# Patient Record
Sex: FEMALE | Race: WHITE | NOT HISPANIC OR LATINO | Employment: OTHER | URBAN - METROPOLITAN AREA
[De-identification: names, ages, dates, MRNs, and addresses within clinical notes are randomized per-mention and may not be internally consistent; named-entity substitution may affect disease eponyms.]

---

## 2017-12-13 ENCOUNTER — APPOINTMENT (OUTPATIENT)
Dept: WOMENS IMAGING | Facility: HOSPITAL | Age: 62
End: 2017-12-13

## 2017-12-13 PROCEDURE — 77063 BREAST TOMOSYNTHESIS BI: CPT | Performed by: RADIOLOGY

## 2017-12-13 PROCEDURE — G0202 SCR MAMMO BI INCL CAD: HCPCS | Performed by: RADIOLOGY

## 2017-12-13 PROCEDURE — 77067 SCR MAMMO BI INCL CAD: CPT | Performed by: RADIOLOGY

## 2017-12-13 PROCEDURE — MDREVIEWSP: Performed by: RADIOLOGY

## 2018-12-14 ENCOUNTER — APPOINTMENT (OUTPATIENT)
Dept: WOMENS IMAGING | Facility: HOSPITAL | Age: 63
End: 2018-12-14

## 2018-12-14 PROCEDURE — MDREVIEWSP: Performed by: RADIOLOGY

## 2018-12-14 PROCEDURE — 77067 SCR MAMMO BI INCL CAD: CPT | Performed by: RADIOLOGY

## 2018-12-14 PROCEDURE — 77063 BREAST TOMOSYNTHESIS BI: CPT | Performed by: RADIOLOGY

## 2019-01-02 ENCOUNTER — TELEPHONE (OUTPATIENT)
Dept: GASTROENTEROLOGY | Facility: CLINIC | Age: 64
End: 2019-01-02

## 2019-01-02 NOTE — TELEPHONE ENCOUNTER
FAST TRACK (IN MEDIA) - RECALL COLONOSCOPY - LAST COON 12/2013 - PERSONAL HX POLYPS & FAMILY HX POLYPS, DAD - [facility].

## 2019-01-03 NOTE — TELEPHONE ENCOUNTER
WANTED YOU TO KNOW.  SLEEPS ON HER RIGHT SIDE.  WAKES UP WITH ROLLING ABD PAIN THAT GOES AWAY SLOWING.

## 2019-01-10 ENCOUNTER — PREP FOR SURGERY (OUTPATIENT)
Dept: OTHER | Facility: HOSPITAL | Age: 64
End: 2019-01-10

## 2019-01-10 DIAGNOSIS — D12.6 ADENOMATOUS POLYP OF COLON, UNSPECIFIED PART OF COLON: Primary | ICD-10-CM

## 2019-01-11 PROBLEM — D12.6 ADENOMATOUS POLYP OF COLON: Status: ACTIVE | Noted: 2019-01-11

## 2019-01-11 NOTE — TELEPHONE ENCOUNTER
SCHEDULED [facility] 03/27/2019 AT PM - ARRIVE 12PM NOON.  E-MAIL INSTRUCTIONS [email or web address].

## 2019-03-26 ENCOUNTER — ANESTHESIA EVENT (OUTPATIENT)
Dept: PERIOP | Facility: HOSPITAL | Age: 64
End: 2019-03-26

## 2019-03-27 ENCOUNTER — HOSPITAL ENCOUNTER (OUTPATIENT)
Facility: HOSPITAL | Age: 64
Setting detail: HOSPITAL OUTPATIENT SURGERY
Discharge: HOME | End: 2019-03-27
Attending: INTERNAL MEDICINE | Admitting: INTERNAL MEDICINE

## 2019-03-27 ENCOUNTER — ANESTHESIA (OUTPATIENT)
Dept: PERIOP | Facility: HOSPITAL | Age: 64
End: 2019-03-27

## 2019-03-27 VITALS
RESPIRATION RATE: 16 BRPM | TEMPERATURE: 97.5 F | SYSTOLIC BLOOD PRESSURE: 165 MMHG | OXYGEN SATURATION: 98 % | HEART RATE: 82 BPM | WEIGHT: 156.2 LBS | HEIGHT: 62 IN | DIASTOLIC BLOOD PRESSURE: 88 MMHG | BODY MASS INDEX: 28.74 KG/M2

## 2019-03-27 DIAGNOSIS — D12.6 ADENOMATOUS POLYP OF COLON, UNSPECIFIED PART OF COLON: ICD-10-CM

## 2019-03-27 PROCEDURE — 25010000002 PROPOFOL 10 MG/ML EMULSION: Performed by: NURSE ANESTHETIST, CERTIFIED REGISTERED

## 2019-03-27 PROCEDURE — 45385 COLONOSCOPY W/LESION REMOVAL: CPT | Performed by: INTERNAL MEDICINE

## 2019-03-27 PROCEDURE — 45380 COLONOSCOPY AND BIOPSY: CPT | Performed by: INTERNAL MEDICINE

## 2019-03-27 PROCEDURE — 88305 TISSUE EXAM BY PATHOLOGIST: CPT | Performed by: INTERNAL MEDICINE

## 2019-03-27 RX ORDER — LIDOCAINE HYDROCHLORIDE 10 MG/ML
INJECTION, SOLUTION INFILTRATION; PERINEURAL AS NEEDED
Status: DISCONTINUED | OUTPATIENT
Start: 2019-03-27 | End: 2019-03-27 | Stop reason: SURG

## 2019-03-27 RX ORDER — SODIUM CHLORIDE 0.9 % (FLUSH) 0.9 %
3 SYRINGE (ML) INJECTION EVERY 12 HOURS SCHEDULED
Status: DISCONTINUED | OUTPATIENT
Start: 2019-03-27 | End: 2019-03-27 | Stop reason: HOSPADM

## 2019-03-27 RX ORDER — SODIUM CHLORIDE 9 MG/ML
40 INJECTION, SOLUTION INTRAVENOUS AS NEEDED
Status: DISCONTINUED | OUTPATIENT
Start: 2019-03-27 | End: 2019-03-27 | Stop reason: HOSPADM

## 2019-03-27 RX ORDER — MEPERIDINE HYDROCHLORIDE 25 MG/ML
12.5 INJECTION INTRAMUSCULAR; INTRAVENOUS; SUBCUTANEOUS
Status: CANCELLED | OUTPATIENT
Start: 2019-03-27 | End: 2019-03-28

## 2019-03-27 RX ORDER — CHLORAL HYDRATE 500 MG
1000 CAPSULE ORAL
COMMUNITY

## 2019-03-27 RX ORDER — LEVOTHYROXINE SODIUM 0.07 MG/1
75 TABLET ORAL DAILY
COMMUNITY
End: 2021-03-18

## 2019-03-27 RX ORDER — ONDANSETRON 2 MG/ML
4 INJECTION INTRAMUSCULAR; INTRAVENOUS ONCE AS NEEDED
Status: CANCELLED | OUTPATIENT
Start: 2019-03-27

## 2019-03-27 RX ORDER — SODIUM CHLORIDE, SODIUM LACTATE, POTASSIUM CHLORIDE, CALCIUM CHLORIDE 600; 310; 30; 20 MG/100ML; MG/100ML; MG/100ML; MG/100ML
100 INJECTION, SOLUTION INTRAVENOUS CONTINUOUS
Status: CANCELLED | OUTPATIENT
Start: 2019-03-27

## 2019-03-27 RX ORDER — SODIUM CHLORIDE, SODIUM LACTATE, POTASSIUM CHLORIDE, CALCIUM CHLORIDE 600; 310; 30; 20 MG/100ML; MG/100ML; MG/100ML; MG/100ML
9 INJECTION, SOLUTION INTRAVENOUS CONTINUOUS
Status: DISCONTINUED | OUTPATIENT
Start: 2019-03-27 | End: 2019-03-27 | Stop reason: HOSPADM

## 2019-03-27 RX ORDER — PHENOL 1.4 %
600 AEROSOL, SPRAY (ML) MUCOUS MEMBRANE DAILY
COMMUNITY

## 2019-03-27 RX ORDER — SODIUM CHLORIDE 0.9 % (FLUSH) 0.9 %
1-10 SYRINGE (ML) INJECTION AS NEEDED
Status: DISCONTINUED | OUTPATIENT
Start: 2019-03-27 | End: 2019-03-27 | Stop reason: HOSPADM

## 2019-03-27 RX ORDER — PROPOFOL 10 MG/ML
VIAL (ML) INTRAVENOUS AS NEEDED
Status: DISCONTINUED | OUTPATIENT
Start: 2019-03-27 | End: 2019-03-27 | Stop reason: SURG

## 2019-03-27 RX ORDER — LIDOCAINE HYDROCHLORIDE 10 MG/ML
0.5 INJECTION, SOLUTION EPIDURAL; INFILTRATION; INTRACAUDAL; PERINEURAL ONCE AS NEEDED
Status: COMPLETED | OUTPATIENT
Start: 2019-03-27 | End: 2019-03-27

## 2019-03-27 RX ADMIN — PROPOFOL 80 MG: 10 INJECTION, EMULSION INTRAVENOUS at 13:31

## 2019-03-27 RX ADMIN — LIDOCAINE HYDROCHLORIDE 0.1 ML: 10 INJECTION, SOLUTION EPIDURAL; INFILTRATION; INTRACAUDAL; PERINEURAL at 12:40

## 2019-03-27 RX ADMIN — PROPOFOL 40 MG: 10 INJECTION, EMULSION INTRAVENOUS at 13:41

## 2019-03-27 RX ADMIN — PROPOFOL 50 MG: 10 INJECTION, EMULSION INTRAVENOUS at 13:43

## 2019-03-27 RX ADMIN — LIDOCAINE HYDROCHLORIDE 30 MG: 10 INJECTION, SOLUTION INFILTRATION; PERINEURAL at 13:31

## 2019-03-27 RX ADMIN — PROPOFOL 40 MG: 10 INJECTION, EMULSION INTRAVENOUS at 13:37

## 2019-03-27 RX ADMIN — PROPOFOL 50 MG: 10 INJECTION, EMULSION INTRAVENOUS at 13:45

## 2019-03-27 RX ADMIN — PROPOFOL 40 MG: 10 INJECTION, EMULSION INTRAVENOUS at 13:34

## 2019-03-27 RX ADMIN — SODIUM CHLORIDE, POTASSIUM CHLORIDE, SODIUM LACTATE AND CALCIUM CHLORIDE 9 ML/HR: 600; 310; 30; 20 INJECTION, SOLUTION INTRAVENOUS at 12:40

## 2019-03-27 NOTE — ANESTHESIA POSTPROCEDURE EVALUATION
Patient: Yesi Benson    Procedure Summary     Date:  03/27/19 Room / Location:  [facility] ENDOSCOPY 1 /  [facility]    Anesthesia Start:  1322 Anesthesia Stop:  1357    Procedure:  COLONOSCOPY POLYPECTOMY (N/A ) Diagnosis:       Adenomatous polyp of colon, unspecified part of colon      (Adenomatous polyp of colon, unspecified part of colon [D12.6])    Surgeon:  Jaime Alaniz MD Provider:  Segun Benjamin CRNA    Anesthesia Type:  MAC ASA Status:  2          Anesthesia Type: MAC  Last vitals  BP   157/91 (03/27/19 1410)   Temp   97.5 °F (36.4 °C) (03/27/19 1400)   Pulse   83 (03/27/19 1410)   Resp   16 (03/27/19 1410)     SpO2   97 % (03/27/19 1410)     Post Anesthesia Care and Evaluation    Patient location during evaluation: bedside  Patient participation: complete - patient participated  Level of consciousness: awake  Pain score: 0  Pain management: adequate  Airway patency: patent  Anesthetic complications: No anesthetic complications  PONV Status: none  Cardiovascular status: acceptable  Respiratory status: acceptable  Hydration status: acceptable

## 2019-03-27 NOTE — ANESTHESIA PREPROCEDURE EVALUATION
Anesthesia Evaluation     Patient summary reviewed and Nursing notes reviewed   NPO Solid Status: > 8 hours  NPO Liquid Status: > 4 hours           Airway   Mallampati: II  TM distance: >3 FB  Neck ROM: full  No difficulty expected  Dental          Pulmonary - negative pulmonary ROS    breath sounds clear to auscultation  Cardiovascular - negative cardio ROS  Exercise tolerance: good (4-7 METS)    Rhythm: regular  Rate: normal        Neuro/Psych- negative ROS  GI/Hepatic/Renal/Endo    (+)   hypothyroidism,     Musculoskeletal (-) negative ROS    Abdominal    Substance History - negative use     OB/GYN          Other - negative ROS       ROS/Med Hx Other: Last of prep at 0700      Phys Exam Other: bridge                Anesthesia Plan    ASA 2     MAC     intravenous induction   Anesthetic plan, all risks, benefits, and alternatives have been provided, discussed and informed consent has been obtained with: patient.  Use of blood products discussed with patient  Consented to blood products.   Plan discussed with CRNA.

## 2019-03-29 LAB
CYTO UR: NORMAL
LAB AP CASE REPORT: NORMAL
LAB AP CLINICAL INFORMATION: NORMAL
PATH REPORT.FINAL DX SPEC: NORMAL
PATH REPORT.GROSS SPEC: NORMAL

## 2019-07-02 ENCOUNTER — APPOINTMENT (OUTPATIENT)
Dept: WOMENS IMAGING | Facility: HOSPITAL | Age: 64
End: 2019-07-02

## 2019-07-02 PROCEDURE — MDREVIEWSP: Performed by: RADIOLOGY

## 2019-07-02 PROCEDURE — 76641 ULTRASOUND BREAST COMPLETE: CPT | Performed by: RADIOLOGY

## 2019-09-25 ENCOUNTER — OFFICE VISIT (OUTPATIENT)
Dept: ORTHOPEDIC SURGERY | Facility: CLINIC | Age: 64
End: 2019-09-25

## 2019-09-25 VITALS
SYSTOLIC BLOOD PRESSURE: 130 MMHG | WEIGHT: 155 LBS | DIASTOLIC BLOOD PRESSURE: 86 MMHG | HEIGHT: 62 IN | BODY MASS INDEX: 28.52 KG/M2 | HEART RATE: 73 BPM

## 2019-09-25 DIAGNOSIS — R52 PAIN: Primary | ICD-10-CM

## 2019-09-25 DIAGNOSIS — M25.461 EFFUSION, RIGHT KNEE: ICD-10-CM

## 2019-09-25 DIAGNOSIS — M17.11 PRIMARY OSTEOARTHRITIS OF RIGHT KNEE: ICD-10-CM

## 2019-09-25 PROCEDURE — 73562 X-RAY EXAM OF KNEE 3: CPT | Performed by: NURSE PRACTITIONER

## 2019-09-25 PROCEDURE — 99203 OFFICE O/P NEW LOW 30 MIN: CPT | Performed by: NURSE PRACTITIONER

## 2019-09-25 PROCEDURE — 20610 DRAIN/INJ JOINT/BURSA W/O US: CPT | Performed by: NURSE PRACTITIONER

## 2019-09-25 RX ORDER — TRIAMCINOLONE ACETONIDE 40 MG/ML
80 INJECTION, SUSPENSION INTRA-ARTICULAR; INTRAMUSCULAR
Status: COMPLETED | OUTPATIENT
Start: 2019-09-25 | End: 2019-09-25

## 2019-09-25 RX ORDER — LIDOCAINE HYDROCHLORIDE 10 MG/ML
8 INJECTION, SOLUTION EPIDURAL; INFILTRATION; INTRACAUDAL; PERINEURAL
Status: COMPLETED | OUTPATIENT
Start: 2019-09-25 | End: 2019-09-25

## 2019-09-25 RX ADMIN — TRIAMCINOLONE ACETONIDE 80 MG: 40 INJECTION, SUSPENSION INTRA-ARTICULAR; INTRAMUSCULAR at 13:50

## 2019-09-25 RX ADMIN — LIDOCAINE HYDROCHLORIDE 8 ML: 10 INJECTION, SOLUTION EPIDURAL; INFILTRATION; INTRACAUDAL; PERINEURAL at 13:50

## 2019-09-25 NOTE — PROGRESS NOTES
Subjective:     Patient ID: Yesi Benson is a 64 y.o. female.    Chief Complaint:  Right knee pain   History of Present Illness  Yesi Benson 64-year-old female who presents to clinic with a 6-week history of pain at the right knee.  On 8/17/2019 she was walking long distances and hills when she began noticing pain.  Over the last 6 weeks pain has improved just slightly but not back to 100%.  Rates discomfort today at a 5-6 out of a 10 mainly aching throbbing in nature.  Increased pain noted with ambulating long distances, standing for extended periods of time, ascending descending steps, transitional changes such as from seated to standing attempting to walk, activities involving deep flexion.  Denies any is locking, catching or giving way.  She denies previously experiencing pain similar to what she is currently experiencing.  Denies known injury in the past.  She is tried applying ice for symptom relief again has helped minimally.  She has taken ibuprofen with mild symptom relief.  Prior to onset of pain she was able to complete exercise regimen on both treadmill and elliptical however has had to avoid the elliptical over the last several weeks since she began experience in pain.  She denies previous x-ray, MRI or CT imaging of the knee in the past.  Denies presence of numbness or tingling radiating down the right lower extremity.  Denies other concerns present at this time.    Social History     Occupational History   • Not on file   Tobacco Use   • Smoking status: Never Smoker   Substance and Sexual Activity   • Alcohol use: Not on file   • Drug use: Not on file   • Sexual activity: Not on file      Past Medical History:   Diagnosis Date   • Colon polyp    • Disease of thyroid gland      Past Surgical History:   Procedure Laterality Date   • COLONOSCOPY N/A 3/27/2019    Procedure: COLONOSCOPY POLYPECTOMY;  Surgeon: Jaime Alaniz MD;  Location: [facility];  Service: Gastroenterology   • DENTAL  "PROCEDURE Left 2011,2014   • SALPINGO OOPHORECTOMY Left        No family history on file.      Review of Systems   Constitutional: Negative for chills, diaphoresis, fever and unexpected weight change.   HENT: Negative for hearing loss, nosebleeds, sore throat and tinnitus.    Eyes: Negative for pain and visual disturbance.   Respiratory: Negative for cough, shortness of breath and wheezing.    Cardiovascular: Negative for chest pain and palpitations.   Gastrointestinal: Negative for abdominal pain, diarrhea, nausea and vomiting.   Endocrine: Negative for cold intolerance, heat intolerance and polydipsia.   Genitourinary: Negative for difficulty urinating, dysuria and hematuria.   Musculoskeletal: Positive for arthralgias and myalgias. Negative for joint swelling.   Skin: Negative for rash and wound.   Allergic/Immunologic: Negative for environmental allergies.   Neurological: Negative for dizziness, syncope and numbness.   Hematological: Does not bruise/bleed easily.   Psychiatric/Behavioral: Negative for dysphoric mood and sleep disturbance. The patient is not nervous/anxious.            Objective:  Physical Exam    Vital signs reviewed.   General: No acute distress.  Eyes: conjunctiva clear; pupils equally round and reactive  ENT: external ears and nose atraumatic; oropharynx clear  CV: no peripheral edema  Resp: normal respiratory effort  Skin: no rashes or wounds; normal turgor  Psych: mood and affect appropriate; recent and remote memory intact    Vitals:    09/25/19 1311   BP: 130/86   BP Location: Right arm   Patient Position: Sitting   Cuff Size: Adult   Pulse: 73   Weight: 70.3 kg (155 lb)   Height: 157.5 cm (62\")         09/25/19  1311   Weight: 70.3 kg (155 lb)     Body mass index is 28.35 kg/m².      Right Knee Exam     Tenderness   The patient is experiencing tenderness in the lateral joint line and medial joint line.    Range of Motion   Extension: 0   Flexion: 120     Tests   Jae:  Medial - " negative Lateral - negative  Varus: negative Valgus: negative  Lachman:  Anterior - 1+    Posterior - negative  Drawer:  Anterior - negative    Posterior - negative  Patellar apprehension: negative    Other   Erythema: absent  Sensation: normal  Pulse: present  Swelling: moderate  Effusion: effusion present    Comments:  Positive crepitus throughout arc of motion  Negative active patellar compression test                  Imaging:  Right Knee X-Ray  Indication: Pain    AP, Lateral, and [place] views    Findings:  No fracture  No bony lesion  Normal soft tissues  Patellofemoral joint narrowing with near bone-on-bone articulation  No prior studies were available for comparison.    Assessment:        1. Pain    2. Primary osteoarthritis of right knee    3. Effusion, right knee           Plan:  1.  Discussed plan of care with patient.  Wish to proceed with arthrocentesis corticosteroid injection of the right knee.  Plan to see her back in approximate 4 weeks to reevaluate.  Discussed with patient we will start her on diclofenac 50 mg 1 tablet in the morning with Tylenol.  Encouraged her to discontinue use of any over-the-counter NSAIDs including ibuprofen and Aleve may take Tylenol.  2.  Discussed with her to continue exercise regimen when the pain has decreased and she is able to continue with all previously tolerated activities.  Discussed with her there is a Baker's cyst in the posterior aspect of the knee which may rupture increase the pain.  If this does occur encouraged her to apply ice to the posterior aspect of the knee.  Encouraged for her to call with any concerns or questions she may have.  Patient verbalized understanding wants to make sugars Medicare.  Denies other concerns present at this time.    Large Joint Arthrocentesis: R knee  Date/Time: 9/25/2019 1:50 PM  Consent given by: patient  Site marked: site marked  Timeout: Immediately prior to procedure a time out was called to verify the correct patient,  procedure, equipment, support staff and site/side marked as required   Supporting Documentation  Indications: pain   Procedure Details  Location: knee - R knee  Preparation: Patient was prepped and draped in the usual sterile fashion  Needle size: 22 G  Approach: anterolateral  Medications administered: 8 mL lidocaine PF 1% 1 %; 80 mg triamcinolone acetonide 40 MG/ML  Aspirate amount: 10 mL  Aspirate: yellow  Patient tolerance: patient tolerated the procedure well with no immediate complications            Orders:  Orders Placed This Encounter   Procedures   • Large Joint Arthrocentesis: R knee   • XR Knee 3+ View With [place] Right       I ordered and reviewed the FARIDA today.     Dictated utilizing Dragon dictation

## 2019-10-23 ENCOUNTER — OFFICE VISIT (OUTPATIENT)
Dept: ORTHOPEDIC SURGERY | Facility: CLINIC | Age: 64
End: 2019-10-23

## 2019-10-23 DIAGNOSIS — M17.11 PRIMARY OSTEOARTHRITIS OF RIGHT KNEE: Primary | ICD-10-CM

## 2019-10-23 PROCEDURE — 99213 OFFICE O/P EST LOW 20 MIN: CPT | Performed by: NURSE PRACTITIONER

## 2019-10-23 NOTE — PROGRESS NOTES
Subjective:     Patient ID: Yesi Benson is a 64 y.o. female.    Chief Complaint:  Follow-up primary osteoarthritis right knee  History of Present Illness  Yesi Benson presents back to clinic for follow-up right knee.  Arthrocentesis corticosteroid injection followed by starting her on diclofenac approximately 4 weeks ago states the 1-1/2-week ago felt a sudden pop in the posterior aspect of the knee and ever since that time pain has been significantly better.  She has been able to fully extend and flex the knee without discomfort.  States the pain is approximately 85 to 90% better and she has returned to treadmill activities however has been very cautious for fear of pain at the knee.  Maximal tenderness continues to be present the anterior aspect but also on occasion the medial and lateral joint line.  Denies that the knee is locking, catching or giving out on her.  Rates discomfort today to 4 out of a 10 aching in nature, time the pain is wax and wane.  Denies locking, catching or giving way.  Denies presence of numbness or tingling right lower extremity.  Denies other concerns present at this time.    Social History     Occupational History   • Not on file   Tobacco Use   • Smoking status: Never Smoker   Substance and Sexual Activity   • Alcohol use: Not on file   • Drug use: Not on file   • Sexual activity: Not on file      Past Medical History:   Diagnosis Date   • Colon polyp    • Disease of thyroid gland      Past Surgical History:   Procedure Laterality Date   • COLONOSCOPY N/A 3/27/2019    Procedure: COLONOSCOPY POLYPECTOMY;  Surgeon: Jaime Alaniz MD;  Location: [facility];  Service: Gastroenterology   • DENTAL PROCEDURE Left 2011,2014   • SALPINGO OOPHORECTOMY Left        No family history on file.      Review of Systems   Constitutional: Negative for chills, diaphoresis, fever and unexpected weight change.   HENT: Negative for hearing loss, nosebleeds, sore throat and tinnitus.    Eyes:  Negative for pain and visual disturbance.   Respiratory: Negative for cough, shortness of breath and wheezing.    Cardiovascular: Negative for chest pain and palpitations.   Gastrointestinal: Negative for abdominal pain, diarrhea, nausea and vomiting.   Endocrine: Negative for cold intolerance, heat intolerance and polydipsia.   Genitourinary: Negative for difficulty urinating, dysuria and hematuria.   Musculoskeletal: Positive for arthralgias. Negative for joint swelling and myalgias.   Skin: Negative for rash and wound.   Allergic/Immunologic: Negative for environmental allergies.   Neurological: Negative for dizziness, syncope and numbness.   Hematological: Does not bruise/bleed easily.   Psychiatric/Behavioral: Negative for dysphoric mood and sleep disturbance. The patient is not nervous/anxious.    All other systems reviewed and are negative.          Objective:  Physical Exam    Vital signs reviewed.   General: No acute distress.  Eyes: conjunctiva clear; pupils equally round and reactive  ENT: external ears and nose atraumatic; oropharynx clear  CV: no peripheral edema  Resp: normal respiratory effort  Skin: no rashes or wounds; normal turgor  Psych: mood and affect appropriate; recent and remote memory intact    There were no vitals filed for this visit.  There were no vitals filed for this visit.  There is no height or weight on file to calculate BMI.      Right Knee Exam     Tenderness   The patient is experiencing tenderness in the patella.    Range of Motion   Extension: 0   Flexion: 130     Tests   Jae:  Medial - negative Lateral - negative  Varus: negative Valgus: negative  Lachman:  Anterior - 1+    Posterior - negative  Drawer:  Anterior - negative      Patellar apprehension: negative    Other   Erythema: absent  Sensation: normal  Pulse: present  Swelling: mild  Effusion: no effusion present    Comments:  Positive crepitus throughout arc of motion  Negative active patellar compression test              Assessment:        1. Primary osteoarthritis of right knee           Plan:  1.  Discussed plan of care with patient.  Wishes to proceed with Visco supplementation authorization.  We will plan to see her back in clinic after authorization approved to start Visco supplementation injections right knee.  Patient encouraged to proceed with weightbearing activities including walking on treadmill as tolerated.  Patient verbalized understanding of all information agrees plan of care.  Denies other concerns present at this time.  Orders:  Orders Placed This Encounter   Procedures   • Visco Treatment       Dictated utilizing Dragon dictation

## 2019-11-04 ENCOUNTER — CLINICAL SUPPORT (OUTPATIENT)
Dept: ORTHOPEDIC SURGERY | Facility: CLINIC | Age: 64
End: 2019-11-04

## 2019-11-04 DIAGNOSIS — M17.11 PRIMARY OSTEOARTHRITIS OF RIGHT KNEE: Primary | ICD-10-CM

## 2019-11-04 PROCEDURE — 20610 DRAIN/INJ JOINT/BURSA W/O US: CPT | Performed by: NURSE PRACTITIONER

## 2019-11-04 NOTE — PROGRESS NOTES
Large Joint Arthrocentesis: R knee  Date/Time: 11/4/2019 2:21 PM  Consent given by: patient  Site marked: site marked  Timeout: Immediately prior to procedure a time out was called to verify the correct patient, procedure, equipment, support staff and site/side marked as required   Supporting Documentation  Indications: pain   Procedure Details  Location: knee - R knee  Preparation: Patient was prepped and draped in the usual sterile fashion  Needle size: 22 G  Approach: superior  Medications administered: 30 mg Hyaluronan 30 MG/2ML  Patient tolerance: patient tolerated the procedure well with no immediate complications        Patient presents to clinic today for right knee viscosupplement injections.  This is the first injection of the series.  I explained details of injections as well as risks, benefits and alternatives with the patient today, had all questions answered, wished to proceed with injections.  I will see patient back in 1 week for repeat injection.  Patient was instructed to watch for signs or symptoms of infection including redness, swelling, warmth to the touch, or significant increased pain and to contact our office immediately if any of these issues were noted.

## 2019-11-11 ENCOUNTER — CLINICAL SUPPORT (OUTPATIENT)
Dept: ORTHOPEDIC SURGERY | Facility: CLINIC | Age: 64
End: 2019-11-11

## 2019-11-11 VITALS — HEIGHT: 62 IN | WEIGHT: 155 LBS | BODY MASS INDEX: 28.52 KG/M2

## 2019-11-11 DIAGNOSIS — M17.11 PRIMARY OSTEOARTHRITIS OF RIGHT KNEE: Primary | ICD-10-CM

## 2019-11-11 PROCEDURE — 20610 DRAIN/INJ JOINT/BURSA W/O US: CPT | Performed by: NURSE PRACTITIONER

## 2019-11-11 NOTE — PROGRESS NOTES
Procedure   Large Joint Arthrocentesis: R knee  Date/Time: 11/11/2019 1:31 PM  Consent given by: patient  Site marked: site marked  Timeout: Immediately prior to procedure a time out was called to verify the correct patient, procedure, equipment, support staff and site/side marked as required   Supporting Documentation  Indications: pain   Procedure Details  Location: knee - R knee  Preparation: Patient was prepped and draped in the usual sterile fashion  Needle size: 22 G  Approach: superior (LATERAL)  Medications administered: 30 mg Hyaluronan 30 MG/2ML  Patient tolerance: patient tolerated the procedure well with no immediate complications      ORTHOVISC PROVIDED VIA [facility].    Patient presents to clinic today for right knee viscosupplement injections.  This is the second injection of the series.  I explained details of injections as well as risks, benefits and alternatives with the patient today, had all questions answered, wished to proceed with injections.  I will see patient back in 1 week for repeat injection.  Patient was instructed to watch for signs or symptoms of infection including redness, swelling, warmth to the touch, or significant increased pain and to contact our office immediately if any of these issues were noted.

## 2019-11-18 ENCOUNTER — CLINICAL SUPPORT (OUTPATIENT)
Dept: ORTHOPEDIC SURGERY | Facility: CLINIC | Age: 64
End: 2019-11-18

## 2019-11-18 DIAGNOSIS — M17.11 PRIMARY OSTEOARTHRITIS OF RIGHT KNEE: ICD-10-CM

## 2019-11-18 DIAGNOSIS — R52 PAIN: Primary | ICD-10-CM

## 2019-11-18 PROCEDURE — 20610 DRAIN/INJ JOINT/BURSA W/O US: CPT | Performed by: NURSE PRACTITIONER

## 2019-11-18 NOTE — PROGRESS NOTES
Large Joint Arthrocentesis: R knee  Date/Time: 11/18/2019 8:17 AM  Consent given by: patient  Site marked: site marked  Timeout: Immediately prior to procedure a time out was called to verify the correct patient, procedure, equipment, support staff and site/side marked as required   Supporting Documentation  Indications: pain   Procedure Details  Location: knee - R knee  Preparation: Patient was prepped and draped in the usual sterile fashion  Needle size: 22 G  Approach: anterolateral  Medications administered: 30 mg Hyaluronan 30 MG/2ML  Patient tolerance: patient tolerated the procedure well with no immediate complications        Patient presents to clinic today for right knee viscosupplement injections.  This is the 3rd injection of the series.  I explained details of injections as well as risks, benefits and alternatives with the patient today, had all questions answered, wished to proceed with injections.  I will see patient back in 6 weeks for re-evaluation. Patient was instructed to watch for signs or symptoms of infection including redness, swelling, warmth to the touch, or significant increased pain and to contact our office immediately if any of these issues were noted.

## 2019-12-16 ENCOUNTER — APPOINTMENT (OUTPATIENT)
Dept: WOMENS IMAGING | Facility: HOSPITAL | Age: 64
End: 2019-12-16

## 2019-12-16 PROCEDURE — 77067 SCR MAMMO BI INCL CAD: CPT | Performed by: RADIOLOGY

## 2019-12-16 PROCEDURE — MDREVIEWSP: Performed by: RADIOLOGY

## 2019-12-16 PROCEDURE — 77063 BREAST TOMOSYNTHESIS BI: CPT | Performed by: RADIOLOGY

## 2020-01-22 ENCOUNTER — OFFICE VISIT (OUTPATIENT)
Dept: ORTHOPEDIC SURGERY | Facility: CLINIC | Age: 65
End: 2020-01-22

## 2020-01-22 DIAGNOSIS — M17.11 PRIMARY OSTEOARTHRITIS OF RIGHT KNEE: Primary | ICD-10-CM

## 2020-01-22 DIAGNOSIS — M25.461 EFFUSION, RIGHT KNEE: ICD-10-CM

## 2020-01-22 PROCEDURE — 99213 OFFICE O/P EST LOW 20 MIN: CPT | Performed by: NURSE PRACTITIONER

## 2020-01-22 RX ORDER — METHYLPREDNISOLONE 4 MG/1
TABLET ORAL
Qty: 21 TABLET | Refills: 0 | Status: SHIPPED | OUTPATIENT
Start: 2020-01-22 | End: 2020-02-21

## 2020-01-22 NOTE — PROGRESS NOTES
Subjective:     Patient ID: Yesi Benson is a 64 y.o. female.    Chief Complaint:  Follow-up primary osteoarthritis right knee  History of Present Illness  Yesi Benson returns to clinic for follow-up right knee.  She received Visco supplementation injections last visit on 11/18/2019 with approximately 90 to 95% symptom relief.  She is experiencing pain at the posterior joint line which does wax and wane.  She notices discomfort when she is seated in a car and the car seat is pushing up against the posterior joint line and notices stiffness in the a.m. however when she kicks the knee back and forth she notices the stiffness is slowly decreasing and eases.  After she is seated for any length of time notices that she is also experiencing stiffness in the knee but pain is very tolerable.  She has resumed all previously tolerated activities including riding a bike, ambulating on treadmill.  She has had orthotics in past has been unknown amount of time since she last replaced those.  She is very pleased with the relief that she is achieved thus far has received greater relief with the Visco supplementation injections than that of the steroid.  Denies that the knee is locking, catching or giving away.  She is continued diclofenac but only taking once daily.  Denies other concerns present this time.       Social History     Occupational History   • Not on file   Tobacco Use   • Smoking status: Never Smoker   • Smokeless tobacco: Never Used   Substance and Sexual Activity   • Alcohol use: No     Frequency: Never   • Drug use: No   • Sexual activity: Defer      Past Medical History:   Diagnosis Date   • Colon polyp    • Disease of thyroid gland      Past Surgical History:   Procedure Laterality Date   • COLONOSCOPY N/A 3/27/2019    Procedure: COLONOSCOPY POLYPECTOMY;  Surgeon: Jaime Alaniz MD;  Location: [facility];  Service: Gastroenterology   • DENTAL PROCEDURE Left 2011,2014   • SALPINGO OOPHORECTOMY Left         No family history on file.      Review of Systems   Constitutional: Negative for chills, diaphoresis, fever and unexpected weight change.   HENT: Negative for hearing loss, nosebleeds, sore throat and tinnitus.    Eyes: Negative for pain and visual disturbance.   Respiratory: Negative for cough, shortness of breath and wheezing.    Cardiovascular: Negative for chest pain and palpitations.   Gastrointestinal: Negative for abdominal pain, diarrhea, nausea and vomiting.   Endocrine: Negative for cold intolerance, heat intolerance and polydipsia.   Genitourinary: Negative for difficulty urinating, dysuria and hematuria.   Musculoskeletal: Positive for arthralgias. Negative for joint swelling and myalgias.   Skin: Negative for rash and wound.   Allergic/Immunologic: Negative for environmental allergies.   Neurological: Negative for dizziness, syncope and numbness.   Hematological: Does not bruise/bleed easily.   Psychiatric/Behavioral: Negative for dysphoric mood and sleep disturbance. The patient is not nervous/anxious.    All other systems reviewed and are negative.          Objective:  Physical Exam    General: No acute distress.  Eyes: conjunctiva clear; pupils equally round and reactive  ENT: external ears and nose atraumatic; oropharynx clear  CV: no peripheral edema  Resp: normal respiratory effort  Skin: no rashes or wounds; normal turgor  Psych: mood and affect appropriate; recent and remote memory intact    There were no vitals filed for this visit.  There were no vitals filed for this visit.  There is no height or weight on file to calculate BMI.      Right Knee Exam     Range of Motion   Extension: 0   Flexion: 130     Tests   Jae:  Medial - negative Lateral - negative  Varus: negative Valgus: negative  Lachman:  Anterior - 1+    Posterior - negative  Drawer:  Anterior - negative      Patellar apprehension: negative    Other   Erythema: absent  Sensation: normal  Pulse: present  Swelling:  moderate  Effusion: no effusion present    Comments:  Maximal tenderness posterior joint line             Assessment:        1. Primary osteoarthritis of right knee    2. Effusion, right knee           Plan:  1. Discussed plan of care with patient.  We will start Medrol Dosepak for the next 6 days encouraged to avoid concurrent use of any anti-inflammatory medications until after completion.  Was completed the steroid pack will proceed with resume the diclofenac.  If she is not improving will proceed with MRI of the right knee.  Discussed again we can repeat Visco supplementation injections as frequently as every 6 months in 1 day.  She verbalized understanding of all information agrees with plan of care.  Denies other concerns present this time.  Orders:  No orders of the defined types were placed in this encounter.      Dictated utilizing Dragon dictation

## 2020-02-21 ENCOUNTER — OFFICE VISIT (OUTPATIENT)
Dept: ORTHOPEDIC SURGERY | Facility: CLINIC | Age: 65
End: 2020-02-21

## 2020-02-21 VITALS — HEIGHT: 62 IN | WEIGHT: 157 LBS | BODY MASS INDEX: 28.89 KG/M2

## 2020-02-21 DIAGNOSIS — M17.11 PRIMARY OSTEOARTHRITIS OF RIGHT KNEE: Primary | ICD-10-CM

## 2020-02-21 PROCEDURE — 99212 OFFICE O/P EST SF 10 MIN: CPT | Performed by: NURSE PRACTITIONER

## 2020-02-21 PROCEDURE — 20610 DRAIN/INJ JOINT/BURSA W/O US: CPT | Performed by: NURSE PRACTITIONER

## 2020-02-21 RX ADMIN — LIDOCAINE HYDROCHLORIDE 4 ML: 10 INJECTION, SOLUTION EPIDURAL; INFILTRATION; INTRACAUDAL; PERINEURAL at 09:51

## 2020-02-21 RX ADMIN — TRIAMCINOLONE ACETONIDE 80 MG: 40 INJECTION, SUSPENSION INTRA-ARTICULAR; INTRAMUSCULAR at 09:51

## 2020-02-21 NOTE — PROGRESS NOTES
Subjective:     Patient ID: Yesi Benson is a 64 y.o. female.    Chief Complaint:  Follow-up primary osteoarthritis right knee  History of Present Illness  Yesi Benson returns to clinic for follow-up right knee.  Has continued with her exercise regimen riding recumbent bike twice daily completing straight leg raises and other resistance exercises and ambulating on treadmill twice a week.  She is able to tolerate the biking does report with treadmill activity stiffness after she is completed exercises and has continued to swell the right knee.  Last visit she was started on Medrol Dosepak which did seem to help but swelling and pain has returned.  Maximal tenderness present the posterior joint line but also swelling noted at the medial compartment.  Completed Visco series 11/18/2019 again was very pleased with the pain relief however swelling continues.  Denies any recent MRI or CT of the knee.  Increased pain with knees flexed specifically when seated in car and he thinks press against the posterior joint line.  Notices significant stiffness the knees in the a.m. does kick the back and forth to get the stiffness to decrease if it does ease if she is getting up in the morning.  Pain is not rating to the lateral aspect of the hip nor to the groin.  Denies all other concerns present this time.    Social History     Occupational History   • Not on file   Tobacco Use   • Smoking status: Never Smoker   • Smokeless tobacco: Never Used   Substance and Sexual Activity   • Alcohol use: No     Frequency: Never   • Drug use: No   • Sexual activity: Defer      Past Medical History:   Diagnosis Date   • Colon polyp    • Disease of thyroid gland      Past Surgical History:   Procedure Laterality Date   • COLONOSCOPY N/A 3/27/2019    Procedure: COLONOSCOPY POLYPECTOMY;  Surgeon: Jaime Alaniz MD;  Location: [facility];  Service: Gastroenterology   • DENTAL PROCEDURE Left 2011,2014   • SALPINGO OOPHORECTOMY Left   "      No family history on file.      Review of Systems   Constitutional: Negative for chills, diaphoresis, fever and unexpected weight change.   HENT: Negative for hearing loss, nosebleeds, sore throat and tinnitus.    Eyes: Negative for pain and visual disturbance.   Respiratory: Negative for cough, shortness of breath and wheezing.    Cardiovascular: Negative for chest pain and palpitations.   Gastrointestinal: Negative for abdominal pain, diarrhea, nausea and vomiting.   Endocrine: Negative for cold intolerance, heat intolerance and polydipsia.   Genitourinary: Negative for difficulty urinating, dysuria and hematuria.   Musculoskeletal: Positive for arthralgias and myalgias. Negative for joint swelling.   Skin: Negative for rash and wound.   Allergic/Immunologic: Negative for environmental allergies.   Neurological: Negative for dizziness, syncope and numbness.   Hematological: Does not bruise/bleed easily.   Psychiatric/Behavioral: Negative for dysphoric mood and sleep disturbance. The patient is not nervous/anxious.            Objective:  Physical Exam    General: No acute distress.  Eyes: conjunctiva clear; pupils equally round and reactive  ENT: external ears and nose atraumatic; oropharynx clear  CV: no peripheral edema  Resp: normal respiratory effort  Skin: no rashes or wounds; normal turgor  Psych: mood and affect appropriate; recent and remote memory intact    Vitals:    02/21/20 0922   Weight: 71.2 kg (157 lb)   Height: 157.5 cm (62\")         02/21/20  0922   Weight: 71.2 kg (157 lb)     Body mass index is 28.72 kg/m².      Right Knee Exam     Tenderness   The patient is experiencing tenderness in the medial joint line.    Range of Motion   Extension: 0   Flexion: 120     Tests   Jae:  Medial - negative Lateral - negative  Varus: negative Valgus: negative  Lachman:  Anterior - 1+    Posterior - negative  Drawer:  Anterior - negative    Posterior - negative  Patellar apprehension: negative    Other "   Erythema: absent  Sensation: normal  Pulse: present  Swelling: moderate  Effusion: no effusion present    Comments:  Maximal tenderness posterior joint line           Assessment:        1. Primary osteoarthritis of right knee           Plan:  1.  Discussed plan of care with patient.  Wish to pursue corticosteroid injection right knee.  Long discussion with patient regarding activity.  I do recommend she decrease walking on treadmill at this time see if the swelling will decrease.  May continue with exercise bike twice daily as well as leg lift exercises she was previously provided in clinic.  We will plan to see her back in clinic if not improving.  She verbalized understanding of all information agrees with plan of care.  Denies other concerns present this time.  Large Joint Arthrocentesis: R knee  Date/Time: 2/21/2020 9:51 AM  Consent given by: patient  Site marked: site marked  Timeout: Immediately prior to procedure a time out was called to verify the correct patient, procedure, equipment, support staff and site/side marked as required   Supporting Documentation  Indications: pain   Procedure Details  Location: knee - R knee  Preparation: Patient was prepped and draped in the usual sterile fashion  Needle size: 22 G  Approach: superior  Medications administered: 4 mL lidocaine PF 1% 1 %; 80 mg triamcinolone acetonide 40 MG/ML  Patient tolerance: patient tolerated the procedure well with no immediate complications          Orders:  Orders Placed This Encounter   Procedures   • Large Joint Arthrocentesis: R knee       Medications:  No orders of the defined types were placed in this encounter.      Followup:  No follow-ups on file.    Yesi was seen today for follow-up and pain.    Diagnoses and all orders for this visit:    Primary osteoarthritis of right knee    Other orders  -     Large Joint Arthrocentesis: R knee      Dictated utilizing Dragon dictation

## 2020-02-22 RX ORDER — LIDOCAINE HYDROCHLORIDE 10 MG/ML
4 INJECTION, SOLUTION EPIDURAL; INFILTRATION; INTRACAUDAL; PERINEURAL
Status: COMPLETED | OUTPATIENT
Start: 2020-02-21 | End: 2020-02-21

## 2020-02-22 RX ORDER — TRIAMCINOLONE ACETONIDE 40 MG/ML
80 INJECTION, SUSPENSION INTRA-ARTICULAR; INTRAMUSCULAR
Status: COMPLETED | OUTPATIENT
Start: 2020-02-21 | End: 2020-02-21

## 2020-04-20 ENCOUNTER — TELEMEDICINE (OUTPATIENT)
Dept: ORTHOPEDIC SURGERY | Facility: CLINIC | Age: 65
End: 2020-04-20

## 2020-04-20 DIAGNOSIS — M17.11 PRIMARY OSTEOARTHRITIS OF RIGHT KNEE: Primary | ICD-10-CM

## 2020-04-20 PROBLEM — E03.9 HYPOTHYROIDISM: Status: ACTIVE | Noted: 2017-11-01

## 2020-04-20 NOTE — PROGRESS NOTES
You have chosen to receive care through a telehealth visit.  Do you consent to use a video/audio connection for your medical care today? Yes    Subjective:     Patient ID: Yesi Benson is a 65 y.o. female.    Chief Complaint:  Follow-up primary osteoarthritis right knee, via video visit  History of Present Illness  Yesi Benson call completed today with video visit with patient regarding her right knee.  Rates discomfort day-to-day to 4-5 out of a 10 constant nagging ongoing pain reports pain present for 3 to 4 days a week is not constant every day.  She feels good in the morning she was able to ride her bike twice daily is also completing leg lifts once daily as previously discussed and has noted significant improvement since she was seen in September 2019 however not completely gone.  Denies at the knee is locking, catching or giving way.  Is not experiencing pain radiating to her groin or to the lateral aspect of the hip.  She does experience tingling pins-and-needles sensation radiating down the right lower extremity and into the foot on occasion is not constant.  She is noticing swelling down into her foot as well.  Maximal tenderness present the anterior aspect of the knee as well as the posterior joint line of the knee joint worse later on throughout the day.  She has not had any difficulty ambulating is not had any difficulty when she is on her bike.  She is continued anti-inflammatory medication tolerating well.  She has now completed Visco supplementation injections which did help as well as corticosteroid injections which have also helped to decrease the pain again just not 100% relief.  He has not yet ready to undergo any total joint replacement surgery.  Denies other concerns present this time.       Social History     Occupational History   • Not on file   Tobacco Use   • Smoking status: Never Smoker   • Smokeless tobacco: Never Used   Substance and Sexual Activity   • Alcohol use: No     Frequency:  Never   • Drug use: No   • Sexual activity: Defer      Past Medical History:   Diagnosis Date   • Colon polyp    • Disease of thyroid gland      Past Surgical History:   Procedure Laterality Date   • COLONOSCOPY N/A 3/27/2019    Procedure: COLONOSCOPY POLYPECTOMY;  Surgeon: Jaime Alaniz MD;  Location: [facility];  Service: Gastroenterology   • DENTAL PROCEDURE Left 2011,2014   • SALPINGO OOPHORECTOMY Left        History reviewed. No pertinent family history.      Review of Systems   Constitutional: Negative for chills, diaphoresis and unexpected weight change.   HENT: Negative for hearing loss, nosebleeds, sore throat and tinnitus.    Eyes: Negative for pain and visual disturbance.   Respiratory: Negative for cough, shortness of breath and wheezing.    Cardiovascular: Negative for chest pain and palpitations.   Gastrointestinal: Negative for abdominal pain, diarrhea, nausea and vomiting.   Endocrine: Negative for cold intolerance, heat intolerance and polydipsia.   Genitourinary: Negative for difficulty urinating, dyspareunia and hematuria.   Musculoskeletal: Positive for arthralgias and myalgias.   Skin: Negative for rash and wound.   Allergic/Immunologic: Negative for environmental allergies.   Neurological: Negative for dizziness, syncope and numbness.   Hematological: Does not bruise/bleed easily.   Psychiatric/Behavioral: Negative for dysphoric mood and sleep disturbance. The patient is not nervous/anxious.            Objective:  Physical Exam    General: No acute distress.  Eyes: conjunctiva clear; pupils equally round and reactive  ENT: external ears and nose atraumatic; oropharynx clear  CV: no peripheral edema  Resp: normal respiratory effort  Skin: no rashes or wounds; normal turgor  Psych: mood and affect appropriate; recent and remote memory intact    There were no vitals filed for this visit.  There were no vitals filed for this visit.  There is no height or weight on file to calculate  BMI.      Ortho Exam     Right knee exam  Extension 2 degrees, flexion 125 degrees  Maximal tenderness posterior joint line, anterior aspect over patella  For patient positive sensation light touch all distributions right lower extremity  Negative logroll exam  Negative erythema  Mild swelling    Assessment:        1. Primary osteoarthritis of right knee           Plan:  1.  Discussed plan of care with patient.  Do wish for patient to continue oral anti-inflammatories may use ice alternating with heat will start topical diclofenac up to 4 times a day at the right knee.  I do wish for her to continue home strengthening exercises as she is doing very good job completing these.  We will proceed with MRI due to continued swelling, failing corticosteroids, Visco supplement, anti-inflammatory, home exercise regimen.  She verbalized understanding of all information agrees with plan of care.  Visco supplementation injections can be completed again after May 18 and corticosteroid injection after May 21 also discussed with patient this will not be completed until after pandemic has subsided.  She verbalized understanding agrees with all information.  Denies other concerns present this time.  Orders:  No orders of the defined types were placed in this encounter.      Medications:  New Medications Ordered This Visit   Medications   • diclofenac (VOLTAREN) 1 % gel gel     Sig: Apply 4 g topically to the appropriate area as directed 4 (Four) Times a Day.     Dispense:  100 g     Refill:  3       Followup:  No follow-ups on file.    Yesi was seen today for knee pain.    Diagnoses and all orders for this visit:    Primary osteoarthritis of right knee    Other orders  -     diclofenac (VOLTAREN) 1 % gel gel; Apply 4 g topically to the appropriate area as directed 4 (Four) Times a Day.    Dictated utilizing Dragon dictation

## 2020-05-01 ENCOUNTER — HOSPITAL ENCOUNTER (OUTPATIENT)
Dept: MRI IMAGING | Facility: HOSPITAL | Age: 65
End: 2020-05-01

## 2020-05-07 ENCOUNTER — HOSPITAL ENCOUNTER (OUTPATIENT)
Dept: MRI IMAGING | Facility: HOSPITAL | Age: 65
Discharge: HOME | End: 2020-05-07
Admitting: NURSE PRACTITIONER

## 2020-05-07 DIAGNOSIS — M17.11 PRIMARY OSTEOARTHRITIS OF RIGHT KNEE: ICD-10-CM

## 2020-05-07 PROCEDURE — 73721 MRI JNT OF LWR EXTRE W/O DYE: CPT

## 2020-05-19 ENCOUNTER — OFFICE VISIT (OUTPATIENT)
Dept: ORTHOPEDIC SURGERY | Facility: CLINIC | Age: 65
End: 2020-05-19

## 2020-05-19 VITALS
HEIGHT: 62 IN | HEART RATE: 76 BPM | BODY MASS INDEX: 28.52 KG/M2 | WEIGHT: 155 LBS | SYSTOLIC BLOOD PRESSURE: 171 MMHG | DIASTOLIC BLOOD PRESSURE: 84 MMHG

## 2020-05-19 DIAGNOSIS — M17.11 PRIMARY OSTEOARTHRITIS OF RIGHT KNEE: Primary | ICD-10-CM

## 2020-05-19 DIAGNOSIS — S83.231A COMPLEX TEAR OF MEDIAL MENISCUS OF RIGHT KNEE AS CURRENT INJURY, INITIAL ENCOUNTER: ICD-10-CM

## 2020-05-19 PROBLEM — S83.271A COMPLEX TEAR OF LATERAL MENISCUS OF RIGHT KNEE AS CURRENT INJURY: Status: ACTIVE | Noted: 2020-05-19

## 2020-05-19 PROCEDURE — 99214 OFFICE O/P EST MOD 30 MIN: CPT | Performed by: ORTHOPAEDIC SURGERY

## 2020-05-19 PROCEDURE — 20610 DRAIN/INJ JOINT/BURSA W/O US: CPT | Performed by: ORTHOPAEDIC SURGERY

## 2020-05-19 RX ORDER — TRIAMCINOLONE ACETONIDE 40 MG/ML
80 INJECTION, SUSPENSION INTRA-ARTICULAR; INTRAMUSCULAR
Status: COMPLETED | OUTPATIENT
Start: 2020-05-19 | End: 2020-05-19

## 2020-05-19 RX ORDER — LIDOCAINE HYDROCHLORIDE 10 MG/ML
8 INJECTION, SOLUTION EPIDURAL; INFILTRATION; INTRACAUDAL; PERINEURAL
Status: COMPLETED | OUTPATIENT
Start: 2020-05-19 | End: 2020-05-19

## 2020-05-19 RX ADMIN — TRIAMCINOLONE ACETONIDE 80 MG: 40 INJECTION, SUSPENSION INTRA-ARTICULAR; INTRAMUSCULAR at 13:42

## 2020-05-19 RX ADMIN — LIDOCAINE HYDROCHLORIDE 8 ML: 10 INJECTION, SOLUTION EPIDURAL; INFILTRATION; INTRACAUDAL; PERINEURAL at 13:42

## 2020-05-19 NOTE — PROGRESS NOTES
Subjective:     Patient ID: Yesi Benson is a 65 y.o. female.    Chief Complaint: right knee pain, new patient to provider    History of Present Illness  Yesi Benson returns to clinic today for evaluation of her right knee. She has been experiencing significant pain in her right knee after a long walk at the [employer] [employer] 9 months ago. Today, she rates the pain as 7/10, describes it as aching in nature.    Localizes pain to the medial aspect of her knee. Has noted improvement with prior cortisone injections and gel injections that lasted approximately 2 months. Symptoms are exacerbated with prolonged weightbearing. She states she is able to ride the stationary bike without issue. She notes occasional radiation of her pain down her leg and into her right foot. She also experiences intermittent numbness and tingling in her right lower extremity.     Social History     Occupational History   • Not on file   Tobacco Use   • Smoking status: Never Smoker   • Smokeless tobacco: Never Used   Substance and Sexual Activity   • Alcohol use: No     Frequency: Never   • Drug use: No   • Sexual activity: Defer      Past Medical History:   Diagnosis Date   • Colon polyp    • Disease of thyroid gland      Past Surgical History:   Procedure Laterality Date   • COLONOSCOPY N/A 3/27/2019    Procedure: COLONOSCOPY POLYPECTOMY;  Surgeon: Jaime Alaniz MD;  Location: [facility];  Service: Gastroenterology   • DENTAL PROCEDURE Left 2011,2014   • SALPINGO OOPHORECTOMY Left        History reviewed. No pertinent family history.      Review of Systems   Constitutional: Negative for chills, diaphoresis, fever and unexpected weight change.   HENT: Negative for hearing loss, nosebleeds, sore throat and tinnitus.    Eyes: Negative for pain and visual disturbance.   Respiratory: Negative for cough, shortness of breath and wheezing.    Cardiovascular: Negative for chest pain and palpitations.   Gastrointestinal: Negative for  "abdominal pain, diarrhea, nausea and vomiting.   Endocrine: Negative for cold intolerance, heat intolerance and polydipsia.   Genitourinary: Negative for difficulty urinating, dysuria and hematuria.   Musculoskeletal: Positive for arthralgias and myalgias. Negative for joint swelling.   Skin: Negative for rash and wound.   Allergic/Immunologic: Negative for environmental allergies.   Neurological: Negative for dizziness, syncope and numbness.   Hematological: Does not bruise/bleed easily.   Psychiatric/Behavioral: Negative for dysphoric mood and sleep disturbance. The patient is not nervous/anxious.            Objective:  Vitals:    05/19/20 1307   BP: 171/84   Pulse: 76   Weight: 70.3 kg (155 lb)   Height: 157.5 cm (62\")         05/19/20  1307   Weight: 70.3 kg (155 lb)     Body mass index is 28.35 kg/m².  Physical Exam    Vital signs reviewed.   General: No acute distress, alert and oriented  Eyes: conjunctiva clear; pupils equally round and reactive  ENT: external ears and nose atraumatic; oropharynx clear  CV: no peripheral edema  Resp: normal respiratory effort  Skin: no rashes or wounds; normal turgor  Psych: mood and affect appropriate; recent and remote memory intact        Ortho Exam     Right Knee-    ROM 0-145 degrees  4+/5 on flexion  4+/5 on extension  Maximal tenderness medial joint line positive  Jae exam    Effusion- moderate   Grade 1A Lachman  Anterior drawer- negative  Posterior drawer- negative   No opening on varus and valgus stress at 0 and 30  Active patellar compression test- positive     Log roll-  negative  Stinchfield-  negative  Positive sensation light tough all distributions symmetric to contralateral side  Brisk cap refill all digits  2+ dorsalis pedis pulse    Imaging:  Review of outside x-rays of right knee from September 2019 indicate mild medial compartment joint space narrowing with no evidence of greg bone-on-bone arthritic change.    Mri Knee Right Without " Contrast    Result Date: 5/7/2020  Impression: Complex tear posterior horn lateral meniscus with a large radial component as well as a vertical component. Large cystic (1.9 cm) structure posterior to the posterior horn most compatible with a para meniscal cyst. Mild chondromalacia medial and patellofemoral compartments. Signer Name: SHAHNAZ Ohara MD  Signed: 5/7/2020 10:42 AM  Workstation Name: [id number]  [facility]      Review of outside MRI right knee including review of image as well as radiology report indicates complex tear posterior horn medial meniscus with significant vertical and radial type tear is appreciated, complex noted primarily in the medial patellofemoral compartments.  No evidence of cruciate or collateral ligament injury.    Assessment:        1. Primary osteoarthritis of right knee    2. Complex tear of medial meniscus of right knee as current injury, initial encounter           Plan:  Large Joint Arthrocentesis  Date/Time: 5/19/2020 1:42 PM  Consent given by: patient  Site marked: site marked  Timeout: Immediately prior to procedure a time out was called to verify the correct patient, procedure, equipment, support staff and site/side marked as required   Supporting Documentation  Indications: pain   Procedure Details  Location: knee - Knee joint: BILATERAL KNEE.  Preparation: Patient was prepped and draped in the usual sterile fashion  Needle size: 22 G  Approach: superior (LATERAL)  Medications administered: 8 mL lidocaine PF 1% 1 %; 80 mg triamcinolone acetonide 40 MG/ML  Patient tolerance: patient tolerated the procedure well with no immediate complications                1. Discussed treatment options at length with patient at today's visit.   2. Patient would like to proceed with cortisone injection today to the right knee. Recommended limited use of affected extremity for the next 24 hours to only essential activites other than work on general active and passive  motion. Recommended supplementing with ice and soft tissue massage. Discussed with patient that they should see results in 5-7 days, if no improvement in 5-6 weeks I have asked them to call the office to review other options. Patient should call office immediately if they notice redness, warmth, fevers, chills, or residual numbness or tingling for greater than 6 hours after injection.   3. We will get approval for one-shot gel injection to the right knee given underlying degenerative change.       Yesi Benson was in agreement with plan and had all questions answered.     Orders:  Orders Placed This Encounter   Procedures   • Large Joint Arthrocentesis   • Visco Treatment       Medications:  No orders of the defined types were placed in this encounter.      Followup:  Return for viscosupplement injections.    Yesi was seen today for pain.    Diagnoses and all orders for this visit:    Primary osteoarthritis of right knee  -     Visco Treatment; Future    Complex tear of medial meniscus of right knee as current injury, initial encounter    Other orders  -     Large Joint Arthrocentesis        By signing my name here, I Rosemary Carroll, attest that all documentation on 05/19/20 at 16:36 has been prepared under the direction and in the presence of Dr. Elmer Fuentes.    I, Dr. Elmer Fuentes, personally performed the services described in this documentation, as scribed by Rosemary Carroll, in my presence, and it is both accurate and complete.    Dictated utilizing Dragon dictation   Answers for HPI/ROS submitted by the patient on 5/13/2020   What is the primary reason for your visit?: Other  Please describe your symptoms.: Right knee pain  Have you had these symptoms before?: Yes  How long have you been having these symptoms?: 1-4 weeks ago  Please list any medications you are currently taking for this condition.: Diclofenac  Please describe any probable cause for these symptoms. : Meniscus tear

## 2020-06-05 ENCOUNTER — CLINICAL SUPPORT (OUTPATIENT)
Dept: ORTHOPEDIC SURGERY | Facility: CLINIC | Age: 65
End: 2020-06-05

## 2020-06-05 VITALS — HEIGHT: 62 IN | WEIGHT: 155 LBS | BODY MASS INDEX: 28.52 KG/M2

## 2020-06-05 DIAGNOSIS — M17.11 PRIMARY OSTEOARTHRITIS OF RIGHT KNEE: Primary | ICD-10-CM

## 2020-06-05 PROCEDURE — 20610 DRAIN/INJ JOINT/BURSA W/O US: CPT | Performed by: ORTHOPAEDIC SURGERY

## 2020-06-05 NOTE — PROGRESS NOTES
Procedure   Large Joint Arthrocentesis: R knee  Date/Time: 6/5/2020 8:51 AM  Consent given by: patient  Site marked: site marked  Timeout: Immediately prior to procedure a time out was called to verify the correct patient, procedure, equipment, support staff and site/side marked as required   Supporting Documentation  Indications: pain   Procedure Details  Location: knee - R knee  Preparation: Patient was prepped and draped in the usual sterile fashion  Needle size: 22 G  Approach: superior (LATERAL)  Medications administered: 88 mg Hyaluronan 88 MG/4ML  Patient tolerance: patient tolerated the procedure well with no immediate complications      MONOVISC INJECTION PROVIDED VIA [facility].         Patient presents to clinic today for right knee viscosupplement one shot injection. I explained details of injections as well as risks, benefits and alternatives with the patient today, had all questions answered, wished to proceed with injection.  I will see patient back in 6 weeks for follow up on injection. Patient was instructed to watch for signs or symptoms of infection including redness, swelling, warmth to the touch, or significant increased pain and to contact our office immediately if any of these issues were noted.

## 2020-07-30 ENCOUNTER — OFFICE VISIT (OUTPATIENT)
Dept: ORTHOPEDIC SURGERY | Facility: CLINIC | Age: 65
End: 2020-07-30

## 2020-07-30 VITALS — BODY MASS INDEX: 28.52 KG/M2 | WEIGHT: 155 LBS | HEIGHT: 62 IN

## 2020-07-30 DIAGNOSIS — S83.231A COMPLEX TEAR OF MEDIAL MENISCUS OF RIGHT KNEE AS CURRENT INJURY, INITIAL ENCOUNTER: ICD-10-CM

## 2020-07-30 DIAGNOSIS — M17.11 PRIMARY OSTEOARTHRITIS OF RIGHT KNEE: Primary | ICD-10-CM

## 2020-07-30 PROCEDURE — 99213 OFFICE O/P EST LOW 20 MIN: CPT | Performed by: NURSE PRACTITIONER

## 2020-07-30 RX ORDER — PREDNISONE 1 MG/1
TABLET ORAL
Qty: 21 TABLET | Refills: 0 | Status: ON HOLD | OUTPATIENT
Start: 2020-07-30 | End: 2020-10-14

## 2020-07-30 NOTE — PROGRESS NOTES
Subjective:     Patient ID: Yesi Benson is a 65 y.o. female.    Chief Complaint:  Follow-up mechanical knee pain, DJD right knee  History of Present Illness  Yesi Benson returns to clinic for follow-up right knee.  Received Visco supplementation injection 6 weeks ago and states she basically feels the same.  The knee continues to be painful but not constant pain.Rates discomfort day-to-day to 4-5 out of a 10 constant nagging ongoing pain reports pain present for 3 to 4 days a week is not constant every day.  She feels good in the morning she was able to ride her bike twice daily is also completing leg lifts once daily as previously discussed and has noted significant improvement since she was seen in September 2019 however not completely gone.  Denies at the knee is locking, catching or giving way.  She is experiencing popping in the front which is somewhat painful.She does experience tingling pins-and-needles sensation radiating down the right lower extremity and into the foot on occasion is not constant.  She is noticing swelling down into her foot as well.  Maximal tenderness present the anterior aspect of the knee as well as the posterior joint line of the knee joint worse later on throughout the day.  She is currently having difficulty ambulating secondary to the swelling that she is experiencing.  Increased pain noted also when the back of the knee, posterior joint line sits against the seat in the car does cause her worsening pain.  She is unable to tolerate oral anti-inflammatories and was recently discontinued secondary to decreased kidney functioning.  She was instructed to use (gel some very sparingly.  Denies presence of pain rating to the groin or to the lateral aspect of the hip.  Denies other concerns present time.     Social History     Occupational History   • Not on file   Tobacco Use   • Smoking status: Never Smoker   • Smokeless tobacco: Never Used   Substance and Sexual Activity   • Alcohol  "use: No     Frequency: Never   • Drug use: No   • Sexual activity: Defer      Past Medical History:   Diagnosis Date   • Colon polyp    • Disease of thyroid gland      Past Surgical History:   Procedure Laterality Date   • COLONOSCOPY N/A 3/27/2019    Procedure: COLONOSCOPY POLYPECTOMY;  Surgeon: Jaime Alaniz MD;  Location: [facility];  Service: Gastroenterology   • DENTAL PROCEDURE Left 2011,2014   • SALPINGO OOPHORECTOMY Left        No family history on file.      Review of Systems   Constitutional: Negative for chills, diaphoresis, fever and unexpected weight change.   HENT: Negative for hearing loss, nosebleeds, sore throat and tinnitus.    Eyes: Negative for pain and visual disturbance.   Respiratory: Negative for cough, shortness of breath and wheezing.    Cardiovascular: Negative for chest pain and palpitations.   Gastrointestinal: Negative for abdominal pain, diarrhea, nausea and vomiting.   Endocrine: Negative for cold intolerance, heat intolerance and polydipsia.   Genitourinary: Negative for difficulty urinating, dysuria and hematuria.   Musculoskeletal: Positive for arthralgias and joint swelling. Negative for myalgias.   Skin: Negative for rash and wound.   Allergic/Immunologic: Negative for environmental allergies.   Neurological: Negative for dizziness, syncope and numbness.   Hematological: Does not bruise/bleed easily.   Psychiatric/Behavioral: Negative for dysphoric mood and sleep disturbance. The patient is not nervous/anxious.            Objective:  Physical Exam    General: No acute distress.  Eyes: conjunctiva clear; pupils equally round and reactive  ENT: external ears and nose atraumatic; oropharynx clear  CV: no peripheral edema  Resp: normal respiratory effort  Skin: no rashes or wounds; normal turgor  Psych: mood and affect appropriate; recent and remote memory intact   Vitals:    07/30/20 1442   Weight: 70.3 kg (155 lb)   Height: 157.5 cm (62\")         07/30/20  1442 "   Weight: 70.3 kg (155 lb)     Body mass index is 28.35 kg/m².      Right Knee Exam     Tenderness   The patient is experiencing tenderness in the medial joint line.    Range of Motion   Extension: 0   Flexion: 130     Tests   Jae:  Medial - positive Lateral - negative  Varus: negative Valgus: negative  Lachman:  Anterior - 1+    Posterior - negative  Drawer:  Anterior - negative    Posterior - negative  Patellar apprehension: negative    Other   Erythema: absent  Sensation: normal  Pulse: present  Swelling: moderate  Effusion: effusion present    Comments:  Positive crepitus throughout arc of motion  Negative logroll exam  Negative stinchfield exam   Positive tenderness posterior joint line            Assessment:        1. Primary osteoarthritis of right knee    2. Complex tear of medial meniscus of right knee as current injury, initial encounter           Plan:  1.  Discussed plan of care with patient.  Will start prednisone taper dose for swelling.  Discussed with patient will talk with Dr. Fuentes figure out a plan to contact patient with further information.  She verbalized understanding of all information agrees with plan of care.  Denies other concerns that she has at this time.  Orders:  No orders of the defined types were placed in this encounter.      Medications:  New Medications Ordered This Visit   Medications   • predniSONE (DELTASONE) 5 MG tablet     Si tabs day 1, 5 tabs day 2, 4 tabs day 3, 3 tabs day 4, 2 tabs day 5, 1 tab day 6     Dispense:  21 tablet     Refill:  0       Followup:  No follow-ups on file.    Yesi was seen today for follow-up.    Diagnoses and all orders for this visit:    Primary osteoarthritis of right knee    Complex tear of medial meniscus of right knee as current injury, initial encounter    Other orders  -     predniSONE (DELTASONE) 5 MG tablet; 6 tabs day 1, 5 tabs day 2, 4 tabs day 3, 3 tabs day 4, 2 tabs day 5, 1 tab day 6           Dictated utilizing Hayley  dictation

## 2020-08-25 NOTE — PROGRESS NOTES
Subjective:     Patient ID: Yesi Benson is a 65 y.o. female.    Chief Complaint: Follow-up mechanical knee pain, DJD right knee    History of Present Illness  Yesi Benson returns to clinic today for evaluation of her right knee. The patient had prior Visco supplement injection to the right knee on 6/5/2020. She notes approximately 50% improvement of the pain with the injection, lasting about 2 months. The pain has worsened in the past 4 weeks. She also notes sensitivity to touch over the right knee. She rates the pain as 8-9/10, describes it as aching and pressure in nature. Localizes pain to the medial joint line. Has noted improvement with rest. Symptoms are exacerbated with activities and prolonged standing. Denies radiation of pain, denies associated numbness or tingling.    Social History     Occupational History   • Not on file   Tobacco Use   • Smoking status: Never Smoker   • Smokeless tobacco: Never Used   Substance and Sexual Activity   • Alcohol use: No     Frequency: Never   • Drug use: No   • Sexual activity: Defer      Past Medical History:   Diagnosis Date   • Colon polyp    • Disease of thyroid gland      Past Surgical History:   Procedure Laterality Date   • COLONOSCOPY N/A 3/27/2019    Procedure: COLONOSCOPY POLYPECTOMY;  Surgeon: Jaime lAaniz MD;  Location: [facility];  Service: Gastroenterology   • DENTAL PROCEDURE Left 2011,2014   • SALPINGO OOPHORECTOMY Left        No family history on file.      Review of Systems   Constitutional: Negative for chills, diaphoresis, fever and unexpected weight change.   HENT: Negative for hearing loss, nosebleeds, sore throat and tinnitus.    Eyes: Negative for pain and visual disturbance.   Respiratory: Negative for cough, shortness of breath and wheezing.    Cardiovascular: Negative for chest pain and palpitations.   Gastrointestinal: Negative for abdominal pain, diarrhea, nausea and vomiting.   Endocrine: Negative for cold intolerance, heat  "intolerance and polydipsia.   Genitourinary: Negative for difficulty urinating, dysuria and hematuria.   Musculoskeletal: Positive for arthralgias and myalgias. Negative for joint swelling.   Skin: Negative for rash and wound.   Allergic/Immunologic: Negative for environmental allergies.   Neurological: Negative for dizziness, syncope and numbness.   Hematological: Does not bruise/bleed easily.   Psychiatric/Behavioral: Negative for dysphoric mood and sleep disturbance. The patient is not nervous/anxious.            Objective:  Vitals:    08/27/20 0908   Weight: 70.3 kg (155 lb)   Height: 157.5 cm (62\")         08/27/20 0908   Weight: 70.3 kg (155 lb)     Body mass index is 28.35 kg/m².    Physical Exam    Vital signs reviewed.   General: No acute distress, alert and oriented  Eyes: conjunctiva clear; pupils equally round and reactive  ENT: external ears and nose atraumatic; oropharynx clear  CV: no peripheral edema  Resp: normal respiratory effort  Skin: no rashes or wounds; normal turgor  Psych: mood and affect appropriate; recent and remote memory intact          Ortho Exam       Right Knee-    ROM 0-125 degrees  4/5 on flexion  4/5 on extension  Maximal tenderness medial joint line    Effusion- moderate   No opening on varus and valgus stress at 0 and 30  Active patellar compression test- positive     Some hypersensitivity to touch over the right knee  Brisk cap refill all digits  2+ dorsalis pedis pulse      Imaging:  Review of the prior x-rays indicates moderate medial compartment joint space narrowing of prior x-rays from September 2019.  Review of prior MRI from May 2020 indicates large degenerative tear posterior horn medial meniscus with moderate chondromalacia of the medial femoral condyle and patellofemoral joint    Assessment:        1. Primary osteoarthritis of right knee    2. Complex tear of medial meniscus of right knee as current injury, initial encounter    3. Chronic pain of right knee       "     Plan:  Large Joint Arthrocentesis: R knee  Date/Time: 8/27/2020 9:45 AM  Consent given by: patient  Site marked: site marked  Timeout: Immediately prior to procedure a time out was called to verify the correct patient, procedure, equipment, support staff and site/side marked as required   Supporting Documentation  Indications: pain   Procedure Details  Location: knee - R knee  Preparation: Patient was prepped and draped in the usual sterile fashion  Needle size: 22 G  Approach: superior  Medications administered: 8 mL lidocaine PF 1% 1 %; 80 mg triamcinolone acetonide 40 MG/ML  Patient tolerance: patient tolerated the procedure well with no immediate complications                1. Discussed treatment options at length with patient at today's visit, including total knee arthoplasty, arthroscopic meniscal repair, cortisone injection, or genicular nerve block. At this time, patient would like to proceed with a cortisone injection today, and plan for a TKA in about 6 weeks.  2. Patient would like to proceed with cortisone injection today to the right knee. Recommended limited use of affected extremity for the next 24 hours to only essential activites other than work on general active and passive motion. Recommended supplementing with ice and soft tissue massage. Discussed with patient that they should see results in 5-7 days, if no improvement in 5-6 weeks I have asked them to call the office to review other options. Patient should call office immediately if they notice redness, warmth, fevers, chills, or residual numbness or tingling for greater than 6 hours after injection.   3. I reviewed anatomy and a model of a total knee arthroplasty, as well as typical postoperative recovery of 6-12 months before [facility] recovery, and possible need for rehabilitation stay after hospitalization. We also discussed risks, benefits, and alternatives of procedure with risks including but not limited to neurovascular damage,  bleeding, infection, malalignment, chronic pian, failure of implants, osteolysis, loosening of implants, loss of motion, weakness, stiffness, instability, DVT, pulmonary embolus, death, stroke, complex regional pain syndrome, myocardial infarction, and need for additional procedures. Patient understood all these and had all questions answered before consenting to the procedure. No guarantees were given in regards to results from the surgery. We will have patient medically optimized by their primary care physician and plan on proceeding with surgery at next available date.   4. Patient denies past medical history of blood clots, cardiac issues, or diabetes mellitus.       Yesi Benson was in agreement with plan and had all questions answered.     Orders:  Orders Placed This Encounter   Procedures   • Large Joint Arthrocentesis: R knee   • MRSA Screen Culture (Outpatient) - Swab, Nares   • Basic metabolic panel   • Protime-INR   • APTT   • Urinalysis With Culture If Indicated -   • Consult Primary Care Physician for Medical Clearance   • Follow anesthesia standing orders.   • Provide instructions to patient regarding NPO status   • Care Order / Instruction for all Female Patients   • Provide NPO Instructions to Patient   • ECG 12 Lead   • CBC and Differential       Medications:  No orders of the defined types were placed in this encounter.      Followup:  No follow-ups on file.    Yesi was seen today for follow-up.    Diagnoses and all orders for this visit:    Primary osteoarthritis of right knee  -     Case Request; Standing  -     Consult Primary Care Physician for Medical Clearance  -     CBC and Differential; Future  -     Basic metabolic panel; Future  -     Protime-INR; Future  -     APTT; Future  -     MRSA Screen Culture (Outpatient) - Swab, Nares; Future  -     Urinalysis With Culture If Indicated -; Future  -     ECG 12 Lead; Future    Complex tear of medial meniscus of right knee as current injury,  initial encounter  -     Case Request; Standing  -     Consult Primary Care Physician for Medical Clearance  -     CBC and Differential; Future  -     Basic metabolic panel; Future  -     Protime-INR; Future  -     APTT; Future  -     MRSA Screen Culture (Outpatient) - Swab, Nares; Future  -     Urinalysis With Culture If Indicated -; Future  -     ECG 12 Lead; Future    Chronic pain of right knee  -     Large Joint Arthrocentesis: R knee    Other orders  -     Follow anesthesia standing orders.  -     Provide instructions to patient regarding NPO status  -     Care Order / Instruction for all Female Patients  -     Provide NPO Instructions to Patient           By signing my name here, I Camille Mckinney attest that all documentation on 08/27/20 at 10:10 has been prepared under the direction and in the presence of Dr. Elmer Fuentes MD.    I, Dr. Elmer Fuentes, personally performed the services described in this documentation, as scribed by Camille Mckinney, in my presence, and it is both accurate and complete.        Dictated utilizing Dragon dictation

## 2020-08-27 ENCOUNTER — OFFICE VISIT (OUTPATIENT)
Dept: ORTHOPEDIC SURGERY | Facility: CLINIC | Age: 65
End: 2020-08-27

## 2020-08-27 VITALS — WEIGHT: 155 LBS | HEIGHT: 62 IN | BODY MASS INDEX: 28.52 KG/M2

## 2020-08-27 DIAGNOSIS — M17.11 PRIMARY OSTEOARTHRITIS OF RIGHT KNEE: Primary | ICD-10-CM

## 2020-08-27 DIAGNOSIS — S83.231A COMPLEX TEAR OF MEDIAL MENISCUS OF RIGHT KNEE AS CURRENT INJURY, INITIAL ENCOUNTER: ICD-10-CM

## 2020-08-27 DIAGNOSIS — M25.561 CHRONIC PAIN OF RIGHT KNEE: ICD-10-CM

## 2020-08-27 DIAGNOSIS — G89.29 CHRONIC PAIN OF RIGHT KNEE: ICD-10-CM

## 2020-08-27 PROCEDURE — 20610 DRAIN/INJ JOINT/BURSA W/O US: CPT | Performed by: ORTHOPAEDIC SURGERY

## 2020-08-27 PROCEDURE — 99214 OFFICE O/P EST MOD 30 MIN: CPT | Performed by: ORTHOPAEDIC SURGERY

## 2020-08-27 RX ORDER — TRIAMCINOLONE ACETONIDE 40 MG/ML
80 INJECTION, SUSPENSION INTRA-ARTICULAR; INTRAMUSCULAR
Status: COMPLETED | OUTPATIENT
Start: 2020-08-27 | End: 2020-08-27

## 2020-08-27 RX ORDER — LIDOCAINE HYDROCHLORIDE 10 MG/ML
8 INJECTION, SOLUTION EPIDURAL; INFILTRATION; INTRACAUDAL; PERINEURAL
Status: COMPLETED | OUTPATIENT
Start: 2020-08-27 | End: 2020-08-27

## 2020-08-27 RX ORDER — MELOXICAM 7.5 MG/1
15 TABLET ORAL ONCE
Status: CANCELLED | OUTPATIENT
Start: 2020-08-27 | End: 2020-08-27

## 2020-08-27 RX ORDER — PREGABALIN 150 MG/1
150 CAPSULE ORAL ONCE
Status: CANCELLED | OUTPATIENT
Start: 2020-08-27 | End: 2020-08-27

## 2020-08-27 RX ORDER — ACETAMINOPHEN 325 MG/1
1000 TABLET ORAL ONCE
Status: CANCELLED | OUTPATIENT
Start: 2020-08-27 | End: 2020-08-27

## 2020-08-27 RX ADMIN — LIDOCAINE HYDROCHLORIDE 8 ML: 10 INJECTION, SOLUTION EPIDURAL; INFILTRATION; INTRACAUDAL; PERINEURAL at 09:45

## 2020-08-27 RX ADMIN — TRIAMCINOLONE ACETONIDE 80 MG: 40 INJECTION, SUSPENSION INTRA-ARTICULAR; INTRAMUSCULAR at 09:45

## 2020-08-31 ENCOUNTER — TRANSCRIBE ORDERS (OUTPATIENT)
Dept: ADMINISTRATIVE | Facility: HOSPITAL | Age: 65
End: 2020-08-31

## 2020-08-31 DIAGNOSIS — Z01.818 PREOP TESTING: Primary | ICD-10-CM

## 2020-10-07 ENCOUNTER — APPOINTMENT (OUTPATIENT)
Dept: PREADMISSION TESTING | Facility: HOSPITAL | Age: 65
End: 2020-10-07

## 2020-10-07 VITALS
BODY MASS INDEX: 28.34 KG/M2 | OXYGEN SATURATION: 95 % | HEIGHT: 62 IN | SYSTOLIC BLOOD PRESSURE: 152 MMHG | WEIGHT: 154 LBS | HEART RATE: 102 BPM | DIASTOLIC BLOOD PRESSURE: 78 MMHG | RESPIRATION RATE: 18 BRPM

## 2020-10-07 DIAGNOSIS — Z01.818 PREOP TESTING: Primary | ICD-10-CM

## 2020-10-07 DIAGNOSIS — S83.231A COMPLEX TEAR OF MEDIAL MENISCUS OF RIGHT KNEE AS CURRENT INJURY, INITIAL ENCOUNTER: ICD-10-CM

## 2020-10-07 DIAGNOSIS — M17.11 PRIMARY OSTEOARTHRITIS OF RIGHT KNEE: ICD-10-CM

## 2020-10-07 LAB
ABO GROUP BLD: NORMAL
ABO GROUP BLD: NORMAL
ANION GAP SERPL CALCULATED.3IONS-SCNC: 9 MMOL/L (ref 5–15)
APTT PPP: 26.9 SECONDS (ref 24.3–38.1)
BASOPHILS # BLD AUTO: 0.04 10*3/MM3 (ref 0–0.2)
BASOPHILS NFR BLD AUTO: 0.7 % (ref 0–1.5)
BILIRUB UR QL STRIP: NEGATIVE
BLD GP AB SCN SERPL QL: NEGATIVE
BUN SERPL-MCNC: 16 MG/DL (ref 8–23)
BUN/CREAT SERPL: 15.1 (ref 7–25)
CALCIUM SPEC-SCNC: 10.1 MG/DL (ref 8.6–10.5)
CHLORIDE SERPL-SCNC: 104 MMOL/L (ref 98–107)
CLARITY UR: CLEAR
CO2 SERPL-SCNC: 26 MMOL/L (ref 22–29)
COLOR UR: YELLOW
CREAT SERPL-MCNC: 1.06 MG/DL (ref 0.57–1)
DEPRECATED RDW RBC AUTO: 42.6 FL (ref 37–54)
EOSINOPHIL # BLD AUTO: 0.05 10*3/MM3 (ref 0–0.4)
EOSINOPHIL NFR BLD AUTO: 0.9 % (ref 0.3–6.2)
ERYTHROCYTE [DISTWIDTH] IN BLOOD BY AUTOMATED COUNT: 13.2 % (ref 12.3–15.4)
GFR SERPL CREATININE-BSD FRML MDRD: 52 ML/MIN/1.73
GLUCOSE SERPL-MCNC: 123 MG/DL (ref 65–99)
GLUCOSE UR STRIP-MCNC: ABNORMAL MG/DL
HCT VFR BLD AUTO: 48.9 % (ref 34–46.6)
HGB BLD-MCNC: 15.7 G/DL (ref 12–15.9)
HGB UR QL STRIP.AUTO: NEGATIVE
IMM GRANULOCYTES # BLD AUTO: 0.01 10*3/MM3 (ref 0–0.05)
IMM GRANULOCYTES NFR BLD AUTO: 0.2 % (ref 0–0.5)
INR PPP: 0.98 (ref 0.9–1.1)
KETONES UR QL STRIP: NEGATIVE
LEUKOCYTE ESTERASE UR QL STRIP.AUTO: NEGATIVE
LYMPHOCYTES # BLD AUTO: 1.77 10*3/MM3 (ref 0.7–3.1)
LYMPHOCYTES NFR BLD AUTO: 30.8 % (ref 19.6–45.3)
MCH RBC QN AUTO: 28.8 PG (ref 26.6–33)
MCHC RBC AUTO-ENTMCNC: 32.1 G/DL (ref 31.5–35.7)
MCV RBC AUTO: 89.7 FL (ref 79–97)
MONOCYTES # BLD AUTO: 0.38 10*3/MM3 (ref 0.1–0.9)
MONOCYTES NFR BLD AUTO: 6.6 % (ref 5–12)
NEUTROPHILS NFR BLD AUTO: 3.49 10*3/MM3 (ref 1.7–7)
NEUTROPHILS NFR BLD AUTO: 60.8 % (ref 42.7–76)
NITRITE UR QL STRIP: NEGATIVE
NRBC BLD AUTO-RTO: 0 /100 WBC (ref 0–0.2)
PH UR STRIP.AUTO: 5.5 [PH] (ref 4.5–8)
PLATELET # BLD AUTO: 258 10*3/MM3 (ref 140–450)
PMV BLD AUTO: 10.6 FL (ref 6–12)
POTASSIUM SERPL-SCNC: 3.7 MMOL/L (ref 3.5–5.2)
PROT UR QL STRIP: NEGATIVE
PROTHROMBIN TIME: 12.7 SECONDS (ref 12.1–15)
RBC # BLD AUTO: 5.45 10*6/MM3 (ref 3.77–5.28)
RH BLD: POSITIVE
RH BLD: POSITIVE
SODIUM SERPL-SCNC: 139 MMOL/L (ref 136–145)
SP GR UR STRIP: 1.01 (ref 1–1.03)
T&S EXPIRATION DATE: NORMAL
UROBILINOGEN UR QL STRIP: ABNORMAL
WBC # BLD AUTO: 5.74 10*3/MM3 (ref 3.4–10.8)

## 2020-10-07 PROCEDURE — 86901 BLOOD TYPING SEROLOGIC RH(D): CPT | Performed by: ORTHOPAEDIC SURGERY

## 2020-10-07 PROCEDURE — 85730 THROMBOPLASTIN TIME PARTIAL: CPT | Performed by: ORTHOPAEDIC SURGERY

## 2020-10-07 PROCEDURE — 80048 BASIC METABOLIC PNL TOTAL CA: CPT | Performed by: ORTHOPAEDIC SURGERY

## 2020-10-07 PROCEDURE — 93005 ELECTROCARDIOGRAM TRACING: CPT

## 2020-10-07 PROCEDURE — 86901 BLOOD TYPING SEROLOGIC RH(D): CPT

## 2020-10-07 PROCEDURE — 36415 COLL VENOUS BLD VENIPUNCTURE: CPT

## 2020-10-07 PROCEDURE — 81003 URINALYSIS AUTO W/O SCOPE: CPT | Performed by: ORTHOPAEDIC SURGERY

## 2020-10-07 PROCEDURE — 85025 COMPLETE CBC W/AUTO DIFF WBC: CPT | Performed by: ORTHOPAEDIC SURGERY

## 2020-10-07 PROCEDURE — 86900 BLOOD TYPING SEROLOGIC ABO: CPT | Performed by: ORTHOPAEDIC SURGERY

## 2020-10-07 PROCEDURE — 85610 PROTHROMBIN TIME: CPT | Performed by: ORTHOPAEDIC SURGERY

## 2020-10-07 PROCEDURE — 87081 CULTURE SCREEN ONLY: CPT | Performed by: ORTHOPAEDIC SURGERY

## 2020-10-07 PROCEDURE — 93010 ELECTROCARDIOGRAM REPORT: CPT | Performed by: INTERNAL MEDICINE

## 2020-10-07 PROCEDURE — 86900 BLOOD TYPING SEROLOGIC ABO: CPT

## 2020-10-07 PROCEDURE — 86850 RBC ANTIBODY SCREEN: CPT | Performed by: ORTHOPAEDIC SURGERY

## 2020-10-07 NOTE — DISCHARGE INSTRUCTIONS
PRE-ADMISSION TESTING INSTRUCTIONS FOR TOTAL JOINT PATIENTS    Take these medications the morning of surgery with a small sip of water: Levothyroxine    Stop Fish Oil, Calcium  No aspirin, advil, aleve, ibuprofen, naproxen, diet pills, decongestants, or vitamin/herbal supplements for a week prior to your surgery.    Do not take any insulin or diabetes medications the morning of surgery.      Start your Bactroban ointment on _____10/9______.  You will need to apply the ointment with a clean Q-tip 3 times a day in both sides of your nose for 5 days and the morning of surgery.    General Instructions:    • Do not eat solid food after midnight the night before surgery.  No gum, mints, or hard candy after midnight the night before surgery.  • You may drink clear liquids the day of surgery up until 2 hours before your arrival time.  • Clear liquids are liquids you can see through. Nothing RED in color.    Plain water    Sports drinks  Sodas     Gelatin (Jell-O)  Fruit juices without pulp such as white grape juice and apple juice  Popsicles that contain no fruit or yogurt  Tea or coffee (no cream or milk added)    • It is beneficial for you to have a clear drink that contains carbohydrates just before you leave your house and before your fasting time begins.  We suggest a 20 ounce bottle of Gatorade or Powerade for non-diabetic patients or a 20 ounce bottle of G2 or Powerade Zero for diabetic patients.     • Patients who avoid smoking, chewing tobacco and alcohol for 4 weeks prior to surgery have a reduced risk of post-operative complications.  If at all possible, quit smoking as many days before surgery as you can.    • Do not smoke, use chewing tobacco or drink alcohol after midnight the day of surgery.    • Bring your C-PAP/ BI-PAP machine if you use one.  • Wear clean comfortable clothes and socks.  • Do not wear contact lenses, lotion, deodorant, or make-up.  Bring a case for your glasses if applicable.   • You may  brush your teeth the morning of surgery.  • You may wear dentures/partials, do not put adhesive/glue on them.  • Bring crutches or walker if applicable.  • Leave all other jewelry and valuables at home.  • NOTIFY YOUR SURGEON IF YOU BECOME ILL, HAVE A FEVER, PRODUCTIVE COUGH, OR CANNOT BE HERE THE DAY OF SURGERY.      Preventing a Surgical Site Infection:    • Shower the night before and on the morning of surgery using the chlorhexidine soap you were given.  Use a clean washcloth with the soap.  Place clean sheets on your bed after showering the night before surgery. Do not use the CHG soap on your hair, face, or private areas. Wash your body gently for five (5) minutes. Do not scrub your skin.  Dry with a clean towel and dress in clean clothing.    • Do not shave the surgical area for 10 days-2 weeks prior to surgery  because the razor can irritate skin and make it easier to develop an infection.  • Make sure you, your family, and all healthcare providers clean their hands with soap and water or an alcohol based hand  before caring for you or your wound.      Day of surgery:    Your surgeon’s office will advise you of your arrival time for the day of surgery.    Upon arrival, a Pre-op nurse and Anesthesia provider will review your health history, obtain vital signs, and answer questions you may have.  The only belongings needed at this time will be your home medications and if applicable your C-PAP/BI-PAP machine.  If you are staying overnight your family can leave the rest of your belongings in the car and bring them to your room later.  A Pre-op nurse will start an IV and you may receive medication in preparation for surgery, including something to help you relax.  Your family will be able to see you in the Pre-op area.  While you are in surgery your family should notify the waiting room  if they leave the waiting room area and provide a contact phone number.    If you have any questions, you  can call the Pre-Admission Department at [phone] or your surgeon's office.    Please be aware that surgery does come with discomfort.  We want to make every effort to control your discomfort so please discuss any uncontrolled symptoms with your nurse.   Your doctor will most likely have prescribed pain medications.     You may have bruising or discomfort from the tourniquet used in surgery.     Please leave all luggage in the car the morning of surgery.  You will be transported to your hospital room following the recovery period.  Your family can get your luggage at that time.      You may receive a survey regarding the care you received. Your feedback is very important and will be used to collect the necessary data to help us to continue to provide excellent care.

## 2020-10-07 NOTE — PAT
Pt here for PAT visit.  Pre-op tests completed, chg soap given, and instructions reviewed.  Instructed clears until 2 hrs prior to arrival time, voiced understanding. Joint Camp completed, Covid swab 10/12, Medical clearance with Dr Gorman, Pain pump reviewed, No reaction to metals or fake jewelry per pt

## 2020-10-08 ENCOUNTER — OFFICE VISIT (OUTPATIENT)
Dept: ORTHOPEDIC SURGERY | Facility: CLINIC | Age: 65
End: 2020-10-08

## 2020-10-08 ENCOUNTER — PREP FOR SURGERY (OUTPATIENT)
Dept: OTHER | Facility: HOSPITAL | Age: 65
End: 2020-10-08

## 2020-10-08 VITALS
BODY MASS INDEX: 28.34 KG/M2 | DIASTOLIC BLOOD PRESSURE: 85 MMHG | SYSTOLIC BLOOD PRESSURE: 169 MMHG | HEIGHT: 62 IN | HEART RATE: 88 BPM | WEIGHT: 154 LBS

## 2020-10-08 DIAGNOSIS — M17.11 PRIMARY OSTEOARTHRITIS OF RIGHT KNEE: ICD-10-CM

## 2020-10-08 DIAGNOSIS — R52 PAIN: Primary | ICD-10-CM

## 2020-10-08 LAB — MRSA SPEC QL CULT: NORMAL

## 2020-10-08 PROCEDURE — 73562 X-RAY EXAM OF KNEE 3: CPT | Performed by: ORTHOPAEDIC SURGERY

## 2020-10-08 PROCEDURE — 99024 POSTOP FOLLOW-UP VISIT: CPT | Performed by: ORTHOPAEDIC SURGERY

## 2020-10-08 ASSESSMENT — KOOS JR
KOOS JR SCORE: 50.012
KOOS JR SCORE: 15

## 2020-10-08 NOTE — PROGRESS NOTES
"Subjective:     Patient ID: Yesi Benson is a 65 y.o. female.    Chief Complaint:    History of Present Illness  Yesi Benson {presents/returns:21071} to clinic today for evaluation of ***     Social History     Occupational History   • Not on file   Tobacco Use   • Smoking status: Never Smoker   • Smokeless tobacco: Never Used   Substance and Sexual Activity   • Alcohol use: No     Frequency: Never   • Drug use: No   • Sexual activity: Defer      Past Medical History:   Diagnosis Date   • Colon polyp    • Disease of thyroid gland      Past Surgical History:   Procedure Laterality Date   • COLONOSCOPY N/A 3/27/2019    Procedure: COLONOSCOPY POLYPECTOMY;  Surgeon: Jaime Alaniz MD;  Location: [facility];  Service: Gastroenterology   • DENTAL PROCEDURE Left 2011,2014   • SALPINGO OOPHORECTOMY Left     as an infant        Family History   Problem Relation Age of Onset   • Cancer Father          Review of Systems   Musculoskeletal: Positive for arthralgias and gait problem.           Objective:  Vitals:    10/08/20 0904   BP: 169/85   Pulse: 88   Weight: 69.9 kg (154 lb)   Height: 157.5 cm (62\")         10/08/20  0904   Weight: 69.9 kg (154 lb)     Body mass index is 28.17 kg/m².  ***      Ortho Exam     ***  Imaging:  ***  Assessment:      No diagnosis found.       Plan:          1. Discussed treatment options at length with patient at today's visit. ***      Yesi Benson *** was in agreement with plan and had all questions answered.     Orders:  No orders of the defined types were placed in this encounter.      Medications:  No orders of the defined types were placed in this encounter.      Followup:  No follow-ups on file.    There are no diagnoses linked to this encounter.      Dictated utilizing Dragon dictation   "

## 2020-10-08 NOTE — H&P (VIEW-ONLY)
History & Physical       Patient: Yesi Benson    YOB: 1955    Medical Record Number: [id number]    Surgeon:  Dr. Elmer Fuentes    Chief Complaints:   Chief Complaint   Patient presents with   • Right Knee - Pain       Subjective:  This problem is not new to this examiner.     History of Present Illness: 65 y.o. female presents with   Chief Complaint   Patient presents with   • Right Knee - Pain   . Onset of symptoms was years ago and has been progressively worsening despite more conservative treatment measures.  Symptoms are associated with ability to move, exercise, and perform activities of daily living.  Symptoms are aggravated by weight bearing and ROM necessary for activities of daily living.   Symptoms improve with rest, ice and elevation only minimally.      Allergies: No Known Allergies    Medications:   Home Medications:  Current Outpatient Medications on File Prior to Visit   Medication Sig   • calcium carbonate (OS-ARMANDO) 600 MG tablet Take 600 mg by mouth Daily.   • diclofenac (VOLTAREN) 1 % gel gel Apply 4 g topically to the appropriate area as directed 4 (Four) Times a Day.   • levothyroxine (SYNTHROID) 75 MCG tablet Take 75 mcg by mouth Daily.   • Omega-3 Fatty Acids (FISH OIL) 1000 MG capsule capsule Take 1,000 mg by mouth Daily With Breakfast.   • predniSONE (DELTASONE) 5 MG tablet 6 tabs day 1, 5 tabs day 2, 4 tabs day 3, 3 tabs day 4, 2 tabs day 5, 1 tab day 6     No current facility-administered medications on file prior to visit.      Current Medications:  Scheduled Meds:  Continuous Infusions:No current facility-administered medications for this visit.     PRN Meds:.    I have reviewed the patient's medical history in detail and updated the computerized patient record.  Review and summarization of old records include:    Past Medical History:   Diagnosis Date   • Colon polyp    • Disease of thyroid gland         Past Surgical History:   Procedure Laterality Date   • COLONOSCOPY  N/A 3/27/2019    Procedure: COLONOSCOPY POLYPECTOMY;  Surgeon: Jaime Alaniz MD;  Location: [facility] OR;  Service: Gastroenterology   • DENTAL PROCEDURE Left 2011,2014   • SALPINGO OOPHORECTOMY Left     as an infant         Social History     Occupational History   • Not on file   Tobacco Use   • Smoking status: Never Smoker   • Smokeless tobacco: Never Used   Substance and Sexual Activity   • Alcohol use: No     Frequency: Never   • Drug use: No   • Sexual activity: Defer      Social History     Social History Narrative   • Not on file        Family History   Problem Relation Age of Onset   • Cancer Father        ROS: 14 point review of systems was performed and was negative except for documented findings in HPI and today's encounter.     Allergies: No Known Allergies  Constitutional:  Denies fever, shaking or chills   Eyes:  Denies change in visual acuity   HENT:  Denies nasal congestion or sore throat   Respiratory:  Denies cough or shortness of breath   Cardiovascular:  Denies chest pain or severe LE edema   GI:  Denies abdominal pain, nausea, vomiting, bloody stools or diarrhea   Musculoskeletal:  Denies numbness tingling or loss of motor function except as outlined above in history of present illness.  : Denies painful urination or hematuria  Integument:  Denies rash, lesion or ulceration   Neurologic:  Denies headache or focal weakness  Endocrine:  Denies lymphadenopathy  Psych:  Denies confusion or change in mental status   Hem:  Denies active bleeding    Physical Exam: 65 y.o. female  Body mass index is 28.17 kg/m².  Vitals:    10/08/20 0904   BP: 169/85   Pulse: 88     Vital signs reviewed.   General Appearance:    Alert, cooperative, in no acute distress                  Eyes: conjunctiva clear  ENT: external ears and nose atraumatic  CV: no peripheral edema  Resp: normal respiratory effort  Skin: no rashes or wounds; normal turgor  Psych: mood and affect appropriate  Lymph: no nodes  appreciated  Neuro: gross sensation intact  Vascular:  Palpable peripheral pulse in noted extremity  Musculoskeletal Extremities: Right Knee-     ROM 0-125 degrees  4/5 on flexion  4/5 on extension  Maximal tenderness medial joint line     Effusion- moderate   No opening on varus and valgus stress at 0 and 30  Active patellar compression test- positive      Some hypersensitivity to touch over the right knee  Brisk cap refill all digits  2+ dorsalis pedis pulse    Debilities/Disabilities Identified: None      Diagnostic Tests:  Appointment on 10/07/2020   Component Date Value Ref Range Status   • ABO Type 10/07/2020 A   Final   • RH type 10/07/2020 Positive   Final   • Antibody Screen 10/07/2020 Negative   Final   • T&S Expiration Date 10/07/2020 10/7/2020 11:59:00 PM   Final   • ABO Type 10/07/2020 A   Final   • RH type 10/07/2020 Positive   Final   Appointment on 10/07/2020   Component Date Value Ref Range Status   • Color, UA 10/07/2020 Yellow  Yellow, Straw Final   • Appearance, UA 10/07/2020 Clear  Clear Final   • pH, UA 10/07/2020 5.5  4.5 - 8.0 Final   • Specific Gravity, UA 10/07/2020 1.015  1.003 - 1.030 Final   • Glucose, UA 10/07/2020 500 mg/dL (2+)* Negative Final   • Ketones, UA 10/07/2020 Negative  Negative Final   • Bilirubin, UA 10/07/2020 Negative  Negative Final   • Blood, UA 10/07/2020 Negative  Negative Final   • Protein, UA 10/07/2020 Negative  Negative Final   • Leuk Esterase, UA 10/07/2020 Negative  Negative Final   • Nitrite, UA 10/07/2020 Negative  Negative Final   • Urobilinogen, UA 10/07/2020 0.2 E.U./dL  0.2 - 1.0 E.U./dL Final   • PTT 10/07/2020 26.9  24.3 - 38.1 seconds Final   • Protime 10/07/2020 12.7  12.1 - 15.0 Seconds Final   • INR 10/07/2020 0.98  0.90 - 1.10 Final   • Glucose 10/07/2020 123* 65 - 99 mg/dL Final   • BUN 10/07/2020 16  8 - 23 mg/dL Final   • Creatinine 10/07/2020 1.06* 0.57 - 1.00 mg/dL Final   • Sodium 10/07/2020 139  136 - 145 mmol/L Final   • Potassium  10/07/2020 3.7  3.5 - 5.2 mmol/L Final   • Chloride 10/07/2020 104  98 - 107 mmol/L Final   • CO2 10/07/2020 26.0  22.0 - 29.0 mmol/L Final   • Calcium 10/07/2020 10.1  8.6 - 10.5 mg/dL Final   • eGFR Non African Amer 10/07/2020 52* >60 mL/min/1.73 Final   • BUN/Creatinine Ratio 10/07/2020 15.1  7.0 - 25.0 Final   • Anion Gap 10/07/2020 9.0  5.0 - 15.0 mmol/L Final   • WBC 10/07/2020 5.74  3.40 - 10.80 10*3/mm3 Final   • RBC 10/07/2020 5.45* 3.77 - 5.28 10*6/mm3 Final   • Hemoglobin 10/07/2020 15.7  12.0 - 15.9 g/dL Final   • Hematocrit 10/07/2020 48.9* 34.0 - 46.6 % Final   • MCV 10/07/2020 89.7  79.0 - 97.0 fL Final   • MCH 10/07/2020 28.8  26.6 - 33.0 pg Final   • MCHC 10/07/2020 32.1  31.5 - 35.7 g/dL Final   • RDW 10/07/2020 13.2  12.3 - 15.4 % Final   • RDW-SD 10/07/2020 42.6  37.0 - 54.0 fl Final   • MPV 10/07/2020 10.6  6.0 - 12.0 fL Final   • Platelets 10/07/2020 258  140 - 450 10*3/mm3 Final   • Neutrophil % 10/07/2020 60.8  42.7 - 76.0 % Final   • Lymphocyte % 10/07/2020 30.8  19.6 - 45.3 % Final   • Monocyte % 10/07/2020 6.6  5.0 - 12.0 % Final   • Eosinophil % 10/07/2020 0.9  0.3 - 6.2 % Final   • Basophil % 10/07/2020 0.7  0.0 - 1.5 % Final   • Immature Grans % 10/07/2020 0.2  0.0 - 0.5 % Final   • Neutrophils, Absolute 10/07/2020 3.49  1.70 - 7.00 10*3/mm3 Final   • Lymphocytes, Absolute 10/07/2020 1.77  0.70 - 3.10 10*3/mm3 Final   • Monocytes, Absolute 10/07/2020 0.38  0.10 - 0.90 10*3/mm3 Final   • Eosinophils, Absolute 10/07/2020 0.05  0.00 - 0.40 10*3/mm3 Final   • Basophils, Absolute 10/07/2020 0.04  0.00 - 0.20 10*3/mm3 Final   • Immature Grans, Absolute 10/07/2020 0.01  0.00 - 0.05 10*3/mm3 Final   • nRBC 10/07/2020 0.0  0.0 - 0.2 /100 WBC Final     No results found.    Imaging was done today and discussed at length with the patient:    Indication: pain related symptoms,  Views: 3V AP, LAT & 40 degree PA right knee(s)   Findings: advanced arthritis  Comparison views: viewed last xray done in  the office.     Assessment:  Patient Active Problem List   Diagnosis   • Adenomatous polyp of colon   • Effusion, right knee   • Primary osteoarthritis of right knee   • Hyperlipidemia   • Hypothyroidism   • Complex tear of lateral meniscus of right knee as current injury   • Complex tear of medial meniscus of right knee as current injury       Plan:  I reviewed anatomy of a total joint arthroplasty in laymen's terms, as well as typical postoperative recovery and possibly 6-12 months for maximal recovery, and possible need for rehabilitation stay after hospitalization. We also discussed risks, benefits, alternatives, and limitations of procedure with risks including but not limited to neurovascular damage, bleeding, infection, malalignment, chronic pian, failure of implants, osteolysis, loosening of implants, loss of motion, weakness, stiffness, instability, DVT, pulmonary embolus, death, stroke, complex regional pain syndrome, myocardial infarction, and need for additional procedures. No guarantees were given regarding results of surgery.      Yesi Benson was given the opportunity to ask and have all questions answered today.  The patient voiced understanding of the risks, benefits, and alternative forms of treatment that were discussed and the patient consents to proceed with surgery.     Patient's blood clot history is negative.    Plan for DVT prophylaxis is ASA    Patient's MRSA infection history is negative.    Patient's skin infection history is negative.    Discharge Plan: POD 2-3 to home    Date: 10/8/2020    Dictated utilizing Dragon dictation

## 2020-10-08 NOTE — H&P
History & Physical       Patient: Yesi Benson    YOB: 1955    Medical Record Number: [id number]    Surgeon:  Dr. Elmer Fuentes    Chief Complaints:   Chief Complaint   Patient presents with   • Right Knee - Pain       Subjective:  This problem is not new to this examiner.     History of Present Illness: 65 y.o. female presents with   Chief Complaint   Patient presents with   • Right Knee - Pain   . Onset of symptoms was years ago and has been progressively worsening despite more conservative treatment measures.  Symptoms are associated with ability to move, exercise, and perform activities of daily living.  Symptoms are aggravated by weight bearing and ROM necessary for activities of daily living.   Symptoms improve with rest, ice and elevation only minimally.      Allergies: No Known Allergies    Medications:   Home Medications:  Current Outpatient Medications on File Prior to Visit   Medication Sig   • calcium carbonate (OS-ARMANDO) 600 MG tablet Take 600 mg by mouth Daily.   • diclofenac (VOLTAREN) 1 % gel gel Apply 4 g topically to the appropriate area as directed 4 (Four) Times a Day.   • levothyroxine (SYNTHROID) 75 MCG tablet Take 75 mcg by mouth Daily.   • Omega-3 Fatty Acids (FISH OIL) 1000 MG capsule capsule Take 1,000 mg by mouth Daily With Breakfast.   • predniSONE (DELTASONE) 5 MG tablet 6 tabs day 1, 5 tabs day 2, 4 tabs day 3, 3 tabs day 4, 2 tabs day 5, 1 tab day 6     No current facility-administered medications on file prior to visit.      Current Medications:  Scheduled Meds:  Continuous Infusions:No current facility-administered medications for this visit.     PRN Meds:.    I have reviewed the patient's medical history in detail and updated the computerized patient record.  Review and summarization of old records include:    Past Medical History:   Diagnosis Date   • Colon polyp    • Disease of thyroid gland         Past Surgical History:   Procedure Laterality Date   • COLONOSCOPY  N/A 3/27/2019    Procedure: COLONOSCOPY POLYPECTOMY;  Surgeon: Jaime Alaniz MD;  Location: [facility] OR;  Service: Gastroenterology   • DENTAL PROCEDURE Left 2011,2014   • SALPINGO OOPHORECTOMY Left     as an infant         Social History     Occupational History   • Not on file   Tobacco Use   • Smoking status: Never Smoker   • Smokeless tobacco: Never Used   Substance and Sexual Activity   • Alcohol use: No     Frequency: Never   • Drug use: No   • Sexual activity: Defer      Social History     Social History Narrative   • Not on file        Family History   Problem Relation Age of Onset   • Cancer Father        ROS: 14 point review of systems was performed and was negative except for documented findings in HPI and today's encounter.     Allergies: No Known Allergies  Constitutional:  Denies fever, shaking or chills   Eyes:  Denies change in visual acuity   HENT:  Denies nasal congestion or sore throat   Respiratory:  Denies cough or shortness of breath   Cardiovascular:  Denies chest pain or severe LE edema   GI:  Denies abdominal pain, nausea, vomiting, bloody stools or diarrhea   Musculoskeletal:  Denies numbness tingling or loss of motor function except as outlined above in history of present illness.  : Denies painful urination or hematuria  Integument:  Denies rash, lesion or ulceration   Neurologic:  Denies headache or focal weakness  Endocrine:  Denies lymphadenopathy  Psych:  Denies confusion or change in mental status   Hem:  Denies active bleeding    Physical Exam: 65 y.o. female  Body mass index is 28.17 kg/m².  Vitals:    10/08/20 0904   BP: 169/85   Pulse: 88     Vital signs reviewed.   General Appearance:    Alert, cooperative, in no acute distress                  Eyes: conjunctiva clear  ENT: external ears and nose atraumatic  CV: no peripheral edema  Resp: normal respiratory effort  Skin: no rashes or wounds; normal turgor  Psych: mood and affect appropriate  Lymph: no nodes  appreciated  Neuro: gross sensation intact  Vascular:  Palpable peripheral pulse in noted extremity  Musculoskeletal Extremities: Right Knee-     ROM 0-125 degrees  4/5 on flexion  4/5 on extension  Maximal tenderness medial joint line     Effusion- moderate   No opening on varus and valgus stress at 0 and 30  Active patellar compression test- positive      Some hypersensitivity to touch over the right knee  Brisk cap refill all digits  2+ dorsalis pedis pulse    Debilities/Disabilities Identified: None      Diagnostic Tests:  Appointment on 10/07/2020   Component Date Value Ref Range Status   • ABO Type 10/07/2020 A   Final   • RH type 10/07/2020 Positive   Final   • Antibody Screen 10/07/2020 Negative   Final   • T&S Expiration Date 10/07/2020 10/7/2020 11:59:00 PM   Final   • ABO Type 10/07/2020 A   Final   • RH type 10/07/2020 Positive   Final   Appointment on 10/07/2020   Component Date Value Ref Range Status   • Color, UA 10/07/2020 Yellow  Yellow, Straw Final   • Appearance, UA 10/07/2020 Clear  Clear Final   • pH, UA 10/07/2020 5.5  4.5 - 8.0 Final   • Specific Gravity, UA 10/07/2020 1.015  1.003 - 1.030 Final   • Glucose, UA 10/07/2020 500 mg/dL (2+)* Negative Final   • Ketones, UA 10/07/2020 Negative  Negative Final   • Bilirubin, UA 10/07/2020 Negative  Negative Final   • Blood, UA 10/07/2020 Negative  Negative Final   • Protein, UA 10/07/2020 Negative  Negative Final   • Leuk Esterase, UA 10/07/2020 Negative  Negative Final   • Nitrite, UA 10/07/2020 Negative  Negative Final   • Urobilinogen, UA 10/07/2020 0.2 E.U./dL  0.2 - 1.0 E.U./dL Final   • PTT 10/07/2020 26.9  24.3 - 38.1 seconds Final   • Protime 10/07/2020 12.7  12.1 - 15.0 Seconds Final   • INR 10/07/2020 0.98  0.90 - 1.10 Final   • Glucose 10/07/2020 123* 65 - 99 mg/dL Final   • BUN 10/07/2020 16  8 - 23 mg/dL Final   • Creatinine 10/07/2020 1.06* 0.57 - 1.00 mg/dL Final   • Sodium 10/07/2020 139  136 - 145 mmol/L Final   • Potassium  10/07/2020 3.7  3.5 - 5.2 mmol/L Final   • Chloride 10/07/2020 104  98 - 107 mmol/L Final   • CO2 10/07/2020 26.0  22.0 - 29.0 mmol/L Final   • Calcium 10/07/2020 10.1  8.6 - 10.5 mg/dL Final   • eGFR Non African Amer 10/07/2020 52* >60 mL/min/1.73 Final   • BUN/Creatinine Ratio 10/07/2020 15.1  7.0 - 25.0 Final   • Anion Gap 10/07/2020 9.0  5.0 - 15.0 mmol/L Final   • WBC 10/07/2020 5.74  3.40 - 10.80 10*3/mm3 Final   • RBC 10/07/2020 5.45* 3.77 - 5.28 10*6/mm3 Final   • Hemoglobin 10/07/2020 15.7  12.0 - 15.9 g/dL Final   • Hematocrit 10/07/2020 48.9* 34.0 - 46.6 % Final   • MCV 10/07/2020 89.7  79.0 - 97.0 fL Final   • MCH 10/07/2020 28.8  26.6 - 33.0 pg Final   • MCHC 10/07/2020 32.1  31.5 - 35.7 g/dL Final   • RDW 10/07/2020 13.2  12.3 - 15.4 % Final   • RDW-SD 10/07/2020 42.6  37.0 - 54.0 fl Final   • MPV 10/07/2020 10.6  6.0 - 12.0 fL Final   • Platelets 10/07/2020 258  140 - 450 10*3/mm3 Final   • Neutrophil % 10/07/2020 60.8  42.7 - 76.0 % Final   • Lymphocyte % 10/07/2020 30.8  19.6 - 45.3 % Final   • Monocyte % 10/07/2020 6.6  5.0 - 12.0 % Final   • Eosinophil % 10/07/2020 0.9  0.3 - 6.2 % Final   • Basophil % 10/07/2020 0.7  0.0 - 1.5 % Final   • Immature Grans % 10/07/2020 0.2  0.0 - 0.5 % Final   • Neutrophils, Absolute 10/07/2020 3.49  1.70 - 7.00 10*3/mm3 Final   • Lymphocytes, Absolute 10/07/2020 1.77  0.70 - 3.10 10*3/mm3 Final   • Monocytes, Absolute 10/07/2020 0.38  0.10 - 0.90 10*3/mm3 Final   • Eosinophils, Absolute 10/07/2020 0.05  0.00 - 0.40 10*3/mm3 Final   • Basophils, Absolute 10/07/2020 0.04  0.00 - 0.20 10*3/mm3 Final   • Immature Grans, Absolute 10/07/2020 0.01  0.00 - 0.05 10*3/mm3 Final   • nRBC 10/07/2020 0.0  0.0 - 0.2 /100 WBC Final     No results found.    Imaging was done today and discussed at length with the patient:    Indication: pain related symptoms,  Views: 3V AP, LAT & 40 degree PA right knee(s)   Findings: advanced arthritis  Comparison views: viewed last xray done in  the office.     Assessment:  Patient Active Problem List   Diagnosis   • Adenomatous polyp of colon   • Effusion, right knee   • Primary osteoarthritis of right knee   • Hyperlipidemia   • Hypothyroidism   • Complex tear of lateral meniscus of right knee as current injury   • Complex tear of medial meniscus of right knee as current injury       Plan:  I reviewed anatomy of a total joint arthroplasty in laymen's terms, as well as typical postoperative recovery and possibly 6-12 months for maximal recovery, and possible need for rehabilitation stay after hospitalization. We also discussed risks, benefits, alternatives, and limitations of procedure with risks including but not limited to neurovascular damage, bleeding, infection, malalignment, chronic pian, failure of implants, osteolysis, loosening of implants, loss of motion, weakness, stiffness, instability, DVT, pulmonary embolus, death, stroke, complex regional pain syndrome, myocardial infarction, and need for additional procedures. No guarantees were given regarding results of surgery.      Yesi Benson was given the opportunity to ask and have all questions answered today.  The patient voiced understanding of the risks, benefits, and alternative forms of treatment that were discussed and the patient consents to proceed with surgery.     Patient's blood clot history is negative.    Plan for DVT prophylaxis is ASA    Patient's MRSA infection history is negative.    Patient's skin infection history is negative.    Discharge Plan: POD 2-3 to home    Date: 10/8/2020    Dictated utilizing Dragon dictation

## 2020-10-12 ENCOUNTER — LAB (OUTPATIENT)
Dept: LAB | Facility: HOSPITAL | Age: 65
End: 2020-10-12

## 2020-10-12 DIAGNOSIS — Z01.818 PREOP TESTING: ICD-10-CM

## 2020-10-12 PROCEDURE — U0004 COV-19 TEST NON-CDC HGH THRU: HCPCS

## 2020-10-12 PROCEDURE — C9803 HOPD COVID-19 SPEC COLLECT: HCPCS

## 2020-10-13 ENCOUNTER — ANESTHESIA EVENT (OUTPATIENT)
Dept: PERIOP | Facility: HOSPITAL | Age: 65
End: 2020-10-13

## 2020-10-13 LAB — SARS-COV-2 RNA RESP QL NAA+PROBE: NOT DETECTED

## 2020-10-14 ENCOUNTER — APPOINTMENT (OUTPATIENT)
Dept: GENERAL RADIOLOGY | Facility: HOSPITAL | Age: 65
End: 2020-10-14

## 2020-10-14 ENCOUNTER — ANESTHESIA (OUTPATIENT)
Dept: PERIOP | Facility: HOSPITAL | Age: 65
End: 2020-10-14

## 2020-10-14 ENCOUNTER — HOSPITAL ENCOUNTER (OUTPATIENT)
Facility: HOSPITAL | Age: 65
Setting detail: OBSERVATION
Discharge: HOME | End: 2020-10-15
Attending: ORTHOPAEDIC SURGERY | Admitting: ORTHOPAEDIC SURGERY

## 2020-10-14 DIAGNOSIS — Z96.651 STATUS POST TOTAL RIGHT KNEE REPLACEMENT: Primary | ICD-10-CM

## 2020-10-14 DIAGNOSIS — S83.231A COMPLEX TEAR OF MEDIAL MENISCUS OF RIGHT KNEE AS CURRENT INJURY, INITIAL ENCOUNTER: ICD-10-CM

## 2020-10-14 DIAGNOSIS — M17.11 PRIMARY OSTEOARTHRITIS OF RIGHT KNEE: ICD-10-CM

## 2020-10-14 PROCEDURE — G0378 HOSPITAL OBSERVATION PER HR: HCPCS

## 2020-10-14 PROCEDURE — C1713 ANCHOR/SCREW BN/BN,TIS/BN: HCPCS | Performed by: ORTHOPAEDIC SURGERY

## 2020-10-14 PROCEDURE — 0396T: CPT | Performed by: ORTHOPAEDIC SURGERY

## 2020-10-14 PROCEDURE — 25010000002 MIDAZOLAM PER 1MG: Performed by: NURSE ANESTHETIST, CERTIFIED REGISTERED

## 2020-10-14 PROCEDURE — 25010000002 DEXAMETHASONE PER 1 MG: Performed by: NURSE ANESTHETIST, CERTIFIED REGISTERED

## 2020-10-14 PROCEDURE — 94799 UNLISTED PULMONARY SVC/PX: CPT

## 2020-10-14 PROCEDURE — 97165 OT EVAL LOW COMPLEX 30 MIN: CPT

## 2020-10-14 PROCEDURE — C1776 JOINT DEVICE (IMPLANTABLE): HCPCS | Performed by: ORTHOPAEDIC SURGERY

## 2020-10-14 PROCEDURE — 76942 ECHO GUIDE FOR BIOPSY: CPT | Performed by: ORTHOPAEDIC SURGERY

## 2020-10-14 PROCEDURE — 73560 X-RAY EXAM OF KNEE 1 OR 2: CPT

## 2020-10-14 PROCEDURE — 25010000002 ONDANSETRON PER 1 MG: Performed by: NURSE ANESTHETIST, CERTIFIED REGISTERED

## 2020-10-14 PROCEDURE — L1830 KO IMMOB CANVAS LONG PRE OTS: HCPCS | Performed by: ORTHOPAEDIC SURGERY

## 2020-10-14 PROCEDURE — 97161 PT EVAL LOW COMPLEX 20 MIN: CPT

## 2020-10-14 PROCEDURE — 25010000002 ROPIVACAINE PER 1 MG: Performed by: NURSE ANESTHETIST, CERTIFIED REGISTERED

## 2020-10-14 PROCEDURE — 25010000003 CEFAZOLIN SODIUM-DEXTROSE 2-3 GM-%(50ML) RECONSTITUTED SOLUTION: Performed by: ORTHOPAEDIC SURGERY

## 2020-10-14 PROCEDURE — 25010000002 PROPOFOL 10 MG/ML EMULSION: Performed by: NURSE ANESTHETIST, CERTIFIED REGISTERED

## 2020-10-14 PROCEDURE — 27447 TOTAL KNEE ARTHROPLASTY: CPT | Performed by: ORTHOPAEDIC SURGERY

## 2020-10-14 PROCEDURE — 25010000002 KETOROLAC TROMETHAMINE PER 15 MG: Performed by: ORTHOPAEDIC SURGERY

## 2020-10-14 DEVICE — COMP FEM PERSONA CR CMT COCR STD SZ4 RT: Type: IMPLANTABLE DEVICE | Site: KNEE | Status: FUNCTIONAL

## 2020-10-14 DEVICE — CAP TOTL KN CMT PREMIUM: Type: IMPLANTABLE DEVICE | Site: KNEE | Status: FUNCTIONAL

## 2020-10-14 DEVICE — DEV CONTRL TISS STRATAFIX SPIRAL MNCRYL UD 3/0 PLS 45CM: Type: IMPLANTABLE DEVICE | Site: KNEE | Status: FUNCTIONAL

## 2020-10-14 DEVICE — STEM TIB/KN PERSONA CMT 5D SZC RT: Type: IMPLANTABLE DEVICE | Site: KNEE | Status: FUNCTIONAL

## 2020-10-14 DEVICE — PAT KN PERSONA VE CRS/LNK CMT 8.5X32MM: Type: IMPLANTABLE DEVICE | Site: KNEE | Status: FUNCTIONAL

## 2020-10-14 DEVICE — DEV WND/CLS CONTRL TISS STRATAFIX SYMM PDS PLS CTX 60CM VIL: Type: IMPLANTABLE DEVICE | Site: KNEE | Status: FUNCTIONAL

## 2020-10-14 DEVICE — CAP BEAR KN VE UPCHRG: Type: IMPLANTABLE DEVICE | Site: KNEE | Status: FUNCTIONAL

## 2020-10-14 DEVICE — CAP PAT KN VE/TM UPCHRG: Type: IMPLANTABLE DEVICE | Site: KNEE | Status: FUNCTIONAL

## 2020-10-14 DEVICE — CMT BONE R 1X40: Type: IMPLANTABLE DEVICE | Site: KNEE | Status: FUNCTIONAL

## 2020-10-14 DEVICE — ART/SRF KN PERSONA/VE MC CD 1TO2 10MM RT: Type: IMPLANTABLE DEVICE | Site: KNEE | Status: FUNCTIONAL

## 2020-10-14 RX ORDER — SODIUM CHLORIDE, SODIUM LACTATE, POTASSIUM CHLORIDE, CALCIUM CHLORIDE 600; 310; 30; 20 MG/100ML; MG/100ML; MG/100ML; MG/100ML
9 INJECTION, SOLUTION INTRAVENOUS CONTINUOUS
Status: DISCONTINUED | OUTPATIENT
Start: 2020-10-14 | End: 2020-10-15 | Stop reason: HOSPADM

## 2020-10-14 RX ORDER — SODIUM CHLORIDE 0.9 % (FLUSH) 0.9 %
10 SYRINGE (ML) INJECTION AS NEEDED
Status: DISCONTINUED | OUTPATIENT
Start: 2020-10-14 | End: 2020-10-14 | Stop reason: HOSPADM

## 2020-10-14 RX ORDER — CEFAZOLIN SODIUM 2 G/50ML
2 SOLUTION INTRAVENOUS EVERY 8 HOURS
Status: DISCONTINUED | OUTPATIENT
Start: 2020-10-14 | End: 2020-10-14

## 2020-10-14 RX ORDER — OXYCODONE HYDROCHLORIDE 5 MG/1
5 TABLET ORAL EVERY 4 HOURS PRN
Status: DISCONTINUED | OUTPATIENT
Start: 2020-10-14 | End: 2020-10-15 | Stop reason: HOSPADM

## 2020-10-14 RX ORDER — OXYCODONE HYDROCHLORIDE 5 MG/1
10 TABLET ORAL EVERY 4 HOURS PRN
Status: DISCONTINUED | OUTPATIENT
Start: 2020-10-14 | End: 2020-10-15 | Stop reason: HOSPADM

## 2020-10-14 RX ORDER — ONDANSETRON 2 MG/ML
4 INJECTION INTRAMUSCULAR; INTRAVENOUS ONCE AS NEEDED
Status: COMPLETED | OUTPATIENT
Start: 2020-10-14 | End: 2020-10-14

## 2020-10-14 RX ORDER — ONDANSETRON 2 MG/ML
4 INJECTION INTRAMUSCULAR; INTRAVENOUS ONCE AS NEEDED
Status: DISCONTINUED | OUTPATIENT
Start: 2020-10-14 | End: 2020-10-14 | Stop reason: HOSPADM

## 2020-10-14 RX ORDER — ONDANSETRON 2 MG/ML
4 INJECTION INTRAMUSCULAR; INTRAVENOUS EVERY 6 HOURS PRN
Status: DISCONTINUED | OUTPATIENT
Start: 2020-10-14 | End: 2020-10-15 | Stop reason: HOSPADM

## 2020-10-14 RX ORDER — BUPIVACAINE HYDROCHLORIDE 5 MG/ML
INJECTION, SOLUTION PERINEURAL
Status: COMPLETED | OUTPATIENT
Start: 2020-10-14 | End: 2020-10-14

## 2020-10-14 RX ORDER — LEVOTHYROXINE SODIUM 0.07 MG/1
75 TABLET ORAL DAILY
Status: DISCONTINUED | OUTPATIENT
Start: 2020-10-14 | End: 2020-10-15 | Stop reason: HOSPADM

## 2020-10-14 RX ORDER — SODIUM CHLORIDE 9 MG/ML
40 INJECTION, SOLUTION INTRAVENOUS AS NEEDED
Status: DISCONTINUED | OUTPATIENT
Start: 2020-10-14 | End: 2020-10-14 | Stop reason: HOSPADM

## 2020-10-14 RX ORDER — ROPIVACAINE HYDROCHLORIDE 2 MG/ML
INJECTION, SOLUTION EPIDURAL; INFILTRATION; PERINEURAL
Status: COMPLETED | OUTPATIENT
Start: 2020-10-14 | End: 2020-10-14

## 2020-10-14 RX ORDER — CEFAZOLIN SODIUM 2 G/50ML
2 SOLUTION INTRAVENOUS EVERY 8 HOURS
Status: COMPLETED | OUTPATIENT
Start: 2020-10-14 | End: 2020-10-15

## 2020-10-14 RX ORDER — MAGNESIUM HYDROXIDE 1200 MG/15ML
LIQUID ORAL AS NEEDED
Status: DISCONTINUED | OUTPATIENT
Start: 2020-10-14 | End: 2020-10-14 | Stop reason: HOSPADM

## 2020-10-14 RX ORDER — PROPOFOL 10 MG/ML
VIAL (ML) INTRAVENOUS AS NEEDED
Status: DISCONTINUED | OUTPATIENT
Start: 2020-10-14 | End: 2020-10-14 | Stop reason: SURG

## 2020-10-14 RX ORDER — HYDROMORPHONE HYDROCHLORIDE 1 MG/ML
0.25 INJECTION, SOLUTION INTRAMUSCULAR; INTRAVENOUS; SUBCUTANEOUS
Status: DISCONTINUED | OUTPATIENT
Start: 2020-10-14 | End: 2020-10-14 | Stop reason: HOSPADM

## 2020-10-14 RX ORDER — LIDOCAINE HYDROCHLORIDE 10 MG/ML
0.5 INJECTION, SOLUTION EPIDURAL; INFILTRATION; INTRACAUDAL; PERINEURAL ONCE AS NEEDED
Status: DISCONTINUED | OUTPATIENT
Start: 2020-10-14 | End: 2020-10-14 | Stop reason: HOSPADM

## 2020-10-14 RX ORDER — MELOXICAM 7.5 MG/1
15 TABLET ORAL DAILY
Status: DISCONTINUED | OUTPATIENT
Start: 2020-10-15 | End: 2020-10-15 | Stop reason: HOSPADM

## 2020-10-14 RX ORDER — LIDOCAINE HYDROCHLORIDE 20 MG/ML
INJECTION, SOLUTION INFILTRATION; PERINEURAL AS NEEDED
Status: DISCONTINUED | OUTPATIENT
Start: 2020-10-14 | End: 2020-10-14 | Stop reason: SURG

## 2020-10-14 RX ORDER — MEPERIDINE HYDROCHLORIDE 25 MG/ML
12.5 INJECTION INTRAMUSCULAR; INTRAVENOUS; SUBCUTANEOUS
Status: DISCONTINUED | OUTPATIENT
Start: 2020-10-14 | End: 2020-10-14 | Stop reason: HOSPADM

## 2020-10-14 RX ORDER — ROPIVACAINE HYDROCHLORIDE 2 MG/ML
INJECTION, SOLUTION EPIDURAL; INFILTRATION; PERINEURAL
Status: DISCONTINUED | OUTPATIENT
Start: 2020-10-14 | End: 2020-10-14 | Stop reason: SURG

## 2020-10-14 RX ORDER — PREGABALIN 75 MG/1
150 CAPSULE ORAL ONCE
Status: COMPLETED | OUTPATIENT
Start: 2020-10-14 | End: 2020-10-14

## 2020-10-14 RX ORDER — SODIUM CHLORIDE 9 MG/ML
INJECTION, SOLUTION INTRAVENOUS AS NEEDED
Status: DISCONTINUED | OUTPATIENT
Start: 2020-10-14 | End: 2020-10-14 | Stop reason: HOSPADM

## 2020-10-14 RX ORDER — ONDANSETRON 4 MG/1
4 TABLET, FILM COATED ORAL EVERY 6 HOURS PRN
Status: DISCONTINUED | OUTPATIENT
Start: 2020-10-14 | End: 2020-10-15 | Stop reason: HOSPADM

## 2020-10-14 RX ORDER — CEFAZOLIN SODIUM 2 G/50ML
2 SOLUTION INTRAVENOUS ONCE
Status: COMPLETED | OUTPATIENT
Start: 2020-10-14 | End: 2020-10-14

## 2020-10-14 RX ORDER — ACETAMINOPHEN 500 MG
1000 TABLET ORAL ONCE
Status: COMPLETED | OUTPATIENT
Start: 2020-10-14 | End: 2020-10-14

## 2020-10-14 RX ORDER — FAMOTIDINE 10 MG/ML
20 INJECTION, SOLUTION INTRAVENOUS
Status: COMPLETED | OUTPATIENT
Start: 2020-10-14 | End: 2020-10-14

## 2020-10-14 RX ORDER — PREGABALIN 75 MG/1
75 CAPSULE ORAL EVERY 12 HOURS SCHEDULED
Status: DISCONTINUED | OUTPATIENT
Start: 2020-10-14 | End: 2020-10-15 | Stop reason: HOSPADM

## 2020-10-14 RX ORDER — SODIUM CHLORIDE 0.9 % (FLUSH) 0.9 %
10 SYRINGE (ML) INJECTION EVERY 12 HOURS SCHEDULED
Status: DISCONTINUED | OUTPATIENT
Start: 2020-10-14 | End: 2020-10-14 | Stop reason: HOSPADM

## 2020-10-14 RX ORDER — HYDROMORPHONE HYDROCHLORIDE 1 MG/ML
0.5 INJECTION, SOLUTION INTRAMUSCULAR; INTRAVENOUS; SUBCUTANEOUS
Status: DISCONTINUED | OUTPATIENT
Start: 2020-10-14 | End: 2020-10-14 | Stop reason: HOSPADM

## 2020-10-14 RX ORDER — MORPHINE SULFATE 10 MG/ML
6 INJECTION INTRAMUSCULAR; INTRAVENOUS; SUBCUTANEOUS
Status: DISCONTINUED | OUTPATIENT
Start: 2020-10-14 | End: 2020-10-15 | Stop reason: HOSPADM

## 2020-10-14 RX ORDER — DEXAMETHASONE SODIUM PHOSPHATE 4 MG/ML
8 INJECTION, SOLUTION INTRA-ARTICULAR; INTRALESIONAL; INTRAMUSCULAR; INTRAVENOUS; SOFT TISSUE ONCE AS NEEDED
Status: COMPLETED | OUTPATIENT
Start: 2020-10-14 | End: 2020-10-14

## 2020-10-14 RX ORDER — SODIUM CHLORIDE, SODIUM LACTATE, POTASSIUM CHLORIDE, CALCIUM CHLORIDE 600; 310; 30; 20 MG/100ML; MG/100ML; MG/100ML; MG/100ML
100 INJECTION, SOLUTION INTRAVENOUS CONTINUOUS
Status: DISCONTINUED | OUTPATIENT
Start: 2020-10-14 | End: 2020-10-15 | Stop reason: HOSPADM

## 2020-10-14 RX ORDER — KETOROLAC TROMETHAMINE 30 MG/ML
15 INJECTION, SOLUTION INTRAMUSCULAR; INTRAVENOUS EVERY 6 HOURS PRN
Status: DISCONTINUED | OUTPATIENT
Start: 2020-10-14 | End: 2020-10-15 | Stop reason: HOSPADM

## 2020-10-14 RX ORDER — MIDAZOLAM HYDROCHLORIDE 2 MG/2ML
1 INJECTION, SOLUTION INTRAMUSCULAR; INTRAVENOUS
Status: COMPLETED | OUTPATIENT
Start: 2020-10-14 | End: 2020-10-14

## 2020-10-14 RX ORDER — NALOXONE HCL 0.4 MG/ML
0.4 VIAL (ML) INJECTION
Status: DISCONTINUED | OUTPATIENT
Start: 2020-10-14 | End: 2020-10-15 | Stop reason: HOSPADM

## 2020-10-14 RX ORDER — MELOXICAM 7.5 MG/1
15 TABLET ORAL ONCE
Status: COMPLETED | OUTPATIENT
Start: 2020-10-14 | End: 2020-10-14

## 2020-10-14 RX ORDER — ACETAMINOPHEN 325 MG/1
950 TABLET ORAL 3 TIMES DAILY
Status: DISCONTINUED | OUTPATIENT
Start: 2020-10-14 | End: 2020-10-15 | Stop reason: HOSPADM

## 2020-10-14 RX ADMIN — CEFAZOLIN SODIUM 2 G: 2 SOLUTION INTRAVENOUS at 16:55

## 2020-10-14 RX ADMIN — SODIUM CHLORIDE, POTASSIUM CHLORIDE, SODIUM LACTATE AND CALCIUM CHLORIDE 9 ML/HR: 600; 310; 30; 20 INJECTION, SOLUTION INTRAVENOUS at 07:11

## 2020-10-14 RX ADMIN — SODIUM CHLORIDE 1000 MG: 9 INJECTION, SOLUTION INTRAVENOUS at 08:26

## 2020-10-14 RX ADMIN — ROPIVACAINE HYDROCHLORIDE 15 ML: 2 INJECTION, SOLUTION EPIDURAL; INFILTRATION at 08:09

## 2020-10-14 RX ADMIN — LIDOCAINE HYDROCHLORIDE 50 MG: 20 INJECTION, SOLUTION INFILTRATION; PERINEURAL at 08:20

## 2020-10-14 RX ADMIN — ACETAMINOPHEN 975 MG: 325 TABLET, FILM COATED ORAL at 16:55

## 2020-10-14 RX ADMIN — MIDAZOLAM HYDROCHLORIDE 1 MG: 1 INJECTION, SOLUTION INTRAMUSCULAR; INTRAVENOUS at 07:54

## 2020-10-14 RX ADMIN — ACETAMINOPHEN 975 MG: 325 TABLET, FILM COATED ORAL at 20:29

## 2020-10-14 RX ADMIN — ROPIVACAINE HYDROCHLORIDE 20 ML: 2 INJECTION, SOLUTION EPIDURAL; INFILTRATION at 11:12

## 2020-10-14 RX ADMIN — DEXAMETHASONE SODIUM PHOSPHATE 8 MG: 4 INJECTION, SOLUTION INTRAMUSCULAR; INTRAVENOUS at 07:47

## 2020-10-14 RX ADMIN — KETOROLAC TROMETHAMINE 15 MG: 30 INJECTION, SOLUTION INTRAMUSCULAR; INTRAVENOUS at 17:35

## 2020-10-14 RX ADMIN — BUPIVACAINE HYDROCHLORIDE 8 ML/HR: 5 INJECTION, SOLUTION EPIDURAL; INTRACAUDAL; PERINEURAL at 11:14

## 2020-10-14 RX ADMIN — ACETAMINOPHEN 1000 MG: 500 TABLET, FILM COATED ORAL at 06:57

## 2020-10-14 RX ADMIN — ONDANSETRON 4 MG: 2 INJECTION, SOLUTION INTRAMUSCULAR; INTRAVENOUS at 07:47

## 2020-10-14 RX ADMIN — PROPOFOL 50 MCG/KG/MIN: 10 INJECTION, EMULSION INTRAVENOUS at 08:20

## 2020-10-14 RX ADMIN — MELOXICAM 15 MG: 7.5 TABLET ORAL at 06:57

## 2020-10-14 RX ADMIN — SODIUM CHLORIDE 1000 MG: 9 INJECTION, SOLUTION INTRAVENOUS at 10:30

## 2020-10-14 RX ADMIN — FAMOTIDINE 20 MG: 10 INJECTION INTRAVENOUS at 07:47

## 2020-10-14 RX ADMIN — PROPOFOL 50 MG: 10 INJECTION, EMULSION INTRAVENOUS at 08:20

## 2020-10-14 RX ADMIN — OXYCODONE HYDROCHLORIDE 5 MG: 5 TABLET ORAL at 14:27

## 2020-10-14 RX ADMIN — CEFAZOLIN SODIUM 2 G: 2 SOLUTION INTRAVENOUS at 08:23

## 2020-10-14 RX ADMIN — PREGABALIN 150 MG: 75 CAPSULE ORAL at 06:57

## 2020-10-14 RX ADMIN — PREGABALIN 75 MG: 75 CAPSULE ORAL at 14:26

## 2020-10-14 RX ADMIN — SODIUM CHLORIDE, POTASSIUM CHLORIDE, SODIUM LACTATE AND CALCIUM CHLORIDE 100 ML/HR: 600; 310; 30; 20 INJECTION, SOLUTION INTRAVENOUS at 14:28

## 2020-10-14 RX ADMIN — MIDAZOLAM HYDROCHLORIDE 1 MG: 1 INJECTION, SOLUTION INTRAMUSCULAR; INTRAVENOUS at 07:50

## 2020-10-14 RX ADMIN — PREGABALIN 75 MG: 75 CAPSULE ORAL at 20:29

## 2020-10-14 RX ADMIN — BUPIVACAINE HYDROCHLORIDE 2 ML: 5 INJECTION, SOLUTION PERINEURAL at 07:59

## 2020-10-14 NOTE — PLAN OF CARE
Problem: Adult Inpatient Plan of Care  Goal: Plan of Care Review  Recent Flowsheet Documentation  Taken 10/14/2020 1326 by Vincent Méndez, PT  Plan of Care Reviewed With: patient  Outcome Summary: PT: Patient performs supine to sit transfer with min A, sit to/from stand transfers with CGA and gait x 95 feet with CGA and use of FWW. Patient requires min verbal cues for safety with use of device. Patient would benefit from phyiscal therapy while in the hospital to address deficits in functional mobility and LE strength/ROM. Recommend outpatient PT at discharge. Patient agreeable to recommendations.

## 2020-10-14 NOTE — INTERVAL H&P NOTE
"H&P reviewed. The patient was examined and there are no changes to the H&P.     Vitals:    10/14/20 0625   BP: 146/81   BP Location: Right arm   Pulse: 75   Resp: 21   Temp: 97.9 °F (36.6 °C)   TempSrc: Oral   SpO2: 100%   Weight: 69.3 kg (152 lb 12.8 oz)   Height: 157.5 cm (62.01\")       "

## 2020-10-14 NOTE — ANESTHESIA PREPROCEDURE EVALUATION
Anesthesia Evaluation     Patient summary reviewed and Nursing notes reviewed   no history of anesthetic complications:  NPO Solid Status: > 8 hours  NPO Liquid Status: > 8 hours           Airway   Mallampati: II  TM distance: >3 FB  Neck ROM: full  No difficulty expected  Dental - normal exam     Pulmonary - negative pulmonary ROS and normal exam   Cardiovascular - normal exam    ECG reviewed  Rhythm: regular  Rate: normal        Neuro/Psych- negative ROS  GI/Hepatic/Renal/Endo    (+)   thyroid problem thyroid cancer    Musculoskeletal     Abdominal  - normal exam   Substance History - negative use     OB/GYN          Other   arthritis,                      Anesthesia Plan    ASA 2     spinal and regional     intravenous induction     Anesthetic plan, all risks, benefits, and alternatives have been provided, discussed and informed consent has been obtained with: patient.  Use of blood products discussed with patient  Consented to blood products.   Plan discussed with CRNA.

## 2020-10-14 NOTE — THERAPY EVALUATION
Patient Name: Yesi Benson  : 1955    MRN: [id number]                              Today's Date: 10/14/2020       Admit Date: 10/14/2020    Visit Dx:     ICD-10-CM ICD-9-CM   1. Primary osteoarthritis of right knee  M17.11 715.16   2. Complex tear of medial meniscus of right knee as current injury, initial encounter  S83.231A 836.0     Patient Active Problem List   Diagnosis   • Adenomatous polyp of colon   • Effusion, right knee   • Primary osteoarthritis of right knee   • Hyperlipidemia   • Hypothyroidism   • Complex tear of lateral meniscus of right knee as current injury   • Complex tear of medial meniscus of right knee as current injury     Past Medical History:   Diagnosis Date   • Colon polyp    • Disease of thyroid gland      Past Surgical History:   Procedure Laterality Date   • COLONOSCOPY N/A 3/27/2019    Procedure: COLONOSCOPY POLYPECTOMY;  Surgeon: Jaime Alaniz MD;  Location: [facility];  Service: Gastroenterology   • DENTAL PROCEDURE Left ,   • SALPINGO OOPHORECTOMY Left     as an infant      General Information     Row Name 10/14/20 1413          OT Time and Intention    Document Type  evaluation  -EN     Mode of Treatment  occupational therapy  -EN     Row Name 10/14/20 1413          General Information    Prior Level of Function  independent:;gait;ADL's  -EN     Existing Precautions/Restrictions  fall;brace worn when out of bed  -EN     Barriers to Rehab  none identified  -EN     Row Name 10/14/20 1413          Living Environment    Lives With  spouse  -EN     Row Name 10/14/20 1413          Home Main Entrance    Number of Stairs, Main Entrance  one  -EN     Stair Railings, Main Entrance  none  -EN     Row Name 10/14/20 1413          Stairs Within Home, Primary    Stairs, Within Home, Primary  lives on first level  -EN     Row Name 10/14/20 1413          Cognition    Orientation Status (Cognition)  oriented x 3  -EN       User Key  (r) = Recorded By, (t) = Taken By, (c)  = Cosigned By    Initials Name Provider Type    Liz Maldonado OTR Occupational Therapist        Mobility/ADL's     Row Name 10/14/20 1414          Bed Mobility    Bed Mobility  supine-sit  -EN     Supine-Sit [place] (Bed Mobility)  minimum assist (75% patient effort)  -EN     Assistive Device (Bed Mobility)  bed rails;head of bed elevated  -EN     Row Name 10/14/20 1414          Transfers    Transfers  sit-stand transfer  -EN     Sit-Stand [place] (Transfers)  contact guard  -EN     Row Name 10/14/20 1414          Sit-Stand Transfer    Assistive Device (Sit-Stand Transfers)  walker, front-wheeled  -EN     Row Name 10/14/20 1414          Functional Mobility    Functional Mobility- Ind. Level  contact guard assist  -EN     Functional Mobility- Device  rolling walker  -EN     Functional Mobility-Distance (Feet)  95  -EN     Row Name 10/14/20 1414          Activities of Daily Living    BADL Assessment/Intervention  lower body dressing  -EN     Row Name 10/14/20 1414          Lower Body Dressing Assessment/Training    Comment (Lower Body Dressing)  patient has sock aid and reacher that was given to her, is unsure how to use it. Anticipate min assist for LB ADLs at this time.  -EN       User Key  (r) = Recorded By, (t) = Taken By, (c) = Cosigned By    Initials Name Provider Type    Liz Maldonado OTR Occupational Therapist        Obj/Interventions    No documentation.       Goals/Plan     Row Name 10/14/20 1419          Dressing Goal 1 (OT)    Activity/Device (Dressing Goal 1, OT)  lower body dressing  -EN     [place]/Cues Needed (Dressing Goal 1, OT)  modified independence;other (see comments) LH AE if needed  -EN     Row Name 10/14/20 1419          Therapy Assessment/Plan (OT)    Planned Therapy Interventions (OT)  BADL retraining;adaptive equipment training  -EN       User Key  (r) = Recorded By, (t) = Taken By, (c) = Cosigned By    Initials Name Provider Type    CUCO Blanton  TRAVIS Orona Occupational Therapist        Clinical Impression     Row Name 10/14/20 1416          Plan of Care Review    Plan of Care Reviewed With  patient;spouse  -EN     Outcome Summary  OT evaluation completed. patient performed supine to sit with min assist. Patient stood from EOB with CGA and performed functional mobility with CGA X 95 ft with rolling walker. Anticipate min assist for LB ADLs. OT to follow while in hosptial. Patient plans to return home with spouse and go [facility]. [facility] and [facility].  -EN     Row Name 10/14/20 1416          Therapy Assessment/Plan (OT)    Rehab Potential (OT)  good, to achieve stated therapy goals  -EN     Criteria for Skilled Therapeutic Interventions Met (OT)  yes;skilled treatment is necessary  -EN     Therapy Frequency (OT)  other (see comments) 1-2 visits to ed. on  AE  -EN     Predicted Duration of Therapy Intervention (OT)  one to two days  -EN     Row Name 10/14/20 1416          Therapy Plan Review/Discharge Plan (OT)    Anticipated Discharge Disposition (OT)  home with outpatient therapy services  -EN     Row Name 10/14/20 1416          Positioning and Restraints    Pre-Treatment Position  in bed  -EN     Post Treatment Position  chair  -EN     In Chair  notified nsg;encouraged to call for assist;reclined;call light within reach  -EN       User Key  (r) = Recorded By, (t) = Taken By, (c) = Cosigned By    Initials Name Provider Type    Liz Maldonado OTR Occupational Therapist        Outcome Measures     Row Name 10/14/20 1420          How much help from another is currently needed...    Putting on and taking off regular lower body clothing?  3  -EN     Bathing (including washing, rinsing, and drying)  3  -EN     Toileting (which includes using toilet bed pan or urinal)  3  -EN     Putting on and taking off regular upper body clothing  4  -EN     Taking care of personal grooming (such as brushing teeth)  4  -EN     Eating meals  4  -EN      AM-PAC 6 Clicks Score (OT)  21  -EN     Row Name 10/14/20 1420          Functional Assessment    Outcome Measure Options  AM-PAC 6 Clicks Daily Activity (OT)  -EN       User Key  (r) = Recorded By, (t) = Taken By, (c) = Cosigned By    Initials Name Provider Type    EN Liz Blanton OTR Occupational Therapist        Occupational Therapy Education                 Title: PT OT SLP Therapies (In Progress)     Topic: Occupational Therapy (Done)     Point: ADL training (Done)     Description:   Instruct learner(s) on proper safety adaptation and remediation techniques during self care or transfers.   Instruct in proper use of assistive devices.              Learning Progress Summary           Patient Acceptance, E, VU by EN at 10/14/2020 1420   Significant Other Acceptance, E, VU by EN at 10/14/2020 1420                               User Key     Initials Effective Dates Name Provider Type Discipline    EN 07/05/20 -  Liz Blanton OTR Occupational Therapist OT              OT Recommendation and Plan  Planned Therapy Interventions (OT): BADL retraining, adaptive equipment training  Therapy Frequency (OT): other (see comments)(1-2 visits to ed. on LH AE)  Plan of Care Review  Plan of Care Reviewed With: patient, spouse  Outcome Summary: OT evaluation completed. patient performed supine to sit with min assist. Patient stood from EOB with CGA and performed functional mobility with CGA X 95 ft with rolling walker. Anticipate min assist for LB ADLs. OT to follow while in hosptial. Patient plans to return home with spouse and go [facility]. [facility] and [facility].     Time Calculation:   Time Calculation- OT     Row Name 10/14/20 1422             Time Calculation- OT    OT Start Time  1326  -EN      OT Stop Time  1349  -EN      OT Time Calculation (min)  23 min  -EN        User Key  (r) = Recorded By, (t) = Taken By, (c) = Cosigned By    Initials Name Provider Type    Liz Maldonado OTR  Occupational Therapist        Therapy Charges for Today     Code Description Service Date Service Provider Modifiers Qty    [id number] HC OT EVAL LOW COMPLEXITY 2 10/14/2020 Liz Blanton, OTR GO 1               TRAVIS Siegel  10/14/2020

## 2020-10-14 NOTE — PLAN OF CARE
Discharge Planning Assessment  ROD Mann     Patient Name: Yesi Benson  MRN: [id number]  Today's Date: 10/14/2020    Admit Date: 10/14/2020    Discharge Needs Assessment       Row Name 10/14/20 1258       Living Environment    Lives With  spouse    Name(s) of Who Lives With Patient  Ryan Benson,     Current Living Arrangements  home/apartment/condo Two story house with bed room and bath on the first floor and one step to gain entry.    Duration at Residence  since 1978    Potentially Unsafe Housing Conditions  -- none    Primary Care Provided by  self    Provides Primary Care For  no one    Caregiving Concerns  None voiced    Family Caregiver if Needed  spouse;child(mckay), adult    Family Caregiver Names   Ryan and daughter Lesia    Quality of Family Relationships  helpful;involved;supportive    Able to Return to Prior Arrangements  yes    Living Arrangement Comments  pt states she lives with her  in a two story house with one step to gain entry and bedroom and bath on the first floor       Resource/Environmental Concerns    Resource/Environmental Concerns  none    Transportation Concerns  -- none       Transition Planning    Patient/Family Anticipates Transition to  home with family    Patient/Family Anticipated Services at Transition  outpatient care    Transportation Anticipated  family or friend will provide pt's  will be able to provide ride home at discharge       Discharge Needs Assessment    Readmission Within the Last 30 Days  no previous admission in last 30 days    Current Outpatient/Agency/Support Group  -- none    Equipment Currently Used at Home  walker, rolling;commode;shower chair    Concerns to be Addressed  discharge planning    Concerns Comments  none voiced    Anticipated Changes Related to Illness  none    Equipment Needed After Discharge  none    Outpatient/Agency/Support Group Needs  outpatient therapy    Discharge Facility/Level of Care Needs  -- na     Provided Post Acute Provider List?  Refused    Refused Provider List Comment  Offered community resources but patient declines the need for them at this time.    Patient's Choice of Community Agency(s)  none    Current Discharge Risk  -- none    Discharge Coordination/Progress  Patient states she plans in returning home at discharge with her  and daughter to help as needed and OP PT/OT through [facility].          Discharge Plan       Row Name 10/14/20 7191       Plan    Plan  Home with OP PT/OT through [facility]    Patient/Family in Agreement with Plan  --  Ryan is at bedside and is also agreeable to this discharge plan.    Plan Comments  Into room and introduced self and role of CM. Discussed discharge disposition with patient and her  with permission. Patient confirms that the info on her face sheet is correct and that she see's Dr. Mani Gorman as PCP. She states that she [facility] in [place] and has no problem picking up her medications or paying for them. She also states that she does have a living will but it is not on file here and CM encouraged patient to bring a copy in so that it can be scanned into her records and she verbalized understanding. Patient states she lives with her  in a two story house with her bedroom and bath on the first floor and only one step to gain entry. She states that prior to her surgery she had no problem maneuvering the steps or within the home. She also states that she is independent with her ADL's and still drives but her  will be alb to provide ride home at discharge. Patient states she has a rolling walker, BASIC and a shower chair at home and does not anticipate needing any other equipment at discharge. She states that she has not used a home care agency for herself in the past and would like to do OP PT/SRIDHAR here at [facility]. Offered community resources but patient declines the need for them at this time.  Patient states she plans on returning home at discharge with her  and adult daughter to help as needed. Patient and  had no other questions or concerns regarding discharge plans at this time. CM called OP Rehab and scheduled an appointment for patient for Friday, 10/16/2020 @ 8:30. Name and number place on white board in room. CM will continue to follow for needs.          Continued Care and Services - Admitted Since 10/14/2020    Coordination has not been started for this encounter.         Demographic Summary       Row Name 10/14/20 1648       General Information    Admission Type  observation    Arrived From  home    Referral Source  admission list    Reason for Consult  discharge planning    Preferred Language  English     Used During This Interaction  no       Contact Information    Permission Granted to Share Info With            Functional Status    No documentation.       Psychosocial    No documentation.       Abuse/Neglect    No documentation.       Legal    No documentation.       Substance Abuse    No documentation.       Patient Forms    No documentation.           Kimberlyn Barrera RN

## 2020-10-14 NOTE — PLAN OF CARE
Problem: Adult Inpatient Plan of Care  Goal: Plan of Care Review  Recent Flowsheet Documentation  Taken 10/14/2020 1416 by Liz Blanton OTR  Plan of Care Reviewed With:   patient   spouse  Outcome Summary: OT evaluation completed. patient performed supine to sit with min assist. Patient stood from EOB with CGA and performed functional mobility with CGA X 95 ft with rolling walker. Anticipate min assist for LB ADLs. OT to follow while in hosptial. Patient plans to return home with spouse and go to out-pt. P[facility]T and [facility].

## 2020-10-14 NOTE — THERAPY TREATMENT NOTE
Acute Care - Physical Therapy Initial Evaluation   Jenny Chávez     Patient Name: Yesi Benson  : 1955  MRN: [id number]  Today's Date: 10/14/2020   Onset of Illness/Injury or Date of Surgery: 10/14/20       PT Assessment (last 12 hours)      PT Evaluation and Treatment     Row Name 10/14/20 1326          Physical Therapy Time and Intention    Subjective Information  no complaints  -BP     Document Type  evaluation  -BP     Mode of Treatment  physical therapy  -BP     Patient Effort  good  -BP     Symptoms Noted During/After Treatment  none  -BP     Row Name 10/14/20 1326          General Information    Patient Profile Reviewed  yes  -BP     Onset of Illness/Injury or Date of Surgery  10/14/20  -BP     Referring Physician  Dr. Fuentes   -BP     Patient Observations  alert;cooperative;agree to therapy  -BP     Patient/Family/Caregiver Comments/Observations  Patient supine in bed with HOB elevated. Patient agreeable to PT evaluation.   -BP     Prior Level of Function  independent:;gait;transfer;all household mobility;community mobility;driving;home management;bed mobility  -BP     Equipment Currently Used at Home  none owns: FWW, BSC, shower chair  -BP     Pertinent History of Current Functional Problem  Patient admitted s/p R TKA due to worsening knee pain. Patient reports independence with all mobility and ADL's prior to surgery.   -BP     Existing Precautions/Restrictions  fall;brace worn when out of bed  -BP     Risks Reviewed  patient:;spouse/S.O.:;LOB;increased discomfort  -BP     Benefits Reviewed  patient:;spouse/S.O.:;improve function;increase independence;increase strength  -BP     Barriers to Rehab  none identified  -BP     Row Name 10/14/20 1326          Previous Level of Function/Home Environm    Household Ambulation, Premorbid Functional Level  independent  -BP     Community Ambulation, Premorbid Functional Level  independent  -BP     Row Name 10/14/20 1326          Living Environment    Current  Living Arrangements  home/apartment/condo  -     Home Accessibility  stairs to enter home  -[facility] Name 10/14/20 1326          Home Main Entrance    Number of Stairs, Main Entrance  one  -     Stairs Comment, Main Entrance  one curb stair to enter   -[facility] Name 10/14/20 1326          Home Use of Assistive/Adaptive Equipment    Equipment Currently Used at Home  none  -BP     Row Name 10/14/20 1326          Sensory Assessment (Somatosensory)    Sensory Assessment (Somatosensory)  -- reports decreased sensation R LE due to surgical block   -[facility] Name 10/14/20 1326          Pain    Additional Documentation  Pain Scale: Numbers Pre/Post-Treatment (Group)  -BP     Row Name 10/14/20 1326          Pain Scale: Numbers Pre/Post-Treatment    Pretreatment Pain Rating  0/10 - no pain  -     Posttreatment Pain Rating  0/10 - no pain  -BP     Row Name 10/14/20 1326          Pain Scale: Word Pre/Post-Treatment    Pain Intervention(s)  Cold applied;Repositioned  -BP     Row Name 10/14/20 1326          Range of Motion Comprehensive    Comment, General Range of Motion  L LE AROM WFL. R ankle AROM WFL. R knee AROM not assessed   -BP     Row Name 10/14/20 1326          Strength Comprehensive (MMT)    Comment, General Manual Muscle Testing (MMT) Assessment  L LE gross MMT 5/5. R LE strength not assessed   -BP     Row Name 10/14/20 1326          Bed Mobility    Bed Mobility  supine-sit  -     Supine-Sit [place] (Bed Mobility)  minimum assist (75% patient effort)  -     Bed Mobility, Safety Issues  decreased use of legs for bridging/pushing  -     Assistive Device (Bed Mobility)  bed rails;head of bed elevated  -     Comment (Bed Mobility)  assist for R LE out of bed   -BP     Row Name 10/14/20 1326          Transfers    Transfers  sit-stand transfer;stand-sit transfer  -     Comment (Transfers)  Verbal cues for hand placement required   -     Sit-Stand [place] (Transfers)  contact guard;verbal  cues  -BP     Stand-Sit [place] (Transfers)  contact guard;verbal cues  -BP     Row Name 10/14/20 1326          Sit-Stand Transfer    Assistive Device (Sit-Stand Transfers)  walker, front-wheeled  -BP     Row Name 10/14/20 1326          Stand-Sit Transfer    Assistive Device (Stand-Sit Transfers)  walker, front-wheeled  -BP     Row Name 10/14/20 1326          Gait/Stairs (Locomotion)    [place] Level (Gait)  contact guard;verbal cues  -BP     Assistive Device (Gait)  walker, front-wheeled  -BP     Distance in Feet (Gait)  95  -BP     Pattern (Gait)  swing-through  -BP     Deviations/Abnormal Patterns (Gait)  gait speed decreased  -BP     Bilateral Gait Deviations  forward flexed posture  -BP     Comment (Gait/Stairs)  No loss of balance noted. Verbal cues for safety with use of device provided with directional changes.   -BP     Row Name 10/14/20 1326          Safety Issues, Functional Mobility    Safety Issues Affecting Function (Mobility)  positioning of assistive device  -BP     Row Name             Wound 10/14/20 0934 Right anterior knee Incision    Wound - Properties Group Placement Date: 10/14/20  -TG Placement Time: 0934  -TG Present on Hospital Admission: N  -TG Side: Right  -TG Orientation: anterior  -TG Location: knee  -TG Primary Wound Type: Incision  -TG Additional Comments: prineo, telfa, ABD, webril, ace wraps, cold wrap, immobilizer  -TG    Retired Wound - Properties Group Date first assessed: 10/14/20  -TG Time first assessed: 0934  -TG Present on Hospital Admission: N  -TG Side: Right  -TG Location: knee  -TG Primary Wound Type: Incision  -TG Additional Comments: prineo, telfa, ABD, webril, ace wraps, cold wrap, immobilizer  -TG    Row Name 10/14/20 1326          Plan of Care Review    Plan of Care Reviewed With  patient  -BP     Outcome Summary  PT: Patient performs supine to sit transfer with min A, sit to/from stand transfers with CGA and gait x 95 feet with CGA and use of FWW. Patient  requires min verbal cues for safety with use of device. Patient would benefit from phyiscal therapy while in the hospital to address deficits in functional mobility and LE strength/ROM. Recommend outpatient PT at discharge. Patient agreeable to recommendations.   -BP     Row Name 10/14/20 1326          Physical Therapy Goals    Bed Mobility Goal Selection (PT)  bed mobility, PT goal 1  -BP     Transfer Goal Selection (PT)  transfer, PT goal 1  -BP     Gait Training Goal Selection (PT)  gait training, PT goal 1  -BP     Stairs Goal Selection (PT)  stairs, PT goal 1  -BP     Row Name 10/14/20 1326          Bed Mobility Goal 1 (PT)    Activity/Assistive Device (Bed Mobility Goal 1, PT)  bed mobility activities, all  -BP     [place] Level/Cues Needed (Bed Mobility Goal 1, PT)  supervision required  -BP     Time Frame (Bed Mobility Goal 1, PT)  2 days  -BP     Progress/Outcomes (Bed Mobility Goal 1, PT)  goal ongoing  -BP     Row Name 10/14/20 1326          Transfer Goal 1 (PT)    Activity/Assistive Device (Transfer Goal 1, PT)  sit-to-stand/stand-to-sit;walker, rolling  -BP     [place] Level/Cues Needed (Transfer Goal 1, PT)  supervision required  -BP     Time Frame (Transfer Goal 1, PT)  2 days  -BP     Progress/Outcome (Transfer Goal 1, PT)  goal ongoing  -     Row Name 10/14/20 1326          Gait Training Goal 1 (PT)    Activity/Assistive Device (Gait Training Goal 1, PT)  gait (walking locomotion);walker, rolling  -BP     [place] Level (Gait Training Goal 1, PT)  supervision required  -BP     Distance (Gait Training Goal 1, PT)  150  -BP     Time Frame (Gait Training Goal 1, PT)  2 days  -BP     Progress/Outcome (Gait Training Goal 1, PT)  goal ongoing  -BP     Row Name 10/14/20 1326          Stairs Goal 1 (PT)    Activity/Assistive Device (Stairs Goal 1, PT)  ascending stairs;descending stairs;walker, rolling  -BP     [place] Level/Cues Needed (Stairs Goal 1, PT)  contact guard assist  -BP      Number of Stairs (Stairs Goal 1, PT)  1  -BP     Time Frame (Stairs Goal 1, PT)  2 days  -BP     Progress/Outcome (Stairs Goal 1, PT)  goal ongoing  -BP     Row Name 10/14/20 1326          Positioning and Restraints    Pre-Treatment Position  in bed  -BP     Post Treatment Position  chair  -BP     In Chair  notified nsg;reclined;call light within reach;encouraged to call for assist;with family/caregiver  -BP     Row Name 10/14/20 1326          Therapy Assessment/Plan (PT)    Criteria for Skilled Interventions Met (PT)  yes;skilled treatment is necessary  -BP     Predicted Duration of Therapy Intervention (PT)  2 days   -BP     Problem List (PT)  problems related to;mobility;range of motion (ROM);strength;pain  -BP     Activity Limitations Related to Problem List (PT)  unable to ambulate safely;unable to transfer safely  -BP     Row Name 10/14/20 1326          PT Evaluation Complexity    History, PT Evaluation Complexity  no personal factors and/or comorbidities  -BP     Examination of Body Systems (PT Eval Complexity)  1-2 elements  -BP     Clinical Presentation (PT Evaluation Complexity)  stable  -BP     Clinical Decision Making (PT Evaluation Complexity)  low complexity  -BP     Overall Complexity (PT Evaluation Complexity)  low complexity  -BP     Row Name 10/14/20 1326          Therapy Plan Review/Discharge Plan (PT)    Therapy Plan Review (PT)  evaluation/treatment results reviewed;care plan/treatment goals reviewed;risks/benefits reviewed;current/potential barriers reviewed;patient;spouse/significant other;participants voiced agreement with care plan  -BP       User Key  (r) = Recorded By, (t) = Taken By, (c) = Cosigned By    Initials Name Provider Type    BP Vincent Méndez, PT Physical Therapist    TG Adamaris Harris, RN Registered Nurse        Physical Therapy Education                 Title: PT OT SLP Therapies (In Progress)     Topic: Physical Therapy (In Progress)     Point: Mobility training (Done)      Learning Progress Summary           Patient Acceptance, E,TB, VU by BP at 10/14/2020 1420                   Point: Home exercise program (Not Started)     Learner Progress:  Not documented in this visit.                      User Key     Initials Effective Dates Name Provider Type Discipline    BP 04/03/18 -  Vincent Méndez, PT Physical Therapist PT              PT Recommendation and Plan  Anticipated Discharge Disposition (PT): home with outpatient therapy services  Planned Therapy Interventions (PT): gait training, bed mobility training, patient/family education, ROM (range of motion), stair training, strengthening, transfer training  Therapy Frequency (PT): 2 times/day  Plan of Care Reviewed With: patient  Outcome Summary: PT: Patient performs supine to sit transfer with min A, sit to/from stand transfers with CGA and gait x 95 feet with CGA and use of FWW. Patient requires min verbal cues for safety with use of device. Patient would benefit from phyiscal therapy while in the hospital to address deficits in functional mobility and LE strength/ROM. Recommend outpatient PT at discharge. Patient agreeable to recommendations.   Outcome Measures     Row Name 10/14/20 1326             How much help from another person do you currently need...    Turning from your back to your side while in flat bed without using bedrails?  3  -BP      Moving from lying on back to sitting on the side of a flat bed without bedrails?  3  -BP      Moving to and from a bed to a chair (including a wheelchair)?  3  -BP      Standing up from a chair using your arms (e.g., wheelchair, bedside chair)?  3  -BP      Climbing 3-5 steps with a railing?  3  -BP      To walk in hospital room?  3  -BP      AM-PAC 6 Clicks Score (PT)  18  -BP         Functional Assessment    Outcome Measure Options  AM-PAC 6 Clicks Basic Mobility (PT)  -BP        User Key  (r) = Recorded By, (t) = Taken By, (c) = Cosigned By    Initials Name Provider Type    BP  Vincent Méndez, PT Physical Therapist           Time Calculation:   PT Charges     Row Name 10/14/20 1421             Time Calculation    Start Time  1326  -BP      Stop Time  1349  -BP      Time Calculation (min)  23 min  -BP      PT Received On  10/14/20  -BP      PT - Next Appointment  10/15/20  -BP        User Key  (r) = Recorded By, (t) = Taken By, (c) = Cosigned By    Initials Name Provider Type    BP Vincent Méndez, PT Physical Therapist        Therapy Charges for Today     Code Description Service Date Service Provider Modifiers Qty    [id number] HC PT EVAL LOW COMPLEXITY 2 10/14/2020 Vincent Méndez, PT GP 1          PT G-Codes  Outcome Measure Options: AM-PAC 6 Clicks Daily Activity (OT)  AM-PAC 6 Clicks Score (PT): 18  AM-PAC 6 Clicks Score (OT): 21    Vincent Méndez, CHAY  10/14/2020

## 2020-10-14 NOTE — ANESTHESIA PROCEDURE NOTES
Peripheral Block      Patient location during procedure: pre-op  Reason for block: post-op pain management and MD/surgeon's request  Performed by  CRNA: Shaggy Lora CRNA  Preanesthetic Checklist  Completed: patient identified, site marked, surgical consent, pre-op evaluation, timeout performed, IV checked, risks and benefits discussed and monitors and equipment checked  Prep:  Sterile barriers:cap, gloves, gown, mask and sterile barriers  Prep: ChloraPrep  Patient monitoring: blood pressure monitoring, continuous pulse oximetry and EKG  Procedure  Sedation:yes  Performed under: spinal  Guidance:ultrasound guided  Images:still images obtained, printed/placed on chart    Laterality:right  Block Type:adductor canal block  Injection Technique:single-shot  Needle Type:echogenic  Needle Gauge:21 G  Resistance on Injection: none    Medications Used: ropivacaine (NAROPIN) 0.2% injection, 20 mL  Med admintered at 10/14/2020 11:12 AM      Post Assessment  Injection Assessment: negative aspiration for heme, no paresthesia on injection and incremental injection  Patient Tolerance:comfortable throughout block  Complications:no

## 2020-10-14 NOTE — ANESTHESIA PROCEDURE NOTES
Spinal Block      Patient location during procedure: OR  Indication:at surgeon's request and post-op pain management  Performed By  CRNA: Shaggy Lora CRNA  Preanesthetic Checklist  Completed: patient identified, surgical consent, pre-op evaluation, timeout performed, IV checked, risks and benefits discussed and monitors and equipment checked  Spinal Block Prep:  Patient Position:sitting  Sterile Tech:cap, gown, mask, gloves and sterile barriers  Prep:Betadine  Patient Monitoring:blood pressure monitoring, continuous pulse oximetry and EKG  Spinal Block Procedure  Approach:midline  Guidance:landmark technique and palpation technique  Location:L3-L4  Needle Type:Sprotte  Needle Gauge:25 G  Placement of Spinal needle event:cerebrospinal fluid aspirated  Paresthesia: no  Fluid Appearance:clear  Medications: bupivacaine (MARCAINE) injection 0.5%, 2 mL  Med Administered at 10/14/2020 7:59 AM   Post Assessment  Patient Tolerance:patient tolerated the procedure well with no apparent complications  Complications no

## 2020-10-14 NOTE — ANESTHESIA POSTPROCEDURE EVALUATION
Patient: Yesi Benson    Procedure Summary     Date: 10/14/20 Room / Location:  [facility] OR 3 /  [facility]    Anesthesia Start: 0816 Anesthesia Stop: 1054    Procedure: TOTAL KNEE ARTHROPLASTY AND ALL ASSOCIATED PROCEDURES (Right Knee) Diagnosis:       Primary osteoarthritis of right knee      Complex tear of medial meniscus of right knee as current injury, initial encounter      (Primary osteoarthritis of right knee [M17.11])      (Complex tear of medial meniscus of right knee as current injury, initial encounter [S83.231A])    Surgeon: Elmer Fuentes MD Provider: Segun Benjamin CRNA    Anesthesia Type: spinal, regional ASA Status: 2          Anesthesia Type: spinal, regional    Vitals  Vitals Value Taken Time   /76 10/14/20 1145   Temp 97.6 °F (36.4 °C) 10/14/20 1054   Pulse 65 10/14/20 1150   Resp 14 10/14/20 1135   SpO2 98 % 10/14/20 1150           Post Anesthesia Care and Evaluation    Patient location during evaluation: bedside  Patient participation: complete - patient participated  Level of consciousness: awake and alert  Pain score: 0  Pain management: adequate  Airway patency: patent  Anesthetic complications: No anesthetic complications    Cardiovascular status: acceptable  Respiratory status: acceptable  Hydration status: acceptable

## 2020-10-14 NOTE — OP NOTE
Date of Surgery: 10/14/2020    Preoperative diagnosis: right knee osteoarthritis    Postoperative diagnosis: right knee osteoarthritis    Procedure:  1.right total knee arthroplasty  2. Intraoperative use of kinetic knee balance sensor for implant stability during total knee arthroplasty    Surgeon: Alfredo Vo.: Dre Mary    spinal with regional block    Estimated blood loss: <500ml    Fluids: per anesthesia    Specimens: None    Complications: None    Drains: None    Tourniquet time: Tourniquet Times:     Inflated: 10/14/2020  8:50 AM     Deflated: 10/14/2020 10:45 AM    Implants used: Tahira Persona total knee arthroplasty system with a size 32 patella, size C tibia, size 4 cruciate retaining femoral component , 10 mm highly cross-linked medial congruent polyethylene insert,  cement    Examination under anesthesia: right knee passive range of motion 2-125°, varus alignment, no open wounds lacerations or abrasions over knee, stable to varus and valgus stress at 2 and 30°, 2+ dorsalis pedis pulse.    Indications for procedure: Patient is a pleasant 65 y.o. female who has had significant pain to right knee over the last several years, has failed conservative treatment with intra-articular injections, bracing, home exercise program, activity modification, anti-inflammatory medications. Has had persistent pain limiting activities of daily living and even has had pain at rest. We discussed treatment options and patient wished to proceed with above-mentioned surgery. Was explained details of procedure as well as risks benefits and alternatives as documented on history and physical and had all questions answered. No guarantees were given in regards to results of the surgery.    Details of procedure: Patient was seen, evaluated, and cleared for surgery by anesthesia. Had been medically optimized by primary care physician. Patient was met in the preoperative hold area, operative site was marked, consent was  reviewed, history and physical was updated, and preoperative labs were reviewed. Regional block and spinal were placed per anesthesia and patient was taken to the operating room and placed in supine position on a regular OR table. Examination under anesthesia was carried out this time. Nonsterile tourniquet was then applied to right lower extremity. Patient was secured to the table with a waist strap and all bony prominences were well-padded. right lower extremity was then sterilely prepped and draped in standard fashion.  Formal timeout was completed including confirmation of history and physical, operative consent, operative site, patient identification number, and preoperative antibiotics administration. right leg was then exsanguinated and tourniquet inflated to 250 mmHg. Procedure was then begun with longitudinal incision over the anterior aspect of the right knee through skin and subcutaneous tissue with 15 blade. Minimal subcutaneous flaps were developed at this point in time, and standard medial parapatellar arthrotomy was then created at this point. Portion of suprapatellar and infrapatellar fat pad were resected this point in time. Medial tibial peel was carried out in order to allow for further exposure the knee. Medial and lateral meniscus were resected this time and ACL was transected.  Patella was then everted and measured with a caliper. Patellar reamer was then used to create initial retropatellar cut followed by completion of cut with a oscillating saw in standard fashion and use of rongeur. Patella was sized as a 32 and patella guard was placed at this time.  Attention was then turned to the distal femur with the knee being brought into a flexed position.  Reference pin for I-Assist navigational system was placed in the distal femur in-line with Whitesides line in retrograde fashion.  Navigational distal femoral cutting block was positioned at this time as well and calibrated with multiple planes  of knee and hip motion carried out to set reference positions for navigation device.  Navigational device was then adjusted to 0 degrees valgus cut on distal femur and 3 degrees flexion cut on distal femur.  Distal femoral cutting block was pinned into position at this site, navigational device removed. Depth of the resection was checked with a sickle and noted to be appropriate. Additional pin was placed for security of the cutting block and oscillating saw was then used to create a distal femoral cut in standard fashion while collateral ligaments were protected with Z retractors. Bone was removed and measured at this time.  Cut validation was completed with navigational device at this point time as well confirming appropriate cut consistent with set parameters as noted above. Pins and cutting block were removed as well. Contents of the notch were then resected including the ACL, PCL, and posterior horns of medial and lateral meniscus. Posterior retractor was then placed and tibia was subluxated anteriorly.   Attention was then turned to the proximal tibia. Additional release of lateral tissues was completed at this time to allow for appropriate visualization of the proximal tibia articular surface. I-Assist navigational device was then pinned in position over the tibial tubercle with 3 pins at this time, external guide was placed in line with the tibial crest and center of the ankle joint and clamped into position over the ankle while proximal guide pins were impacted into the top of the tibia.  Navigational pods were then calibrated with range of motion of the hip and once noted to be appropriate, external guide from tibia was removed.  Adjustments were made to the navigational device to place the cut in neutral varus and 3 degrees posterior slope.  Tibial cutting block resection depth was set with a sickle, block was pinned into position at this time, and alignment assessed with the drop kareem noted to be in line  with tibial crest, medial third of tibial tubercle, and center of the ankle joint.  Oscillating saw was then used to complete the proximal tibial cut while collateral ligaments were protected with Z retractors. Bone fragments were removed and measured.  Tibial cut was then validated with validation device from navigational system and confirmed to be within reasonable position of above-mentioned set parameters for the proximal tibia cut. Knee was brought into full extension with extension gap being checked with spacer block at this time and noted be acceptable with knee brought into full extension, stable medial and lateral collateral stability at full extension.  Navigational guide was then removed at this point time.   Attention was then returned to the distal femur with a femoral sizing guide being placed, transverse epicondylar axis and Whitesides line being assessed and used to determine appropriate external rotation of 5 degrees.  Femoral sizing guide was secured to the distal femur with 2 screws, sizing caliper was adjusted to the anterior femur and femur was sized at a 4.  2 drill holes were made through the sizing guide which was then removed including 2 screws. Size 4 femoral cutting block was then impacted onto the distal femoral cut surface and pinned into position. Sickle was used to check the anterior cut and there was no evidence of anticipated notching. Collateral ligaments were protected with Z retractors while anterior, posterior, and chamfer cuts were created in standard fashion with oscillating saw. Femoral cutting block was removed at this time as well as bone fragments. Posterior capsule was stripped at this time. Size 4 femoral trial was placed. Size C tibial baseplate trial with 10 mm polyethylene trial was inserted. Knee was then ranged from 0-125°, good varus valgus stability at full extension and 30° as well as throughout mid flexion with good AP translation at 90° of flexion  noted.  Patella was everted at this point in time, 32 mm patella reaming guide was placed and lug holes were drilled for patella at this point. Patella trial was placed and patella was noted to track in midline with no evidence of subluxation. Knee was ranged from full extension to full flexion and tibial rotation was noted with midline of tibial trial being marked with Bovie electrocautery at the proximal tibia. Femoral and patellar and tibial trials were removed at this point in time and tibia was subluxated anteriorly with posterior retractor. Tibial trial baseplate was placed once again, position confirmed with drop kareem as well as with previous marking for tibial rotation and assessing for bony coverage of implant. Once position of tibial baseplate was noted to be appropriate, 2 pins were placed at this time, and reamer and punch were then used through the tibial baseplate.  All trial components were once again removed at this time and pulsatile lavage was used to thoroughly clean all bony cut surfaces as well as subcutaneous tissue. Final implants were opened on the back table this time. Cement was prepared on the back table and was applied to the cut surfaces of the distal femur, proximal tibia, and patella as well as to the backside of the implants. Tibial component was placed first followed by distal femur followed by patella. Extraneous cement was removed with freer at this time. Once all implants were in position knee was brought into full extension with axial compression to allow for cement to cure fully.  Once cement was completely hardened, trial polyethylene insert was assessed, range of motion of knee from 0-125° was achieved with good varus and valgus stability throughout range of motion and good AP translation at 90° of flexion. Thus a 10 mm polyethylene insert was opened on the back table, inserted and locked in appropriately into the tibial baseplate. Knee was thoroughly irrigated with a second  evaluation for any additional bone fragments or cement particles. Knee was then thoroughly irrigated with Betadine solution followed by pulsatile lavage.   Attention was then turned to closure of the wound with running self locking #2 suture ×1, 2-0 Vicryls in inverted fashion for deep subcutaneous closure, 2-0 running self locking strata-fix suture, and Prineo glue and mesh for skin. Wound was dressed with Telfa, 4 x 4 gauze, web roll, ABD pad, Ace wrap, and patient was placed in knee immobilizer and given ice pack. At the end of procedure all lap, needle and sponge counts were correct ×2. Patient had brisk cap refill all digits right lower extremity, compartments are soft and easily compressible at the end of the procedure.    Disposition: Patient was taken to recovery room in stable condition. Will be admitted for pain control and initiation of therapy, weight-bear as tolerated right lower extremity, DVT prophylaxis will be started on postoperative day #1 with aspirin. Results of the procedure were discussed immediately postoperatively with patient's family and they had all questions answered at that time.

## 2020-10-14 NOTE — ANESTHESIA PROCEDURE NOTES
Peripheral Block      Patient location during procedure: pre-op  Reason for block: post-op pain management and MD/surgeon's request  Performed by  CRNA: Shaggy Lora CRNA  Preanesthetic Checklist  Completed: patient identified, site marked, surgical consent, pre-op evaluation, timeout performed, IV checked, risks and benefits discussed and monitors and equipment checked  Prep:  Sterile barriers:cap, gloves, gown, mask and sterile barriers  Prep: ChloraPrep  Patient monitoring: blood pressure monitoring, continuous pulse oximetry and EKG  Procedure  Sedation:yes    Guidance:ultrasound guided  Images:still images obtained    Laterality:right  Block Type:iPack  Injection Technique:single-shot  Needle Type:echogenic  Needle Gauge:21 G  Resistance on Injection: none    Medications Used: ropivacaine (NAROPIN) 0.2% injection, 15 mL  Med admintered at 10/14/2020 8:09 AM      Post Assessment  Injection Assessment: negative aspiration for heme, no paresthesia on injection and incremental injection  Patient Tolerance:comfortable throughout block  Complications:no

## 2020-10-15 VITALS
HEIGHT: 62 IN | OXYGEN SATURATION: 97 % | HEART RATE: 85 BPM | WEIGHT: 152 LBS | DIASTOLIC BLOOD PRESSURE: 57 MMHG | TEMPERATURE: 98.3 F | SYSTOLIC BLOOD PRESSURE: 111 MMHG | RESPIRATION RATE: 17 BRPM | BODY MASS INDEX: 27.97 KG/M2

## 2020-10-15 LAB
ANION GAP SERPL CALCULATED.3IONS-SCNC: 9.6 MMOL/L (ref 5–15)
BASOPHILS # BLD AUTO: 0.01 10*3/MM3 (ref 0–0.2)
BASOPHILS NFR BLD AUTO: 0.1 % (ref 0–1.5)
BUN SERPL-MCNC: 16 MG/DL (ref 8–23)
BUN/CREAT SERPL: 17.4 (ref 7–25)
CALCIUM SPEC-SCNC: 9.4 MG/DL (ref 8.6–10.5)
CHLORIDE SERPL-SCNC: 105 MMOL/L (ref 98–107)
CO2 SERPL-SCNC: 24.4 MMOL/L (ref 22–29)
CREAT SERPL-MCNC: 0.92 MG/DL (ref 0.57–1)
DEPRECATED RDW RBC AUTO: 43.5 FL (ref 37–54)
EOSINOPHIL # BLD AUTO: 0 10*3/MM3 (ref 0–0.4)
EOSINOPHIL NFR BLD AUTO: 0 % (ref 0.3–6.2)
ERYTHROCYTE [DISTWIDTH] IN BLOOD BY AUTOMATED COUNT: 13.3 % (ref 12.3–15.4)
GFR SERPL CREATININE-BSD FRML MDRD: 61 ML/MIN/1.73
GLUCOSE SERPL-MCNC: 123 MG/DL (ref 65–99)
HCT VFR BLD AUTO: 41.8 % (ref 34–46.6)
HGB BLD-MCNC: 13.5 G/DL (ref 12–15.9)
IMM GRANULOCYTES # BLD AUTO: 0.04 10*3/MM3 (ref 0–0.05)
IMM GRANULOCYTES NFR BLD AUTO: 0.3 % (ref 0–0.5)
LYMPHOCYTES # BLD AUTO: 0.77 10*3/MM3 (ref 0.7–3.1)
LYMPHOCYTES NFR BLD AUTO: 6.2 % (ref 19.6–45.3)
MCH RBC QN AUTO: 29.2 PG (ref 26.6–33)
MCHC RBC AUTO-ENTMCNC: 32.3 G/DL (ref 31.5–35.7)
MCV RBC AUTO: 90.5 FL (ref 79–97)
MONOCYTES # BLD AUTO: 1.01 10*3/MM3 (ref 0.1–0.9)
MONOCYTES NFR BLD AUTO: 8.1 % (ref 5–12)
NEUTROPHILS NFR BLD AUTO: 10.68 10*3/MM3 (ref 1.7–7)
NEUTROPHILS NFR BLD AUTO: 85.3 % (ref 42.7–76)
NRBC BLD AUTO-RTO: 0 /100 WBC (ref 0–0.2)
PLATELET # BLD AUTO: 243 10*3/MM3 (ref 140–450)
PMV BLD AUTO: 11.7 FL (ref 6–12)
POTASSIUM SERPL-SCNC: 4.1 MMOL/L (ref 3.5–5.2)
RBC # BLD AUTO: 4.62 10*6/MM3 (ref 3.77–5.28)
SODIUM SERPL-SCNC: 139 MMOL/L (ref 136–145)
WBC # BLD AUTO: 12.51 10*3/MM3 (ref 3.4–10.8)

## 2020-10-15 PROCEDURE — 80048 BASIC METABOLIC PNL TOTAL CA: CPT | Performed by: ORTHOPAEDIC SURGERY

## 2020-10-15 PROCEDURE — 25010000003 CEFAZOLIN SODIUM-DEXTROSE 2-3 GM-%(50ML) RECONSTITUTED SOLUTION: Performed by: ORTHOPAEDIC SURGERY

## 2020-10-15 PROCEDURE — 97535 SELF CARE MNGMENT TRAINING: CPT

## 2020-10-15 PROCEDURE — G0378 HOSPITAL OBSERVATION PER HR: HCPCS

## 2020-10-15 PROCEDURE — 97116 GAIT TRAINING THERAPY: CPT

## 2020-10-15 PROCEDURE — 25010000002 KETOROLAC TROMETHAMINE PER 15 MG: Performed by: ORTHOPAEDIC SURGERY

## 2020-10-15 PROCEDURE — 85025 COMPLETE CBC W/AUTO DIFF WBC: CPT | Performed by: ORTHOPAEDIC SURGERY

## 2020-10-15 PROCEDURE — 97110 THERAPEUTIC EXERCISES: CPT

## 2020-10-15 RX ORDER — MELOXICAM 15 MG/1
15 TABLET ORAL DAILY
Qty: 30 TABLET | Refills: 0 | Status: SHIPPED | OUTPATIENT
Start: 2020-10-16 | End: 2020-12-03

## 2020-10-15 RX ORDER — PREGABALIN 75 MG/1
75 CAPSULE ORAL 2 TIMES DAILY
Qty: 28 CAPSULE | Refills: 0 | Status: SHIPPED | OUTPATIENT
Start: 2020-10-15 | End: 2020-11-13 | Stop reason: ALTCHOICE

## 2020-10-15 RX ORDER — FOLIC ACID 0.8 MG
500 TABLET ORAL
Qty: 30 EACH | Refills: 0 | Status: SHIPPED | OUTPATIENT
Start: 2020-10-15 | End: 2020-12-03

## 2020-10-15 RX ORDER — ONDANSETRON 4 MG/1
4 TABLET, FILM COATED ORAL EVERY 6 HOURS PRN
Qty: 20 TABLET | Refills: 0 | Status: SHIPPED | OUTPATIENT
Start: 2020-10-15 | End: 2020-11-13 | Stop reason: ALTCHOICE

## 2020-10-15 RX ORDER — DOCUSATE SODIUM 250 MG
250 CAPSULE ORAL DAILY
Qty: 60 CAPSULE | Refills: 0 | Status: SHIPPED | OUTPATIENT
Start: 2020-10-15 | End: 2020-12-18

## 2020-10-15 RX ORDER — PREDNISONE 1 MG/1
5 TABLET ORAL DAILY
Qty: 21 TABLET | Refills: 0 | Status: SHIPPED | OUTPATIENT
Start: 2020-10-15 | End: 2020-11-13 | Stop reason: ALTCHOICE

## 2020-10-15 RX ORDER — OXYCODONE HYDROCHLORIDE AND ACETAMINOPHEN 5; 325 MG/1; MG/1
TABLET ORAL
Qty: 36 TABLET | Refills: 0 | Status: SHIPPED | OUTPATIENT
Start: 2020-10-15 | End: 2020-12-03

## 2020-10-15 RX ADMIN — MELOXICAM 15 MG: 7.5 TABLET ORAL at 09:46

## 2020-10-15 RX ADMIN — OXYCODONE HYDROCHLORIDE 5 MG: 5 TABLET ORAL at 07:30

## 2020-10-15 RX ADMIN — LEVOTHYROXINE SODIUM 75 MCG: 75 TABLET ORAL at 09:46

## 2020-10-15 RX ADMIN — CEFAZOLIN SODIUM 2 G: 2 SOLUTION INTRAVENOUS at 00:21

## 2020-10-15 RX ADMIN — ASPIRIN 325 MG: 325 TABLET, COATED ORAL at 09:46

## 2020-10-15 RX ADMIN — OXYCODONE HYDROCHLORIDE 5 MG: 5 TABLET ORAL at 14:48

## 2020-10-15 RX ADMIN — ACETAMINOPHEN 975 MG: 325 TABLET, FILM COATED ORAL at 09:46

## 2020-10-15 RX ADMIN — KETOROLAC TROMETHAMINE 15 MG: 30 INJECTION, SOLUTION INTRAMUSCULAR; INTRAVENOUS at 12:38

## 2020-10-15 RX ADMIN — PREGABALIN 75 MG: 75 CAPSULE ORAL at 09:46

## 2020-10-15 NOTE — THERAPY DISCHARGE NOTE
Acute Care - Physical Therapy Treatment Note/Discharge   Jenny Chávez     Patient Name: Yesi Benson  : 1955  MRN: [id number]  Today's Date: 10/15/2020   Onset of Illness/Injury or Date of Surgery: 10/14/20     Referring Physician: Dr. Fuentes       Admit Date: 10/14/2020    Visit Dx:    ICD-10-CM ICD-9-CM   1. Status post total right knee replacement  Z96.651 V43.65   2. Primary osteoarthritis of right knee  M17.11 715.16   3. Complex tear of medial meniscus of right knee as current injury, initial encounter  S83.231A 836.0     Patient Active Problem List   Diagnosis   • Adenomatous polyp of colon   • Effusion, right knee   • Primary osteoarthritis of right knee   • Hyperlipidemia   • Hypothyroidism   • Complex tear of lateral meniscus of right knee as current injury   • Complex tear of medial meniscus of right knee as current injury     Past Medical History:   Diagnosis Date   • Colon polyp    • Disease of thyroid gland      Past Surgical History:   Procedure Laterality Date   • COLONOSCOPY N/A 3/27/2019    Procedure: COLONOSCOPY POLYPECTOMY;  Surgeon: Jaime Alaniz MD;  Location: [facility];  Service: Gastroenterology   • DENTAL PROCEDURE Left ,   • SALPINGO OOPHORECTOMY Left     as an infant           PT Assessment (last 12 hours)      PT Evaluation and Treatment     Row Name 10/15/20 1330 10/15/20 0853       Physical Therapy Time and Intention    Subjective Information  no complaints  -MH  no complaints  -MH    Document Type  therapy note (daily note)  -MH  therapy note (daily note)  -MH    Mode of Treatment  --  physical therapy  -MH    Patient Effort  good  -MH  good  -MH    Row Name 10/15/20 1330 10/15/20 0853       General Information    Patient Observations  alert;cooperative;agree to therapy  -MH  --    Patient/Family/Caregiver Comments/Observations  Pt sitting EOB with pt's  present in the room  -MH  --    General Observations of Patient  --  pt resting in bed with SCD  "and ice pack in place  -    Row Name 10/15/20 1330          Pain Scale: Numbers Pre/Post-Treatment    Pain Location - Side  Right  -     Pain Location  knee  -     Pre/Posttreatment Pain Comment  Pt denies pain but states she does feel \"some pressure\" during gait and \"Burning\" during ex's  -     Row Name 10/15/20 0853          Pain Scale: Word Pre/Post-Treatment    Pre/Posttreatment Pain Comment  Pt states she is not having any \"sharp or shooting pain\" but describes as an ache  -     Pain Intervention(s)  Cold applied;Repositioned;Ambulation/increased activity  -     Row Name 10/15/20 1330 10/15/20 0853       Bed Mobility    Supine-Sit [place]ier (Bed Mobility)  --  standby assist;supervision;contact guard  -    Assistive Device (Bed Mobility)  --  bed rails;head of bed elevated  -    Comment (Bed Mobility)  Sit to supine transfer with HOB flat and no bed rail with CGA/Min (A) for guiding (R) LE  -  --    Row Name 10/15/20 1330 10/15/20 0853       Transfers    Sit-Stand [place] (Transfers)  verbal cues;standby assist;contact guard  -  standby assist;verbal cues;contact guard  -    Stand-Sit [place] (Transfers)  verbal cues;standby assist;contact guard  -  standby assist;contact guard;verbal cues  -    Row Name 10/15/20 1330 10/15/20 0853       Sit-Stand Transfer    Assistive Device (Sit-Stand Transfers)  walker, front-wheeled  -  walker, front-wheeled  -    Row Name 10/15/20 1330 10/15/20 0853       Stand-Sit Transfer    Assistive Device (Stand-Sit Transfers)  walker, front-wheeled  -  walker, front-wheeled  -    Row Name 10/15/20 1330 10/15/20 0853       Gait/Stairs (Locomotion)    C[place]ier Level (Gait)  standby assist;contact guard  -  verbal cues;contact guard  -    Assistive Device (Gait)  walker, front-wheeled  -  walker, naomi  -    Distance in Feet (Gait)  200+  -MH  300 ft total  -    Pattern (Gait)  step-through  -  step-through  -    " Deviations/Abnormal Patterns (Gait)  --  gait speed decreased  -    Right Sided Gait Deviations  --  heel strike decreased  -    [place] Level (Stairs)  verbal cues;contact guard  -  --    Assistive Device (Stairs)  walker, front-wheeled  -  --    Handrail Location (Stairs)  both sides  -  --    Number of Steps (Stairs)  curb style step then 3 steps  -  --    Ascending Technique (Stairs)  step-to-step  -  --    Descending Technique (Stairs)  step-to-step  -  --    Row Name 10/15/20 1330 10/15/20 0853       Knee (Therapeutic Exercise)    Knee AROM (Therapeutic Exercise)  right;10 repetitions AP, QS, SLR, SAQ, hip abd/add, heel slide  -  right;SAQ (short arc quad);SLR (straight leg raise);heel slides;supine;10 repetitions hip abd/add, AP, QS x 10 reps each  -    Row Name             Wound 10/14/20 0934 Right anterior knee Incision    Wound - Properties Group Placement Date: 10/14/20  -TG Placement Time: 0934  -TG Present on Hospital Admission: N  -TG Side: Right  -TG Orientation: anterior  -TG Location: knee  -TG Primary Wound Type: Incision  -TG Additional Comments: prineo, telfa, ABD, webril, ace wraps, cold wrap, immobilizer  -TG    Retired Wound - Properties Group Date first assessed: 10/14/20  -TG Time first assessed: 0934  -TG Present on Hospital Admission: N  -TG Side: Right  -TG Location: knee  -TG Primary Wound Type: Incision  -TG Additional Comments: prineo, telfa, ABD, webril, ace wraps, cold wrap, immobilizer  -TG    Row Name 10/15/20 1330 10/15/20 0853       Plan of Care Review    Outcome Summary  PT:  pt performs bed mobility with assist only to guide the (R)LE; participates in TKA HEP x 10 reps each; pt able to manage curb step with verbal cues, FWW and CGA - 3 additional steps with (B) handrails with vc's and CGA; pt ambulated additional 200+ft with FWW, CGA - demonstrates improved gait pattern including increased heel strike and step length; reviewed HEP with pt  -  PT:  Pt  performed 10 reps of TKA ex's including (I) SLR; pt performs supine to sit with CGA to guide (R) LE; sit to/from stand transfers from bed, recliner and BSC with SBA/CGA ; ambulated 300ft total with CGA and FWW.  -    Row Name 10/15/20 1330 10/15/20 0853       Positioning and Restraints    Pre-Treatment Position  other (comment) sitting EOB  -MH  in bed  -MH    Post Treatment Position  bed  -MH  chair  -MH    In Bed  supine;call light within reach;encouraged to call for assist;with family/caregiver;side rails up x2 CP reapplied  -  --    In Chair  --  reclined;call light within reach;encouraged to call for assist CP reapplied  -      User Key  (r) = Recorded By, (t) = Taken By, (c) = Cosigned By    Initials Name Provider Type     Braden Mckeon PTA Physical Therapy Assistant    TG Adamaris Harris RN Registered Nurse          Physical Therapy Education                 Title: PT OT SLP Therapies (Resolved)     Topic: Physical Therapy (Resolved)     Point: Mobility training (Resolved)     Learning Progress Summary           Patient Acceptance, E,TB,D, VU,DU,NR by  at 10/15/2020 1116    Acceptance, E,TB, VU by  at 10/14/2020 1420                   Point: Home exercise program (Resolved)     Learning Progress Summary           Patient Acceptance, E,TB,D, VU,DU,NR by  at 10/15/2020 1116                               User Key     Initials Effective Dates Name Provider Type Discipline     08/11/15 -  Braden Mckeon PTA Physical Therapy Assistant PT     04/03/18 -  Vincent Méndez PT Physical Therapist PT                PT Recommendation and Plan     Outcome Summary: PT:  pt performs bed mobility with assist only to guide the (R)LE; participates in TKA HEP x 10 reps each; pt able to manage curb step with verbal cues, FWW and CGA - 3 additional steps with (B) handrails with vc's and CGA; pt ambulated additional 200+ft with FWW, CGA - demonstrates improved gait pattern including increased heel strike  and step length; reviewed HEP with pt    Outcome Measures     Row Name 10/15/20 1300 10/15/20 1020 10/15/20 0853       How much help from another person do you currently need...    Turning from your back to your side while in flat bed without using bedrails?  4  -MH  --  4  -MH    Moving from lying on back to sitting on the side of a flat bed without bedrails?  3  -MH  --  3  -MH    Moving to and from a bed to a chair (including a wheelchair)?  3  -MH  --  3  -MH    Standing up from a chair using your arms (e.g., wheelchair, bedside chair)?  3  -MH  --  3  -MH    Climbing 3-5 steps with a railing?  3  -MH  --  3  -MH    To walk in hospital room?  3  -MH  --  3  -MH    AM-PAC 6 Clicks Score (PT)  19  -MH  --  19  -MH       How much help from another is currently needed...    Putting on and taking off regular lower body clothing?  --  4  -JJ  --    Bathing (including washing, rinsing, and drying)  --  4  -JJ  --    Toileting (which includes using toilet bed pan or urinal)  --  4  -JJ  --    Putting on and taking off regular upper body clothing  --  4  -JJ  --    Taking care of personal grooming (such as brushing teeth)  --  4  -JJ  --    Eating meals  --  4  -JJ  --    AM-PAC 6 Clicks Score (OT)  --  24  -JJ  --       PADD    Diagnosis  1  -JW  --  --    Gender  1  -JW  --  --    Age Group  1  -JW  --  --    Gait Distance  1  -JW  --  --    Assist Level  1  -JW  --  --    Home Support  3  -JW  --  --    PADD Score  8  -JW  --  --    Patient Preference  home with outpatient rehab  -  --  --    Prediction by PADD Score  directly home (with home health or out-patient rehab)  -  --  --       Functional Assessment    Outcome Measure Options  PADD  -JW  --  AM-PAC 6 Clicks Basic Mobility (PT)  -    Row Name 10/14/20 1326             How much help from another person do you currently need...    Turning from your back to your side while in flat bed without using bedrails?  3  -BP      Moving from lying on back to  sitting on the side of a flat bed without bedrails?  3  -BP      Moving to and from a bed to a chair (including a wheelchair)?  3  -BP      Standing up from a chair using your arms (e.g., wheelchair, bedside chair)?  3  -BP      Climbing 3-5 steps with a railing?  3  -BP      To walk in hospital room?  3  -BP      AM-PAC 6 Clicks Score (PT)  18  -BP         Functional Assessment    Outcome Measure Options  AM-PAC 6 Clicks Basic Mobility (PT)  -BP        User Key  (r) = Recorded By, (t) = Taken By, (c) = Cosigned By    Initials Name Provider Type    Braden Smith PTA Physical Therapy Assistant    Jesika Sanchez, OTR Occupational Therapist    Vincent Hernandez, PT Physical Therapist    Hayley Small, PT Physical Therapist           Time Calculation:   PT Charges     Row Name 10/15/20 1437 10/15/20 1119          Time Calculation    Start Time  1330  -  0853  -     Stop Time  1401  -  0937  -     Time Calculation (min)  31 min  -  44 min  -       User Key  (r) = Recorded By, (t) = Taken By, (c) = Cosigned By    Initials Name Provider Type     Braden Mckeon PTA Physical Therapy Assistant        Therapy Charges for Today     Code Description Service Date Service Provider Modifiers Qty    72524914832 HC GAIT TRAINING EA 15 MIN 10/15/2020 Braden Mckeon PTA GP 1    71373563509 HC PT THER PROC EA 15 MIN 10/15/2020 Braden Mckeon PTA GP 2    46059486869 HC PT THER PROC EA 15 MIN 10/15/2020 Braden Mckeon PTA GP 1    43239642968 HC GAIT TRAINING EA 15 MIN 10/15/2020 Braden Mckeon PTA GP 1          PT G-Codes  Outcome Measure Options: PADD  AM-PAC 6 Clicks Score (PT): 19  AM-PAC 6 Clicks Score (OT): 24    PT Discharge Summary  Reason for Discharge: Discharge from facility  Outcomes Achieved: Patient able to partially acheive established goals  Discharge Destination: Home with outpatient services    Braden Mckeon PTA  10/15/2020

## 2020-10-15 NOTE — DISCHARGE SUMMARY
Orthopedic Discharge Summary      Patient: Yesi Benson      YOB: 1955    Medical Record Number: [id number]    Attending Physician: Elmer Fuentes MD  Consulting Physician(s):   Date of Admission: 10/14/2020  6:17 AM  Date of Discharge: 10/15/2020      Patient Active Problem List   Diagnosis   • Adenomatous polyp of colon   • Effusion, right knee   • Primary osteoarthritis of right knee   • Hyperlipidemia   • Hypothyroidism   • Complex tear of lateral meniscus of right knee as current injury   • Complex tear of medial meniscus of right knee as current injury     Status Post: [place] TOTAL KNEE ARTHROPLASTY [36287] (TOTAL KNEE ARTHROPLASTY AND ALL ASSOCIATED PROCEDURES), RIGHT       No Known Allergies    Current Medications:     Discharge Medications      New Medications      Instructions Start Date   aspirin 325 MG EC tablet   One tablet BID x 2 weeks then one tablet once daily x 2 weeks then discontinue for DVT prophylaxis      docusate sodium 250 MG capsule  Commonly known as: COLACE   250 mg, Oral, Daily      elastomeric 8 mL/hr reservoir 1 each device 1 Device, sodium chloride 0.9 % solution 300 mL with bupivacaine (PF) 0.5 % solution 100 mL   8 mL/hr (8 mL/hr), Intradermal, Continuous      Magnesium 500 MG capsule   500 mg, Oral, Every Night at Bedtime      Melatonin 5 MG capsule   5 mg, Oral, Every Night at Bedtime      meloxicam 15 MG tablet  Commonly known as: MOBIC   15 mg, Oral, Daily   Start Date: October 16, 2020     ondansetron 4 MG tablet  Commonly known as: ZOFRAN   4 mg, Oral, Every 6 Hours PRN      oxyCODONE-acetaminophen 5-325 MG per tablet  Commonly known as: PERCOCET   1-2 tablets every 4-6 hours, prn moderate-severe pain      predniSONE 5 MG tablet  Commonly known as: DELTASONE   5 mg, Oral, Daily      pregabalin 75 MG capsule  Commonly known as: LYRICA   75 mg, Oral, 2 Times Daily         Continue These Medications      Instructions Start Date   calcium carbonate 600 MG  tablet  Commonly known as: OS-ARMANDO   600 mg, Oral, Daily      diclofenac 1 % gel gel  Commonly known as: VOLTAREN   4 g, Topical, 4 Times Daily      fish oil 1000 MG capsule capsule   1,000 mg, Oral, Daily With Breakfast      Synthroid 75 MCG tablet  Generic drug: levothyroxine   75 mcg, Oral, Daily                 Past Medical History:   Diagnosis Date   • Colon polyp    • Disease of thyroid gland      Past Surgical History:   Procedure Laterality Date   • COLONOSCOPY N/A 3/27/2019    Procedure: COLONOSCOPY POLYPECTOMY;  Surgeon: Jaime Alaniz MD;  Location: [facility];  Service: Gastroenterology   • DENTAL PROCEDURE Left 2011,2014   • SALPINGO OOPHORECTOMY Left     as an infant      Social History     Occupational History   • Not on file   Tobacco Use   • Smoking status: Never Smoker   • Smokeless tobacco: Never Used   Substance and Sexual Activity   • Alcohol use: No     Frequency: Never   • Drug use: No   • Sexual activity: Defer      Social History     Social History Narrative   • Not on file     Family History   Problem Relation Age of Onset   • Cancer Father          Physical Exam: 65 y.o. female  General Appearance:    Alert, cooperative, in no acute distress                      Vitals:    10/14/20 1405 10/14/20 1505 10/14/20 1915 10/15/20 0612   BP: 150/76 140/74 136/64 111/57   BP Location: Left arm Right arm Right arm Left arm   Patient Position: Sitting Sitting Lying Lying   Pulse: 72 74 89 85   Resp: 20 20 21 17   Temp: 98 °F (36.7 °C) 98 °F (36.7 °C) 98 °F (36.7 °C) 98.3 °F (36.8 °C)   TempSrc: Oral Oral Oral Oral   SpO2: 97% 99% 96% 97%   Weight:       Height:            Head:    Normocephalic, without obvious abnormality, atraumatic   Eyes:            Lids and lashes normal, conjunctivae and sclerae normal, no   icterus, no pallor, corneas clear, PERRLA   Ears:    Ears appear intact with no abnormalities noted   Throat:   No oral lesions, no thrush, oral mucosa moist   Neck:   No  adenopathy, supple, trachea midline, no thyromegaly, no    carotid bruit, no JVD   Back:     No kyphosis present, no scoliosis present, no skin lesions,       erythema or scars, no tenderness to percussion or                   palpation,   range of motion normal   Lungs:     Clear to auscultation,respirations regular, even and                   unlabored    Heart:    Regular rhythm and normal rate, normal S1 and S2, no            murmur, no gallop, no rub, no click   Chest Wall:    No abnormalities observed   Abdomen:     Normal bowel sounds, no masses, no organomegaly, soft        non-tender, non-distended, no guarding, no rebound                 tenderness   Rectal:     Deferred   Extremities:   Incision intact without signs or symptoms of infection.               Neurovascular status remains intact to operative extremity.      Moves all extremities well, no edema, no cyanosis, no              redness   Pulses:   Pulses palpable and equal bilaterally   Skin:   No bleeding, bruising or rash   Lymph nodes:   No palpable adenopathy   Neurologic:   Cranial nerves 2 - 12 grossly intact, sensation intact, DTR        present and equal bilaterally           Hospital Course:  65 y.o. female admitted to [facility] to services of Elmer Fuentes MD with Primary osteoarthritis of right knee [M17.11]  Complex tear of medial meniscus of right knee as current injury, initial encounter [S83.231A]  Primary osteoarthritis of right knee [M17.11] on 10/14/2020 and underwent [place] TOTAL KNEE ARTHROPLASTY [54974] (TOTAL KNEE ARTHROPLASTY AND ALL ASSOCIATED PROCEDURES)  Per Elmer Fuentes MD. Antibiotic and VTE prophylaxis were per [facility]IP protocols. Post-operatively the patient transferred to the post-operative floor where the patient underwent mobilization therapy that included active as well as passive ROM exercises. Opioids were titrated to achieve appropriate pain management to allow for participation in mobilization  exercises. Vital signs are now stable. The incision is intact without signs or symptoms of infection. Operative extremity neurovascular status remains intact.   Appropriate education re: incision care, activity levels, medications, and follow up visits was completed and all questions were answered. The patient is now deemed stable for discharge to Home.      DIAGNOSTIC TESTS:     Admission on 10/14/2020   Component Date Value Ref Range Status   • Glucose 10/15/2020 123* 65 - 99 mg/dL Final   • BUN 10/15/2020 16  8 - 23 mg/dL Final   • Creatinine 10/15/2020 0.92  0.57 - 1.00 mg/dL Final   • Sodium 10/15/2020 139  136 - 145 mmol/L Final   • Potassium 10/15/2020 4.1  3.5 - 5.2 mmol/L Final   • Chloride 10/15/2020 105  98 - 107 mmol/L Final   • CO2 10/15/2020 24.4  22.0 - 29.0 mmol/L Final   • Calcium 10/15/2020 9.4  8.6 - 10.5 mg/dL Final   • eGFR Non African Amer 10/15/2020 61  >60 mL/min/1.73 Final   • BUN/Creatinine Ratio 10/15/2020 17.4  7.0 - 25.0 Final   • Anion Gap 10/15/2020 9.6  5.0 - 15.0 mmol/L Final   • WBC 10/15/2020 12.51* 3.40 - 10.80 10*3/mm3 Final   • RBC 10/15/2020 4.62  3.77 - 5.28 10*6/mm3 Final   • Hemoglobin 10/15/2020 13.5  12.0 - 15.9 g/dL Final   • Hematocrit 10/15/2020 41.8  34.0 - 46.6 % Final   • MCV 10/15/2020 90.5  79.0 - 97.0 fL Final   • MCH 10/15/2020 29.2  26.6 - 33.0 pg Final   • MCHC 10/15/2020 32.3  31.5 - 35.7 g/dL Final   • RDW 10/15/2020 13.3  12.3 - 15.4 % Final   • RDW-SD 10/15/2020 43.5  37.0 - 54.0 fl Final   • MPV 10/15/2020 11.7  6.0 - 12.0 fL Final   • Platelets 10/15/2020 243  140 - 450 10*3/mm3 Final   • Neutrophil % 10/15/2020 85.3* 42.7 - 76.0 % Final   • Lymphocyte % 10/15/2020 6.2* 19.6 - 45.3 % Final   • Monocyte % 10/15/2020 8.1  5.0 - 12.0 % Final   • Eosinophil % 10/15/2020 0.0* 0.3 - 6.2 % Final   • Basophil % 10/15/2020 0.1  0.0 - 1.5 % Final   • Immature Grans % 10/15/2020 0.3  0.0 - 0.5 % Final   • Neutrophils, Absolute 10/15/2020 10.68* 1.70 - 7.00 10*3/mm3  Final   • Lymphocytes, Absolute 10/15/2020 0.77  0.70 - 3.10 10*3/mm3 Final   • Monocytes, Absolute 10/15/2020 1.01* 0.10 - 0.90 10*3/mm3 Final   • Eosinophils, Absolute 10/15/2020 0.00  0.00 - 0.40 10*3/mm3 Final   • Basophils, Absolute 10/15/2020 0.01  0.00 - 0.20 10*3/mm3 Final   • Immature Grans, Absolute 10/15/2020 0.04  0.00 - 0.05 10*3/mm3 Final   • nRBC 10/15/2020 0.0  0.0 - 0.2 /100 WBC Final       No results found.    Discharge and Follow up Instructions:   Outpatient physical therapy appointment scheduled 10/16/2020  Aspirin DVT prophylaxis  Follow-up in office in 2 weeks  Follow-up primary care within 1 to 2 weeks postop  Weightbearing as tolerated      Date: 10/15/2020    UNRULY Hernandez

## 2020-10-15 NOTE — PROGRESS NOTES
POD# 1 s/p right TKA    Subjective:Patient states well controlled, has been participating with PT/OT ambulating in hallway tolerated well. Denies fevers chills or sweats overnight, denies any residual numbness or tingling to operative extremity.  No calf pain or swelling.    Objective:  Vitals:    10/14/20 1405 10/14/20 1505 10/14/20 1915 10/15/20 0612   BP: 150/76 140/74 136/64 111/57   BP Location: Left arm Right arm Right arm Left arm   Patient Position: Sitting Sitting Lying Lying   Pulse: 72 74 89 85   Resp: 20 20 21 17   Temp: 98 °F (36.7 °C) 98 °F (36.7 °C) 98 °F (36.7 °C) 98.3 °F (36.8 °C)   TempSrc: Oral Oral Oral Oral   SpO2: 97% 99% 96% 97%   Weight:       Height:           Results from last 7 days   Lab Units 10/15/20  0346   WBC 10*3/mm3 12.51*   HEMOGLOBIN g/dL 13.5   HEMATOCRIT % 41.8   PLATELETS 10*3/mm3 243       Results from last 7 days   Lab Units 10/15/20  0346   SODIUM mmol/L 139   POTASSIUM mmol/L 4.1   CHLORIDE mmol/L 105   CO2 mmol/L 24.4   BUN mg/dL 16   CREATININE mg/dL 0.92   GLUCOSE mg/dL 123*   CALCIUM mg/dL 9.4       Exam:    Right knee- incision clean, dry, intact   Flex and extend toes and ankle   No calf pain, negative Homans sign   Positive sensation light touch right foot   Brisk cap refill all digits, palpable dorsalis pedis pulse    Impression: s/p right TKA    Plan:  1. PT/OT- weight-bear as tolerated, ambulation, range of motion  2. Pain control-  oxycodone, Tylenol, Lyrica  3. DVT prophylaxis- aspirin twice a day  4. Wound care- dermabond remain intact until follow-up in office  5. Disposition- home with outpatient PT once medically stable and cleared by PT, home today after physical therapy this afternoon.

## 2020-10-15 NOTE — DISCHARGE INSTRUCTIONS
Total Knee Replacement Discharge Instructions:    I. ACTIVITIES:  1. Exercises:  ? Complete exercise program as taught by the hospital physical therapist 2 times per day  ? Exercise program will be advanced by the physical therapist  ? During the day be up ambulating every 2 hours (while awake) for short distances  ? Complete the ankle pump exercises at least 10 times per hour (while awake)  ? Elevate legs most of the day the first week post operatively and thereafter elevate legs when in bed and for at least 30 minutes during the day. Caution must be taken to avoid pillow placement under the bend of the knee as this can lead to flexion contractures of the knee.  ? Use cold packs 20-30 minutes approximately 5 times per day. This should be done before and after completing your exercises and at any time you are experiencing pain/ stiffness in your operative extremity.  ? Apply 6 inch ace wraps to operative extremity from ankle to groin. Please keep in place at all times except when bathing.      2. Activities of Daily Living:  ? No tub baths, hot tubs, or swimming pools for 4 weeks  ? May shower on post-operative day #3- Do not scrub or rub around the incision or mesh. No soap or alcohol to incision, just let water run over wound.         II. RESTRICTIONS  ? Do not cross legs or kneel  ? Your surgeon will discuss with you when you will be able to drive again.  ? Weight bearing as tolerated  ? First week stay inside on even terrain. May go up and down stairs one stair at a time utilizing the hand rail  ? After one week, you may venture outside.     III. PRECAUTIONS:  ? Everyone that comes near you should wash their hands  ? No elective dental, genital-urinary, or colon procedures or surgical procedures for 12 weeks after surgery unless absolutely necessary.  ?  If dental work or surgical procedure is deemed absolutely necessary, you will need to contact your surgeon as you will need to take antibiotics 1 hour prior to  any dental work (including teeth cleanings).  ? Please discuss with your surgeon prophylactic antibiotics as the length of time this intervention will be necessary for you varies with each patient’s health history and situation.  ? Avoid sick people. If you must be around someone who is ill, they should wear a mask.  ? Avoid visits to the Emergency Room or Urgent Care unless you are having a life              threatening event.     IV. INCISION CARE:  ? Wash your hands prior to dressing changes  ? Incision and knee should be covered with an ACE wrap daily for compression. No creams or ointments to the incision  ? Do not touch or pick at the incision  ? Check incision every day and notify surgeon immediately if any of the following signs or symptoms are noted:  o Increase in redness  o Increase in swelling around the incision and of the entire extremity  o Increase in pain  o Drainage oozing from the incision  o Pulling apart of the edges of the incision  o Increase in overall body temperature (greater than 100.5 degrees)  ? You will be given further instructions on removal of the glue and mesh over your incision at your first follow up visit at the office.     V. MEDICATIONS:   1. Anticoagulants: You will be discharged on an anticoagulant, typically either Aspirin or Eliquis. This is a prophylactic medication that helps prevent blood clots during your post-operative period. The type and length of dosage varies based on your individual needs, procedure performed, and surgeon’s preference.  ? While taking the anticoagulant, you should avoid taking any additional aspirin, ibuprofen (Advil or Motrin), Aleve (Naprosyn) or other non-steroidal anti-inflammatory medications.   ? Notify surgeon immediately if any greg bleeding is noted in the urine, stool, emesis, or from the nose or the incision. Blood in the stool will often appear as black rather than red. Blood in urine may appear as pink. Blood in emesis may appear as  brown/black like coffee grounds.  ? You will need to apply pressure for longer periods of time to any cuts or abrasions to stop bleeding  ? Avoid alcohol while taking anticoagulants    2. Stool Softeners: You will be at greater risk of constipation after surgery due to being less mobile and the pain medications.   ? Take stool softeners as instructed by your surgeon while on pain medications. Over the counter Colace 100 mg 1-2 capsules twice daily.   ? If stools become too loose or too frequent, please decreases the dosage or stop the stool softener.  ? If constipation occurs despite use of stool softeners, you are to continue the stool softeners and add a laxative (Milk of Magnesia 1 ounce daily as needed)  ? Drink plenty of fluids, and eat fruits and vegetables during your recovery time    3. Pain Medications utilized after surgery are narcotics and the law requires that the following information be given to all patients that are prescribed narcotics:  ? CLASSIFICATION: Pain medications are called Opioids and are narcotics  ? LEGALITIES: It is illegal to share narcotics with others and to drive within 24 hours of taking narcotics  ? POTENTIAL SIDE EFFECTS: Potential side effects of opioids include: nausea, vomiting, itching, dizziness, drowsiness, dry mouth, constipation, and difficulty urinating.  ? POTENTIAL ADVERSE EFFECTS:   o Opioid tolerance can develop with use of pain medications and this simply means that it requires more and more of the medication to control pain; however, this is seen more in patients that use opioids for longer periods of time.  o Opioid dependence can develop with use of Opioids and this simply means that to stop the medication can cause withdrawal symptoms; however, this is seen with patients that use Opioids for longer periods of time.  o Opioid addiction can develop with use of Opioids and the incidence of this is very unlikely in patients who take the medications as ordered and  stop the medications as instructed.  o Opioid overdose can be dangerous, but is unlikely when the medication is taken as ordered and stopped when ordered. It is important not to mix opioids with alcohol or with and type of sedative such as Benadryl as this can lead to over sedation and respiratory difficulty.  ? DOSAGE:   o Pain medications will need to be taken consistently for the first week to decrease pain and promote adequate pain relief and participation in physical therapy.  o After the initial surgical pain begins to resolve, you may begin to decrease the pain medication. By the end of 6 weeks, you should be off of pain medications.  o Refills will not be given by the office during evening hours, on weekends, or after 12 weeks post-op.  o To seek refills on pain medications during the initial 6 week post-operative period, you must call the office 48 hours in advance to request the refill. The office will then notify you when to  the prescription. DO NOT wait until you are out of the medication to request a refill.    VI. FOLLOW-UP VISITS:  ? You will need to follow up in the office with Hetal Melendrez Nurse Practitioner in 2 weeks. Please call  [phone]  to schedule this appointment.  ? You will need to follow up with your primary care physician within 4 weeks.  ? If you have any concerns or suspected complications prior to your follow up visit, please call your surgeons office. Do not wait until your appointment time if you suspect complications. These will need to be addressed in the office promptly.

## 2020-10-15 NOTE — PLAN OF CARE
Problem: Adult Inpatient Plan of Care  Goal: Plan of Care Review  Recent Flowsheet Documentation  Taken 10/15/2020 1330 by Braden Mckeon, PTA  Outcome Summary:   PT:  pt performs bed mobility with assist only to guide the (R)LE   participates in TKA HEP x 10 reps each   pt able to manage curb step with verbal cues, FWW and CGA - 3 additional steps with (B) handrails with vc's and CGA   pt ambulated additional 200+ft with FWW, CGA - demonstrates improved gait pattern including increased heel strike and step length   reviewed HEP with pt  T

## 2020-10-15 NOTE — PROGRESS NOTES
Patient: Yesi Benson  Procedure(s):  TOTAL KNEE ARTHROPLASTY AND ALL ASSOCIATED PROCEDURES  Anesthesia type: spinal, regional    Patient location: [facility] Surgical Floor  Vitals:    10/14/20 1405 10/14/20 1505 10/14/20 1915 10/15/20 0612   BP: 150/76 140/74 136/64 111/57   BP Location: Left arm Right arm Right arm Left arm   Patient Position: Sitting Sitting Lying Lying   Pulse: 72 74 89 85   Resp: 20 20 21 17   Temp: 98 °F (36.7 °C) 98 °F (36.7 °C) 98 °F (36.7 °C) 98.3 °F (36.8 °C)   TempSrc: Oral Oral Oral Oral   SpO2: 97% 99% 96% 97%   Weight:       Height:         Level of consciousness: awake, alert and oriented    Post-anesthesia pain: adequate analgesia  Airway patency: patent  Respiratory: unassisted  Cardiovascular: stable and blood pressure at baseline  Hydration: euvolemic    Anesthetic complications: no

## 2020-10-15 NOTE — PLAN OF CARE
Goal Outcome Evaluation:  Plan of Care Reviewed With: patient  Progress: improving  Outcome Summary: VSS, good pain control, ambulated in light, tolerating diet, dressing c/d/i

## 2020-10-15 NOTE — PLAN OF CARE
Problem: Adult Inpatient Plan of Care  Goal: Plan of Care Review  Recent Flowsheet Documentation  Taken 10/15/2020 1020 by Jesika Hallman OTR  Plan of Care Reviewed With: patient  Outcome Summary: OT: pt educated on use of long handled ae for lb adls. pt states she has spouse's equipment at home to use. pt able to utilize sockaid to samuel slipper socks with verbal cues after demonstration. pt and spouse state no further OT concerns at this time. pt plan is to return home today with outpatient physical therapy tomorrow. no further OT recommendations at this time

## 2020-10-15 NOTE — THERAPY TREATMENT NOTE
"Acute Care - Physical Therapy Treatment Note   Jenny Chávez     Patient Name: Yesi Benson  : 1955  MRN: [id number]  Today's Date: 10/15/2020   Onset of Illness/Injury or Date of Surgery: 10/14/20       PT Assessment (last 12 hours)      PT Evaluation and Treatment     Row Name 10/15/20 0853          Physical Therapy Time and Intention    Subjective Information  no complaints  -     Document Type  therapy note (daily note)  -     Mode of Treatment  physical therapy  -     Patient Effort  good  -     Row Name 10/15/20 0853          General Information    General Observations of Patient  pt resting in bed with SCD and ice pack in place  -     Row Name 10/15/20 0853          Pain Scale: Word Pre/Post-Treatment    Pre/Posttreatment Pain Comment  Pt states she is not having any \"sharp or shooting pain\" but describes as an ache  -     Pain Intervention(s)  Cold applied;Repositioned;Ambulation/increased activity  -     Row Name 10/15/20 0853          Bed Mobility    Supine-Sit [place] (Bed Mobility)  standby assist;supervision;contact guard  -     Assistive Device (Bed Mobility)  bed rails;head of bed elevated  -     Row Name 10/15/20 0853          Transfers    Sit-Stand [place] (Transfers)  standby assist;verbal cues;contact guard  -     Stand-Sit [place] (Transfers)  standby assist;contact guard;verbal cues  -     Row Name 10/15/20 0853          Sit-Stand Transfer    Assistive Device (Sit-Stand Transfers)  walker, front-wheeled  -     Row Name 10/15/20 0853          Stand-Sit Transfer    Assistive Device (Stand-Sit Transfers)  walker, front-wheeled  -     Row Name 10/15/20 0853          Gait/Stairs (Locomotion)    [place] Level (Gait)  verbal cues;contact guard  -     Assistive Device (Gait)  walker, naomi  -     Distance in Feet (Gait)  300 ft total  -     Pattern (Gait)  step-through  -     Deviations/Abnormal Patterns (Gait)  gait speed decreased  -     " Right Sided Gait Deviations  heel strike decreased  -     Row Name 10/15/20 0853          Knee (Therapeutic Exercise)    Knee AROM (Therapeutic Exercise)  right;SAQ (short arc quad);SLR (straight leg raise);heel slides;supine;10 repetitions hip abd/add, AP, QS x 10 reps each  -                            Row Name 10/15/20 0853          Plan of Care Review    Outcome Summary  PT:  Pt performed 10 reps of TKA ex's including (I) SLR; pt performs supine to sit with CGA to guide (R) LE; sit to/from stand transfers from bed, recliner and BSC with SBA/CGA ; ambulated 300ft total with CGA and FWW.  -     Row Name 10/15/20 0853          Positioning and Restraints    Pre-Treatment Position  in bed  -     Post Treatment Position  chair  -     In Chair  reclined;call light within reach;encouraged to call for assist CP reapplied  -       User Key  (r) = Recorded By, (t) = Taken By, (c) = Cosigned By    Initials Name Provider Type     Braden Mckeon PTA Physical Therapy Assistant              Physical Therapy Education                 Title: PT OT SLP Therapies (Done)     Topic: Physical Therapy (Done)     Point: Mobility training (Done)     Learning Progress Summary           Patient Acceptance, E,TB,D, VU,DU,NR by  at 10/15/2020 1116    Acceptance, E,TB, VU by  at 10/14/2020 1420                   Point: Home exercise program (Done)     Learning Progress Summary           Patient Acceptance, E,TB,D, VU,DU,NR by  at 10/15/2020 1116                               User Key     Initials Effective Dates Name Provider Type Discipline     08/11/15 -  Braden Mckeon PTA Physical Therapy Assistant PT     04/03/18 -  Vincent Méndez PT Physical Therapist PT              PT Recommendation and Plan     Outcome Summary: PT:  Pt performed 10 reps of TKA ex's including (I) SLR; pt performs supine to sit with CGA to guide (R) LE; sit to/from stand transfers from bed, recliner and BSC with SBA/CGA ; ambulated 300ft  total with CGA and FWW.  Outcome Measures     Row Name 10/15/20 0853 10/14/20 1326          How much help from another person do you currently need...    Turning from your back to your side while in flat bed without using bedrails?  4  -MH  3  -BP     Moving from lying on back to sitting on the side of a flat bed without bedrails?  3  -MH  3  -BP     Moving to and from a bed to a chair (including a wheelchair)?  3  -MH  3  -BP     Standing up from a chair using your arms (e.g., wheelchair, bedside chair)?  3  -MH  3  -BP     Climbing 3-5 steps with a railing?  3  -MH  3  -BP     To walk in hospital room?  3  -MH  3  -BP     AM-PAC 6 Clicks Score (PT)  19  -MH  18  -BP        Functional Assessment    Outcome Measure Options  AM-PAC 6 Clicks Basic Mobility (PT)  -MH  AM-PAC 6 Clicks Basic Mobility (PT)  -BP       User Key  (r) = Recorded By, (t) = Taken By, (c) = Cosigned By    Initials Name Provider Type     Braden Mckeon PTA Physical Therapy Assistant    BP Vincent Méndez, PT Physical Therapist           Time Calculation:   PT Charges     Row Name 10/15/20 1119             Time Calculation    Start Time  0853  -      Stop Time  0937  -      Time Calculation (min)  44 min  -        User Key  (r) = Recorded By, (t) = Taken By, (c) = Cosigned By    Initials Name Provider Type     Braden Mckeon PTA Physical Therapy Assistant        Therapy Charges for Today     Code Description Service Date Service Provider Modifiers Qty    39883404604 HC GAIT TRAINING EA 15 MIN 10/15/2020 Braden Mckeon PTA GP 1    12791838650 HC PT THER PROC EA 15 MIN 10/15/2020 Braden Mckeon PTA GP 2          PT G-Codes  Outcome Measure Options: AM-PAC 6 Clicks Basic Mobility (PT)  AM-PAC 6 Clicks Score (PT): 19  AM-PAC 6 Clicks Score (OT): 21    Braden Mckeon PTA  10/15/2020

## 2020-10-15 NOTE — THERAPY DISCHARGE NOTE
Acute Care - Occupational Therapy Treatment Note/Discharge   [place]     Patient Name: Yesi Benson  : 1955  MRN: [id number]  Today's Date: 10/15/2020  Onset of Illness/Injury or Date of Surgery: 10/14/20     Referring Physician: Dr. Fuentes       Admit Date: 10/14/2020       ICD-10-CM ICD-9-CM   1. Status post total right knee replacement  Z96.651 V43.65   2. Primary osteoarthritis of right knee  M17.11 715.16   3. Complex tear of medial meniscus of right knee as current injury, initial encounter  S83.231A 836.0     Patient Active Problem List   Diagnosis   • Adenomatous polyp of colon   • Effusion, right knee   • Primary osteoarthritis of right knee   • Hyperlipidemia   • Hypothyroidism   • Complex tear of lateral meniscus of right knee as current injury   • Complex tear of medial meniscus of right knee as current injury     Past Medical History:   Diagnosis Date   • Colon polyp    • Disease of thyroid gland      Past Surgical History:   Procedure Laterality Date   • COLONOSCOPY N/A 3/27/2019    Procedure: COLONOSCOPY POLYPECTOMY;  Surgeon: Jaime Alaniz MD;  Location: [facility];  Service: Gastroenterology   • DENTAL PROCEDURE Left ,   • SALPINGO OOPHORECTOMY Left     as an infant           OT ASSESSMENT FLOWSHEET (last 12 hours)      OT Evaluation and Treatment     Row Name 10/15/20 1020                   OT Time and Intention    Subjective Information  no complaints  -JJ        Document Type  therapy note (daily note)  -JJ        Mode of Treatment  occupational therapy  -JJ        Patient Effort  good  -JJ        Symptoms Noted During/After Treatment  none  -JJ           Pain Scale: Numbers Pre/Post-Treatment    Pretreatment Pain Rating  -- no co pain  -JJ           Bed Mobility    Comment (Bed Mobility)  deferred up in chair  -JJ           Lower Body Dressing Assessment/Training    Comment (Lower Body Dressing)  pt donned slipper sock utilizing sockaid with verbal cues after  demonstration  -JJ           Wound 10/14/20 0934 Right anterior knee Incision    Wound - Properties Group Placement Date: 10/14/20  -TG Placement Time: 0934 -TG Present on Hospital Admission: N  -TG Side: Right  -TG Orientation: anterior  -TG Location: knee  -TG Primary Wound Type: Incision  -TG Additional Comments: prineo, telfa, ABD, webril, ace wraps, cold wrap, immobilizer  -TG    Retired Wound - Properties Group Date first assessed: 10/14/20  -TG Time first assessed: 0934  -TG Present on Hospital Admission: N  -TG Side: Right  -TG Location: knee  -TG Primary Wound Type: Incision  -TG Additional Comments: prineo, telfa, ABD, webril, ace wraps, cold wrap, immobilizer  -TG       Plan of Care Review    Plan of Care Reviewed With  patient  -JJ        Outcome Summary  OT: pt educated on use of long handled ae for lb adls. pt states she has spouse's equipment at home to use. pt able to utilize sockaid to samuel slipper socks with verbal cues after demonstration. pt and spouse state no further OT concerns at this time. pt plan is to return home today with outpatient physical therapy tomorrow. no further OT recommendations at this time  -JJ           Dressing Goal 1 (OT)    Activity/Device (Dressing Goal 1, OT)  lower body dressing  -JJ        [place]/Cues Needed (Dressing Goal 1, OT)  modified independence;other (see comments) LH AE if needed  -JJ        Progress/Outcome (Dressing Goal 1, OT)  goal met  -JJ           Positioning and Restraints    Pre-Treatment Position  sitting in chair/recliner  -JJ        Post Treatment Position  chair  -JJ        In Chair  reclined;encouraged to call for assist;call light within reach;with family/caregiver  -JJ           Progress Summary (OT)    Progress Toward Functional Goals (OT)  progress toward functional goals as expected  -JJ        Daily Progress Summary (OT)  no further OT concerns at this time.   -JJ           Therapy Plan Review/Discharge Plan (OT)    Anticipated  Discharge Disposition (OT)  home with outpatient therapy services  -MODESTO          User Key  (r) = Recorded By, (t) = Taken By, (c) = Cosigned By    Initials Name Effective Dates    Jesika Sanchez, OTABBY 07/05/20 -     Adamaris Cooper RN 04/10/20 -           Occupational Therapy Education                 Title: PT OT SLP Therapies (Done)     Topic: Occupational Therapy (Resolved)     Point: ADL training (Resolved)     Description:   Instruct learner(s) on proper safety adaptation and remediation techniques during self care or transfers.   Instruct in proper use of assistive devices.              Learning Progress Summary           Patient Acceptance, E,TB,D, VU,DU by MODESTO at 10/15/2020 1251    Comment: pt educated on long handled ae and adls.    Acceptance, E, VU by EN at 10/14/2020 1420   Significant Other Acceptance, E, VU by EN at 10/14/2020 1420                               User Key     Initials Effective Dates Name Provider Type Discipline    EN 07/05/20 -  Liz Blanton OTR Occupational Therapist OT    MODESTO 07/05/20 -  Jesika Hallman, OTR Occupational Therapist OT                OT Recommendation and Plan     Progress Toward Functional Goals (OT): progress toward functional goals as expected  Plan of Care Review  Plan of Care Reviewed With: patient  Outcome Summary: OT: pt educated on use of long handled ae for lb adls. pt states she has spouse's equipment at home to use. pt able to utilize sockaid to samuel slipper socks with verbal cues after demonstration. pt and spouse state no further OT concerns at this time. pt plan is to return home today with outpatient physical therapy tomorrow. no further OT recommendations at this time  Plan of Care Reviewed With: patient  Outcome Summary: OT: pt educated on use of long handled ae for lb adls. pt states she has spouse's equipment at home to use. pt able to utilize sockaid to samuel slipper socks with verbal cues after demonstration. pt and spouse  state no further OT concerns at this time. pt plan is to return home today with outpatient physical therapy tomorrow. no further OT recommendations at this time     Rehab Goal Summary     Row Name 10/15/20 1020             Dressing Goal 1 (OT)    Activity/Device (Dressing Goal 1, OT)  lower body dressing  -JJ      [place]/Cues Needed (Dressing Goal 1, OT)  modified independence;other (see comments) LH AE if needed  -JJ      Progress/Outcome (Dressing Goal 1, OT)  goal met  -JJ        User Key  (r) = Recorded By, (t) = Taken By, (c) = Cosigned By    Initials Name Provider Type Discipline    Jesika Sanchez, OTR Occupational Therapist OT          Outcome Measures     Row Name 10/15/20 1020 10/15/20 0853 10/14/20 1326       How much help from another person do you currently need...    Turning from your back to your side while in flat bed without using bedrails?  --  4  -  3  -BP    Moving from lying on back to sitting on the side of a flat bed without bedrails?  --  3  -  3  -BP    Moving to and from a bed to a chair (including a wheelchair)?  --  3  -  3  -BP    Standing up from a chair using your arms (e.g., wheelchair, bedside chair)?  --  3  -  3  -BP    Climbing 3-5 steps with a railing?  --  3  -MH  3  -BP    To walk in hospital room?  --  3  -MH  3  -BP    AM-PAC 6 Clicks Score (PT)  --  19  -MH  18  -BP       How much help from another is currently needed...    Putting on and taking off regular lower body clothing?  4  -JJ  --  --    Bathing (including washing, rinsing, and drying)  4  -JJ  --  --    Toileting (which includes using toilet bed pan or urinal)  4  -JJ  --  --    Putting on and taking off regular upper body clothing  4  -JJ  --  --    Taking care of personal grooming (such as brushing teeth)  4  -JJ  --  --    Eating meals  4  -JJ  --  --    AM-PAC 6 Clicks Score (OT)  24  -JJ  --  --       Functional Assessment    Outcome Measure Options  --  AM-PAC 6 Clicks Basic Mobility  (PT)  -JOSSIE  AM-PAC 6 Clicks Basic Mobility (PT)  -BP      User Key  (r) = Recorded By, (t) = Taken By, (c) = Cosigned By    Initials Name Provider Type    Braden Smith, PTA Physical Therapy Assistant    Jesika Sanchez, OTR Occupational Therapist    BP Vincent Méndez, PT Physical Therapist          Time Calculation:   Time Calculation- OT     Row Name 10/15/20 1252             Time Calculation- OT    OT Start Time  1020  -      OT Stop Time  1033  -      OT Time Calculation (min)  13 min  -        User Key  (r) = Recorded By, (t) = Taken By, (c) = Cosigned By    Initials Name Provider Type    Jesika Sanchez, OTR Occupational Therapist          Therapy Charges for Today     Code Description Service Date Service Provider Modifiers Qty    [id number]  OT SELF CARE/MGMT/TRAIN EA 15 MIN 10/15/2020 Jesika Hallman OTR GO 1               OT Discharge Summary  Anticipated Discharge Disposition (OT): home with outpatient therapy services  Reason for Discharge: All goals achieved  Discharge Destination: Home with outpatient services    TRAVIS Bagley  10/15/2020

## 2020-10-15 NOTE — PLAN OF CARE
Problem: Adult Inpatient Plan of Care  Goal: Plan of Care Review  Recent Flowsheet Documentation  Taken 10/15/2020 0853 by Braden Mckeon, SARAH  Outcome Summary:   PT:  Pt performed 10 reps of TKA ex's including (I) SLR   pt performs supine to sit with CGA to guide (R) LE   sit to/from stand transfers from bed, recliner and BSC with SBA/CGA   ambulated 300ft total with CGA and FWW.

## 2020-10-15 NOTE — NURSING NOTE
Continued Stay Note  [facility]     Patient Name: Yesi Benson  MRN: [id number]  Today's Date: 10/15/2020    Admit Date: 10/14/2020    Discharge Plan     Row Name 10/15/20 1052       Plan    Plan  Home    Plan Comments  Followed up with patient at bedside to review discharge needs. Patient is currently sitting up in the chair with no complaints and  is at bedside. Updated patient that she had an appointment at [facility] for tomorrow at 8:30AM and to arrive at 8:00AM and she verbalized understanding. Patient states she plans on returning home at discharge with her  to help as needed. Patient had no other questions or concerns regarding discharge needs at this time. CM will continue to follow for needs.        Discharge Codes    No documentation.       Expected Discharge Date and Time     Expected Discharge Date Expected Discharge Time    Oct 15, 2020             Kimberlyn Barrera RN

## 2020-10-16 ENCOUNTER — READMISSION MANAGEMENT (OUTPATIENT)
Dept: CALL CENTER | Facility: HOSPITAL | Age: 65
End: 2020-10-16

## 2020-10-16 ENCOUNTER — HOSPITAL ENCOUNTER (OUTPATIENT)
Dept: PHYSICAL THERAPY | Facility: HOSPITAL | Age: 65
Setting detail: THERAPIES SERIES
Discharge: HOME | End: 2020-10-16

## 2020-10-16 DIAGNOSIS — Z96.651 STATUS POST TOTAL RIGHT KNEE REPLACEMENT: Primary | ICD-10-CM

## 2020-10-16 PROCEDURE — 97161 PT EVAL LOW COMPLEX 20 MIN: CPT | Performed by: PHYSICAL THERAPIST

## 2020-10-16 NOTE — OUTREACH NOTE
Prep Survey      Responses   [facility] facility patient discharged from?  [place]   Is LACE score < 7 ?  Yes   Eligibility  Readm Mgmt   Discharge diagnosis  [place] TOTAL KNEE ARTHROPLASTY 22114 (TOTAL KNEE ARTHROPLASTY AND ALL ASSOCIATED PROCEDURES), RIGHT    Does the patient have one of the following disease processes/diagnoses(primary or secondary)?  Total Joint Replacement   Does the patient have Home health ordered?  Yes   What is the Home health agency?   [facility]   Is there a DME ordered?  No   Prep survey completed?  Yes          Charo Lieberman RN

## 2020-10-16 NOTE — THERAPY EVALUATION
Outpatient Physical Therapy Ortho Initial Evaluation   [place]     Patient Name: Yesi Benson  : 1955  MRN: [id number]  Today's Date: 10/16/2020      Visit Date: 10/16/2020    Patient Active Problem List   Diagnosis   • Adenomatous polyp of colon   • Effusion, right knee   • Primary osteoarthritis of right knee   • Hyperlipidemia   • Hypothyroidism   • Complex tear of lateral meniscus of right knee as current injury   • Complex tear of medial meniscus of right knee as current injury        Past Medical History:   Diagnosis Date   • Colon polyp    • Disease of thyroid gland         Past Surgical History:   Procedure Laterality Date   • COLONOSCOPY N/A 3/27/2019    Procedure: COLONOSCOPY POLYPECTOMY;  Surgeon: Jaime Alaniz MD;  Location: [facility] OR;  Service: Gastroenterology   • DENTAL PROCEDURE Left    • SALPINGO OOPHORECTOMY Left     as an infant        Visit Dx:     ICD-10-CM ICD-9-CM   1. Status post total right knee replacement  Z96.651 V43.65         Patient History     Row Name 10/16/20 0820 10/15/20 0736          History    Chief Complaint  Difficulty Walking;Difficulty with daily activities;Pain;Swelling;Tightness  -  Joint stiffness;Joint swelling;Pain  (Pended)   (Patient-Rptd)   -patient     Type of Pain  Knee pain right  -  Knee pain  (Pended)   (Patient-Rptd)   -patient     Date Current Problem(s) Began  10/14/20  -GC  08/15/19  (Pended)   (Patient-Rptd)   -patient     Brief Description of Current Complaint  Pt reports a long history of right knee pain that did not respond to conservative treatment. She underwent a right TKA on 10/14. She was hosptialized overnight before being discharged home. She is now referred for therapy.  -  Had total knee replacement on  after having knee problems since   (Pended)   (Patient-Rptd)   -patient     Patient/Caregiver Goals  Relieve pain;Return to prior level of function;Improve mobility;Improve  strength  -GC  Relieve pain;Return to prior level of function;Return to work;Improve mobility;Improve strength;Decrease swelling;Heal wound  (Pended)   (Patient-Rptd)   -patient     Patient's Rating of General Health  Good  -GC  --     Hand Dominance  right-handed  -GC  right-handed  (Pended)   (Patient-Rptd)   -patient     Occupation/sports/leisure activities  pt is retired and enjoys working outside  -  Retired/working outside & inside my home  (Pended)   (Patient-Rptd)   -patient     Patient seeing anyone else for problem(s)?  --  No  (Pended)   (Patient-Rptd)   -patient     What clinical tests have you had for this problem?  X-ray;MRI  -GC  X-ray;MRI  (Pended)   (Patient-Rptd)   -patient     Results of Clinical Tests  OA right knee  -GC  --     Are you or can you be pregnant  --  No  (Pended)   (Patient-Rptd)   -patient        Pain     Pain Location  Knee right   -GC  --     Pain at Present  5  -GC  --     Pain at Best  5  -GC  --     Pain at Worst  5  -GC  --     Pain Frequency  Constant/continuous  -GC  --     Pain Description  Aching;Discomfort  -GC  --     What Performance Factors Make the Current Problem(s) WORSE?  Pt c/o pain with being on her feet and when she tries to bend or straighten her knee  -GC  --     What Performance Factors Make the Current Problem(s) BETTER?  Pt feels best if she gets off her feet and rests with her knee slightly bent  -  --     Difficulties with ADL's?  Pt has some difficulty with don/doff shoes and socks, getting in/out of car  -GC  --        Fall Risk Assessment    Any falls in the past year:  No  -GC  No  (Pended)   (Patient-Rptd)   -patient        Services    Prior Rehab/Home Health Experiences  --  No  (Pended)   (Patient-Rptd)   -patient     Are you currently receiving Home Health services  --  No  (Pended)   (Patient-Rptd)   -patient     Do you plan to receive Home Health services in the near future  --  No  (Pended)   (Patient-Rptd)   -patient        Daily  Activities    Primary Language  English  -GC  English  (Pended)   (Patient-Rptd)   -patient     Are you able to read  Yes  -GC  Yes  (Pended)   (Patient-Rptd)   -patient     Are you able to write  Yes  -GC  Yes  (Pended)   (Patient-Rptd)   -patient     How does patient learn best?  Reading  -GC  Listening;Reading;Demonstration;Pictures/Video  (Pended)   (Patient-Rptd)   -patient     Teaching needs identified  Home Exercise Program;Management of Condition  -GC  --     Patient is concerned about/has problems with  Climbing Stairs;Flexibility;Performing home management (household chores, shopping, care of dependents);Performing job responsibilities/community activities (work, school,;Performing sports, recreation, and play activities;Standing;Walking  -GC  --     Does patient have problems with the following?  None  -GC  --     Barriers to learning  None  -GC  --     Functional Status  bathing;dressing;grooming;mobility issues preventing performance of daily activities  -GC  --     Pt Participated in POC and Goals  Yes  -GC  --        Safety    Are you being hurt, hit, or frightened by anyone at home or in your life?  No  -GC  No  (Pended)   (Patient-Rptd)   -patient     Are you being neglected by a caregiver  No  -GC  No  (Pended)   (Patient-Rptd)   -patient     Have you had any of the following issues with  --  N/A  (Pended)   (Patient-Rptd)   -patient       User Key  (r) = Recorded By, (t) = Taken By, (c) = Cosigned By    Initials Name Provider Type    GC Ryan Kong, PT Physical Therapist    patient Yesi Benson --          PT Ortho     Row Name 10/16/20 0820       Posture/Observations    Posture/Observations Comments  Pt initially seen with ACE wrap on right knee and pain pump in thigh. The pain pump was removed and the pt was noted to have considerable bleeding from where it was inserted. A gauze compression wrap was applied the bleeding stopped before her treatment session ended. Pt has moderate edema right  knee. The surgical site is healing nicely.  -GC       Knee Palpation    Medial Joint Line  Right:;Tender  -GC    Lateral Joint Line  Right:;Tender  -GC       Patellar Accessory Motions    Superior glide  Right:;WNL  -GC    Inferior glide  Right:;WNL  -GC    Medial glide  Right:;WNL  -GC    Lateral glide  Right:;WNL  -GC       Knee Special Tests    Suzanne’s sign (DVT)  Right:;Negative  -GC       Right Lower Ext    Rt Knee Extension/Flexion AROM  0-19-75 degrees  -GC       MMT Right Lower Ext    Rt Hip Flexion MMT, Gross Movement  (3/5) fair  -GC    Rt Hip Extension MMT, Gross Movement  (3/5) fair  -GC    Rt Hip ABduction MMT, Gross Movement  (3+/5) fair plus  -GC    Rt Hip ADduction MMT, Gross Movement  (3/5) fair  -GC    Rt Knee Extension MMT, Gross Movement  (3/5) fair  -GC    Rt Knee Flexion MMT, Gross Movement  (3+/5) fair plus  -GC    Rt Ankle Plantarflexion MMT, Gross Movement  (4/5) good  -GC    Rt Ankle Dorsiflexion MMT, Gross Movement  (4+/5) good plus  -GC       Sensation    Light Touch  No apparent deficits  -GC       Lower Extremity Flexibility    Hamstrings  Right:;Moderately limited  -GC    Quadriceps  Right:;Moderately limited  -GC    ITB  Right:;Mildly limited  -GC    Gastrocnemius  Right:;Mildly limited  -GC       Transfers    Comment (Transfers)  Pt requires assistance with her right LE when bladimir gsit to/from supine  -GC       Gait/Stairs (Locomotion)    Comment (Gait/Stairs)  Pt ambualtes with front wheeled walker and has moderate antalgic gait right LE with decreased step and stride length and maintaining a knee flexed posture throughout the gait cycle.  -GC      User Key  (r) = Recorded By, (t) = Taken By, (c) = Cosigned By    Initials Name Provider Type    GC Ryan Kong PT Physical Therapist                      Therapy Education  Given: HEP, Symptoms/condition management, Pain management  Program: New  How Provided: Verbal, Demonstration, Written  Provided to: Patient  Level of  Understanding: Teach back education performed, Verbalized, Demonstrated     PT OP Goals     Row Name 10/16/20 0820          PT Short Term Goals    STG Date to Achieve  11/06/20  -     STG 1  Decrease right knee pain to 2-3/10 with activity.  -GC     STG 2  Increase right knee AROM to 0-5-110 degrees with testing.  -GC     STG 3  Increase right LE stength to at least 4/5 all planes with testing.  -GC     STG 4  Pt will be independent with her HEP issued by this therapist.  -GC        Long Term Goals    LTG Date to Achieve  11/27/20  -GC     LTG 1  Decrease right knee pain to 0-1/10 with activity.  -GC     LTG 2  Increase right knee AROM to 0-1250 degrees with testing.  -GC     LTG 3  Increase right LE stength to at least 4+/5 all planes with testing.  -GC     LTG 4  Pt will ambulate normally on levels and stairs without assistive device.  -     LTG 5  Pt will be independent with all ADLs and have a LEFS score > 65.  -        Time Calculation    PT Goal Re-Cert Due Date  11/13/20  -       User Key  (r) = Recorded By, (t) = Taken By, (c) = Cosigned By    Initials Name Provider Type     Ryan Kong, PT Physical Therapist          PT Assessment/Plan     Row Name 10/16/20 0820          PT Assessment    Functional Limitations  Impaired gait;Limitation in home management;Limitations in community activities;Limitations in functional capacity and performance;Performance in leisure activities;Performance in self-care ADL  -     Impairments  Edema;Gait;Impaired flexibility;Range of motion;Pain;Muscle strength  -     Assessment Comments  Pt presents 2 days s/p right TKA. She has knee pain rated up to 7/10 with activities that involve her being on her feet or when she tries to bedn or straighten it. She has moderate edema right knee, decreased left knee ROM, decreased left LE strength, decreased ambulatory status, and decreased function secodnary to the above.  -     Rehab Potential  Good  -      Patient/caregiver participated in establishment of treatment plan and goals  Yes  -GC     Patient would benefit from skilled therapy intervention  Yes  -GC        PT Plan    PT Frequency  2x/week;3x/week  -GC     Predicted Duration of Therapy Intervention (PT)  6 weeks  -GC     Planned CPT's?  PT EVAL LOW COMPLEXITY: 40410;PT THER PROC EA 15 MIN: 51685;PT MANUAL THERAPY EA 15 MIN: 03660;PT HOT OR COLD PACK TREAT MCARE;PT ELECTRICAL STIM UNATTEND:   -GC     PT Plan Comments  Pt is to continue her HEP daily  -GC       User Key  (r) = Recorded By, (t) = Taken By, (c) = Cosigned By    Initials Name Provider Type    GC Ryan Kong, PT Physical Therapist            OP Exercises     Row Name 10/16/20 0820             Exercise 1    Exercise Name 1  Heel Slides  -GC      Cueing 1  Verbal;Tactile  -GC      Time 1  8 min  -GC         Exercise 2    Exercise Name 2  Wall Slides  -GC      Cueing 2  Verbal;Tactile  -GC      Time 2  8 min  -GC         Exercise 3    Exercise Name 3  Passive knee FLEX stretch with pt in supine  -GC      Cueing 3  Verbal;Tactile  -GC      Reps 3  10  -GC      Time 3  10 secs  -GC         Exercise 4    Exercise Name 4  Hamstring/gastroc stretch  -GC      Cueing 4  Verbal;Tactile  -GC      Reps 4  15  -GC      Time 4  10 secs  -GC         Exercise 5    Exercise Name 5  Supine knee EXT stretch on roll  -GC      Cueing 5  Verbal;Tactile  -GC      Time 5  5 min  -GC         Exercise 6    Exercise Name 6  Passive knee EXT stretch  -GC      Cueing 6  Verbal;Tactile  -GC      Reps 6  10  -GC      Time 6  10 secs  -GC         Exercise 7    Exercise Name 7  QS  -GC      Cueing 7  Verbal;Tactile  -GC      Reps 7  20  -GC      Time 7  5 secs  -GC        User Key  (r) = Recorded By, (t) = Taken By, (c) = Cosigned By    Initials Name Provider Type    GC Ryan Kong, PT Physical Therapist                        Outcome Measure Options: Lower Extremity Functional Scale (LEFS)  Lower Extremity Functional  Index  Any of your usual work, housework or school activities: Quite a bit of difficulty  Your usual hobbies, recreational or sporting activities: Quite a bit of difficulty  Getting into or out of the bath: Quite a bit of difficulty  Walking between rooms: Moderate difficulty  Putting on your shoes or socks: Moderate difficulty  Squatting: Extreme difficulty or unable to perform activity  Lifting an object, like a bag of groceries from the floor: Quite a bit of difficulty  Performing light activities around your home: Moderate difficulty  Performing heavy activities around your home: Extreme difficulty or unable to perform activity  Getting into or out of a car: Moderate difficulty  Walking 2 blocks: Moderate difficulty  Walking a mile: Extreme difficulty or unable to perform activity  Going up or down 10 stairs (about 1 flight of stairs): Quite a bit of difficulty  Standing for 1 hour: Extreme difficulty or unable to perform activity  Sitting for 1 hour: Quite a bit of difficulty  Running on even ground: Extreme difficulty or unable to perform activity  Running on uneven ground: Extreme difficulty or unable to perform activity  Making sharp turns while running fast: Extreme difficulty or unable to perform activity  Hopping: Extreme difficulty or unable to perform activity  Rolling over in bed: Quite a bit of difficulty  Total: 17      Time Calculation:     Start Time: 0820  Stop Time: 0937  Time Calculation (min): 77 min     Therapy Charges for Today     Code Description Service Date Service Provider Modifiers Qty    28707540051 HC PT EVAL LOW COMPLEXITY 3 10/16/2020 Ryan Kong, PT GP 1          PT G-Nick  Outcome Measure Options: Lower Extremity Functional Scale (LEFS)  Total: 17         Ryan Kong PT  10/16/2020

## 2020-10-21 ENCOUNTER — HOSPITAL ENCOUNTER (OUTPATIENT)
Dept: PHYSICAL THERAPY | Facility: HOSPITAL | Age: 65
Setting detail: THERAPIES SERIES
Discharge: HOME | End: 2020-10-21

## 2020-10-21 DIAGNOSIS — Z96.651 STATUS POST TOTAL RIGHT KNEE REPLACEMENT: Primary | ICD-10-CM

## 2020-10-21 PROCEDURE — 97140 MANUAL THERAPY 1/> REGIONS: CPT

## 2020-10-21 PROCEDURE — 97110 THERAPEUTIC EXERCISES: CPT

## 2020-10-21 NOTE — THERAPY TREATMENT NOTE
Outpatient Physical Therapy Ortho Treatment Note   [facility]     Patient Name: Yesi Benson  : 1955  MRN: [id number]  Today's Date: 10/21/2020      Visit Date: 10/21/2020    Visit Dx:    ICD-10-CM ICD-9-CM   1. Status post total right knee replacement  Z96.651 V43.65       Patient Active Problem List   Diagnosis   • Adenomatous polyp of colon   • Effusion, right knee   • Primary osteoarthritis of right knee   • Hyperlipidemia   • Hypothyroidism   • Complex tear of lateral meniscus of right knee as current injury   • Complex tear of medial meniscus of right knee as current injury        Past Medical History:   Diagnosis Date   • Colon polyp    • Disease of thyroid gland         Past Surgical History:   Procedure Laterality Date   • COLONOSCOPY N/A 3/27/2019    Procedure: COLONOSCOPY POLYPECTOMY;  Surgeon: Jaime Alaniz MD;  Location: [facility];  Service: Gastroenterology   • DENTAL PROCEDURE Left    • SALPINGO OOPHORECTOMY Left     as an infant    • TOTAL KNEE ARTHROPLASTY Right 10/14/2020    Procedure: TOTAL KNEE ARTHROPLASTY AND ALL ASSOCIATED PROCEDURES;  Surgeon: Elmer Fuentes MD;  Location: [facility];  Service: Orthopedics;  Laterality: Right;                       PT Assessment/Plan     Row Name 10/21/20 1100          PT Assessment    Assessment Comments  Pt with slightly limited flexion AROM secondary to increased edema/ecchymosis. Incorporated strengthening exercises;trial of KT applied for edema  -KM        PT Plan    PT Plan Comments  Assess pts response to KT tape. Progress as tolerated.   -KM       User Key  (r) = Recorded By, (t) = Taken By, (c) = Cosigned By    Initials Name Provider Type     Gladis Garzon PTA Physical Therapy Assistant          Modalities     Row Name 10/21/20 1100             Ice    Ice Applied  Yes  -KM      Location  (R) knee- ant/post  -KM      Rx Minutes  10 mins  -KM      Ice S/P Rx  Yes  -KM         Other Treatment Provided    Taping  / Bracing  KT- 2 fan strips for edema  -KM        User Key  (r) = Recorded By, (t) = Taken By, (c) = Cosigned By    Initials Name Provider Type    Gladis Callahan PTA Physical Therapy Assistant        OP Exercises     Row Name 10/21/20 1100             Subjective Comments    Subjective Comments  Pt states her knee is doing well and has been compliant with HEP 2x/day.   -KM         Exercise 1    Exercise Name 1  Heel Slides  -KM      Cueing 1  Verbal;Tactile  -KM      Time 1  8 min  -KM         Exercise 2    Exercise Name 2  Wall Slides  -KM      Cueing 2  Verbal;Tactile  -KM      Time 2  8 min  -KM         Exercise 3    Exercise Name 3  Passive knee FLEX stretch with pt in supine  -KM      Cueing 3  Verbal;Tactile  -KM      Reps 3  10  -KM      Time 3  10 secs  -KM         Exercise 4    Exercise Name 4  Hamstring/gastroc stretch  -KM      Cueing 4  Verbal;Tactile  -KM      Reps 4  15  -KM      Time 4  10 secs  -KM         Exercise 5    Exercise Name 5  Supine knee EXT stretch on roll  -KM      Cueing 5  Verbal;Tactile  -KM      Time 5  5 min  -KM         Exercise 6    Exercise Name 6  Passive knee EXT stretch  -KM      Cueing 6  Verbal;Tactile  -KM      Reps 6  10  -KM      Time 6  10 secs  -KM         Exercise 7    Exercise Name 7  QS  -KM      Cueing 7  Verbal;Tactile  -KM      Reps 7  20  -KM      Time 7  5 secs  -KM         Exercise 8    Exercise Name 8  SLR  -KM      Reps 8  10  -KM         Exercise 9    Exercise Name 9  SAQ   -KM      Reps 9  10  -KM         Exercise 10    Exercise Name 10  PF vs TB  -KM      Reps 10  20  -KM      Time 10  Silver  -KM        User Key  (r) = Recorded By, (t) = Taken By, (c) = Cosigned By    Initials Name Provider Type    Gladis Callahan PTA Physical Therapy Assistant                                          Time Calculation:   Start Time: 1100  Stop Time: 1230  Time Calculation (min): 90 min  Therapy Charges for Today     Code Description Service Date Service Provider  Modifiers Qty    77983121829 HC PT MANUAL THERAPY EA 15 MIN 10/21/2020 Gladis Garzon, PTA GP 1    81789996084 HC PT THER PROC EA 15 MIN 10/21/2020 Gladis Garzon, PTA GP 1    77564991497 HC PT THER PROC EA 15 MIN 10/21/2020 Gladis Garzon, PTA GP 1                    Gladis Garzon, SARAH  10/21/2020

## 2020-10-23 ENCOUNTER — HOSPITAL ENCOUNTER (OUTPATIENT)
Dept: PHYSICAL THERAPY | Facility: HOSPITAL | Age: 65
Setting detail: THERAPIES SERIES
Discharge: HOME | End: 2020-10-23

## 2020-10-23 DIAGNOSIS — Z96.651 STATUS POST TOTAL RIGHT KNEE REPLACEMENT: Primary | ICD-10-CM

## 2020-10-23 PROCEDURE — 97110 THERAPEUTIC EXERCISES: CPT

## 2020-10-23 PROCEDURE — 97140 MANUAL THERAPY 1/> REGIONS: CPT

## 2020-10-23 NOTE — THERAPY TREATMENT NOTE
Outpatient Physical Therapy Ortho Treatment Note   [facility]     Patient Name: Yesi Benson  : 1955  MRN: [id number]  Today's Date: 10/23/2020      Visit Date: 10/23/2020    Visit Dx:    ICD-10-CM ICD-9-CM   1. Status post total right knee replacement  Z96.651 V43.65       Patient Active Problem List   Diagnosis   • Adenomatous polyp of colon   • Effusion, right knee   • Primary osteoarthritis of right knee   • Hyperlipidemia   • Hypothyroidism   • Complex tear of lateral meniscus of right knee as current injury   • Complex tear of medial meniscus of right knee as current injury        Past Medical History:   Diagnosis Date   • Colon polyp    • Disease of thyroid gland         Past Surgical History:   Procedure Laterality Date   • COLONOSCOPY N/A 3/27/2019    Procedure: COLONOSCOPY POLYPECTOMY;  Surgeon: Jaime Alaniz MD;  Location: [facility];  Service: Gastroenterology   • DENTAL PROCEDURE Left    • SALPINGO OOPHORECTOMY Left     as an infant    • TOTAL KNEE ARTHROPLASTY Right 10/14/2020    Procedure: TOTAL KNEE ARTHROPLASTY AND ALL ASSOCIATED PROCEDURES;  Surgeon: Elmer Fuentes MD;  Location: [facility];  Service: Orthopedics;  Laterality: Right;       PT Ortho     Row Name 10/23/20 1100       Subjective Comments    Subjective Comments  pt reports she has not been taking pain meds for a few days, only tylenol; hasn't noticed a significant difference with tape in place  -       Subjective Pain    Subjective Pain Comment  reports minimal to no pain at rest but with increased pain with initial change of position/initiation of gait   -      User Key  (r) = Recorded By, (t) = Taken By, (c) = Cosigned By    Initials Name Provider Type     Braden Mckeon, SARAH Physical Therapy Assistant                      PT Assessment/Plan     Row Name 10/23/20 1310          PT Assessment    Assessment Comments  pt continues with edema and bruising throughout (R) LE, limited flexion ROM;  good tolerance to progression of ex's  -        PT Plan    PT Plan Comments  Cont per POC, progressing as tolerated  -       User Key  (r) = Recorded By, (t) = Taken By, (c) = Cosigned By    Initials Name Provider Type     Braden Mckeon PTA Physical Therapy Assistant          Modalities     Row Name 10/23/20 1100             Ice    Location  (R) knee- ant/post  -      Rx Minutes  10 mins  -      Ice S/P Rx  Yes  -         Other Treatment Provided    Taping / Bracing  KT- 2 fan strips for edema still intact, pt to leave on up to Monday then remove; additional fan strip applied posterior thigh to area of extensive bruising.  -        User Key  (r) = Recorded By, (t) = Taken By, (c) = Cosigned By    Initials Name Provider Type     Braden Mckeon PTA Physical Therapy Assistant        OP Exercises     Row Name 10/23/20 1100             Subjective Comments    Subjective Comments  pt reports she has not been taking pain meds for a few days, only tylenol; hasn't noticed a significant difference with tape in place  -         Subjective Pain    Subjective Pain Comment  reports minimal to no pain at rest but with increased pain with initial change of position/initiation of gait   -         Exercise 1    Exercise Name 1  Heel Slides  -      Cueing 1  Verbal;Tactile  -      Time 1  8 min  -         Exercise 2    Exercise Name 2  Wall Slides  -      Cueing 2  Verbal;Tactile  -      Time 2  8 min  -         Exercise 3    Exercise Name 3  Passive knee FLEX stretch with pt in supine  -      Cueing 3  Verbal;Tactile  -      Reps 3  10  -      Time 3  10 secs  -         Exercise 4    Exercise Name 4  Hamstring/gastroc stretch  -      Cueing 4  Verbal;Tactile  -      Reps 4  --  -      Time 4  10 sec hold x 3 min  -         Exercise 5    Exercise Name 5  Supine knee EXT stretch on roll  -      Cueing 5  Verbal;Tactile  -      Time 5  5 min  -         Exercise 6    Exercise Name  "6  Passive knee EXT stretch  -MH      Cueing 6  Verbal;Tactile  -MH      Reps 6  10  -MH      Time 6  10 secs  -MH         Exercise 7    Exercise Name 7  QS  -MH      Cueing 7  Verbal;Tactile  -MH      Reps 7  20  -MH      Time 7  5 secs  -MH         Exercise 8    Exercise Name 8  SLR  -MH      Cueing 8  Verbal;Tactile  -MH      Reps 8  10  -MH      Additional Comments  assist to initiate first 2-3  reps then able to complete (I)  -MH         Exercise 9    Exercise Name 9  sidelying ABD  -MH      Cueing 9  Verbal;Tactile  -MH      Reps 9  10  -MH         Exercise 10    Exercise Name 10  SAQ  -MH      Cueing 10  Verbal;Tactile  -MH      Reps 10  20  -MH      Time 10  --  -MH         Exercise 11    Exercise Name 11  LAQ  -MH      Cueing 11  Verbal;Tactile;Demo  -MH      Reps 11  10  -MH         Exercise 12    Exercise Name 12  Calf raises  -MH      Cueing 12  Verbal;Demo  -MH      Reps 12  10  -MH         Exercise 13    Exercise Name 13  4\" forward step up  -MH      Cueing 13  Verbal;Demo  -MH      Reps 13  10 each  -MH        User Key  (r) = Recorded By, (t) = Taken By, (c) = Cosigned By    Initials Name Provider Type     Braden Mckeon PTA Physical Therapy Assistant                           Therapy Education  Given: HEP, Symptoms/condition management  Program: New, Reinforced  How Provided: Verbal, Demonstration  Provided to: Patient  Level of Understanding: Teach back education performed, Verbalized, Demonstrated              Time Calculation:   Start Time: 1103  Stop Time: 1222  Time Calculation (min): 79 min  Therapy Charges for Today     Code Description Service Date Service Provider Modifiers Qty    73411868342 HC PT MANUAL THERAPY EA 15 MIN 10/23/2020 Braden Mckeon PTA GP 1    71185654646 HC PT THER PROC EA 15 MIN 10/23/2020 Braden Mckeon PTA GP 2                    Braden Mckeon PTA  10/23/2020     "

## 2020-10-28 ENCOUNTER — HOSPITAL ENCOUNTER (OUTPATIENT)
Dept: PHYSICAL THERAPY | Facility: HOSPITAL | Age: 65
Setting detail: THERAPIES SERIES
Discharge: HOME | End: 2020-10-28

## 2020-10-28 DIAGNOSIS — Z96.651 STATUS POST TOTAL RIGHT KNEE REPLACEMENT: Primary | ICD-10-CM

## 2020-10-28 PROCEDURE — 97110 THERAPEUTIC EXERCISES: CPT

## 2020-10-28 PROCEDURE — 97140 MANUAL THERAPY 1/> REGIONS: CPT

## 2020-10-28 NOTE — THERAPY TREATMENT NOTE
"    Outpatient Physical Therapy Ortho Treatment Note   Jenny Chávez     Patient Name: Yesi Benson  : 1955  MRN: [id number]  Today's Date: 10/28/2020      Visit Date: 10/28/2020    Visit Dx:    ICD-10-CM ICD-9-CM   1. Status post total right knee replacement  Z96.651 V43.65       Patient Active Problem List   Diagnosis   • Adenomatous polyp of colon   • Effusion, right knee   • Primary osteoarthritis of right knee   • Hyperlipidemia   • Hypothyroidism   • Complex tear of lateral meniscus of right knee as current injury   • Complex tear of medial meniscus of right knee as current injury        Past Medical History:   Diagnosis Date   • Colon polyp    • Disease of thyroid gland         Past Surgical History:   Procedure Laterality Date   • COLONOSCOPY N/A 3/27/2019    Procedure: COLONOSCOPY POLYPECTOMY;  Surgeon: Jaime Alaniz MD;  Location: [facility];  Service: Gastroenterology   • DENTAL PROCEDURE Left ,   • SALPINGO OOPHORECTOMY Left     as an infant    • TOTAL KNEE ARTHROPLASTY Right 10/14/2020    Procedure: TOTAL KNEE ARTHROPLASTY AND ALL ASSOCIATED PROCEDURES;  Surgeon: Elmer Fuentes MD;  Location: [facility];  Service: Orthopedics;  Laterality: Right;       PT Ortho     Row Name 10/28/20 1000       Subjective Comments    Subjective Comments  pt states she had a \"really bad day Saturday\" with pt needing to take pain meds (hadn't taken any for 3-4 days prior to) and had increased swelling.  Pt by the next day she was better - no obvious reason for flare of symptoms  -       Posture/Observations    Posture/Observations Comments  Pt ambulates into clinic with improved gait pattern - increased flexion of the knee and improved heel strike  -      User Key  (r) = Recorded By, (t) = Taken By, (c) = Cosigned By    Initials Name Provider Type     Braden Mckeon, PTA Physical Therapy Assistant                      PT Assessment/Plan     Row Name 10/28/20 1219          PT Assessment " "   Assessment Comments  Bruising still present throughout (R) LE but improving; pt continues with mod+ edema (L) thigh to foot which is limiting factor in progressing pt's flexion ROM; pt able to progress reps of ex's without increased pain  -        PT Plan    PT Plan Comments  Cont per POC, progressing as tolerated  -       User Key  (r) = Recorded By, (t) = Taken By, (c) = Cosigned By    Initials Name Provider Type    Braden Smith, PTA Physical Therapy Assistant            OP Exercises     Row Name 10/28/20 1000             Subjective Comments    Subjective Comments  pt states she had a \"really bad day Saturday\" with pt needing to take pain meds (hadn't taken any for 3-4 days prior to) and had increased swelling.  Pt by the next day she was better - no obvious reason for flare of symptoms  -         Exercise 1    Exercise Name 1  Heel Slides  -      Cueing 1  Verbal;Tactile  -      Time 1  8 min  -MH         Exercise 2    Exercise Name 2  Wall Slides  -      Cueing 2  Verbal;Tactile  -      Time 2  8 min  -      Additional Comments  assist needed to place foot on wall  -         Exercise 3    Exercise Name 3  PROM: flex - seated  -      Cueing 3  Verbal;Tactile  -      Reps 3  10  -MH      Time 3  10 secs  -MH         Exercise 4    Exercise Name 4  Hamstring/gastroc stretch  -      Cueing 4  Verbal;Tactile  -      Time 4  10 sec hold x 3 min  -MH         Exercise 5    Exercise Name 5  Supine knee EXT stretch on roll  -      Cueing 5  Verbal;Tactile  -      Time 5  5 min  -MH         Exercise 6    Exercise Name 6  PROM: Ext - supine  -      Cueing 6  Verbal;Tactile  -      Reps 6  10  -MH      Time 6  10 secs  -MH         Exercise 7    Exercise Name 7  QS  -MH      Cueing 7  Verbal;Tactile  -      Reps 7  20  -MH      Time 7  5 secs  -MH         Exercise 8    Exercise Name 8  SLR  -      Cueing 8  Verbal;Tactile  -MH      Sets 8  2  -MH      Reps 8  10  -MH      " "Additional Comments  assist for 1st rep then able to complete (I)  -MH         Exercise 9    Exercise Name 9  sidelying ABD  -MH      Cueing 9  Verbal;Tactile  -MH      Sets 9  2  -MH      Reps 9  10  -MH         Exercise 10    Exercise Name 10  SAQ  -MH      Cueing 10  Verbal;Tactile  -MH      Sets 10  2  -MH      Reps 10  10  -MH         Exercise 11    Exercise Name 11  LAQ  -MH      Cueing 11  Verbal;Tactile;Demo  -MH      Reps 11  20  -MH         Exercise 12    Exercise Name 12  Calf raises  -MH      Cueing 12  Verbal;Demo  -MH      Reps 12  20  -MH         Exercise 13    Exercise Name 13  4\" forward step up  -MH      Cueing 13  Verbal;Demo  -MH      Reps 13  10 each  -MH        User Key  (r) = Recorded By, (t) = Taken By, (c) = Cosigned By    Initials Name Provider Type    Braden Smith PTA Physical Therapy Assistant                           Therapy Education  Given: HEP, Symptoms/condition management  Program: Reinforced  How Provided: Verbal  Provided to: Patient  Level of Understanding: Verbalized, Demonstrated, Teach back education performed              Time Calculation:   Start Time: 1100  Stop Time: 1229  Time Calculation (min): 89 min  Therapy Charges for Today     Code Description Service Date Service Provider Modifiers Qty    85216546326 HC PT MANUAL THERAPY EA 15 MIN 10/28/2020 Braden Mckeon PTA GP 1    27814319250 HC PT THER PROC EA 15 MIN 10/28/2020 Braden Mckeon PTA GP 3                    Braden Mckeon PTA  10/28/2020     "

## 2020-10-30 ENCOUNTER — HOSPITAL ENCOUNTER (OUTPATIENT)
Dept: PHYSICAL THERAPY | Facility: HOSPITAL | Age: 65
Setting detail: THERAPIES SERIES
Discharge: HOME | End: 2020-10-30

## 2020-10-30 ENCOUNTER — OFFICE VISIT (OUTPATIENT)
Dept: ORTHOPEDIC SURGERY | Facility: CLINIC | Age: 65
End: 2020-10-30

## 2020-10-30 VITALS — WEIGHT: 152 LBS | BODY MASS INDEX: 27.97 KG/M2 | HEIGHT: 62 IN

## 2020-10-30 DIAGNOSIS — Z96.651 STATUS POST TOTAL RIGHT KNEE REPLACEMENT: Primary | ICD-10-CM

## 2020-10-30 PROBLEM — S83.271A COMPLEX TEAR OF LATERAL MENISCUS OF RIGHT KNEE AS CURRENT INJURY: Status: RESOLVED | Noted: 2020-05-19 | Resolved: 2020-10-30

## 2020-10-30 PROBLEM — M25.461 EFFUSION, RIGHT KNEE: Status: RESOLVED | Noted: 2019-09-25 | Resolved: 2020-10-30

## 2020-10-30 PROBLEM — M17.11 PRIMARY OSTEOARTHRITIS OF RIGHT KNEE: Status: RESOLVED | Noted: 2019-09-25 | Resolved: 2020-10-30

## 2020-10-30 PROBLEM — S83.231A COMPLEX TEAR OF MEDIAL MENISCUS OF RIGHT KNEE AS CURRENT INJURY: Status: RESOLVED | Noted: 2020-05-19 | Resolved: 2020-10-30

## 2020-10-30 PROCEDURE — 97140 MANUAL THERAPY 1/> REGIONS: CPT

## 2020-10-30 PROCEDURE — 97110 THERAPEUTIC EXERCISES: CPT

## 2020-10-30 PROCEDURE — 99024 POSTOP FOLLOW-UP VISIT: CPT | Performed by: NURSE PRACTITIONER

## 2020-10-30 NOTE — PROGRESS NOTES
CC: s/p right total knee arthroplasty, DOS 10/14/2020    Interval History: Yesi Benson returns for 2 week postoperative visit.  She is doing well. Pain is controlled with pain medication and is  improving. She denies any wound problem, fevers, or chills. Patient is continuing to work with outpatient PT. Ambulating with walker. Continuing DVT prophylaxis with use of Aspirin.     Physical Examination: Right knee was examined   Incision clean and dry   ROM 2-75,  4/5 strength   Stable to varus and valgus stress   Flex/extend ankle and toes   Positive sensation right foot   No calf pain, negative Homans sign bilaterally    Assessment/Plan:  Yesi Benson is recovering from surgery as expected.  We will continue outpatient therapy for range of motion, strengthening, and gait normalization.  Discussed to decrease home exercise regimen to once daily to further help reduce the swelling continue the anti-inflammatory medications until completed.  We will have her follow-up with Dr. Fuentes in 2 weeks with repeat x-ray images of the right knee.  Discussed to continue with application of ice, Ace for swelling.  Patient had all question answered today. Will continue DVT prophylaxis for an additional 2 weeks. Discussed with patient to avoid immersing incision for 4 weeks total after surgery.  May remove Dermabond dressing in 2 weeks.    Medications:  No orders of the defined types were placed in this encounter.      UNRULY Hernandez

## 2020-10-30 NOTE — THERAPY TREATMENT NOTE
Outpatient Physical Therapy Ortho Treatment Note   Jenny Chávez     Patient Name: Yesi Benson  : 1955  MRN: [id number]  Today's Date: 10/30/2020      Visit Date: 10/30/2020    Visit Dx:    ICD-10-CM ICD-9-CM   1. Status post total right knee replacement  Z96.651 V43.65       Patient Active Problem List   Diagnosis   • Adenomatous polyp of colon   • Hyperlipidemia   • Hypothyroidism   • Status post total right knee replacement, DOS 10/14/2020        Past Medical History:   Diagnosis Date   • Colon polyp    • Disease of thyroid gland         Past Surgical History:   Procedure Laterality Date   • COLONOSCOPY N/A 3/27/2019    Procedure: COLONOSCOPY POLYPECTOMY;  Surgeon: Jaime Alaniz MD;  Location: [facility];  Service: Gastroenterology   • DENTAL PROCEDURE Left    • SALPINGO OOPHORECTOMY Left     as an infant    • TOTAL KNEE ARTHROPLASTY Right 10/14/2020    Procedure: TOTAL KNEE ARTHROPLASTY AND ALL ASSOCIATED PROCEDURES;  Surgeon: Elmer Fuentes MD;  Location: [facility];  Service: Orthopedics;  Laterality: Right;       PT Ortho     Row Name 10/30/20 1050       Right Lower Ext    Rt Knee Extension/Flexion AROM  0-2-95 post stretch  -KM      User Key  (r) = Recorded By, (t) = Taken By, (c) = Cosigned By    Initials Name Provider Type    Gladis Callahan PTA Physical Therapy Assistant                      PT Assessment/Plan     Row Name 10/30/20 1055          PT Assessment    Assessment Comments  Pt with improved edema and ecchymosis compared to previous visit. Good progression with passive stretching and pt ambulates well with SPC. KT reapplied for edema                                                 -KM        PT Plan    PT Plan Comments  Continue to progress as tolerated.   -KM       User Key  (r) = Recorded By, (t) = Taken By, (c) = Cosigned By    Initials Name Provider Type    Gladis Callahan PTA Physical Therapy Assistant          Modalities     Row Name 10/30/20 1598              Ice    Ice Applied  Yes  -KM      Location  (R) knee- ant/post  -KM      Rx Minutes  10 mins  -KM      Ice S/P Rx  Yes  -KM         Other Treatment Provided    Taping / Bracing  KT- 2 fan strips for edema  -KM        User Key  (r) = Recorded By, (t) = Taken By, (c) = Cosigned By    Initials Name Provider Type    Gladis Callahan PTA Physical Therapy Assistant        OP Exercises     Row Name 10/30/20 1050             Subjective Comments    Subjective Comments  Pt states her f/u appointment went well; states she is to decreased HEP to 1x/day due to increased edema.   -KM         Exercise 1    Exercise Name 1  Heel Slides  -KM      Cueing 1  Verbal;Tactile  -KM      Time 1  8 min  -KM         Exercise 2    Exercise Name 2  Wall Slides  -KM      Cueing 2  Verbal;Tactile  -KM      Time 2  8 min  -KM      Additional Comments  Bike (seat 1) x 5 min  -KM         Exercise 3    Exercise Name 3  PROM: flex - seated  -KM      Cueing 3  Verbal;Tactile  -KM      Reps 3  10  -KM      Time 3  10 secs  -KM         Exercise 4    Exercise Name 4  Hamstring/gastroc stretch  -KM      Cueing 4  Verbal;Tactile  -KM      Time 4  10 sec hold x 3 min  -KM         Exercise 5    Exercise Name 5  Supine knee EXT stretch on roll  -KM      Cueing 5  Verbal;Tactile  -KM      Time 5  5 min  -KM         Exercise 6    Exercise Name 6  PROM: Ext - supine  -KM      Cueing 6  Verbal;Tactile  -KM      Reps 6  10  -KM      Time 6  10 secs  -KM         Exercise 7    Exercise Name 7  QS  -KM      Cueing 7  Verbal;Tactile  -KM      Reps 7  20  -KM      Time 7  5 secs  -KM         Exercise 8    Exercise Name 8  SLR  -KM      Cueing 8  Verbal;Tactile  -KM      Sets 8  2  -KM      Reps 8  10  -KM         Exercise 9    Exercise Name 9  sidelying ABD  -KM      Cueing 9  Verbal;Tactile  -KM      Sets 9  2  -KM      Reps 9  10  -KM         Exercise 10    Exercise Name 10  SAQ  -KM      Cueing 10  Verbal;Tactile  -KM      Sets 10  2  -KM      Reps 10  10  " -KM         Exercise 11    Exercise Name 11  LAQ  -KM      Cueing 11  Verbal;Tactile;Demo  -KM      Reps 11  20  -KM         Exercise 12    Exercise Name 12  Calf raises  -KM      Cueing 12  Verbal;Demo  -KM      Reps 12  20  -KM         Exercise 13    Exercise Name 13  6\" forward step up  -KM      Cueing 13  Verbal;Demo  -KM      Reps 13  10 each  -KM         Exercise 14    Exercise Name 14  TKE  -KM      Cueing 14  Verbal;Demo  -KM      Reps 14  20  -KM      Time 14  Silver  -KM        User Key  (r) = Recorded By, (t) = Taken By, (c) = Cosigned By    Initials Name Provider Type    Gladis Callahan PTA Physical Therapy Assistant                                          Time Calculation:   Start Time: 1050  Stop Time: 1200  Time Calculation (min): 70 min  Therapy Charges for Today     Code Description Service Date Service Provider Modifiers Qty    85438784493 HC PT MANUAL THERAPY EA 15 MIN 10/30/2020 Gladis Garzon PTA GP 1    [id number] HC PT THER PROC EA 15 MIN 10/30/2020 Gladis Garzon PTA GP 2                    Gladis Garzon PTA  10/30/2020     "

## 2020-11-02 ENCOUNTER — TELEPHONE (OUTPATIENT)
Dept: ORTHOPEDIC SURGERY | Facility: CLINIC | Age: 65
End: 2020-11-02

## 2020-11-02 NOTE — TELEPHONE ENCOUNTER
She does not need to continue the prednisone after she is completed. If the pain is still present, she can take the Lyrica. She was improving so I was not going to refill the Lyrica but we always have the option. Thanks

## 2020-11-02 NOTE — TELEPHONE ENCOUNTER
Yesi would like to know if she is to continue taking the Lyrica and prednisone. She said you both talked about it but she didn't remember.  If so she needs a refill sent in. Please advice

## 2020-11-03 NOTE — TELEPHONE ENCOUNTER
Patient  Informed    She will finish up both and if she feels like she needs the Lyrica she will call.

## 2020-11-04 ENCOUNTER — HOSPITAL ENCOUNTER (OUTPATIENT)
Dept: PHYSICAL THERAPY | Facility: HOSPITAL | Age: 65
Setting detail: THERAPIES SERIES
Discharge: HOME | End: 2020-11-04

## 2020-11-04 PROCEDURE — 97140 MANUAL THERAPY 1/> REGIONS: CPT

## 2020-11-04 PROCEDURE — 97110 THERAPEUTIC EXERCISES: CPT

## 2020-11-04 NOTE — THERAPY TREATMENT NOTE
Outpatient Physical Therapy Ortho Treatment Note   Jenny Chávez     Patient Name: Yesi Benson  : 1955  MRN: [id number]  Today's Date: 2020      Visit Date: 2020    Visit Dx:  No diagnosis found.    Patient Active Problem List   Diagnosis   • Adenomatous polyp of colon   • Hyperlipidemia   • Hypothyroidism   • Status post total right knee replacement, DOS 10/14/2020        Past Medical History:   Diagnosis Date   • Colon polyp    • Disease of thyroid gland         Past Surgical History:   Procedure Laterality Date   • COLONOSCOPY N/A 3/27/2019    Procedure: COLONOSCOPY POLYPECTOMY;  Surgeon: Jaime Alaniz MD;  Location: [facility];  Service: Gastroenterology   • DENTAL PROCEDURE Left    • SALPINGO OOPHORECTOMY Left     as an infant    • TOTAL KNEE ARTHROPLASTY Right 10/14/2020    Procedure: TOTAL KNEE ARTHROPLASTY AND ALL ASSOCIATED PROCEDURES;  Surgeon: Elmer Fuentes MD;  Location: [facility];  Service: Orthopedics;  Laterality: Right;                       PT Assessment/Plan     Row Name 20 1055          PT Assessment    Assessment Comments  Pt continues to present with edema, however has improved since previous visit. Improved quad strength noted and pt able to complete independent SLR  -KM        PT Plan    PT Plan Comments  Continue per POC  -KM       User Key  (r) = Recorded By, (t) = Taken By, (c) = Cosigned By    Initials Name Provider Type    Gladis Callahan PTA Physical Therapy Assistant          Modalities     Row Name 20 105             Ice    Ice Applied  Yes  -KM      Location  (R) knee- ant/post  -KM      Rx Minutes  10 mins  -KM      Ice S/P Rx  Yes  -KM         Other Treatment Provided    Taping / Bracing  KT- 2 fan strips for edema  -KM        User Key  (r) = Recorded By, (t) = Taken By, (c) = Cosigned By    Initials Name Provider Type    Gladis Callahan PTA Physical Therapy Assistant        OP Exercises     Row Name 20 1057  "            Subjective Comments    Subjective Comments  Pt states she has noticed improved symptoms since decreasing HEP to 1x/day.   -KM         Exercise 1    Exercise Name 1  Heel Slides  -KM      Cueing 1  --  -KM      Time 1  8 min  -KM         Exercise 2    Exercise Name 2  Wall Slides  -KM      Cueing 2  --  -KM      Time 2  85 min  -KM      Additional Comments  --  -KM         Exercise 3    Exercise Name 3  Bike: Seat 1  -KM      Cueing 3  --  -KM      Reps 3  --  -KM      Time 3  5 min  -KM         Exercise 4    Exercise Name 4  PROM: Flexion  -KM      Cueing 4  --  -KM      Reps 4  10x10  -KM      Time 4  --  -KM         Exercise 5    Exercise Name 5  Hamstring Stretch  -KM      Cueing 5  --  -KM      Reps 5  10x  -KM      Time 5  10 sec hold  -KM         Exercise 6    Exercise Name 6  Heel Prop  -KM      Cueing 6  --  -KM      Reps 6  --  -KM      Time 6  5 min  -KM         Exercise 7    Exercise Name 7  PROM: Extension  -KM      Cueing 7  --  -KM      Reps 7  10x10  -KM      Time 7  --  -KM         Exercise 8    Exercise Name 8  QS  -KM      Cueing 8  --  -KM      Sets 8  --  -KM      Reps 8  20  -KM      Time 8  5 sec  -KM         Exercise 9    Exercise Name 9  3-Way Hip  -KM      Cueing 9  --  -KM      Sets 9  --  -KM      Reps 9  25 each  -KM         Exercise 10    Exercise Name 10  LAQ  -KM      Cueing 10  --  -KM      Sets 10  --  -KM      Reps 10  25  -KM         Exercise 11    Exercise Name 11  TKE  -KM      Cueing 11  --  -KM      Reps 11  25  -KM         Exercise 12    Exercise Name 12  Heel Raises  -KM      Cueing 12  --  -KM      Reps 12  25  -KM         Exercise 13    Exercise Name 13  6\" Fwd Step Ups  -KM      Cueing 13  --  -KM      Reps 13  20 each   -KM         Exercise 14    Exercise Name 14  Mini Squats  -KM      Cueing 14  --  -KM      Reps 14  20  -KM      Time 14  --  -KM         Exercise 15    Exercise Name 15  --  -KM        User Key  (r) = Recorded By, (t) = Taken By, (c) = " Cosigned By    Initials Name Provider Type    Gladis Callahan PTA Physical Therapy Assistant                                          Time Calculation:   Start Time: 1055  Stop Time: 1215  Time Calculation (min): 80 min  Therapy Charges for Today     Code Description Service Date Service Provider Modifiers Qty    01450890553 HC PT MANUAL THERAPY EA 15 MIN 11/4/2020 Gladis Garzon PTA GP 1    03579902648 HC PT THER PROC EA 15 MIN 11/4/2020 Gladis Garzon PTA GP 2                    Gladis Garzon PTA  11/4/2020

## 2020-11-06 ENCOUNTER — HOSPITAL ENCOUNTER (OUTPATIENT)
Dept: PHYSICAL THERAPY | Facility: HOSPITAL | Age: 65
Setting detail: THERAPIES SERIES
Discharge: HOME | End: 2020-11-06

## 2020-11-06 DIAGNOSIS — Z96.651 STATUS POST TOTAL RIGHT KNEE REPLACEMENT: Primary | ICD-10-CM

## 2020-11-06 PROCEDURE — 97140 MANUAL THERAPY 1/> REGIONS: CPT

## 2020-11-06 PROCEDURE — 97110 THERAPEUTIC EXERCISES: CPT

## 2020-11-06 NOTE — THERAPY TREATMENT NOTE
"    Outpatient Physical Therapy Ortho Treatment Note   [place]     Patient Name: Yesi Benson  : 1955  MRN: [id number]  Today's Date: 2020      Visit Date: 2020    Visit Dx:    ICD-10-CM ICD-9-CM   1. Status post total right knee replacement  Z96.651 V43.65       Patient Active Problem List   Diagnosis   • Adenomatous polyp of colon   • Hyperlipidemia   • Hypothyroidism   • Status post total right knee replacement, DOS 10/14/2020        Past Medical History:   Diagnosis Date   • Colon polyp    • Disease of thyroid gland         Past Surgical History:   Procedure Laterality Date   • COLONOSCOPY N/A 3/27/2019    Procedure: COLONOSCOPY POLYPECTOMY;  Surgeon: Jaime Alaniz MD;  Location: [facility];  Service: Gastroenterology   • DENTAL PROCEDURE Left    • SALPINGO OOPHORECTOMY Left     as an infant    • TOTAL KNEE ARTHROPLASTY Right 10/14/2020    Procedure: TOTAL KNEE ARTHROPLASTY AND ALL ASSOCIATED PROCEDURES;  Surgeon: Elmer Fuentes MD;  Location: [facility];  Service: Orthopedics;  Laterality: Right;       PT Ortho     Row Name 20 1100       Subjective Comments    Subjective Comments  Pt reports her swelling is much better but that her knee still feels sore and bruised in the back  -       Subjective Pain    Subjective Pain Comment  \"I really don't have any pain\" during the day but that she does continue to have pain with ex's/stretching  -      User Key  (r) = Recorded By, (t) = Taken By, (c) = Cosigned By    Initials Name Provider Type     Braden Mckeon, PTA Physical Therapy Assistant                      PT Assessment/Plan     Row Name 20 1215          PT Assessment    Assessment Comments  pt continues to make progress with decreased edema and bruising, improved ROM and improving gait; tolerated progression of reps well and without increased symptoms; kinesiotape held this session due to decreased edema and to allow the skin a break  -        " "PT Plan    PT Plan Comments  Cont per POC, progressing as tolerated; check response to not applying tape  -MH       User Key  (r) = Recorded By, (t) = Taken By, (c) = Cosigned By    Initials Name Provider Type    Braden Smith PTA Physical Therapy Assistant          Modalities     Row Name 11/06/20 1100             Ice    Location  (R) knee- ant/post  -MH      Rx Minutes  10 mins  -MH      Ice S/P Rx  Yes  -MH        User Key  (r) = Recorded By, (t) = Taken By, (c) = Cosigned By    Initials Name Provider Type    JOSSIE Braden Mckeon PTA Physical Therapy Assistant        OP Exercises     Row Name 11/06/20 1100             Subjective Comments    Subjective Comments  Pt reports her swellin is much better but that her knee still feels sore and bruised in the back  -         Subjective Pain    Subjective Pain Comment  \"I really don't have any pain\" during the day but that she does continue to have pain with ex's/stretching  -MH         Exercise 1    Exercise Name 1  Heel Slides  -MH      Time 1  8 min  -MH         Exercise 2    Exercise Name 2  Wall Slides  -MH      Time 2  8 min  -MH         Exercise 3    Exercise Name 3  Bike: Seat 1  -MH      Time 3  5 min  -MH         Exercise 4    Exercise Name 4  PROM: Flexion  -MH      Reps 4  10x10  -MH         Exercise 5    Exercise Name 5  Hamstring Stretch  -MH      Reps 5  10x  -MH      Time 5  10 sec hold  -MH         Exercise 6    Exercise Name 6  Heel Prop  -MH      Time 6  5 min  -MH         Exercise 7    Exercise Name 7  PROM: Extension  -MH      Reps 7  10x10  -MH         Exercise 8    Exercise Name 8  QS  -MH      Reps 8  20  -MH      Time 8  5 sec  -MH         Exercise 9    Exercise Name 9  3-Way Hip  -MH      Reps 9  25 each  -MH         Exercise 10    Exercise Name 10  SAQ  -MH      Cueing 10  Verbal;Tactile  -MH      Reps 10  30  -MH         Exercise 11    Exercise Name 11  LAQ  -MH      Cueing 11  Verbal  -MH      Reps 11  30  -MH         Exercise 12    " "Exercise Name 12  Heel Raises  -      Reps 12  30  -         Exercise 13    Exercise Name 13  6\" Fwd Step Ups  -      Reps 13  15 each  -         Exercise 14    Exercise Name 14  Mini Squats  -      Reps 14  not completed due to oversight  -        User Key  (r) = Recorded By, (t) = Taken By, (c) = Cosigned By    Initials Name Provider Type     Braden Mckeon PTA Physical Therapy Assistant                           Therapy Education  Given: HEP, Symptoms/condition management  Program: Reinforced, Progressed  How Provided: Verbal, Demonstration  Provided to: Patient  Level of Understanding: Teach back education performed, Verbalized, Demonstrated              Time Calculation:   Start Time: 1100  Stop Time: 1230  Time Calculation (min): 90 min  Therapy Charges for Today     Code Description Service Date Service Provider Modifiers Qty    12584968726 HC PT MANUAL THERAPY EA 15 MIN 11/6/2020 Braden Mckeon PTA GP 1    22075332586 HC PT THER PROC EA 15 MIN 11/6/2020 Braden Mckeon PTA GP 3                    Braden Mckeon PTA  11/6/2020     "

## 2020-11-11 ENCOUNTER — HOSPITAL ENCOUNTER (OUTPATIENT)
Dept: PHYSICAL THERAPY | Facility: HOSPITAL | Age: 65
Setting detail: THERAPIES SERIES
Discharge: HOME | End: 2020-11-11

## 2020-11-11 DIAGNOSIS — Z96.651 STATUS POST TOTAL RIGHT KNEE REPLACEMENT: Primary | ICD-10-CM

## 2020-11-11 PROCEDURE — 97140 MANUAL THERAPY 1/> REGIONS: CPT

## 2020-11-11 PROCEDURE — 97110 THERAPEUTIC EXERCISES: CPT

## 2020-11-11 NOTE — THERAPY TREATMENT NOTE
Outpatient Physical Therapy Ortho Treatment Note   [place]     Patient Name: Yesi Benson  : 1955  MRN: [id number]  Today's Date: 2020      Visit Date: 2020    Visit Dx:    ICD-10-CM ICD-9-CM   1. Status post total right knee replacement  Z96.651 V43.65       Patient Active Problem List   Diagnosis   • Adenomatous polyp of colon   • Hyperlipidemia   • Hypothyroidism   • Status post total right knee replacement, DOS 10/14/2020        Past Medical History:   Diagnosis Date   • Colon polyp    • Disease of thyroid gland         Past Surgical History:   Procedure Laterality Date   • COLONOSCOPY N/A 3/27/2019    Procedure: COLONOSCOPY POLYPECTOMY;  Surgeon: Jaime Alaniz MD;  Location: [facility], [place];  Service: Gastroenterology   • DENTAL PROCEDURE Left    • SALPINGO OOPHORECTOMY Left     as an infant    • TOTAL KNEE ARTHROPLASTY Right 10/14/2020    Procedure: TOTAL KNEE ARTHROPLASTY AND ALL ASSOCIATED PROCEDURES;  Surgeon: Elemr Fuentes MD;  Location: [facility], [place];  Service: Orthopedics;  Laterality: Right;       PT Ortho     Row Name 20 1100       Subjective Comments    Subjective Comments  pt states she has been walking around the house with the cane but still uses walker in public - asking when she can transition to cane soley; pt states she still gets a lot of swelling medially and behind the knee  -       Right Lower Ext    Rt Knee Extension/Flexion AROM  3 - 95 degrees (supine)  -    Rt Knee Extension/Flexion PROM  0 - 100 degrees (supine)  -      User Key  (r) = Recorded By, (t) = Taken By, (c) = Cosigned By    Initials Name Provider Type     Braden Mckeon, PTA Physical Therapy Assistant                      PT Assessment/Plan     Row Name 20 0002          PT Assessment    Assessment Comments  pt is tolerating progression of ex's well but although she demonstrates increased PROM this session, her AROM has been slow to progress - feel this is  likely due to continued edema  -MH        PT Plan    PT Plan Comments  Pt to MD prior to next session - will continue as advised  -MH       User Key  (r) = Recorded By, (t) = Taken By, (c) = Cosigned By    Initials Name Provider Type    [place] Smithodilia Corcoran PTA Physical Therapy Assistant          Modalities     Row Name 11/11/20 1100             Ice    Location  (R) knee- ant/post  -MH      Rx Minutes  10 mins  -MH      Ice S/P Rx  Yes  -MH        User Key  (r) = Recorded By, (t) = Taken By, (c) = Cosigned By    Initials Name Provider Type    [place] Smithodilia Corcoran PTA Physical Therapy Assistant        OP Exercises     Row Name 11/11/20 1100             Subjective Comments    Subjective Comments  pt states she has been walking around the house with the cane but still uses walker in public - asking when she can transition to cane soley; pt states she still gets a lot of swelling medially and behind the knee  -MH         Exercise 1    Exercise Name 1  Heel Slides  -MH      Time 1  8 min  -MH         Exercise 2    Exercise Name 2  Wall Slides  -MH      Time 2  8 min  -MH         Exercise 3    Exercise Name 3  Bike: Seat 1  -MH      Time 3  5 min  -MH         Exercise 4    Exercise Name 4  PROM: Flexion  -MH      Reps 4  10x10  -MH         Exercise 5    Exercise Name 5  Hamstring Stretch  -MH      Reps 5  10x  -MH      Time 5  10 sec hold  -MH         Exercise 6    Exercise Name 6  Heel Prop  -MH      Time 6  5 min  -MH         Exercise 7    Exercise Name 7  PROM: Extension  -MH      Reps 7  10x10  -MH         Exercise 8    Exercise Name 8  QS  -MH      Reps 8  30  -MH      Time 8  5 sec  -MH         Exercise 9    Exercise Name 9  3-Way Hip  -MH      Reps 9  25 each  -MH         Exercise 10    Exercise Name 10  SAQ  -MH      Cueing 10  Verbal;Tactile  -MH      Reps 10  40  -MH         Exercise 11    Exercise Name 11  LAQ  -MH      Cueing 11  Verbal  -MH      Reps 11  40  -MH         Exercise 12    Exercise Name 12  Heel  "Raises  -      Reps 12  30  -MH         Exercise 13    Exercise Name 13  6\" Fwd Step Ups  -      Reps 13  15 each  -MH         Exercise 14    Exercise Name 14  Mini Squats  -      Cueing 14  Verbal  -      Reps 14  10  -MH         Exercise 15    Exercise Name 15  TKE vs theraband  -MH      Cueing 15  Verbal  -MH      Reps 15  40  -MH      Time 15  silver  -        User Key  (r) = Recorded By, (t) = Taken By, (c) = Cosigned By    Initials Name Provider Type     Braden Mckeon PTA Physical Therapy Assistant                           Therapy Education  Education Details: discussed use of cane at home until she feels comfortable and then start with using cane outside of the home when on even ground, transitioning to cane only when she feels safe  Given: HEP, Symptoms/condition management  Program: Reinforced  How Provided: Verbal  Provided to: Patient  Level of Understanding: Teach back education performed, Verbalized, Demonstrated              Time Calculation:   Start Time: 1100  Stop Time: 1230  Time Calculation (min): 90 min  Therapy Charges for Today     Code Description Service Date Service Provider Modifiers Qty    55958499622 HC PT MANUAL THERAPY EA 15 MIN 11/11/2020 Braden Mckeon PTA GP 1    20212340775 HC PT THER PROC EA 15 MIN 11/11/2020 Braden Mckeon PTA GP 3                    Braden Mckeon PTA  11/11/2020     "

## 2020-11-12 NOTE — PROGRESS NOTES
CC: s/p right total knee arthroplasty, DOS 10/14/2020  Interval History: Yesi Benson returns for 4 week postoperative visit. She is doing well. Pain is controlled with pain medication and is improving. She denies any wound problem, fevers, or chills. Patient is continuing to work with outpatient PT. Ambulating with walker today, but uses a cane in her home.     Physical Examination: Right knee was examined   Incision clean, dry, and intact   Moderate soft tissue swelling   Mild effusion   ROM 2-95   Stable to varus and valgus stress   Flex/extend ankle and toes   Positive sensation right foot   No calf pain, negative Homans sign bilaterally    Radiographic review: Radiographs of the right knee 3 views, AP, lateral and patella axial views, compared to immediate postop films, indication s/p TKA,  shows that the implant is in good position. There is no evidence of implant loosening or osteolysis, no dislocation or periprosthetic fractures. Overall alignment acceptable at this time.     Assessment/Plan:  1. Yesi Benson is recovering from surgery as expected.  2. Prescribed prednisone taper today to relieve residual stiffness.  3. May transition to cane for ambulation as tolerated.  4. She may shower at this time.  5. We will continue physical therapy for range of motion, strengthening, and gait normalization. Continue at-home exercise program.  6. She is to follow up in clinic in 2 weeks with no xrays. Patient had all question answered today. Discussed need for prophylactic antibiotics with dental/endoscopy procedures.     Medications:  New Medications Ordered This Visit   Medications   • predniSONE (DELTASONE) 10 MG tablet     Si mg po daily x 3 days, then 40 mg po daily x 3 days, then 20 mg po daily x 3 days, then 10 mg po daily x 3 days     Dispense:  39 tablet     Refill:  0         By signing my name here, I Camille Mckinney attest that all documentation on 20 at 10:13 EST has been prepared under the  direction and in the presence of Dr. Elmer Fuentes MD.    I, Dr. Elmer Fuentes, personally performed the services described in this documentation, as scribed by Camille Mckinney, in my presence, and it is both accurate and complete.

## 2020-11-13 ENCOUNTER — OFFICE VISIT (OUTPATIENT)
Dept: ORTHOPEDIC SURGERY | Facility: CLINIC | Age: 65
End: 2020-11-13

## 2020-11-13 ENCOUNTER — HOSPITAL ENCOUNTER (OUTPATIENT)
Dept: PHYSICAL THERAPY | Facility: HOSPITAL | Age: 65
Setting detail: THERAPIES SERIES
Discharge: HOME | End: 2020-11-13

## 2020-11-13 VITALS — HEIGHT: 62 IN | WEIGHT: 152 LBS | BODY MASS INDEX: 27.97 KG/M2

## 2020-11-13 DIAGNOSIS — Z96.651 STATUS POST TOTAL RIGHT KNEE REPLACEMENT: Primary | ICD-10-CM

## 2020-11-13 PROCEDURE — 73562 X-RAY EXAM OF KNEE 3: CPT | Performed by: ORTHOPAEDIC SURGERY

## 2020-11-13 PROCEDURE — 97140 MANUAL THERAPY 1/> REGIONS: CPT

## 2020-11-13 PROCEDURE — 97110 THERAPEUTIC EXERCISES: CPT

## 2020-11-13 PROCEDURE — 99024 POSTOP FOLLOW-UP VISIT: CPT | Performed by: ORTHOPAEDIC SURGERY

## 2020-11-13 RX ORDER — PREDNISONE 10 MG/1
TABLET ORAL
Qty: 39 TABLET | Refills: 0 | Status: SHIPPED | OUTPATIENT
Start: 2020-11-13 | End: 2020-12-03

## 2020-11-13 NOTE — THERAPY TREATMENT NOTE
"    Outpatient Physical Therapy Ortho Treatment Note   Jenny Chávez     Patient Name: Yesi Benson  : 1955  MRN: [id number]  Today's Date: 2020      Visit Date: 2020    Visit Dx:    ICD-10-CM ICD-9-CM   1. Status post total right knee replacement  Z96.651 V43.65       Patient Active Problem List   Diagnosis   • Adenomatous polyp of colon   • Hyperlipidemia   • Hypothyroidism   • Status post total right knee replacement, DOS 10/14/2020        Past Medical History:   Diagnosis Date   • Colon polyp    • Disease of thyroid gland         Past Surgical History:   Procedure Laterality Date   • COLONOSCOPY N/A 3/27/2019    Procedure: COLONOSCOPY POLYPECTOMY;  Surgeon: Jaime Alaniz MD;  Location: [facility];  Service: Gastroenterology   • DENTAL PROCEDURE Left    • SALPINGO OOPHORECTOMY Left     as an infant    • TOTAL KNEE ARTHROPLASTY Right 10/14/2020    Procedure: TOTAL KNEE ARTHROPLASTY AND ALL ASSOCIATED PROCEDURES;  Surgeon: Elmer Fuentes MD;  Location: [facility];  Service: Orthopedics;  Laterality: Right;       PT Ortho     Row Name 20 1000       Subjective Comments    Subjective Comments  pt reports MD pleased with current progress but is going to give prescription for steroid to help with edema  -       Subjective Pain    Subjective Pain Comment  No specific pain rating given but complains of knee feeling \"really tight\"  -      User Key  (r) = Recorded By, (t) = Taken By, (c) = Cosigned By    Initials Name Provider Type     Braden Mckeon, PTA Physical Therapy Assistant                      PT Assessment/Plan     Row Name 20 1337          PT Assessment    Assessment Comments  continues with tightness (R) thigh and knee due to edema but pt making gains with increasing ex reps and improved gait  -        PT Plan    PT Plan Comments  Contt per POC, progressing as tolerated  -       User Key  (r) = Recorded By, (t) = Taken By, (c) = Cosigned By    " "Initials Name Provider Type    Braden SmithSARAH Physical Therapy Assistant          Modalities     Row Name 11/13/20 1000             Ice    Location  ant/post (R) knee - pt supine  -MH      Rx Minutes  10 mins  -MH      Ice S/P Rx  Yes  -MH        User Key  (r) = Recorded By, (t) = Taken By, (c) = Cosigned By    Initials Name Provider Type    Braden SmithSARAH Physical Therapy Assistant        OP Exercises     Row Name 11/13/20 1000             Subjective Comments    Subjective Comments  pt reports MD pleased with current progress but is going to give prescription for steroid to help with edema  -         Subjective Pain    Subjective Pain Comment  No specific pain rating given but complains of knee feeling \"really tight\"  -         Exercise 1    Exercise Name 1  Heel Slides  -      Time 1  6 min  -MH         Exercise 2    Exercise Name 2  Wall Slides  -MH      Time 2  6 min  -MH         Exercise 3    Exercise Name 3  Bike: Seat 1  -MH      Time 3  5 min  -MH         Exercise 4    Exercise Name 4  PROM: Flexion  -MH      Reps 4  10x10  -MH         Exercise 5    Exercise Name 5  Hamstring Stretch  -MH      Reps 5  10x  -MH      Time 5  10 sec hold  -MH         Exercise 6    Exercise Name 6  Heel Prop  -MH      Time 6  5 min  -MH         Exercise 7    Exercise Name 7  PROM: Extension  -MH      Reps 7  10x10  -MH         Exercise 8    Exercise Name 8  QS  -MH      Reps 8  30  -MH      Time 8  5 sec  -MH         Exercise 9    Exercise Name 9  3-Way Hip  -MH      Reps 9  30 each  -MH         Exercise 10    Exercise Name 10  SAQ  -MH      Cueing 10  Verbal;Tactile  -MH      Reps 10  40  -MH         Exercise 11    Exercise Name 11  LAQ  -MH      Cueing 11  Verbal  -MH      Reps 11  40  -MH         Exercise 12    Exercise Name 12  Heel Raises  -MH      Reps 12  30  -MH         Exercise 13    Exercise Name 13  6\" Fwd Step Ups  -MH      Reps 13  30 each  -MH         Exercise 14    Exercise Name 14  Mini " Squats  -      Cueing 14  Verbal  -MH      Reps 14  15  -MH         Exercise 15    Exercise Name 15  TKE vs theraband  -MH      Cueing 15  Verbal  -MH      Reps 15  40  -MH      Time 15  silver  -        User Key  (r) = Recorded By, (t) = Taken By, (c) = Cosigned By    Initials Name Provider Type     Braden Mckeon PTA Physical Therapy Assistant                           Therapy Education  Education Details: discussed trial of alt heat/cold to assist with edema  Given: HEP, Symptoms/condition management  Program: Reinforced  How Provided: Verbal  Provided to: Patient  Level of Understanding: Teach back education performed, Verbalized, Demonstrated              Time Calculation:   Start Time: 1025  Stop Time: 1200  Time Calculation (min): 95 min  Therapy Charges for Today     Code Description Service Date Service Provider Modifiers Qty    94814414420 HC PT MANUAL THERAPY EA 15 MIN 11/13/2020 Braden Mckeon PTA GP 1    50766416095 HC PT THER PROC EA 15 MIN 11/13/2020 Braden Mckeon PTA GP 2                    Braden Mckeon PTA  11/13/2020

## 2020-11-18 ENCOUNTER — HOSPITAL ENCOUNTER (OUTPATIENT)
Dept: PHYSICAL THERAPY | Facility: HOSPITAL | Age: 65
Setting detail: THERAPIES SERIES
Discharge: HOME | End: 2020-11-18

## 2020-11-18 DIAGNOSIS — Z96.651 STATUS POST TOTAL RIGHT KNEE REPLACEMENT: Primary | ICD-10-CM

## 2020-11-18 PROCEDURE — 97110 THERAPEUTIC EXERCISES: CPT | Performed by: PHYSICAL THERAPIST

## 2020-11-18 NOTE — THERAPY RE-EVALUATION
Outpatient Physical Therapy Ortho Re-Evaluation   [place]     Patient Name: Yesi Benson  : 1955  MRN: [id number]  Today's Date: 2020      Visit Date: 2020    Patient Active Problem List   Diagnosis   • Adenomatous polyp of colon   • Hyperlipidemia   • Hypothyroidism   • Status post total right knee replacement, DOS 10/14/2020        Past Medical History:   Diagnosis Date   • Colon polyp    • Disease of thyroid gland         Past Surgical History:   Procedure Laterality Date   • COLONOSCOPY N/A 3/27/2019    Procedure: COLONOSCOPY POLYPECTOMY;  Surgeon: Jaime Alaniz MD;  Location: [facility];  Service: Gastroenterology   • DENTAL PROCEDURE Left    • SALPINGO OOPHORECTOMY Left     as an infant    • TOTAL KNEE ARTHROPLASTY Right 10/14/2020    Procedure: TOTAL KNEE ARTHROPLASTY AND ALL ASSOCIATED PROCEDURES;  Surgeon: Elmer Fuentes MD;  Location: [facility];  Service: Orthopedics;  Laterality: Right;       Visit Dx:     ICD-10-CM ICD-9-CM   1. Status post total right knee replacement  Z96.651 V43.65             PT Ortho     Row Name 20 1100       Patellar Accessory Motions    Superior glide  Right:;WNL  -GC    Inferior glide  Right:;WNL  -GC    Medial glide  Right:;WNL  -GC    Lateral glide  Right:;WNL  -GC       Right Lower Ext    Rt Knee Extension/Flexion AROM  0-104 degrees  -GC    Rt Knee Extension/Flexion PROM  0-110 degrees  -GC       MMT Right Lower Ext    Rt Hip Flexion MMT, Gross Movement  (4/5) good  -GC    Rt Hip Extension MMT, Gross Movement  (4+/5) good plus  -GC    Rt Hip ABduction MMT, Gross Movement  (4+/5) good plus  -GC    Rt Hip ADduction MMT, Gross Movement  (4/5) good  -GC    Rt Knee Extension MMT, Gross Movement  (4/5) good  -GC    Rt Knee Flexion MMT, Gross Movement  (4+/5) good plus  -GC    Rt Ankle Plantarflexion MMT, Gross Movement  (5/5) normal  -GC    Rt Ankle Dorsiflexion MMT, Gross Movement  (5/5) normal  -GC       Lower Extremity  Flexibility    Hamstrings  Right:;Mildly limited  -GC    Quadriceps  Right:;Moderately limited  -GC       Gait/Stairs (Locomotion)    Comment (Gait/Stairs)  Pt ambulates with mild antaglic gait right LE using a quad cane. She has decreased stride length on the right and decreased knee range during gait cycle  -      User Key  (r) = Recorded By, (t) = Taken By, (c) = Cosigned By    Initials Name Provider Type     Ryan Kong PT Physical Therapist                            PT OP Goals     Row Name 11/18/20 1100          PT Short Term Goals    STG Date to Achieve  11/06/20  -     STG 1  Decrease right knee pain to 2-3/10 with activity.  -     STG 1 Progress  Met  -     STG 2  Increase right knee AROM to 0-5-110 degrees with testing.  -GC     STG 2 Progress  Partially Met;Ongoing;Progressing  -     STG 3  Increase right LE stength to at least 4/5 all planes with testing.  -     STG 3 Progress  Met  -     STG 4  Pt will be independent with her HEP issued by this therapist.  -     STG 4 Progress  Met  -        Long Term Goals    LTG Date to Achieve  11/27/20  -     LTG 1  Decrease right knee pain to 0-1/10 with activity.  -     LTG 1 Progress  Met  -     LTG 2  Increase right knee AROM to 0-125 degrees with testing.  -     LTG 2 Progress  Partially Met;Ongoing;Progressing  -     LTG 3  Increase right LE stength to at least 4+/5 all planes with testing.  -     LTG 3 Progress  Partially Met;Ongoing;Progressing  -     LTG 4  Pt will ambulate normally on levels and stairs without assistive device.  -     LTG 4 Progress  Ongoing;Progressing  -     LTG 5  Pt will be independent with all ADLs and have a LEFS score > 65.  -     LTG 5 Progress  Ongoing;Progressing  -       User Key  (r) = Recorded By, (t) = Taken By, (c) = Cosigned By    Initials Name Provider Type    Ryan Catalan PT Physical Therapist          PT Assessment/Plan     Row Name 11/18/20 1100          PT Assessment  "   Assessment Comments  Pt is doing well with increased knee ROM, increased LE strength, ecreased c/o pain, and improving function.  -GC        PT Plan    PT Plan Comments  Pt is to continue her HEP daily.  -GC       User Key  (r) = Recorded By, (t) = Taken By, (c) = Cosigned By    Initials Name Provider Type    GC Ryan Kong, PT Physical Therapist            OP Exercises     Row Name 11/18/20 1100             Subjective Comments    Subjective Comments  Pt states the swelling is getting better and her knee is feeling pretty good.  -GC         Subjective Pain    Able to rate subjective pain?  yes  -GC      Pre-Treatment Pain Level  1  -GC         Exercise 1    Exercise Name 1  Heel Slides  -GC      Time 1  8 min  -GC         Exercise 2    Exercise Name 2  Wall Slides  -GC      Time 2  8 min  -GC         Exercise 3    Exercise Name 3  Bike: Seat 1  -GC      Time 3  5 min  -GC         Exercise 4    Exercise Name 4  PROM: Flexion  -GC      Reps 4  10x10  -GC         Exercise 5    Exercise Name 5  Hamstring Stretch  -GC      Reps 5  15  -GC      Time 5  10 sec hold  -GC         Exercise 6    Exercise Name 6  Heel Prop  -GC      Time 6  5 min  -GC         Exercise 7    Exercise Name 7  PROM: Extension  -GC      Reps 7  10x10  -GC         Exercise 8    Exercise Name 8  QS  -GC      Reps 8  30  -GC      Time 8  5 sec  -GC         Exercise 9    Exercise Name 9  3-Way Hip  -GC      Reps 9  25 each  -GC      Time 9  1#  -GC         Exercise 10    Exercise Name 10  SAQ  -GC      Cueing 10  Verbal;Tactile  -GC      Reps 10  25  -GC      Time 10  1#  -GC         Exercise 11    Exercise Name 11  LAQ  -GC      Cueing 11  Verbal  -GC      Reps 11  25  -GC      Time 11  1#  -GC         Exercise 12    Exercise Name 12  Heel Raises  -GC      Reps 12  30  -GC         Exercise 13    Exercise Name 13  6\" Fwd Step Ups  -GC      Reps 13  30 each  -GC         Exercise 14    Exercise Name 14  Mini Squats  -GC      Cueing 14  Verbal  -GC   "    Reps 14  20  -GC         Exercise 15    Exercise Name 15  TKE vs theraband  -GC      Cueing 15  Verbal  -GC      Reps 15  25  -GC      Time 15  gold  -GC        User Key  (r) = Recorded By, (t) = Taken By, (c) = Cosigned By    Initials Name Provider Type    GC Ryan Kong, PT Physical Therapist                                  Time Calculation:     Start Time: 1100  Stop Time: 1228  Time Calculation (min): 88 min     Therapy Charges for Today     Code Description Service Date Service Provider Modifiers Qty    52801593906  PT THER PROC EA 15 MIN 11/18/2020 Ryan Kong, PT GP 2                    Ryan Kong, PT  11/18/2020

## 2020-11-20 ENCOUNTER — HOSPITAL ENCOUNTER (OUTPATIENT)
Dept: PHYSICAL THERAPY | Facility: HOSPITAL | Age: 65
Setting detail: THERAPIES SERIES
Discharge: HOME | End: 2020-11-20

## 2020-11-20 DIAGNOSIS — Z96.651 STATUS POST TOTAL RIGHT KNEE REPLACEMENT: Primary | ICD-10-CM

## 2020-11-20 PROCEDURE — 97140 MANUAL THERAPY 1/> REGIONS: CPT

## 2020-11-20 PROCEDURE — 97110 THERAPEUTIC EXERCISES: CPT

## 2020-11-20 NOTE — THERAPY TREATMENT NOTE
Outpatient Physical Therapy Ortho Treatment Note   Jenny Chávez     Patient Name: Yesi Benson  : 1955  MRN: [id number]  Today's Date: 2020      Visit Date: 2020    Visit Dx:    ICD-10-CM ICD-9-CM   1. Status post total right knee replacement  Z96.651 V43.65       Patient Active Problem List   Diagnosis   • Adenomatous polyp of colon   • Hyperlipidemia   • Hypothyroidism   • Status post total right knee replacement, DOS 10/14/2020        Past Medical History:   Diagnosis Date   • Colon polyp    • Disease of thyroid gland         Past Surgical History:   Procedure Laterality Date   • COLONOSCOPY N/A 3/27/2019    Procedure: COLONOSCOPY POLYPECTOMY;  Surgeon: Jaime Alaniz MD;  Location: [facility];  Service: Gastroenterology   • DENTAL PROCEDURE Left    • SALPINGO OOPHORECTOMY Left     as an infant    • TOTAL KNEE ARTHROPLASTY Right 10/14/2020    Procedure: TOTAL KNEE ARTHROPLASTY AND ALL ASSOCIATED PROCEDURES;  Surgeon: Elmer Fuentes MD;  Location: [facility];  Service: Orthopedics;  Laterality: Right;       PT Ortho     Row Name 20 1100       Subjective Comments    Subjective Comments  pt states her knee felt swollen in the back when she woke this morning but it feels better now  -      User Key  (r) = Recorded By, (t) = Taken By, (c) = Cosigned By    Initials Name Provider Type    Braden Smith PTA Physical Therapy Assistant                      PT Assessment/Plan     Row Name 20 1444          PT Assessment    Assessment Comments  pt with increased tolerance to flexion PROM stretching today; able to progress reps of ex's per flow sheet without increased symptoms; pt ambulating increased distances with cane vs walker  - decreased speed but improving gait pattern  -       User Key  (r) = Recorded By, (t) = Taken By, (c) = Cosigned By    Initials Name Provider Type    Braden Smith PTA Physical Therapy Assistant          Modalities     Row  "Name 11/20/20 1100             Ice    Location  ant/post (R) knee - pt supine  -MH      Rx Minutes  10 mins  -MH      Ice S/P Rx  Yes  -MH        User Key  (r) = Recorded By, (t) = Taken By, (c) = Cosigned By    Initials Name Provider Type     Linda Braden Corcoran, PTA Physical Therapy Assistant        OP Exercises     Row Name 11/20/20 1100             Subjective Comments    Subjective Comments  pt states her knee felt swollen in the back when she woke this morning but it feels better now  -MH         Exercise 1    Exercise Name 1  Heel Slides  -MH      Time 1  8 min  -MH         Exercise 2    Exercise Name 2  Wall Slides  -MH      Time 2  8 min  -MH         Exercise 3    Exercise Name 3  Airdyne: Seat 1  -MH      Time 3  5 min  -MH         Exercise 4    Exercise Name 4  PROM: Flexion  -MH      Reps 4  10x10  -MH         Exercise 5    Exercise Name 5  Hamstring Stretch  -MH      Reps 5  15  -MH      Time 5  10 sec hold x 3min  -MH         Exercise 6    Exercise Name 6  Heel Prop  -MH      Time 6  5 min  -MH         Exercise 7    Exercise Name 7  PROM: Extension  -MH      Reps 7  10x10  -MH         Exercise 8    Exercise Name 8  QS  -MH      Reps 8  D/C to HEP  -MH         Exercise 9    Exercise Name 9  3-Way Hip  -MH      Reps 9  30 each  -MH      Time 9  1#  -MH         Exercise 10    Exercise Name 10  SAQ  -MH      Cueing 10  Verbal;Tactile  -MH      Reps 10  30  -MH      Time 10  1#  -MH         Exercise 11    Exercise Name 11  LAQ  -MH      Cueing 11  Verbal  -MH      Reps 11  30  -MH      Time 11  1#  -MH         Exercise 12    Exercise Name 12  Heel Raises  -MH      Reps 12  40  -MH         Exercise 13    Exercise Name 13  6\" Fwd Step Ups  -MH      Reps 13  15 each  -MH         Exercise 14    Exercise Name 14  Mini Squats  -MH      Cueing 14  Verbal  -MH      Reps 14  20  -MH         Exercise 15    Exercise Name 15  TKE vs theraband  -MH      Cueing 15  Verbal  -MH      Reps 15  30  -MH      Time 15  gold  -MH   "      User Key  (r) = Recorded By, (t) = Taken By, (c) = Cosigned By    Initials Name Provider Type     Braden Mckeon PTA Physical Therapy Assistant                           Therapy Education  Given: HEP, Symptoms/condition management  Program: Reinforced  How Provided: Verbal  Provided to: Patient  Level of Understanding: Teach back education performed, Verbalized, Demonstrated              Time Calculation:   Start Time: 1100  Stop Time: 1220  Time Calculation (min): 80 min  Therapy Charges for Today     Code Description Service Date Service Provider Modifiers Qty    78108306153 HC PT MANUAL THERAPY EA 15 MIN 11/20/2020 Braden Mckeon PTA GP 1    26197073940 HC PT THER PROC EA 15 MIN 11/20/2020 Braden Mckeon PTA GP 2                    Braden Mckeon PTA  11/20/2020

## 2020-11-25 ENCOUNTER — HOSPITAL ENCOUNTER (OUTPATIENT)
Dept: PHYSICAL THERAPY | Facility: HOSPITAL | Age: 65
Setting detail: THERAPIES SERIES
Discharge: HOME | End: 2020-11-25

## 2020-11-25 DIAGNOSIS — Z96.651 STATUS POST TOTAL RIGHT KNEE REPLACEMENT: Primary | ICD-10-CM

## 2020-11-25 PROCEDURE — 97140 MANUAL THERAPY 1/> REGIONS: CPT

## 2020-11-25 PROCEDURE — 97110 THERAPEUTIC EXERCISES: CPT

## 2020-11-25 NOTE — THERAPY TREATMENT NOTE
Outpatient Physical Therapy Ortho Treatment Note   Jenny Chávez     Patient Name: Yesi Benson  : 1955  MRN: [id number]  Today's Date: 2020      Visit Date: 2020    Visit Dx:    ICD-10-CM ICD-9-CM   1. Status post total right knee replacement  Z96.651 V43.65       Patient Active Problem List   Diagnosis   • Adenomatous polyp of colon   • Hyperlipidemia   • Hypothyroidism   • Status post total right knee replacement, DOS 10/14/2020        Past Medical History:   Diagnosis Date   • Colon polyp    • Disease of thyroid gland         Past Surgical History:   Procedure Laterality Date   • COLONOSCOPY N/A 3/27/2019    Procedure: COLONOSCOPY POLYPECTOMY;  Surgeon: Jaime Alaniz MD;  Location: [facility];  Service: Gastroenterology   • DENTAL PROCEDURE Left    • SALPINGO OOPHORECTOMY Left     as an infant    • TOTAL KNEE ARTHROPLASTY Right 10/14/2020    Procedure: TOTAL KNEE ARTHROPLASTY AND ALL ASSOCIATED PROCEDURES;  Surgeon: Elmer Fuentes MD;  Location: [facility];  Service: Orthopedics;  Laterality: Right;                       PT Assessment/Plan     Row Name 20 1105          PT Assessment    Assessment Comments  Pt with improved motion with passive stretching and progressing well with strength.   -KM        PT Plan    PT Plan Comments  Continue to progress as tolerated.  -KM       User Key  (r) = Recorded By, (t) = Taken By, (c) = Cosigned By    Initials Name Provider Type    Gladis Callahan PTA Physical Therapy Assistant            OP Exercises     Row Name 20 1105             Subjective Comments    Subjective Comments  Pt states her knee is well and was able to sleep on her right side without pain. States she feels her swelling is slowly improving.   -KM         Exercise 1    Exercise Name 1  Heel Slides  -KM      Time 1  8 min  -KM         Exercise 2    Exercise Name 2  Wall Slides  -KM      Time 2  8 min  -KM         Exercise 3    Exercise Name 3   "Airdyne: Seat 1  -KM      Time 3  5 min  -KM         Exercise 4    Exercise Name 4  PROM: Flexion  -KM      Reps 4  10x10  -KM         Exercise 5    Exercise Name 5  Hamstring Stretch  -KM      Reps 5  15  -KM      Time 5  10 sec hold x 3min  -KM         Exercise 6    Exercise Name 6  Heel Prop  -KM      Time 6  5 min  -KM         Exercise 7    Exercise Name 7  PROM: Extension  -KM      Reps 7  10x10  -KM         Exercise 8    Exercise Name 8  QS  -KM      Reps 8  D/C to HEP  -KM         Exercise 9    Exercise Name 9  3-Way Hip  -KM      Reps 9  30 each  -KM      Time 9  1#  -KM         Exercise 10    Exercise Name 10  SAQ  -KM      Cueing 10  Verbal;Tactile  -KM      Reps 10  30  -KM      Time 10  1#  -KM         Exercise 11    Exercise Name 11  LAQ  -KM      Cueing 11  Verbal  -KM      Reps 11  30  -KM      Time 11  1#  -KM         Exercise 12    Exercise Name 12  Heel Raises  -KM      Reps 12  40  -KM         Exercise 13    Exercise Name 13  6\" Fwd Step Ups  -KM      Reps 13  15 each  -KM         Exercise 14    Exercise Name 14  Mini Squats  -KM      Cueing 14  Verbal  -KM      Reps 14  20  -KM         Exercise 15    Exercise Name 15  TKE vs theraband  -KM      Cueing 15  Verbal  -KM      Reps 15  30  -KM      Time 15  gold  -KM        User Key  (r) = Recorded By, (t) = Taken By, (c) = Cosigned By    Initials Name Provider Type    Gladis Callahan PTA Physical Therapy Assistant                                          Time Calculation:   Start Time: 1105  Stop Time: 1215  Time Calculation (min): 70 min  Therapy Charges for Today     Code Description Service Date Service Provider Modifiers Qty    74248317122 HC PT MANUAL THERAPY EA 15 MIN 11/25/2020 Gladis Garzon PTA GP 1    27046449594 HC PT THER PROC EA 15 MIN 11/25/2020 Gladis Garzon PTA GP 1                    Gladis Garzon PTA  11/25/2020     "

## 2020-12-02 ENCOUNTER — HOSPITAL ENCOUNTER (OUTPATIENT)
Dept: PHYSICAL THERAPY | Facility: HOSPITAL | Age: 65
Setting detail: THERAPIES SERIES
Discharge: HOME | End: 2020-12-02

## 2020-12-02 DIAGNOSIS — Z96.651 STATUS POST TOTAL RIGHT KNEE REPLACEMENT: Primary | ICD-10-CM

## 2020-12-02 PROCEDURE — 97140 MANUAL THERAPY 1/> REGIONS: CPT

## 2020-12-02 PROCEDURE — 97110 THERAPEUTIC EXERCISES: CPT

## 2020-12-02 NOTE — THERAPY TREATMENT NOTE
Outpatient Physical Therapy Ortho Treatment Note   Jenny Chávez     Patient Name: Yesi Benson  : 1955  MRN: [id number]  Today's Date: 2020      Visit Date: 2020    Visit Dx:    ICD-10-CM ICD-9-CM   1. Status post total right knee replacement  Z96.651 V43.65       Patient Active Problem List   Diagnosis   • Adenomatous polyp of colon   • Hyperlipidemia   • Hypothyroidism   • Status post total right knee replacement, DOS 10/14/2020        Past Medical History:   Diagnosis Date   • Colon polyp    • Disease of thyroid gland         Past Surgical History:   Procedure Laterality Date   • COLONOSCOPY N/A 3/27/2019    Procedure: COLONOSCOPY POLYPECTOMY;  Surgeon: Jaime Alaniz MD;  Location: [facility];  Service: Gastroenterology   • DENTAL PROCEDURE Left    • SALPINGO OOPHORECTOMY Left     as an infant    • TOTAL KNEE ARTHROPLASTY Right 10/14/2020    Procedure: TOTAL KNEE ARTHROPLASTY AND ALL ASSOCIATED PROCEDURES;  Surgeon: Elmer Fuentes MD;  Location: [facility];  Service: Orthopedics;  Laterality: Right;       PT Ortho     Row Name 20 1055       Right Lower Ext    Rt Knee Extension/Flexion AROM  0-110 post stretch  -KM      User Key  (r) = Recorded By, (t) = Taken By, (c) = Cosigned By    Initials Name Provider Type    Gladis Callahan PTA Physical Therapy Assistant                      PT Assessment/Plan     Row Name 20 105          PT Assessment    Assessment Comments  Pt ambulates with slight antalgic gait with SPC demonstrating decreased knee flexion. Pt is making slow progress with flexion AROM.   -KM        PT Plan    PT Plan Comments  Continue POC per MD. Pt to complete HEP daily.   -KM       User Key  (r) = Recorded By, (t) = Taken By, (c) = Cosigned By    Initials Name Provider Type    Gladis Callahan PTA Physical Therapy Assistant            OP Exercises     Row Name 20 105             Subjective Comments    Subjective Comments  Pt  "states she continues to make slow progress and feels her swelling continues to improve   -KM         Exercise 1    Exercise Name 1  Heel Slides  -KM      Time 1  8 min  -KM         Exercise 2    Exercise Name 2  Wall Slides  -KM      Time 2  8 min  -KM         Exercise 3    Exercise Name 3  Airdyne: Seat 1  -KM      Time 3  5 min  -KM         Exercise 4    Exercise Name 4  PROM: Flexion  -KM      Reps 4  10x10  -KM         Exercise 5    Exercise Name 5  Hamstring Stretch  -KM      Reps 5  15  -KM      Time 5  10 sec hold x 3min  -KM         Exercise 6    Exercise Name 6  Heel Prop  -KM      Time 6  5 min  -KM         Exercise 7    Exercise Name 7  PROM: Extension  -KM      Reps 7  10x10  -KM         Exercise 8    Exercise Name 8  QS  -KM      Reps 8  D/C to HEP  -KM         Exercise 9    Exercise Name 9  3-Way Hip  -KM      Reps 9  30 each  -KM      Time 9  1#  -KM         Exercise 10    Exercise Name 10  SAQ  -KM      Cueing 10  Verbal;Tactile  -KM      Reps 10  30  -KM      Time 10  1#  -KM         Exercise 11    Exercise Name 11  LAQ  -KM      Cueing 11  Verbal  -KM      Reps 11  30  -KM      Time 11  1#  -KM         Exercise 12    Exercise Name 12  Heel Raises  -KM      Reps 12  40  -KM         Exercise 13    Exercise Name 13  6\" Fwd Step Ups  -KM      Reps 13  15 each  -KM         Exercise 14    Exercise Name 14  Mini Squats  -KM      Cueing 14  Verbal  -KM      Reps 14  30  -KM         Exercise 15    Exercise Name 15  TKE vs theraband  -KM      Cueing 15  Verbal  -KM      Reps 15  40  -KM      Time 15  gold  -KM        User Key  (r) = Recorded By, (t) = Taken By, (c) = Cosigned By    Initials Name Provider Type    Gladis Callahan, PTA Physical Therapy Assistant                                          Time Calculation:   Start Time: 1055  Stop Time: 1211  Time Calculation (min): 76 min  Therapy Charges for Today     Code Description Service Date Service Provider Modifiers Qty    17062004509  PT MANUAL " THERAPY EA 15 MIN 12/2/2020 Gladis Garzon, SARAH GP 1    [id number]  PT THER PROC EA 15 MIN 12/2/2020 Gladis Garzon PTA GP 1                    Gladis Garzon PTA  12/2/2020

## 2020-12-03 ENCOUNTER — OFFICE VISIT (OUTPATIENT)
Dept: ORTHOPEDIC SURGERY | Facility: CLINIC | Age: 65
End: 2020-12-03

## 2020-12-03 VITALS — BODY MASS INDEX: 27.97 KG/M2 | WEIGHT: 152 LBS | HEIGHT: 62 IN

## 2020-12-03 DIAGNOSIS — Z96.651 STATUS POST TOTAL RIGHT KNEE REPLACEMENT: Primary | ICD-10-CM

## 2020-12-03 DIAGNOSIS — M24.661 ARTHROFIBROSIS OF KNEE JOINT, RIGHT: ICD-10-CM

## 2020-12-03 PROCEDURE — 99024 POSTOP FOLLOW-UP VISIT: CPT | Performed by: ORTHOPAEDIC SURGERY

## 2020-12-03 RX ORDER — CEPHALEXIN 500 MG/1
CAPSULE ORAL
Qty: 4 CAPSULE | Refills: 2 | Status: SHIPPED | OUTPATIENT
Start: 2020-12-03 | End: 2021-07-26

## 2020-12-03 RX ORDER — CELECOXIB 200 MG/1
200 CAPSULE ORAL 2 TIMES DAILY
Qty: 60 CAPSULE | Refills: 0 | Status: SHIPPED | OUTPATIENT
Start: 2020-12-03 | End: 2021-02-04

## 2020-12-03 NOTE — PROGRESS NOTES
CC: s/p right total knee arthroplasty, DOS 10/14/2020  Interval History: Yesi Benson returns for 7 week postoperative visit.  She is doing well. Pain is controlled with pain medication and is  Improving, but she does note some residual tightness and swelling, as well as tenderness over the incision. She has noted improvement with prednisone taper. She denies any wound problem, fevers, or chills. Patient is continuing to work with outpatient PT. Ambulating with cane.     Physical Examination: Right knee was examined   Incision well healed   ROM 1-105,  4/5 strength on extension, 4+/5 on flexion   Stable to varus and valgus stress   Flex/extend ankle and toes   Positive sensation right foot   No calf pain, negative Homans sign bilaterally    Radiographic review:  None    Assessment/Plan:  1. Yesi Benson is recovering from surgery.  I did discuss with patient that she has made progress but she still is lacking in regards to her flexion as well as residual extension with weakness on extension noted.  We will need to continue to work aggressively on these items so that she can achieve as much benefit and functional improvement following her knee replacement as possible.  2. We will continue physical therapy for range of motion, strengthening, and gait normalization. Continue at-home exercises including prone hangs.  3. Recommend wearing a knee sleeve during activities to reduce swelling.  4. Prescribed Celebrex 200 mg twice daily to reduce inflammation.  5. Continue to use Vitamin E cream to densensitize the incision. May also use soft tissue massage and Diclofenac gel.  6. Prescribed Keflex for upcoming dental appointment.  7. She is to follow up in clinic in 2 weeks with no xrays. May consider manipulation under anaesthesia if range of motion has not improved. Patient had all question answered today.    Medications:  New Medications Ordered This Visit   Medications   • celecoxib (CeleBREX) 200 MG capsule     Sig:  Take 1 capsule by mouth 2 (Two) Times a Day.     Dispense:  60 capsule     Refill:  0       By signing my name here, I Camille Mckinney attest that all documentation on 12/03/20 at 09:38 EST has been prepared under the direction and in the presence of Dr. Elmer Fuentes MD.    I, Dr. Elmer Fuentes, personally performed the services described in this documentation, as scribed by Camille Mckinney, in my presence, and it is both accurate and complete.

## 2020-12-04 ENCOUNTER — HOSPITAL ENCOUNTER (OUTPATIENT)
Dept: PHYSICAL THERAPY | Facility: HOSPITAL | Age: 65
Setting detail: THERAPIES SERIES
Discharge: HOME | End: 2020-12-04

## 2020-12-04 DIAGNOSIS — Z96.651 STATUS POST TOTAL RIGHT KNEE REPLACEMENT: Primary | ICD-10-CM

## 2020-12-04 PROCEDURE — 97110 THERAPEUTIC EXERCISES: CPT

## 2020-12-04 PROCEDURE — 97140 MANUAL THERAPY 1/> REGIONS: CPT

## 2020-12-04 NOTE — THERAPY TREATMENT NOTE
Outpatient Physical Therapy Ortho Treatment Note   Jenny Chávez     Patient Name: Yesi Benson  : 1955  MRN: [id number]  Today's Date: 2020      Visit Date: 2020    Visit Dx:    ICD-10-CM ICD-9-CM   1. Status post total right knee replacement  Z96.651 V43.65       Patient Active Problem List   Diagnosis   • Adenomatous polyp of colon   • Hyperlipidemia   • Hypothyroidism   • Status post total right knee replacement, DOS 10/14/2020   • Arthrofibrosis of knee joint, right        Past Medical History:   Diagnosis Date   • Colon polyp    • Disease of thyroid gland         Past Surgical History:   Procedure Laterality Date   • COLONOSCOPY N/A 3/27/2019    Procedure: COLONOSCOPY POLYPECTOMY;  Surgeon: Jaime Alaniz MD;  Location: [facility];  Service: Gastroenterology   • DENTAL PROCEDURE Left    • SALPINGO OOPHORECTOMY Left     as an infant    • TOTAL KNEE ARTHROPLASTY Right 10/14/2020    Procedure: TOTAL KNEE ARTHROPLASTY AND ALL ASSOCIATED PROCEDURES;  Surgeon: Elmer Fuentes MD;  Location: [facility];  Service: Orthopedics;  Laterality: Right;       PT Ortho     Row Name 20 1200       Subjective Comments    Subjective Comments  pt reports her knee has still been swelling; MD wants her to wear a compression sleeve - puachased one at the drug store  -      User Key  (r) = Recorded By, (t) = Taken By, (c) = Cosigned By    Initials Name Provider Type     Braden Mckeon PTA Physical Therapy Assistant                      PT Assessment/Plan     Row Name 20 1311          PT Assessment    Assessment Comments  pt continues with edema surrounding the knee - now has knee sleeve to wear per MD; pt continues with limited AROM with gait but improving  -        PT Plan    PT Plan Comments  Cont per POC  -       User Key  (r) = Recorded By, (t) = Taken By, (c) = Cosigned By    Initials Name Provider Type    Braden Smith PTA Physical Therapy Assistant     "        OP Exercises     Row Name 12/04/20 1200             Subjective Comments    Subjective Comments  pt reports her knee has still been swelling; MD wants her to wear a compression sleeve - puachased one at the drug store  -MH         Exercise 1    Exercise Name 1  Heel Slides  -MH      Time 1  8 min  -MH         Exercise 2    Exercise Name 2  Wall Slides  -MH      Time 2  8 min  -MH         Exercise 3    Exercise Name 3  Airdyne: Seat 1  -MH      Time 3  5 min  -MH         Exercise 4    Exercise Name 4  PROM: Flexion  -MH      Reps 4  10x10  -MH         Exercise 5    Exercise Name 5  Hamstring Stretch  -MH      Reps 5  10  -MH      Time 5  10 sec hold   -MH         Exercise 6    Exercise Name 6  Heel Prop  -MH      Time 6  5 min  -MH         Exercise 7    Exercise Name 7  PROM: Extension  -MH      Reps 7  10x10  -MH         Exercise 8    Exercise Name 8  QS  -MH      Reps 8  D/C to HEP  -MH         Exercise 9    Exercise Name 9  3-Way Hip  -MH      Reps 9  30 each  -MH      Time 9  1#  -MH         Exercise 10    Exercise Name 10  SAQ  -MH      Cueing 10  Verbal;Tactile  -MH      Reps 10  30  -MH      Time 10  1#  -MH         Exercise 11    Exercise Name 11  LAQ  -MH      Cueing 11  Verbal  -MH      Reps 11  30  -MH      Time 11  1#  -MH         Exercise 12    Exercise Name 12  Heel Raises  -MH      Reps 12  40  -MH         Exercise 13    Exercise Name 13  6\" Fwd Step Ups  -MH      Reps 13  20 each  -MH         Exercise 14    Exercise Name 14  Mini Squats  -MH      Cueing 14  Verbal  -MH      Reps 14  40  -MH         Exercise 15    Exercise Name 15  TKE vs theraband  -MH      Cueing 15  Verbal  -MH      Reps 15  40  -MH      Time 15  gold  -MH        User Key  (r) = Recorded By, (t) = Taken By, (c) = Cosigned By    Initials Name Provider Type     Braden Mckeon, PTA Physical Therapy Assistant                           Therapy Education  Given: HEP, Symptoms/condition management  Program: Reinforced  How " Provided: Verbal  Provided to: Patient  Level of Understanding: Teach back education performed, Verbalized, Demonstrated              Time Calculation:   Start Time: 1100  Stop Time: 1225  Time Calculation (min): 85 min  Therapy Charges for Today     Code Description Service Date Service Provider Modifiers Qty    46160940454 HC PT MANUAL THERAPY EA 15 MIN 12/4/2020 Braden Mckeon PTA GP 1    47292150824 HC PT THER PROC EA 15 MIN 12/4/2020 Brdaen Mckeon PTA GP 2                    Braden Mckeon PTA  12/4/2020

## 2020-12-09 ENCOUNTER — HOSPITAL ENCOUNTER (OUTPATIENT)
Dept: PHYSICAL THERAPY | Facility: HOSPITAL | Age: 65
Setting detail: THERAPIES SERIES
Discharge: HOME | End: 2020-12-09

## 2020-12-09 DIAGNOSIS — Z96.651 STATUS POST TOTAL RIGHT KNEE REPLACEMENT: Primary | ICD-10-CM

## 2020-12-09 PROCEDURE — 97140 MANUAL THERAPY 1/> REGIONS: CPT

## 2020-12-09 PROCEDURE — 97110 THERAPEUTIC EXERCISES: CPT

## 2020-12-09 NOTE — THERAPY TREATMENT NOTE
"    Outpatient Physical Therapy Ortho Treatment Note   Jenny Chávez     Patient Name: Yesi Benson  : 1955  MRN: [id number]  Today's Date: 2020      Visit Date: 2020    Visit Dx:    ICD-10-CM ICD-9-CM   1. Status post total right knee replacement  Z96.651 V43.65       Patient Active Problem List   Diagnosis   • Adenomatous polyp of colon   • Hyperlipidemia   • Hypothyroidism   • Status post total right knee replacement, DOS 10/14/2020   • Arthrofibrosis of knee joint, right        Past Medical History:   Diagnosis Date   • Colon polyp    • Disease of thyroid gland         Past Surgical History:   Procedure Laterality Date   • COLONOSCOPY N/A 3/27/2019    Procedure: COLONOSCOPY POLYPECTOMY;  Surgeon: Jaime Alaniz MD;  Location: [facility];  Service: Gastroenterology   • DENTAL PROCEDURE Left    • SALPINGO OOPHORECTOMY Left     as an infant    • TOTAL KNEE ARTHROPLASTY Right 10/14/2020    Procedure: TOTAL KNEE ARTHROPLASTY AND ALL ASSOCIATED PROCEDURES;  Surgeon: Elmer Fuentes MD;  Location: [facility];  Service: Orthopedics;  Laterality: Right;       PT Ortho     Row Name 20 1000       Subjective Comments    Subjective Comments  pt states the knee sleeve she had was too bulky so she has been using an ace wrap - ordered a sleeve; \"I feel like this knee gets tighter everyday\"  -      User Key  (r) = Recorded By, (t) = Taken By, (c) = Cosigned By    Initials Name Provider Type    Braden Smith PTA Physical Therapy Assistant                      PT Assessment/Plan     Row Name 20 1258          PT Assessment    Assessment Comments  pt tolerated progression of reps well; continues with altered gait pattern that improves with cues and use of A.D.  -        PT Plan    PT Plan Comments  COnt per POC  -       User Key  (r) = Recorded By, (t) = Taken By, (c) = Cosigned By    Initials Name Provider Type    Braden Smith PTA Physical Therapy Assistant    " "        OP Exercises     Row Name 12/09/20 1000             Subjective Comments    Subjective Comments  pt states the knee sleeve she had was too bulky so she has been using an ace wrap - ordered a sleeve; \"I feel like this knee gets tighter everyday\"  -MH         Exercise 1    Exercise Name 1  Heel Slides  -MH      Time 1  8 min  -MH         Exercise 2    Exercise Name 2  Wall Slides  -MH      Time 2  8 min  -MH         Exercise 3    Exercise Name 3  Airdyne: Seat 1  -MH      Time 3  6 min  -MH         Exercise 4    Exercise Name 4  PROM: Flexion  -MH      Reps 4  10x10  -MH         Exercise 5    Exercise Name 5  Hamstring Stretch  -MH      Reps 5  10  -MH      Time 5  10 sec hold   -MH         Exercise 6    Exercise Name 6  Heel Prop  -MH      Time 6  5 min  -MH         Exercise 7    Exercise Name 7  PROM: Extension  -MH      Reps 7  10x10  -MH         Exercise 8    Exercise Name 8  --  -MH      Reps 8  --  -MH         Exercise 9    Exercise Name 9  3-Way Hip  -MH      Reps 9  40 each  -MH      Time 9  1#  -MH         Exercise 10    Exercise Name 10  SAQ  -MH      Cueing 10  Verbal;Tactile  -MH      Reps 10  40  -MH      Time 10  1#  -MH         Exercise 11    Exercise Name 11  LAQ  -MH      Cueing 11  Verbal  -MH      Reps 11  40  -MH      Time 11  1#  -MH         Exercise 12    Exercise Name 12  Heel Raises  -MH      Reps 12  40  -MH         Exercise 13    Exercise Name 13  6\" Fwd Step Ups  -MH      Reps 13  20 each  -MH         Exercise 14    Exercise Name 14  Mini Squats  -MH      Cueing 14  Verbal  -MH      Reps 14  40  -MH         Exercise 15    Exercise Name 15  TKE vs theraband  -MH      Cueing 15  Verbal  -MH      Reps 15  40  -MH      Time 15  gold  -MH        User Key  (r) = Recorded By, (t) = Taken By, (c) = Cosigned By    Initials Name Provider Type    Braden Smith, PTA Physical Therapy Assistant                           Therapy Education  Education Details: encouraged to increase heel strike " with gait  Given: HEP, Mobility training  Program: Reinforced  How Provided: Verbal, Demonstration  Provided to: Patient  Level of Understanding: Teach back education performed, Verbalized              Time Calculation:   Start Time: 0954  Stop Time: 1120  Time Calculation (min): 86 min  Therapy Charges for Today     Code Description Service Date Service Provider Modifiers Qty    67366998641 HC PT MANUAL THERAPY EA 15 MIN 12/9/2020 Braden Mckeon PTA GP 1    27851082786 HC PT THER PROC EA 15 MIN 12/9/2020 Braden Mckeon PTA GP 2                    Braden Mckeon PTA  12/9/2020

## 2020-12-11 ENCOUNTER — HOSPITAL ENCOUNTER (OUTPATIENT)
Dept: PHYSICAL THERAPY | Facility: HOSPITAL | Age: 65
Setting detail: THERAPIES SERIES
Discharge: HOME | End: 2020-12-11

## 2020-12-11 DIAGNOSIS — Z96.651 STATUS POST TOTAL RIGHT KNEE REPLACEMENT: Primary | ICD-10-CM

## 2020-12-11 PROCEDURE — 97110 THERAPEUTIC EXERCISES: CPT

## 2020-12-11 PROCEDURE — 97140 MANUAL THERAPY 1/> REGIONS: CPT

## 2020-12-11 NOTE — THERAPY TREATMENT NOTE
Outpatient Physical Therapy Ortho Treatment Note   Jenny Chávez     Patient Name: Yesi Benson  : 1955  MRN: [id number]  Today's Date: 2020      Visit Date: 2020    Visit Dx:    ICD-10-CM ICD-9-CM   1. Status post total right knee replacement  Z96.651 V43.65       Patient Active Problem List   Diagnosis   • Adenomatous polyp of colon   • Hyperlipidemia   • Hypothyroidism   • Status post total right knee replacement, DOS 10/14/2020   • Arthrofibrosis of knee joint, right        Past Medical History:   Diagnosis Date   • Colon polyp    • Disease of thyroid gland         Past Surgical History:   Procedure Laterality Date   • COLONOSCOPY N/A 3/27/2019    Procedure: COLONOSCOPY POLYPECTOMY;  Surgeon: Jaime Alaniz MD;  Location: [facility];  Service: Gastroenterology   • DENTAL PROCEDURE Left    • SALPINGO OOPHORECTOMY Left     as an infant    • TOTAL KNEE ARTHROPLASTY Right 10/14/2020    Procedure: TOTAL KNEE ARTHROPLASTY AND ALL ASSOCIATED PROCEDURES;  Surgeon: Elmer Fuentes MD;  Location: [facility];  Service: Orthopedics;  Laterality: Right;       PT Ortho     Row Name 20 1100       Subjective Comments    Subjective Comments  pt reports her knee still feels really tight but that she may be feeling some improvement  -      User Key  (r) = Recorded By, (t) = Taken By, (c) = Cosigned By    Initials Name Provider Type    Braden Smith PTA Physical Therapy Assistant                      PT Assessment/Plan     Row Name 20 1430          PT Assessment    Assessment Comments  pt progressing with ex's - decreased fatigue noted; improved gait pattern with increased heel strike with decreased cues - continues with slower pace and guarded  -        PT Plan    PT Plan Comments  Cont per POC, progressing as tolerated  -       User Key  (r) = Recorded By, (t) = Taken By, (c) = Cosigned By    Initials Name Provider Type    Braden Smith PTA Physical  "Therapy Assistant          Modalities     Row Name 12/11/20 1100             Ice    Location  ant/post (R) knee - pt supine  -MH      Rx Minutes  10 mins  -MH      Ice S/P Rx  Yes  -MH        User Key  (r) = Recorded By, (t) = Taken By, (c) = Cosigned By    Initials Name Provider Type     Linda Braden Lee, PTA Physical Therapy Assistant        OP Exercises     Row Name 12/11/20 1100             Subjective Comments    Subjective Comments  pt reports her knee still feels really tight but that she may be feeling some improvement  -MH         Exercise 1    Exercise Name 1  Heel Slides  -MH      Time 1  8 min  -MH         Exercise 2    Exercise Name 2  Wall Slides  -MH      Time 2  8 min  -MH         Exercise 3    Exercise Name 3  Airdyne: Seat 1  -MH      Time 3  6 min  -MH         Exercise 4    Exercise Name 4  PROM: Flexion  -MH      Reps 4  10x10  -MH         Exercise 5    Exercise Name 5  Hamstring Stretch  -MH      Reps 5  10  -MH      Time 5  10 sec hold   -MH         Exercise 6    Exercise Name 6  Heel Prop  -MH      Time 6  5 min  -MH         Exercise 7    Exercise Name 7  PROM: Extension  -MH      Reps 7  10x10  -MH         Exercise 9    Exercise Name 9  3-Way Hip  -MH      Reps 9  40 each  -MH      Time 9  1#  -MH         Exercise 10    Exercise Name 10  SAQ  -MH      Cueing 10  Verbal;Tactile  -MH      Reps 10  40  -MH      Time 10  1#  -MH         Exercise 11    Exercise Name 11  LAQ  -MH      Cueing 11  Verbal  -MH      Reps 11  40  -MH      Time 11  1#  -MH         Exercise 12    Exercise Name 12  Heel Raises  -MH      Reps 12  Discharge to [facility]  -         Exercise 13    Exercise Name 13  6\" Fwd Step Ups  -MH      Reps 13  20 each  -MH         Exercise 14    Exercise Name 14  Mini Squats  -MH      Cueing 14  Verbal  -MH      Reps 14  40  -MH         Exercise 15    Exercise Name 15  TKE vs theraband  -MH      Reps 15  Discharge to [facility]  -         Exercise 16    Exercise Name 16  6\" Lateral step overs  " -      Cueing 16  Verbal;Demo  -      Reps 16  10 each  -        User Key  (r) = Recorded By, (t) = Taken By, (c) = Cosigned By    Initials Name Provider Type    Braden Smith PTA Physical Therapy Assistant                           Therapy Education  Given: HEP, Symptoms/condition management  Program: Reinforced  How Provided: Verbal  Provided to: Patient  Level of Understanding: Teach back education performed, Verbalized, Demonstrated              Time Calculation:   Start Time: 1040  Stop Time: 1215  Time Calculation (min): 95 min  Therapy Charges for Today     Code Description Service Date Service Provider Modifiers Qty    53987768120 HC PT MANUAL THERAPY EA 15 MIN 12/11/2020 Braden Mckeon PTA GP 1    29786329750 HC PT THER PROC EA 15 MIN 12/11/2020 Braden Mckeon PTA GP 3                    Braden Mckeon PTA  12/11/2020

## 2020-12-16 ENCOUNTER — HOSPITAL ENCOUNTER (OUTPATIENT)
Dept: PHYSICAL THERAPY | Facility: HOSPITAL | Age: 65
Setting detail: THERAPIES SERIES
Discharge: HOME | End: 2020-12-16

## 2020-12-16 DIAGNOSIS — Z96.651 STATUS POST TOTAL RIGHT KNEE REPLACEMENT: Primary | ICD-10-CM

## 2020-12-16 PROCEDURE — 97140 MANUAL THERAPY 1/> REGIONS: CPT

## 2020-12-16 PROCEDURE — 97110 THERAPEUTIC EXERCISES: CPT

## 2020-12-16 NOTE — THERAPY TREATMENT NOTE
Outpatient Physical Therapy Ortho Treatment Note   Jenny Chávez     Patient Name: Yesi Benson  : 1955  MRN: [id number]  Today's Date: 2020      Visit Date: 2020    Visit Dx:    ICD-10-CM ICD-9-CM   1. Status post total right knee replacement  Z96.651 V43.65       Patient Active Problem List   Diagnosis   • Adenomatous polyp of colon   • Hyperlipidemia   • Hypothyroidism   • Status post total right knee replacement, DOS 10/14/2020   • Arthrofibrosis of knee joint, right        Past Medical History:   Diagnosis Date   • Colon polyp    • Disease of thyroid gland         Past Surgical History:   Procedure Laterality Date   • COLONOSCOPY N/A 3/27/2019    Procedure: COLONOSCOPY POLYPECTOMY;  Surgeon: Jaime Alaniz MD;  Location: [facility];  Service: Gastroenterology   • DENTAL PROCEDURE Left    • SALPINGO OOPHORECTOMY Left     as an infant    • TOTAL KNEE ARTHROPLASTY Right 10/14/2020    Procedure: TOTAL KNEE ARTHROPLASTY AND ALL ASSOCIATED PROCEDURES;  Surgeon: Elmer Fuentes MD;  Location: [facility];  Service: Orthopedics;  Laterality: Right;       PT Ortho     Row Name 20 1100       Subjective Comments    Subjective Comments  pt reports her knee continues to feel tight and worries that it is getting tighter  -       Right Lower Ext    Rt Knee Extension/Flexion PROM  0 - 115 degrees AA/PROM (post stretches, supine  -      User Key  (r) = Recorded By, (t) = Taken By, (c) = Cosigned By    Initials Name Provider Type     Braden Mckeon, SARAH Physical Therapy Assistant                      PT Assessment/Plan     Row Name 20 8894          PT Assessment    Assessment Comments  pt making slow gains with ROM; decreased extension noted at beginning of therapy today but improved following stretches; continues with moderate edema (R) knee  -        PT Plan    PT Plan Comments  Check response to kinesiotape; pt to MD tomorrow - cont as advised  -       User  "Key  (r) = Recorded By, (t) = Taken By, (c) = Cosigned By    Initials Name Provider Type    Braden Smith SARAH Corcoran Physical Therapy Assistant          Modalities     Row Name 12/16/20 1100             Ice    Location  ant/post (R) knee - pt supine  -MH      Rx Minutes  10 mins  -MH      Ice S/P Rx  Yes  -MH         Other Treatment Provided    Taping / Bracing  2 fans strips applied for edema to (R) knee.  Pt to leave on up to 5 days and  trim/remove as needed.  -        User Key  (r) = Recorded By, (t) = Taken By, (c) = Cosigned By    Initials Name Provider Type    Braden SmithSARAH Physical Therapy Assistant        OP Exercises     Row Name 12/16/20 1100             Subjective Comments    Subjective Comments  pt reports her knee continues to feel tight and worries that it is getting tighter  -MH         Exercise 1    Exercise Name 1  Heel Slides  -MH      Time 1  8 min  -MH         Exercise 2    Exercise Name 2  Wall Slides  -MH      Time 2  8 min  -MH         Exercise 3    Exercise Name 3  Airdyne: Seat 1  -MH      Time 3  6 min  -MH         Exercise 4    Exercise Name 4  PROM: Flexion  -MH      Reps 4  10x10  -MH         Exercise 5    Exercise Name 5  Hamstring Stretch  -MH      Reps 5  10  -MH      Time 5  10 sec hold   -MH         Exercise 6    Exercise Name 6  Heel Prop  -MH      Time 6  5 min - 1#  -MH         Exercise 7    Exercise Name 7  PROM: Extension  -MH      Reps 7  10x10  -MH         Exercise 9    Exercise Name 9  3-Way Hip  -MH      Reps 9  40 each  -MH      Time 9  1#  -MH         Exercise 10    Exercise Name 10  SAQ  -MH      Cueing 10  Verbal;Tactile  -MH      Reps 10  40  -MH      Time 10  1#  -MH         Exercise 11    Exercise Name 11  LAQ  -MH      Cueing 11  Verbal  -MH      Reps 11  40  -MH      Time 11  1#  -MH         Exercise 12    Exercise Name 12  6\" Lateral step overs  -MH      Cueing 12  Verbal;Demo  -MH      Reps 12  15 each  -MH         Exercise 13    Exercise Name 13  6\" " Fwd Step Ups  -      Reps 13  20 each  -         Exercise 14    Exercise Name 14  Mini Squats  -      Cueing 14  Verbal  -      Reps 14  40  -MH        User Key  (r) = Recorded By, (t) = Taken By, (c) = Cosigned By    Initials Name Provider Type     Braden Mckeon PTA Physical Therapy Assistant                           Therapy Education  Given: HEP, Symptoms/condition management  Program: Reinforced  How Provided: Verbal  Provided to: Patient  Level of Understanding: Teach back education performed, Verbalized, Demonstrated              Time Calculation:   Start Time: 1045  Stop Time: 1225  Time Calculation (min): 100 min  Therapy Charges for Today     Code Description Service Date Service Provider Modifiers Qty    33561479622 HC PT THER PROC EA 15 MIN 12/16/2020 Braden Mckeon PTA GP 3    25455916327 HC PT MANUAL THERAPY EA 15 MIN 12/16/2020 Braden Mckeon PTA GP 1                    Braden Mckeon PTA  12/16/2020

## 2020-12-17 ENCOUNTER — TRANSCRIBE ORDERS (OUTPATIENT)
Dept: ADMINISTRATIVE | Facility: HOSPITAL | Age: 65
End: 2020-12-17

## 2020-12-17 ENCOUNTER — OFFICE VISIT (OUTPATIENT)
Dept: ORTHOPEDIC SURGERY | Facility: CLINIC | Age: 65
End: 2020-12-17

## 2020-12-17 VITALS — BODY MASS INDEX: 27.97 KG/M2 | HEIGHT: 62 IN | WEIGHT: 152 LBS

## 2020-12-17 DIAGNOSIS — Z96.651 STATUS POST TOTAL RIGHT KNEE REPLACEMENT: Primary | ICD-10-CM

## 2020-12-17 DIAGNOSIS — Z01.818 PREOP EXAMINATION: Primary | ICD-10-CM

## 2020-12-17 DIAGNOSIS — M24.661 ARTHROFIBROSIS OF KNEE JOINT, RIGHT: ICD-10-CM

## 2020-12-17 PROCEDURE — 99024 POSTOP FOLLOW-UP VISIT: CPT | Performed by: ORTHOPAEDIC SURGERY

## 2020-12-17 RX ORDER — ACETAMINOPHEN 325 MG/1
1000 TABLET ORAL ONCE
Status: CANCELLED | OUTPATIENT
Start: 2020-12-17 | End: 2020-12-17

## 2020-12-17 RX ORDER — PREGABALIN 150 MG/1
150 CAPSULE ORAL ONCE
Status: CANCELLED | OUTPATIENT
Start: 2020-12-17 | End: 2020-12-17

## 2020-12-17 RX ORDER — MELOXICAM 7.5 MG/1
15 TABLET ORAL ONCE
Status: CANCELLED | OUTPATIENT
Start: 2020-12-17 | End: 2020-12-17

## 2020-12-17 NOTE — PROGRESS NOTES
CC: s/p right total knee arthroplasty, DOS 10/14/2020  Interval History: Yesi Benson returns 8 week postoperative visit.  She continues to note stiffness in the right knee at this time. Pain is controlled with pain medication and is improving. She denies any wound problem, fevers, or chills. Patient is continuing to work with outpatient PT. Ambulating with cane.     Physical Examination: Right knee was examined   Incision well healed   ROM 1-95,  4/5 strength   No residual extensor lag   Stable to varus and valgus stress   Flex/extend ankle and toes   Positive sensation right foot   No calf pain, negative Homans sign bilaterally    Radiographic review:   None    Assessment/Plan:  1. Yesi Benson is recovering from surgery as expected.  2. We will continue physical therapy for range of motion, strengthening, and gait normalization.  3. Continue prone hang exercises with ankle weights.  4. Given her residual stiffness in the knee, discussed option for manipulation under anaesthesia today. Patient would like to proceed with this at this time.  5. I explained to the patient details of procedure as well as risks, benefits, and alternatives with the risks including not limited to neurovascular damage, bleeding, infection, chronic pain, recurrence of loss of motion, patellar/quad tendon rupture, periprosthetic fracture, incisional dehiscence, weakness, stiffness, instability, DVT, pulmonary embolus, death, stroke, complex regional pain syndrome, myocardial infarction, need for additional procedures.  Patient understood all these had all questions answered prior to consenting to proceed with surgery. No guarantees were given in regards to results of surgery.  We will have patient continue daily physical therapy for 2 weeks after manipulation to try to maintain improvements from manipulation.     Medications:  No orders of the defined types were placed in this encounter.      By signing my name here, I Camille leo  that all documentation on 12/17/20 at 07:41 EST has been prepared under the direction and in the presence of Dr. Elmer Fuentes MD.    I, Dr. Elmer Fuentes, personally performed the services described in this documentation, as scribed by Camille Mckinney, in my presence, and it is both accurate and complete.

## 2020-12-18 ENCOUNTER — HOSPITAL ENCOUNTER (OUTPATIENT)
Dept: PHYSICAL THERAPY | Facility: HOSPITAL | Age: 65
Setting detail: THERAPIES SERIES
Discharge: HOME | End: 2020-12-18

## 2020-12-18 ENCOUNTER — LAB (OUTPATIENT)
Dept: LAB | Facility: HOSPITAL | Age: 65
End: 2020-12-18

## 2020-12-18 ENCOUNTER — ANESTHESIA EVENT (OUTPATIENT)
Dept: PERIOP | Facility: HOSPITAL | Age: 65
End: 2020-12-18

## 2020-12-18 DIAGNOSIS — Z96.651 STATUS POST TOTAL RIGHT KNEE REPLACEMENT: Primary | ICD-10-CM

## 2020-12-18 DIAGNOSIS — Z01.818 PREOP EXAMINATION: ICD-10-CM

## 2020-12-18 PROCEDURE — C9803 HOPD COVID-19 SPEC COLLECT: HCPCS

## 2020-12-18 PROCEDURE — 97140 MANUAL THERAPY 1/> REGIONS: CPT

## 2020-12-18 PROCEDURE — U0004 COV-19 TEST NON-CDC HGH THRU: HCPCS | Performed by: OBSTETRICS & GYNECOLOGY

## 2020-12-18 PROCEDURE — 97110 THERAPEUTIC EXERCISES: CPT

## 2020-12-18 NOTE — THERAPY TREATMENT NOTE
"    Outpatient Physical Therapy Ortho Treatment Note   Jenny Chávez     Patient Name: Yesi Benson  : 1955  MRN: [id number]  Today's Date: 2020      Visit Date: 2020    Visit Dx:    ICD-10-CM ICD-9-CM   1. Status post total right knee replacement  Z96.651 V43.65       Patient Active Problem List   Diagnosis   • Adenomatous polyp of colon   • Hyperlipidemia   • Hypothyroidism   • Status post total right knee replacement, DOS 10/14/2020   • Arthrofibrosis of knee joint, right        Past Medical History:   Diagnosis Date   • Colon polyp    • Disease of thyroid gland         Past Surgical History:   Procedure Laterality Date   • COLONOSCOPY N/A 3/27/2019    Procedure: COLONOSCOPY POLYPECTOMY;  Surgeon: Jaime Alaniz MD;  Location: [facility];  Service: Gastroenterology   • DENTAL PROCEDURE Left    • SALPINGO OOPHORECTOMY Left     as an infant    • TOTAL KNEE ARTHROPLASTY Right 10/14/2020    Procedure: TOTAL KNEE ARTHROPLASTY AND ALL ASSOCIATED PROCEDURES;  Surgeon: Elmer Fuentes MD;  Location: [facility];  Service: Orthopedics;  Laterality: Right;       PT Ortho     Row Name 20 1100       Subjective Comments    Subjective Comments  pt states her knee feels really tight but not much pain; scheduled for manipulation 2020  -       Right Lower Ext    Rt Knee Extension/Flexion PROM  112 degrees flexion (seated, post stretches)  -      User Key  (r) = Recorded By, (t) = Taken By, (c) = Cosigned By    Initials Name Provider Type     Braden Mckeon, SARAH Physical Therapy Assistant                      PT Assessment/Plan     Row Name 20 1216          PT Assessment    Assessment Comments  tolerated progression of ex's well, no complaints of pain; continues with reports of knee feeling tight; no significant benefit noted with kinesiotape but \"buckets\" had already loosened soon after leaving last session per pt  -        PT Plan    PT Plan Comments  Pt to " "continue HEP; scheduled for manipulation 12-; will continue as advised by MD SALAS       User Key  (r) = Recorded By, (t) = Taken By, (c) = Cosigned By    Initials Name Provider Type    Braden Smith PTA Physical Therapy Assistant            OP Exercises     Row Name 12/18/20 1100             Subjective Comments    Subjective Comments  pt states her knee feels really tight but not much pain; scheduled for manipulation 12-  -MH         Exercise 1    Exercise Name 1  Heel Slides  -MH      Time 1  8 min  -MH         Exercise 2    Exercise Name 2  Wall Slides  -MH      Time 2  8 min  -MH         Exercise 3    Exercise Name 3  Airdyne: Seat 1  -MH      Time 3  6 min  -MH         Exercise 4    Exercise Name 4  PROM: Flexion  -MH      Reps 4  10x10  -MH         Exercise 5    Exercise Name 5  Hamstring Stretch  -MH      Reps 5  10  -MH      Time 5  10 sec hold   -MH         Exercise 6    Exercise Name 6  Heel Prop  -MH      Time 6  5 min - 2#  -MH         Exercise 7    Exercise Name 7  PROM: Extension  -MH      Reps 7  10x10  -MH         Exercise 9    Exercise Name 9  3-Way Hip  -MH      Reps 9  30 each  -MH      Time 9  2#  -MH         Exercise 10    Exercise Name 10  SAQ  -MH      Cueing 10  Verbal;Tactile  -MH      Reps 10  40  -MH      Time 10  2#  -MH         Exercise 11    Exercise Name 11  LAQ  -MH      Cueing 11  Verbal  -MH      Reps 11  40  -MH      Time 11  2#  -MH         Exercise 12    Exercise Name 12  6\" Lateral step overs  -MH      Cueing 12  Verbal  -MH      Reps 12  20 each  -MH         Exercise 13    Exercise Name 13  6\" Fwd Step Ups  -MH      Reps 13  20 each  -MH         Exercise 14    Exercise Name 14  Mini Squats  -MH      Cueing 14  Verbal  -MH      Reps 14  40  -MH        User Key  (r) = Recorded By, (t) = Taken By, (c) = Cosigned By    Initials Name Provider Type    Braden Smith PTA Physical Therapy Assistant                           Therapy Education  Education Details: " discussed importance of HEP, pushing ROM, following manipulation  Given: HEP, Symptoms/condition management  Program: Reinforced  How Provided: Verbal  Provided to: Patient  Level of Understanding: Teach back education performed, Verbalized, Demonstrated              Time Calculation:   Start Time: 1100  Stop Time: 1245  Time Calculation (min): 105 min  Therapy Charges for Today     Code Description Service Date Service Provider Modifiers Qty    33861293569 HC PT MANUAL THERAPY EA 15 MIN 12/18/2020 Braden Mckeon PTA GP 1    72111301999 HC PT THER PROC EA 15 MIN 12/18/2020 Braden Mckeon PTA GP 3                    Braden Mckeon PTA  12/18/2020

## 2020-12-19 LAB — SARS-COV-2 RNA RESP QL NAA+PROBE: NOT DETECTED

## 2020-12-21 ENCOUNTER — HOSPITAL ENCOUNTER (OUTPATIENT)
Facility: HOSPITAL | Age: 65
Setting detail: HOSPITAL OUTPATIENT SURGERY
Discharge: HOME | End: 2020-12-21
Attending: ORTHOPAEDIC SURGERY | Admitting: ORTHOPAEDIC SURGERY

## 2020-12-21 ENCOUNTER — APPOINTMENT (OUTPATIENT)
Dept: PHYSICAL THERAPY | Facility: HOSPITAL | Age: 65
End: 2020-12-21

## 2020-12-21 ENCOUNTER — ANESTHESIA (OUTPATIENT)
Dept: PERIOP | Facility: HOSPITAL | Age: 65
End: 2020-12-21

## 2020-12-21 ENCOUNTER — HOSPITAL ENCOUNTER (OUTPATIENT)
Dept: PHYSICAL THERAPY | Facility: HOSPITAL | Age: 65
Setting detail: THERAPIES SERIES
Discharge: HOME | End: 2020-12-21

## 2020-12-21 VITALS
DIASTOLIC BLOOD PRESSURE: 80 MMHG | TEMPERATURE: 98.1 F | WEIGHT: 151 LBS | BODY MASS INDEX: 27.62 KG/M2 | HEART RATE: 68 BPM | RESPIRATION RATE: 17 BRPM | SYSTOLIC BLOOD PRESSURE: 149 MMHG | OXYGEN SATURATION: 98 %

## 2020-12-21 DIAGNOSIS — Z96.651 STATUS POST TOTAL RIGHT KNEE REPLACEMENT: ICD-10-CM

## 2020-12-21 DIAGNOSIS — Z96.651 STATUS POST TOTAL RIGHT KNEE REPLACEMENT: Primary | ICD-10-CM

## 2020-12-21 DIAGNOSIS — M24.661 ARTHROFIBROSIS OF KNEE JOINT, RIGHT: ICD-10-CM

## 2020-12-21 PROCEDURE — 25010000002 DEXAMETHASONE PER 1 MG: Performed by: NURSE ANESTHETIST, CERTIFIED REGISTERED

## 2020-12-21 PROCEDURE — 25010000002 MIDAZOLAM PER 1MG: Performed by: NURSE ANESTHETIST, CERTIFIED REGISTERED

## 2020-12-21 PROCEDURE — 27570 FIXATION OF KNEE JOINT: CPT | Performed by: ORTHOPAEDIC SURGERY

## 2020-12-21 PROCEDURE — 25010000002 TRIAMCINOLONE PER 10 MG: Performed by: ORTHOPAEDIC SURGERY

## 2020-12-21 PROCEDURE — 25010000002 HYDROMORPHONE PER 4 MG: Performed by: NURSE ANESTHETIST, CERTIFIED REGISTERED

## 2020-12-21 PROCEDURE — 76942 ECHO GUIDE FOR BIOPSY: CPT | Performed by: ORTHOPAEDIC SURGERY

## 2020-12-21 PROCEDURE — 25010000002 PROPOFOL 10 MG/ML EMULSION: Performed by: NURSE ANESTHETIST, CERTIFIED REGISTERED

## 2020-12-21 PROCEDURE — 97110 THERAPEUTIC EXERCISES: CPT

## 2020-12-21 PROCEDURE — 25010000003 LIDOCAINE 1 % SOLUTION: Performed by: ORTHOPAEDIC SURGERY

## 2020-12-21 PROCEDURE — 25010000002 ROPIVACAINE PER 1 MG: Performed by: NURSE ANESTHETIST, CERTIFIED REGISTERED

## 2020-12-21 PROCEDURE — 25010000002 ONDANSETRON PER 1 MG: Performed by: NURSE ANESTHETIST, CERTIFIED REGISTERED

## 2020-12-21 RX ORDER — HYDROMORPHONE HYDROCHLORIDE 1 MG/ML
0.5 INJECTION, SOLUTION INTRAMUSCULAR; INTRAVENOUS; SUBCUTANEOUS
Status: DISCONTINUED | OUTPATIENT
Start: 2020-12-21 | End: 2020-12-21 | Stop reason: HOSPADM

## 2020-12-21 RX ORDER — ONDANSETRON 2 MG/ML
4 INJECTION INTRAMUSCULAR; INTRAVENOUS ONCE AS NEEDED
Status: COMPLETED | OUTPATIENT
Start: 2020-12-21 | End: 2020-12-21

## 2020-12-21 RX ORDER — MELOXICAM 7.5 MG/1
15 TABLET ORAL ONCE
Status: COMPLETED | OUTPATIENT
Start: 2020-12-21 | End: 2020-12-21

## 2020-12-21 RX ORDER — SODIUM CHLORIDE 0.9 % (FLUSH) 0.9 %
10 SYRINGE (ML) INJECTION EVERY 12 HOURS SCHEDULED
Status: DISCONTINUED | OUTPATIENT
Start: 2020-12-21 | End: 2020-12-21 | Stop reason: HOSPADM

## 2020-12-21 RX ORDER — ACETAMINOPHEN 325 MG/1
650 TABLET ORAL ONCE AS NEEDED
Status: DISCONTINUED | OUTPATIENT
Start: 2020-12-21 | End: 2020-12-21 | Stop reason: HOSPADM

## 2020-12-21 RX ORDER — DEXAMETHASONE SODIUM PHOSPHATE 4 MG/ML
8 INJECTION, SOLUTION INTRA-ARTICULAR; INTRALESIONAL; INTRAMUSCULAR; INTRAVENOUS; SOFT TISSUE ONCE AS NEEDED
Status: COMPLETED | OUTPATIENT
Start: 2020-12-21 | End: 2020-12-21

## 2020-12-21 RX ORDER — LIDOCAINE HYDROCHLORIDE 10 MG/ML
INJECTION, SOLUTION INFILTRATION; PERINEURAL AS NEEDED
Status: DISCONTINUED | OUTPATIENT
Start: 2020-12-21 | End: 2020-12-21 | Stop reason: HOSPADM

## 2020-12-21 RX ORDER — PREGABALIN 75 MG/1
150 CAPSULE ORAL ONCE
Status: COMPLETED | OUTPATIENT
Start: 2020-12-21 | End: 2020-12-21

## 2020-12-21 RX ORDER — ACETAMINOPHEN 500 MG
1000 TABLET ORAL ONCE
Status: COMPLETED | OUTPATIENT
Start: 2020-12-21 | End: 2020-12-21

## 2020-12-21 RX ORDER — MEPERIDINE HYDROCHLORIDE 25 MG/ML
12.5 INJECTION INTRAMUSCULAR; INTRAVENOUS; SUBCUTANEOUS
Status: DISCONTINUED | OUTPATIENT
Start: 2020-12-21 | End: 2020-12-21 | Stop reason: HOSPADM

## 2020-12-21 RX ORDER — FAMOTIDINE 10 MG/ML
20 INJECTION, SOLUTION INTRAVENOUS
Status: COMPLETED | OUTPATIENT
Start: 2020-12-21 | End: 2020-12-21

## 2020-12-21 RX ORDER — ONDANSETRON 2 MG/ML
4 INJECTION INTRAMUSCULAR; INTRAVENOUS ONCE AS NEEDED
Status: DISCONTINUED | OUTPATIENT
Start: 2020-12-21 | End: 2020-12-21 | Stop reason: HOSPADM

## 2020-12-21 RX ORDER — SODIUM CHLORIDE, SODIUM LACTATE, POTASSIUM CHLORIDE, CALCIUM CHLORIDE 600; 310; 30; 20 MG/100ML; MG/100ML; MG/100ML; MG/100ML
9 INJECTION, SOLUTION INTRAVENOUS CONTINUOUS PRN
Status: DISCONTINUED | OUTPATIENT
Start: 2020-12-21 | End: 2020-12-21 | Stop reason: HOSPADM

## 2020-12-21 RX ORDER — SODIUM CHLORIDE 0.9 % (FLUSH) 0.9 %
10 SYRINGE (ML) INJECTION AS NEEDED
Status: DISCONTINUED | OUTPATIENT
Start: 2020-12-21 | End: 2020-12-21 | Stop reason: HOSPADM

## 2020-12-21 RX ORDER — MIDAZOLAM HYDROCHLORIDE 2 MG/2ML
1 INJECTION, SOLUTION INTRAMUSCULAR; INTRAVENOUS
Status: DISCONTINUED | OUTPATIENT
Start: 2020-12-21 | End: 2020-12-21 | Stop reason: HOSPADM

## 2020-12-21 RX ORDER — ROPIVACAINE HYDROCHLORIDE 2 MG/ML
INJECTION, SOLUTION EPIDURAL; INFILTRATION; PERINEURAL
Status: COMPLETED | OUTPATIENT
Start: 2020-12-21 | End: 2020-12-21

## 2020-12-21 RX ORDER — HYDROMORPHONE HYDROCHLORIDE 1 MG/ML
1 INJECTION, SOLUTION INTRAMUSCULAR; INTRAVENOUS; SUBCUTANEOUS
Status: DISCONTINUED | OUTPATIENT
Start: 2020-12-21 | End: 2020-12-21 | Stop reason: HOSPADM

## 2020-12-21 RX ORDER — OXYCODONE HYDROCHLORIDE AND ACETAMINOPHEN 5; 325 MG/1; MG/1
1 TABLET ORAL ONCE AS NEEDED
Status: COMPLETED | OUTPATIENT
Start: 2020-12-21 | End: 2020-12-21

## 2020-12-21 RX ORDER — SODIUM CHLORIDE 9 MG/ML
40 INJECTION, SOLUTION INTRAVENOUS AS NEEDED
Status: DISCONTINUED | OUTPATIENT
Start: 2020-12-21 | End: 2020-12-21 | Stop reason: HOSPADM

## 2020-12-21 RX ORDER — PROPOFOL 10 MG/ML
VIAL (ML) INTRAVENOUS AS NEEDED
Status: DISCONTINUED | OUTPATIENT
Start: 2020-12-21 | End: 2020-12-21 | Stop reason: SURG

## 2020-12-21 RX ORDER — LIDOCAINE HYDROCHLORIDE 10 MG/ML
0.5 INJECTION, SOLUTION EPIDURAL; INFILTRATION; INTRACAUDAL; PERINEURAL ONCE AS NEEDED
Status: DISCONTINUED | OUTPATIENT
Start: 2020-12-21 | End: 2020-12-21 | Stop reason: HOSPADM

## 2020-12-21 RX ORDER — TRIAMCINOLONE ACETONIDE 40 MG/ML
INJECTION, SUSPENSION INTRA-ARTICULAR; INTRAMUSCULAR AS NEEDED
Status: DISCONTINUED | OUTPATIENT
Start: 2020-12-21 | End: 2020-12-21 | Stop reason: HOSPADM

## 2020-12-21 RX ORDER — DIPHENHYDRAMINE HYDROCHLORIDE 50 MG/ML
12.5 INJECTION INTRAMUSCULAR; INTRAVENOUS
Status: DISCONTINUED | OUTPATIENT
Start: 2020-12-21 | End: 2020-12-21 | Stop reason: HOSPADM

## 2020-12-21 RX ORDER — ACETAMINOPHEN 650 MG/1
650 SUPPOSITORY RECTAL ONCE AS NEEDED
Status: DISCONTINUED | OUTPATIENT
Start: 2020-12-21 | End: 2020-12-21 | Stop reason: HOSPADM

## 2020-12-21 RX ORDER — LIDOCAINE HYDROCHLORIDE 10 MG/ML
INJECTION, SOLUTION EPIDURAL; INFILTRATION; INTRACAUDAL; PERINEURAL AS NEEDED
Status: DISCONTINUED | OUTPATIENT
Start: 2020-12-21 | End: 2020-12-21 | Stop reason: SURG

## 2020-12-21 RX ADMIN — DEXAMETHASONE SODIUM PHOSPHATE 8 MG: 4 INJECTION, SOLUTION INTRAMUSCULAR; INTRAVENOUS at 07:26

## 2020-12-21 RX ADMIN — ONDANSETRON 4 MG: 2 INJECTION, SOLUTION INTRAMUSCULAR; INTRAVENOUS at 07:26

## 2020-12-21 RX ADMIN — SODIUM CHLORIDE, POTASSIUM CHLORIDE, SODIUM LACTATE AND CALCIUM CHLORIDE 9 ML/HR: 600; 310; 30; 20 INJECTION, SOLUTION INTRAVENOUS at 06:46

## 2020-12-21 RX ADMIN — MELOXICAM 15 MG: 7.5 TABLET ORAL at 06:34

## 2020-12-21 RX ADMIN — LIDOCAINE HYDROCHLORIDE 60 MG: 10 INJECTION, SOLUTION EPIDURAL; INFILTRATION; INTRACAUDAL; PERINEURAL at 08:00

## 2020-12-21 RX ADMIN — PROPOFOL 30 MG: 10 INJECTION, EMULSION INTRAVENOUS at 08:02

## 2020-12-21 RX ADMIN — FAMOTIDINE 20 MG: 10 INJECTION INTRAVENOUS at 07:26

## 2020-12-21 RX ADMIN — PROPOFOL 30 MG: 10 INJECTION, EMULSION INTRAVENOUS at 08:08

## 2020-12-21 RX ADMIN — OXYCODONE HYDROCHLORIDE AND ACETAMINOPHEN 1 TABLET: 5; 325 TABLET ORAL at 08:52

## 2020-12-21 RX ADMIN — PREGABALIN 150 MG: 75 CAPSULE ORAL at 06:34

## 2020-12-21 RX ADMIN — PROPOFOL 20 MG: 10 INJECTION, EMULSION INTRAVENOUS at 08:05

## 2020-12-21 RX ADMIN — MIDAZOLAM HYDROCHLORIDE 1 MG: 1 INJECTION, SOLUTION INTRAMUSCULAR; INTRAVENOUS at 07:26

## 2020-12-21 RX ADMIN — SODIUM CHLORIDE, POTASSIUM CHLORIDE, SODIUM LACTATE AND CALCIUM CHLORIDE: 600; 310; 30; 20 INJECTION, SOLUTION INTRAVENOUS at 07:58

## 2020-12-21 RX ADMIN — ROPIVACAINE HYDROCHLORIDE 20 ML: 2 INJECTION, SOLUTION EPIDURAL; INFILTRATION at 07:30

## 2020-12-21 RX ADMIN — HYDROMORPHONE HYDROCHLORIDE 0.5 MG: 1 INJECTION, SOLUTION INTRAMUSCULAR; INTRAVENOUS; SUBCUTANEOUS at 08:52

## 2020-12-21 RX ADMIN — ACETAMINOPHEN 1000 MG: 500 TABLET, FILM COATED ORAL at 06:34

## 2020-12-21 NOTE — ANESTHESIA PREPROCEDURE EVALUATION
Anesthesia Evaluation     Patient summary reviewed and Nursing notes reviewed   no history of anesthetic complications:  NPO Solid Status: > 8 hours  NPO Liquid Status: > 8 hours           Airway   Mallampati: II  TM distance: >3 FB  Neck ROM: full  No difficulty expected  Dental - normal exam     Pulmonary - negative pulmonary ROS and normal exam    breath sounds clear to auscultation  Cardiovascular - normal exam    ECG reviewed  Rhythm: regular  Rate: normal        Neuro/Psych- negative ROS  GI/Hepatic/Renal/Endo    (+)   thyroid problem hypothyroidism    Musculoskeletal     Abdominal  - normal exam   Substance History - negative use     OB/GYN          Other   arthritis,                      Anesthesia Plan    ASA 2     MAC and regional     intravenous induction     Anesthetic plan, all risks, benefits, and alternatives have been provided, discussed and informed consent has been obtained with: patient.  Use of blood products discussed with patient  Consented to blood products.

## 2020-12-21 NOTE — OP NOTE
Date of Operation: 12/21/2020     PREOPERATIVE DIAGNOSIS: right knee arthrofibrosis status post total knee arthroplasty    POSTOPERATIVE DIAGNOSIS: right knee arthrofibrosis status post total knee arthroplasty      PROCEDURE PERFORMED:   1.  right knee manipulation under anesthesia  2.  right knee intra-articular injection    SURGEON: Elmer Fuentes MD     ASSISTANT: None     MAC with regional block     ESTIMATED BLOOD LOSS: none     URINE OUTPUT: Not recorded.       FLUIDS: Per anesthesia.       COMPLICATIONS: None.       SPECIMENS: None.       DRAINS: None.     No Tourniquet Times Documented      IMPLANTS: none     Examination under anesthesia:  right knee midline incision well-healed.  Passive range of motion 2-110 degrees, stable to varus and valgus at 2 and 30 degrees.    INDICATIONS FOR PROCEDURE: Patient is a pleasant 65 y.o. female with significant history of right total knee arthroplasty who developed postoperative arthrofibrosis. I discussed treatment options available to the patient and patient wished to proceed with surgical treatment. I explained details of the procedure, as well as the risks, benefits, and alternatives as documented on history and physical, and the patient had all questions answered prior to signing the operative consent form. No guarantees were given in regard to results of the surgery.       DESCRIPTION OF PROCEDURE: The patient was seen, evaluated, and cleared for surgery by anesthesia. Admitted in the preoperative holding area. The operative site was marked, consent was reviewed, history and physical was updated, and preoperative labs were reviewed. A regional block was then placed per anesthesia. The patient was then taken to the operating room and kept in supine position on stretcher. After successful sedation per anesthesia, examination under anesthesia was carried out this point time.    A formal timeout was completed, including confirmation of History and Physical, operative  consent, surgical site, patient identification number.      Attention was then turned to manipulation under anesthesia of the right knee.  Short arm lever arc range of motion and manipulation was completed with pressure through the mid tibia and knee was brought up into a flexed position.  Audible release of adhesions was noted primarily from the suprapatellar pouch.  Improved flexion was achieved up to 125 degrees compared to 125 degrees of flexion on the contralateral knee.      Attention was then turned to intra-articular injection to the superior lateral approach.  Sterile prep was completed in this region.  Sterile preparation of 6 cc 1% lidocaine plain and 40 mg Kenalog was then prepared on the sterile Rangel and injected from the superior lateral approach into the intra-articular space.    At the end of the procedure, patient had brisk capillary refill to all digits of the right lower extremity. Compartments were soft and easily compressible at the end of the procedure.       DISPOSITION: The patient was taken to the recovery room in stable condition. Will follow up in office in 1 week for motion check. Results discussed immediately after procedure with family and all questions were answered at that time.       REHAB: Patient will participate in daily rehab x2 weeks to try to maintain and increase range of motion and strength following manipulation.

## 2020-12-21 NOTE — THERAPY RE-EVALUATION
Outpatient Physical Therapy Ortho Re-Evaluation   Jenny Chávez     Patient Name: Yesi Benson  : 1955  MRN: [id number]  Today's Date: 2020      Visit Date: 2020    Patient Active Problem List   Diagnosis   • Adenomatous polyp of colon   • Hyperlipidemia   • Hypothyroidism   • Status post total right knee replacement, DOS 10/14/2020   • Arthrofibrosis of knee joint, right        Past Medical History:   Diagnosis Date   • Colon polyp    • Disease of thyroid gland         Past Surgical History:   Procedure Laterality Date   • COLONOSCOPY N/A 3/27/2019    Procedure: COLONOSCOPY POLYPECTOMY;  Surgeon: Jaime Alaniz MD;  Location: [facility];  Service: Gastroenterology   • DENTAL PROCEDURE Left    • SALPINGO OOPHORECTOMY Left     as an infant    • TOTAL KNEE ARTHROPLASTY Right 10/14/2020    Procedure: TOTAL KNEE ARTHROPLASTY AND ALL ASSOCIATED PROCEDURES;  Surgeon: Elmer Fuentes MD;  Location: [facility];  Service: Orthopedics;  Laterality: Right;       Visit Dx:     ICD-10-CM ICD-9-CM   1. Status post total right knee replacement  Z96.651 V43.65             PT Ortho     Row Name 20 1200       Precautions and Contraindications    Precautions/Limitations  no known precautions/limitations  -AS       Subjective Pain    Able to rate subjective pain?  yes  -AS    Pre-Treatment Pain Level  2  -AS    Post-Treatment Pain Level  2  -AS       Posture/Observations    Posture- WNL  Posture is WNL  -AS       Patellar Accessory Motions    Superior glide  Right:;WNL  -AS    Inferior glide  Right:;WNL  -AS    Medial glide  Right:;WNL  -AS    Lateral glide  Right:;WNL  -AS       Right Lower Ext    Rt Knee Extension/Flexion AROM  0-2-113 post stretch 0-5-100 pre stretch  -AS    Rt Knee Extension/Flexion PROM  0-121  -AS       Lower Extremity Flexibility    Hamstrings  Right:;Mildly limited  -AS      User Key  (r) = Recorded By, (t) = Taken By, (c) = Cosigned By    Initials Name  Provider Type    AS Jared Yoon, PT Physical Therapist                            PT OP Goals     Row Name 12/21/20 1200          PT Short Term Goals    STG Date to Achieve  11/06/20  -AS     STG 1  Decrease right knee pain to 2-3/10 with activity.  -AS     STG 1 Progress  Met  -AS     STG 2  Increase right knee AROM to 0-1-115 degrees with testing.  -AS     STG 2 Progress  New  -AS     STG 3  Increase right LE stength to at least 4/5 all planes with testing.  -AS     STG 3 Progress  Met  -AS     STG 4  Pt will be independent with her HEP issued by this therapist.  -AS     STG 4 Progress  Met  -AS        Long Term Goals    LTG Date to Achieve  11/27/20  -AS     LTG 1  Decrease right knee pain to 0-1/10 with activity.  -AS     LTG 1 Progress  Met  -AS     LTG 2  Increase right knee AROM to 0-125 degrees with testing.  -AS     LTG 2 Progress  New  -AS     LTG 3  Increase right LE stength to at least 4+/5 all planes with testing.  -AS     LTG 3 Progress  Partially Met;Ongoing;Progressing  -AS     LTG 4  Pt will ambulate normally on levels and stairs without assistive device.  -AS     LTG 4 Progress  Ongoing;Progressing  -AS     LTG 5  Pt will be independent with all ADLs and have a LEFS score > 65.  -AS     LTG 5 Progress  Ongoing;Progressing  -AS       User Key  (r) = Recorded By, (t) = Taken By, (c) = Cosigned By    Initials Name Provider Type    AS Jared Yoon, PT Physical Therapist          PT Assessment/Plan     Row Name 12/21/20 1200          PT Assessment    Functional Limitations  Impaired gait;Impaired locomotion;Limitation in home management;Limitations in community activities;Performance in leisure activities;Performance in sport activities;Performance in work activities  -AS     Impairments  Gait;Impaired flexibility;Pain;Range of motion  -AS     Assessment Comments  Patient is s/p right knee manipulation this morning. Patient able to amnulate slowly with a FWW at this time. Patient has  improved right knee ROM with 0-2-113 AROM post stretching and 0-121 degrees PROM. Only ROM exercises were performed today. Updated patient's HEP and educated her on what exercises to do and how often. Patient agreed with POC.  -AS     Please refer to paper survey for additional self-reported information  Yes  -AS     Rehab Potential  Good  -AS     Patient/caregiver participated in establishment of treatment plan and goals  Yes  -AS     Patient would benefit from skilled therapy intervention  Yes  -AS        PT Plan    PT Frequency  5x/week  -AS     Predicted Duration of Therapy Intervention (PT)  2 weeks  -AS     Planned CPT's?  PT RE-EVAL: 42224;PT THER PROC EA 15 MIN: 38784;PT THER ACT EA 15 MIN: 16631;PT MANUAL THERAPY EA 15 MIN: 47912;PT NEUROMUSC RE-EDUCATION EA 15 MIN: 97166;PT GAIT TRAINING EA 15 MIN: 08848  -AS     PT Plan Comments  Continue with current treatment plan.  -AS       User Key  (r) = Recorded By, (t) = Taken By, (c) = Cosigned By    Initials Name Provider Type    AS Jared Yoon, PT Physical Therapist            OP Exercises     Row Name 12/21/20 1200             Subjective Comments    Subjective Comments  Patient states she feels ok. She states she does feel like she may get sick.  -AS         Subjective Pain    Able to rate subjective pain?  yes  -AS      Pre-Treatment Pain Level  2  -AS      Post-Treatment Pain Level  2  -AS         Exercise 1    Exercise Name 1  Heel Slides  -AS      Time 1  10 min  -AS         Exercise 2    Exercise Name 2  Wall Slides  -AS      Time 2  10 min  -AS         Exercise 4    Exercise Name 4  PROM: Flexion  -AS      Reps 4  10  -AS      Time 4  10 sec hold each  -AS         Exercise 5    Exercise Name 5  HS Stretch  -AS      Reps 5  10  -AS      Time 5  10 sec hold each  -AS         Exercise 6    Exercise Name 6  Heel Prop  -AS      Time 6  5 min  -AS      Additional Comments  3#  -AS         Exercise 7    Exercise Name 7  Prone Leg Hang  -AS      Time 7   5 min  -AS         Exercise 8    Exercise Name 8  PROM: Extension  -AS      Reps 8  10  -AS      Time 8  10 sec hold each  -AS         Exercise 9    Exercise Name 9  QS  -AS      Reps 9  25  -AS      Time 9  5 sec hold each  -AS        User Key  (r) = Recorded By, (t) = Taken By, (c) = Cosigned By    Initials Name Provider Type    AS Jared Yoon, PT Physical Therapist                                  Time Calculation:     Start Time: 1020  Stop Time: 1127  Time Calculation (min): 67 min     Therapy Charges for Today     Code Description Service Date Service Provider Modifiers Qty    [id number]  PT THER PROC EA 15 MIN 12/21/2020 Jaerd Yoon, PT GP 2                    Jared Yoon, PT  12/21/2020

## 2020-12-21 NOTE — H&P
History & Physical       Patient: Yesi Benson    Proposed Surgery and date: Procedure(s) (LRB):  KNEE MANIPULATION (Right)     YOB: 1955    Medical Record Number: [id number]    Surgeon:  Dr. Elmer Fuentes        Chief Complaints: Right knee arthrofibrosis, status post total knee arthroplasty October 14, 2020      History of Present Illness: 65 y.o. female presents with right knee stiffness status post total knee arthroplasty.  Patient has noted some mild improvement with range of motion with oral steroid and physical therapy but stiffness has been progressively worsening over the last 2 to 3 weeks despite more conservative treatment measures.  Symptoms are associated with ability to move, exercise, and perform activities of daily living.  Symptoms are aggravated by ROM necessary for activities of daily living.   Symptoms improve with stretching on oral steroids only minimally.      Allergies: No Known Allergies    Medications:   Home Medications:  No current facility-administered medications on file prior to encounter.      Current Outpatient Medications on File Prior to Encounter   Medication Sig   • calcium carbonate (OS-ARMANDO) 600 MG tablet Take 600 mg by mouth Daily.   • celecoxib (CeleBREX) 200 MG capsule Take 1 capsule by mouth 2 (Two) Times a Day.   • cephalexin (KEFLEX) 500 MG capsule 2 grams once 1 hour prior to dental appointment   • Diclofenac Sodium (VOLTAREN) 1 % gel gel diclofenac 1 % topical gel   • levothyroxine (SYNTHROID) 75 MCG tablet Take 75 mcg by mouth Daily.   • Melatonin 5 MG capsule Take 5 mg by mouth every night at bedtime.   • Omega-3 Fatty Acids (FISH OIL) 1000 MG capsule capsule Take 1,000 mg by mouth Daily With Breakfast.     Current Medications:  Scheduled Meds:  Continuous Infusions:No current facility-administered medications for this encounter.     PRN Meds:.    Past Medical History:   Diagnosis Date   • Colon polyp    • Disease of thyroid gland         Past Surgical  History:   Procedure Laterality Date   • COLONOSCOPY N/A 3/27/2019    Procedure: COLONOSCOPY POLYPECTOMY;  Surgeon: Jaime Alaniz MD;  Location: [facility] OR;  Service: Gastroenterology   • DENTAL PROCEDURE Left 2011,2014   • SALPINGO OOPHORECTOMY Left     as an infant    • TOTAL KNEE ARTHROPLASTY Right 10/14/2020    Procedure: TOTAL KNEE ARTHROPLASTY AND ALL ASSOCIATED PROCEDURES;  Surgeon: Elmer Fuentes MD;  Location: [facility] OR;  Service: Orthopedics;  Laterality: Right;        Social History     Occupational History   • Not on file   Tobacco Use   • Smoking status: Never Smoker   • Smokeless tobacco: Never Used   Substance and Sexual Activity   • Alcohol use: No     Frequency: Never   • Drug use: No   • Sexual activity: Defer      Social History     Social History Narrative   • Not on file        Family History   Problem Relation Age of Onset   • Cancer Father    • Malig Hyperthermia Neg Hx          Review of Systems:   HEENT: Patient denies any headaches, vision changes, epistaxis, dental problems, sore throat, hoarseness, or dysphagia  Pulmonary: Patient denies any cough, congestion, SOA, or wheezing  Cardiovascular: Patient denies any chest pain, dyspnea, palpitations.  Gastrointestinal:  Patient denies nausea, vomiting, diarrhea, loss of appetite, change in appetite, dysphagia, melena.  Genital/Urinary: Patient denies dysuria, change in color of urine, change in frequency of urination, pain with urgency.  Musculoskeletal: Patient reports some limitation of function, crepitation, and pain in the affected joint or extremity, especially with movement.  Neurological: Patient denies dizziness, ataxia, difficulty in speaking, change in speech, seizures, syncope.  Endocrine system: Patient denies palpitations, intolerance of heat or cold, polyuria, polydipsia, polyphagia, exophthalmos, or goiter.  Psychological: Patient denies thoughts/plans or harming self or other; depression,  night terrors,  ngoc, disorientation.  Skin: Patient denies any open wounds, ulcers, decubiti.  Hematopoietic: Patient denies history of spontaneous or excessive bleeding, epistaxis, hematuria, melena, enlarged or tender lymph nodes, pallor.    Physical Exam: 65 y.o. female  General Appearance:    Alert, cooperative, in no acute distress                      Vitals:    12/21/20 0615 12/21/20 0726 12/21/20 0729 12/21/20 0731   BP: 107/85 180/76 165/85 154/78   BP Location: Left arm      Patient Position: Lying      Pulse: 73 84 76 76   Resp: 18 18 16 18   Temp: 98.5 °F (36.9 °C)      TempSrc: Oral      SpO2: 97% 97% 99% 96%   Weight: 68.5 kg (151 lb)             Vital signs reviewed.   General: alert, oriented  Eyes: conjunctiva clear  ENT: external ears and nose atraumatic  CV: no peripheral edema  Resp: normal respiratory effort  Skin: no rashes or wounds; normal turgor  Psych: mood and affect appropriate  Lymph: no nodes appreciated  Neuro: gross sensation intact  Vascular:  Palpable peripheral pulse in noted extremity  Musculoskeletal Extremities:   Right knee was examined              Incision well healed              ROM 1-95,  4/5 strength              No residual extensor lag              Stable to varus and valgus stress              Flex/extend ankle and toes              Positive sensation right foot              No calf pain, negative Homans sign bilaterally  Debilities/Disabilities Identified: None        Diagnostic Tests:  Lab on 12/18/2020   Component Date Value Ref Range Status   • SARS-CoV-2 MIKHAIL 12/18/2020 Not Detected  Not Detected Final     No results found.    MRI Results: none    Assessment:  Patient Active Problem List   Diagnosis   • Adenomatous polyp of colon   • Hyperlipidemia   • Hypothyroidism   • Status post total right knee replacement, DOS 10/14/2020   • Arthrofibrosis of knee joint, right           Plan:  We will plan on proceeding with surgery at patient's request for a knee manipulation under  anesthesia with possible intra-articular injection and all associated procedures.    I explained to the patient details of procedure as well as risks, benefits, and alternatives with the risks including not limited to neurovascular damage, bleeding, infection, chronic pain, recurrence of loss of motion, patellar/quad tendon rupture, periprosthetic fracture, incisional dehiscence, weakness, stiffness, instability, DVT, pulmonary embolus, death, stroke, complex regional pain syndrome, myocardial infarction, need for additional procedures.  Patient understood all these had all questions answered prior to consenting to proceed with surgery. No guarantees were given in regards to results of surgery.    Patient was given the opportunity to ask and have all questions answered today.  The patient voiced understanding of the risks, benefits, and alternative forms of treatment that were discussed and the patient consents to proceed with surgery.     We will have patient continue daily physical therapy for 2 weeks after manipulation to try to maintain improvements from manipulation      Discharge Plan: Home with f/u in 1 week in the office with Dr. Elmer Fuentes      Dictated utilizing Dragon dictation

## 2020-12-21 NOTE — ANESTHESIA PROCEDURE NOTES
Peripheral Block      Patient location during procedure: pre-op  Reason for block: post-op pain management and MD/surgeon's request  Performed by  CRNA: Shaggy Lora CRNA  Preanesthetic Checklist  Completed: patient identified, site marked, surgical consent, pre-op evaluation, timeout performed, IV checked, risks and benefits discussed and monitors and equipment checked  Prep:  Sterile barriers:cap, gloves, gown, mask and sterile barriers  Prep: ChloraPrep  Patient monitoring: blood pressure monitoring, continuous pulse oximetry and EKG  Procedure  Sedation:yes    Guidance:ultrasound guided  Images:still images obtained    Laterality:right  Block Type:adductor canal block  Injection Technique:single-shot  Needle Type:echogenic  Needle Gauge:21 G  Resistance on Injection: none    Medications Used: ropivacaine (NAROPIN) 0.2% injection, 20 mL  Med admintered at 12/21/2020 7:30 AM      Post Assessment  Injection Assessment: negative aspiration for heme, no paresthesia on injection and incremental injection  Patient Tolerance:comfortable throughout block  Complications:no

## 2020-12-21 NOTE — ANESTHESIA POSTPROCEDURE EVALUATION
Patient: Yesi Benson    Procedure Summary     Date: 12/21/20 Room / Location:  [facility] 2 /  [facility]    Anesthesia Start: 0758 Anesthesia Stop: 0821    Procedure: KNEE MANIPULATION (Right ) Diagnosis:       Status post total right knee replacement      Arthrofibrosis of knee joint, right      (Status post total right knee replacement [Z96.651])      (Arthrofibrosis of knee joint, right [M24.661])    Surgeon: Elmer Fuentes MD Provider: Shaggy Lora CRNA    Anesthesia Type: MAC, regional ASA Status: 2          Anesthesia Type: MAC, regional    Vitals  Vitals Value Taken Time   /91 12/21/20 0840   Temp 98.1 °F (36.7 °C) 12/21/20 0817   Pulse 68 12/21/20 0840   Resp 17 12/21/20 0840   SpO2 98 % 12/21/20 0840           Post Anesthesia Care and Evaluation    Patient location during evaluation: PHASE II  Patient participation: complete - patient participated  Level of consciousness: awake and alert  Pain score: 0  Pain management: adequate  Airway patency: fixed obstruction  Anesthetic complications: No anesthetic complications  PONV Status: none  Cardiovascular status: acceptable  Respiratory status: acceptable  Hydration status: acceptable

## 2020-12-22 ENCOUNTER — HOSPITAL ENCOUNTER (OUTPATIENT)
Dept: PHYSICAL THERAPY | Facility: HOSPITAL | Age: 65
Setting detail: THERAPIES SERIES
Discharge: HOME | End: 2020-12-22

## 2020-12-22 DIAGNOSIS — Z96.651 STATUS POST TOTAL RIGHT KNEE REPLACEMENT: Primary | ICD-10-CM

## 2020-12-22 PROCEDURE — 97110 THERAPEUTIC EXERCISES: CPT

## 2020-12-22 PROCEDURE — 97140 MANUAL THERAPY 1/> REGIONS: CPT

## 2020-12-22 NOTE — THERAPY TREATMENT NOTE
Outpatient Physical Therapy Ortho Treatment Note   Jenny Chávez     Patient Name: Yesi Benson  : 1955  MRN: [id number]  Today's Date: 2020      Visit Date: 2020    Visit Dx:    ICD-10-CM ICD-9-CM   1. Status post total right knee replacement  Z96.651 V43.65       Patient Active Problem List   Diagnosis   • Adenomatous polyp of colon   • Hyperlipidemia   • Hypothyroidism   • Status post total right knee replacement, DOS 10/14/2020   • Arthrofibrosis of knee joint, right        Past Medical History:   Diagnosis Date   • Colon polyp    • Disease of thyroid gland         Past Surgical History:   Procedure Laterality Date   • COLONOSCOPY N/A 3/27/2019    Procedure: COLONOSCOPY POLYPECTOMY;  Surgeon: Jaime Alaniz MD;  Location: [facility] OR;  Service: Gastroenterology   • DENTAL PROCEDURE Left    • JOINT MANIPULATION Right 2020    Procedure: KNEE MANIPULATION;  Surgeon: Elmer Fuentes MD;  Location: [facility] OR;  Service: Orthopedics;  Laterality: Right;   • SALPINGO OOPHORECTOMY Left     as an infant    • TOTAL KNEE ARTHROPLASTY Right 10/14/2020    Procedure: TOTAL KNEE ARTHROPLASTY AND ALL ASSOCIATED PROCEDURES;  Surgeon: Elmer Fuentes MD;  Location: [facility] OR;  Service: Orthopedics;  Laterality: Right;       PT Ortho     Row Name 20 1300       Subjective Comments    Subjective Comments  Pt reports she has already gone through her ex's once this morning; reports knee feels tight but otherwise no complaints of pain - once pt started ex's, she complains of medial knee pain  -MH       Right Lower Ext    Rt Knee Extension/Flexion AROM  4 - 105 AAROM, supine post stretch  -MH    Rt Knee Extension/Flexion PROM  0 - 112 degrees, supine post stretch  -MH      User Key  (r) = Recorded By, (t) = Taken By, (c) = Cosigned By    Initials Name Provider Type    Braden Smith, PTA Physical Therapy Assistant                      PT Assessment/Plan     Row Name  12/22/20 1647          PT Assessment    Assessment Comments  pt with good tolerance to today's treatment; decreased ROM from initial treatment following manipulation but feel this is to be expected; pt with complaints of continued tightness and swelling  -        PT Plan    PT Plan Comments  Cont daily PT, pushing ROM  -       User Key  (r) = Recorded By, (t) = Taken By, (c) = Cosigned By    Initials Name Provider Type    Braden Smith PTA Physical Therapy Assistant          Modalities     Row Name 12/22/20 1300             Ice    Location  ant/post (R) knee - pt supine  -      Rx Minutes  10 mins  -      Ice S/P Rx  Yes  -        User Key  (r) = Recorded By, (t) = Taken By, (c) = Cosigned By    Initials Name Provider Type    Braden Smith PTA Physical Therapy Assistant        OP Exercises     Row Name 12/22/20 1300             Subjective Comments    Subjective Comments  Pt reports she has already gone through her ex's once this morning; reports knee feels tight but otherwise no complaints of pain - once pt started ex's, she complains of medial knee pain  -         Exercise 1    Exercise Name 1  Heel Slides  -MH      Time 1  10 min  -MH         Exercise 2    Exercise Name 2  Wall Slides  -MH      Time 2  10 min  -MH         Exercise 3    Exercise Name 3  Airdyne: Seat 1  -MH      Reps 3  Partial revolutions  -MH      Time 3  6 min  -MH         Exercise 4    Exercise Name 4  PROM: Flexion  -MH      Reps 4  10  -MH      Time 4  10 sec hold each  -MH         Exercise 5    Exercise Name 5  HS Stretch  -MH      Reps 5  10  -MH      Time 5  10 sec hold each  -MH         Exercise 6    Exercise Name 6  Heel Prop  -MH      Time 6  5 min  -MH         Exercise 7    Exercise Name 7  Prone Leg Hang  -MH      Time 7  5 min  -MH         Exercise 8    Exercise Name 8  PROM: Extension  -MH      Reps 8  10  -MH      Time 8  10 sec hold each  -MH         Exercise 9    Exercise Name 9  QS  -MH      Reps 9  25   -MH      Time 9  5 sec hold each  -         Exercise 10    Exercise Name 10  NWB gastroc stretch  -MH      Cueing 10  Verbal;Tactile;Demo  -MH      Reps 10  5  -MH      Time 10  20 sec  -MH        User Key  (r) = Recorded By, (t) = Taken By, (c) = Cosigned By    Initials Name Provider Type    Braden Smith PTA Physical Therapy Assistant                           Therapy Education  Given: HEP, Symptoms/condition management  Program: Reinforced  How Provided: Verbal  Provided to: Patient  Level of Understanding: Verbalized, Demonstrated, Teach back education performed              Time Calculation:   Start Time: 1300  Stop Time: 1425  Time Calculation (min): 85 min  Therapy Charges for Today     Code Description Service Date Service Provider Modifiers Qty    28525685390 HC PT MANUAL THERAPY EA 15 MIN 12/22/2020 Braden Mckeon PTA GP 1    23565433434 HC PT THER PROC EA 15 MIN 12/22/2020 Braden Mckeon PTA GP 2                    Braden Mckeon PTA  12/22/2020

## 2020-12-23 ENCOUNTER — HOSPITAL ENCOUNTER (OUTPATIENT)
Dept: GENERAL RADIOLOGY | Facility: HOSPITAL | Age: 65
Discharge: HOME | End: 2020-12-23

## 2020-12-28 ENCOUNTER — HOSPITAL ENCOUNTER (OUTPATIENT)
Dept: PHYSICAL THERAPY | Facility: HOSPITAL | Age: 65
Setting detail: THERAPIES SERIES
Discharge: HOME | End: 2020-12-28

## 2020-12-28 PROCEDURE — 97110 THERAPEUTIC EXERCISES: CPT

## 2020-12-28 PROCEDURE — 97140 MANUAL THERAPY 1/> REGIONS: CPT

## 2020-12-28 NOTE — THERAPY TREATMENT NOTE
Outpatient Physical Therapy Ortho Treatment Note   Jenny Chávez     Patient Name: Yesi Benson  : 1955  MRN: [id number]  Today's Date: 2020      Visit Date: 2020    Visit Dx:  No diagnosis found.    Patient Active Problem List   Diagnosis   • Adenomatous polyp of colon   • Hyperlipidemia   • Hypothyroidism   • Status post total right knee replacement, DOS 10/14/2020   • Arthrofibrosis of knee joint, right        Past Medical History:   Diagnosis Date   • Colon polyp    • Disease of thyroid gland         Past Surgical History:   Procedure Laterality Date   • COLONOSCOPY N/A 3/27/2019    Procedure: COLONOSCOPY POLYPECTOMY;  Surgeon: Jaime Alaniz MD;  Location: [facility] OR;  Service: Gastroenterology   • DENTAL PROCEDURE Left    • JOINT MANIPULATION Right 2020    Procedure: KNEE MANIPULATION;  Surgeon: Elmer Fuentes MD;  Location: [facility] OR;  Service: Orthopedics;  Laterality: Right;   • SALPINGO OOPHORECTOMY Left     as an infant    • TOTAL KNEE ARTHROPLASTY Right 10/14/2020    Procedure: TOTAL KNEE ARTHROPLASTY AND ALL ASSOCIATED PROCEDURES;  Surgeon: Elmer Fuentes MD;  Location: [facility] OR;  Service: Orthopedics;  Laterality: Right;       PT Ortho     Row Name 20 1105       Right Lower Ext    Rt Knee Extension/Flexion AROM  0-6-115 post stretch 0-12-95 pre stretch  -KM      User Key  (r) = Recorded By, (t) = Taken By, (c) = Cosigned By    Initials Name Provider Type    Gladis Callahan PTA Physical Therapy Assistant                      PT Assessment/Plan     Row Name 20 1105          PT Assessment    Assessment Comments  Continued to push ROM; improved measurements post stretch.   -KM        PT Plan    PT Plan Comments  Will see pt daily to push ROM  -KM       User Key  (r) = Recorded By, (t) = Taken By, (c) = Cosigned By    Initials Name Provider Type    Gladis Callahan PTA Physical Therapy Assistant            OP Exercises     Row Name  12/28/20 1105             Subjective Comments    Subjective Comments  Pt states her knee feels a little tight and has been compliant with HEP  -KM         Exercise 1    Exercise Name 1  Heel Slides  -KM      Time 1  10 min  -KM         Exercise 2    Exercise Name 2  Wall Slides  -KM      Time 2  10 min  -KM         Exercise 3    Exercise Name 3  Airdyne: Seat 1  -KM      Reps 3  Partial revolutions  -KM      Time 3  6 min  -KM         Exercise 4    Exercise Name 4  PROM: Flexion  -KM      Reps 4  10  -KM      Time 4  10 sec hold each  -KM         Exercise 5    Exercise Name 5  HS Stretch  -KM      Reps 5  10  -KM      Time 5  10 sec hold each  -KM         Exercise 6    Exercise Name 6  Heel Prop  -KM      Time 6  5 min  -KM      Additional Comments  5#  -KM         Exercise 7    Exercise Name 7  Prone Leg Hang  -KM      Time 7  5 min  -KM      Additional Comments  5#  -KM         Exercise 8    Exercise Name 8  PROM: Extension  -KM      Reps 8  10  -KM      Time 8  10 sec hold each  -KM         Exercise 9    Exercise Name 9  QS  -KM      Reps 9  25  -KM      Time 9  5 sec hold each  -KM         Exercise 10    Exercise Name 10  NWB gastroc stretch  -KM      Cueing 10  Verbal;Tactile;Demo  -KM      Reps 10  5  -KM      Time 10  20 sec  -KM        User Key  (r) = Recorded By, (t) = Taken By, (c) = Cosigned By    Initials Name Provider Type    Gladis Callahan PTA Physical Therapy Assistant                                          Time Calculation:   Start Time: 1105  Stop Time: 1200  Time Calculation (min): 55 min  Therapy Charges for Today     Code Description Service Date Service Provider Modifiers Qty    54400471145 HC PT MANUAL THERAPY EA 15 MIN 12/28/2020 Gladis Garzon PTA GP 1    60854529082 HC PT THER PROC EA 15 MIN 12/28/2020 Gladis Garzon PTA GP 1                    Gladis Garzon PTA  12/28/2020

## 2020-12-29 ENCOUNTER — HOSPITAL ENCOUNTER (OUTPATIENT)
Dept: PHYSICAL THERAPY | Facility: HOSPITAL | Age: 65
Setting detail: THERAPIES SERIES
Discharge: HOME | End: 2020-12-29

## 2020-12-29 DIAGNOSIS — Z96.651 STATUS POST TOTAL RIGHT KNEE REPLACEMENT: Primary | ICD-10-CM

## 2020-12-29 PROCEDURE — 97140 MANUAL THERAPY 1/> REGIONS: CPT

## 2020-12-29 PROCEDURE — 97110 THERAPEUTIC EXERCISES: CPT

## 2020-12-29 NOTE — THERAPY TREATMENT NOTE
Outpatient Physical Therapy Ortho Treatment Note   Jenny Chávez     Patient Name: Yesi Benson  : 1955  MRN: [id number]  Today's Date: 2020      Visit Date: 2020    Visit Dx:    ICD-10-CM ICD-9-CM   1. Status post total right knee replacement  Z96.651 V43.65       Patient Active Problem List   Diagnosis   • Adenomatous polyp of colon   • Hyperlipidemia   • Hypothyroidism   • Status post total right knee replacement, DOS 10/14/2020   • Arthrofibrosis of knee joint, right        Past Medical History:   Diagnosis Date   • Colon polyp    • Disease of thyroid gland         Past Surgical History:   Procedure Laterality Date   • COLONOSCOPY N/A 3/27/2019    Procedure: COLONOSCOPY POLYPECTOMY;  Surgeon: Jaime Alaniz MD;  Location: [facility] OR;  Service: Gastroenterology   • DENTAL PROCEDURE Left    • JOINT MANIPULATION Right 2020    Procedure: KNEE MANIPULATION;  Surgeon: Elmer Fuentes MD;  Location: [facility] OR;  Service: Orthopedics;  Laterality: Right;   • SALPINGO OOPHORECTOMY Left     as an infant    • TOTAL KNEE ARTHROPLASTY Right 10/14/2020    Procedure: TOTAL KNEE ARTHROPLASTY AND ALL ASSOCIATED PROCEDURES;  Surgeon: Elmer Fuentes MD;  Location: [facility] OR;  Service: Orthopedics;  Laterality: Right;       PT Ortho     Row Name 20 1223       Right Lower Ext    Rt Knee Extension/Flexion AROM  0-5-111 post stretch 0-14-93 pre stretch  -KM      User Key  (r) = Recorded By, (t) = Taken By, (c) = Cosigned By    Initials Name Provider Type    Gladis Callahan PTA Physical Therapy Assistant                      PT Assessment/Plan     Row Name 20 1233          PT Assessment    Assessment Comments  Pt is making slow progress with AROM, however measures improved motion post stretch  -KM        PT Plan    PT Plan Comments  Continue per POC  -KM       User Key  (r) = Recorded By, (t) = Taken By, (c) = Cosigned By    Initials Name Provider Type    RUDOLPH  Gladis Garzon PTA Physical Therapy Assistant            OP Exercises     Row Name 12/29/20 1223             Subjective Comments    Subjective Comments  Pt states her knee feels tight and has completed her HEP this morning.   -KM         Exercise 1    Exercise Name 1  Heel Slides  -KM      Time 1  10 min  -KM         Exercise 2    Exercise Name 2  Wall Slides  -KM      Time 2  10 min  -KM         Exercise 3    Exercise Name 3  Airdyne: Seat 1  -KM      Reps 3  Partial revolutions  -KM      Time 3  6 min  -KM         Exercise 4    Exercise Name 4  PROM: Flexion  -KM      Reps 4  10  -KM      Time 4  10 sec hold each  -KM         Exercise 5    Exercise Name 5  HS Stretch  -KM      Reps 5  10  -KM      Time 5  10 sec hold each  -KM         Exercise 6    Exercise Name 6  Heel Prop  -KM      Time 6  5 min  -KM         Exercise 7    Exercise Name 7  Prone Leg Hang  -KM      Time 7  5 min  -KM         Exercise 8    Exercise Name 8  PROM: Extension  -KM      Reps 8  10  -KM      Time 8  10 sec hold each  -KM         Exercise 9    Exercise Name 9  QS  -KM      Reps 9  25  -KM      Time 9  5 sec hold each  -KM         Exercise 10    Exercise Name 10  NWB gastroc stretch  -KM      Cueing 10  Verbal;Tactile;Demo  -KM      Reps 10  5  -KM      Time 10  20 sec  -KM        User Key  (r) = Recorded By, (t) = Taken By, (c) = Cosigned By    Initials Name Provider Type    Gladis Callahan PTA Physical Therapy Assistant                                          Time Calculation:   Start Time: 1223  Stop Time: 1325  Time Calculation (min): 62 min  Therapy Charges for Today     Code Description Service Date Service Provider Modifiers Qty    87222730809 HC PT MANUAL THERAPY EA 15 MIN 12/29/2020 Gladis Garzon PTA GP 1    25891726107 HC PT THER PROC EA 15 MIN 12/29/2020 Gladis Garzon PTA GP 1                    Gladis Garzon PTA  12/29/2020

## 2020-12-30 ENCOUNTER — HOSPITAL ENCOUNTER (OUTPATIENT)
Dept: PHYSICAL THERAPY | Facility: HOSPITAL | Age: 65
Setting detail: THERAPIES SERIES
Discharge: HOME | End: 2020-12-30

## 2020-12-30 DIAGNOSIS — Z96.651 STATUS POST TOTAL RIGHT KNEE REPLACEMENT: Primary | ICD-10-CM

## 2020-12-30 PROCEDURE — 97140 MANUAL THERAPY 1/> REGIONS: CPT

## 2020-12-30 PROCEDURE — 97110 THERAPEUTIC EXERCISES: CPT

## 2020-12-30 NOTE — THERAPY TREATMENT NOTE
Outpatient Physical Therapy Ortho Treatment Note   Jenny Chávez     Patient Name: Yesi Benson  : 1955  MRN: [id number]  Today's Date: 2020      Visit Date: 2020    Visit Dx:    ICD-10-CM ICD-9-CM   1. Status post total right knee replacement  Z96.651 V43.65       Patient Active Problem List   Diagnosis   • Adenomatous polyp of colon   • Hyperlipidemia   • Hypothyroidism   • Status post total right knee replacement, DOS 10/14/2020   • Arthrofibrosis of knee joint, right        Past Medical History:   Diagnosis Date   • Colon polyp    • Disease of thyroid gland         Past Surgical History:   Procedure Laterality Date   • COLONOSCOPY N/A 3/27/2019    Procedure: COLONOSCOPY POLYPECTOMY;  Surgeon: Jaime Alaniz MD;  Location: [facility] OR;  Service: Gastroenterology   • DENTAL PROCEDURE Left    • JOINT MANIPULATION Right 2020    Procedure: KNEE MANIPULATION;  Surgeon: Elmer Fuentes MD;  Location: [facility] OR;  Service: Orthopedics;  Laterality: Right;   • SALPINGO OOPHORECTOMY Left     as an infant    • TOTAL KNEE ARTHROPLASTY Right 10/14/2020    Procedure: TOTAL KNEE ARTHROPLASTY AND ALL ASSOCIATED PROCEDURES;  Surgeon: Elmer Fuentes MD;  Location: [facility] OR;  Service: Orthopedics;  Laterality: Right;       PT Ortho     Row Name 20 4640       Subjective Comments    Subjective Comments  Pt states her feel a little better compared to yesterday  -KM       Right Lower Ext    Rt Knee Extension/Flexion AROM  0-4-112 post stretch 0-8-95 pre stretch  -KM      User Key  (r) = Recorded By, (t) = Taken By, (c) = Cosigned By    Initials Name Provider Type    Gladis Callahan PTA Physical Therapy Assistant                      PT Assessment/Plan     Row Name 20 6096          PT Assessment    Assessment Comments  Pt with increased motion with passive stretching and improved measurements pre-tx  -KM        PT Plan    PT Plan Comments  Continue to push ROM  -KM        User Key  (r) = Recorded By, (t) = Taken By, (c) = Cosigned By    Initials Name Provider Type    Gladis Callahan PTA Physical Therapy Assistant            OP Exercises     Row Name 12/30/20 0850             Subjective Comments    Subjective Comments  Pt states her feel a little better compared to yesterday  -KM         Exercise 1    Exercise Name 1  Heel Slides  -KM      Time 1  10 min  -KM         Exercise 2    Exercise Name 2  Wall Slides  -KM      Time 2  10 min  -KM         Exercise 3    Exercise Name 3  Airdyne: Seat 1  -KM      Reps 3  Partial revolutions  -KM      Time 3  6 min  -KM         Exercise 4    Exercise Name 4  PROM: Flexion  -KM      Reps 4  10  -KM      Time 4  10 sec hold each  -KM         Exercise 5    Exercise Name 5  HS Stretch  -KM      Reps 5  10  -KM      Time 5  10 sec hold each  -KM         Exercise 6    Exercise Name 6  Heel Prop  -KM      Time 6  5 min  -KM         Exercise 7    Exercise Name 7  Prone Leg Hang  -KM      Time 7  5 min  -KM         Exercise 8    Exercise Name 8  PROM: Extension  -KM      Reps 8  10  -KM      Time 8  10 sec hold each  -KM         Exercise 9    Exercise Name 9  QS  -KM      Reps 9  25  -KM      Time 9  5 sec hold each  -KM         Exercise 10    Exercise Name 10  NWB gastroc stretch  -KM      Cueing 10  Verbal;Tactile;Demo  -KM      Reps 10  5  -KM      Time 10  20 sec  -KM         Exercise 11    Exercise Name 11  TKE  -KM      Reps 11  25  -KM      Time 11  Gold  -KM        User Key  (r) = Recorded By, (t) = Taken By, (c) = Cosigned By    Initials Name Provider Type    Gladis Callahan PTA Physical Therapy Assistant                                          Time Calculation:   Start Time: 0850  Stop Time: 0954  Time Calculation (min): 64 min  Therapy Charges for Today     Code Description Service Date Service Provider Modifiers Qty    70145699555 HC PT MANUAL THERAPY EA 15 MIN 12/30/2020 Gladis Garzon PTA GP 1    67270339167 HC PT THER PROC EA  15 MIN 12/30/2020 Gladis Garzon, PTA GP 1                    Gladis Garzon PTA  12/30/2020

## 2020-12-31 ENCOUNTER — HOSPITAL ENCOUNTER (OUTPATIENT)
Dept: PHYSICAL THERAPY | Facility: HOSPITAL | Age: 65
Setting detail: THERAPIES SERIES
Discharge: HOME | End: 2020-12-31

## 2020-12-31 DIAGNOSIS — Z96.651 STATUS POST TOTAL RIGHT KNEE REPLACEMENT: Primary | ICD-10-CM

## 2020-12-31 PROCEDURE — 97140 MANUAL THERAPY 1/> REGIONS: CPT

## 2020-12-31 PROCEDURE — 97110 THERAPEUTIC EXERCISES: CPT

## 2021-01-04 ENCOUNTER — HOSPITAL ENCOUNTER (OUTPATIENT)
Dept: PHYSICAL THERAPY | Facility: HOSPITAL | Age: 66
Setting detail: THERAPIES SERIES
Discharge: HOME | End: 2021-01-04

## 2021-01-04 DIAGNOSIS — Z96.651 STATUS POST TOTAL RIGHT KNEE REPLACEMENT: Primary | ICD-10-CM

## 2021-01-04 PROCEDURE — 97140 MANUAL THERAPY 1/> REGIONS: CPT

## 2021-01-04 PROCEDURE — 97110 THERAPEUTIC EXERCISES: CPT

## 2021-01-04 NOTE — THERAPY TREATMENT NOTE
Outpatient Physical Therapy Ortho Treatment Note   Jenny Chávez     Patient Name: Yesi Benson  : 1955  MRN: [id number]  Today's Date: 2021      Visit Date: 2021    Visit Dx:    ICD-10-CM ICD-9-CM   1. Status post total right knee replacement  Z96.651 V43.65       Patient Active Problem List   Diagnosis   • Adenomatous polyp of colon   • Hyperlipidemia   • Hypothyroidism   • Status post total right knee replacement, DOS 10/14/2020   • Arthrofibrosis of knee joint, right        Past Medical History:   Diagnosis Date   • Colon polyp    • Disease of thyroid gland         Past Surgical History:   Procedure Laterality Date   • COLONOSCOPY N/A 3/27/2019    Procedure: COLONOSCOPY POLYPECTOMY;  Surgeon: Jaime Alaniz MD;  Location: [facility] OR;  Service: Gastroenterology   • DENTAL PROCEDURE Left    • JOINT MANIPULATION Right 2020    Procedure: KNEE MANIPULATION;  Surgeon: Elmer Fuentes MD;  Location: [facility] OR;  Service: Orthopedics;  Laterality: Right;   • SALPINGO OOPHORECTOMY Left     as an infant    • TOTAL KNEE ARTHROPLASTY Right 10/14/2020    Procedure: TOTAL KNEE ARTHROPLASTY AND ALL ASSOCIATED PROCEDURES;  Surgeon: Elmer Fuentes MD;  Location: [facility] OR;  Service: Orthopedics;  Laterality: Right;       PT Ortho     Row Name 21 1135       Subjective Comments    Subjective Comments  Pt states her knee is doing well. States she has been compliant with HEP and been walking more without AD. Pt has f/u appointment scheduled tomorrow  -KM       Right Lower Ext    Rt Knee Extension/Flexion AROM  0-3-115 post stretch  -KM      User Key  (r) = Recorded By, (t) = Taken By, (c) = Cosigned By    Initials Name Provider Type    Gladis Callahan PTA Physical Therapy Assistant                      PT Assessment/Plan     Row Name 21 1131          PT Assessment    Assessment Comments  Pt is making slow but good progress with ROM. Limited heel strike/knee flexion  with gait; continues to respond well to passive stretching.   -KM        PT Plan    PT Plan Comments  Continue POC per MD. Continue to push ROM  -KM       User Key  (r) = Recorded By, (t) = Taken By, (c) = Cosigned By    Initials Name Provider Type    Gladis Callahan PTA Physical Therapy Assistant            OP Exercises     Row Name 01/04/21 1130             Subjective Comments    Subjective Comments  Pt states her knee is doing well. States she has been compliant with HEP and been walking more without AD. Pt has f/u appointment scheduled tomorrow  -KM         Exercise 1    Exercise Name 1  Heel Slides  -KM      Time 1  10 min  -KM         Exercise 2    Exercise Name 2  Wall Slides  -KM      Time 2  10 min  -KM         Exercise 3    Exercise Name 3  Airdyne: Seat 1  -KM      Reps 3  Partial revolutions  -KM      Time 3  6 min  -KM         Exercise 4    Exercise Name 4  PROM: Flexion  -KM      Reps 4  10  -KM      Time 4  10 sec hold each  -KM         Exercise 5    Exercise Name 5  HS Stretch  -KM      Reps 5  10  -KM      Time 5  10 sec hold each  -KM         Exercise 6    Exercise Name 6  Heel Prop  -KM      Time 6  5 min  -KM      Additional Comments  5#  -KM         Exercise 7    Exercise Name 7  Prone Leg Hang  -KM      Time 7  5 min  -KM      Additional Comments  5#  -KM         Exercise 8    Exercise Name 8  PROM: Extension  -KM      Reps 8  10  -KM      Time 8  10 sec hold each  -KM         Exercise 9    Exercise Name 9  QS  -KM      Reps 9  25  -KM      Time 9  5 sec hold each  -KM         Exercise 10    Exercise Name 10  NWB gastroc stretch  -KM      Cueing 10  Verbal;Tactile;Demo  -KM      Reps 10  5  -KM      Time 10  20 sec  -KM         Exercise 11    Exercise Name 11  TKE  -KM      Reps 11  40  -KM      Time 11  Gold  -KM        User Key  (r) = Recorded By, (t) = Taken By, (c) = Cosigned By    Initials Name Provider Type    Gladis Callahan PTA Physical Therapy Assistant                                           Time Calculation:   Start Time: 1130  Stop Time: 1232  Time Calculation (min): 62 min  Therapy Charges for Today     Code Description Service Date Service Provider Modifiers Qty    32113092520 HC PT MANUAL THERAPY EA 15 MIN 1/4/2021 Gladis Garzon PTA GP 1    53107587991 HC PT THER PROC EA 15 MIN 1/4/2021 Gladis Garzon PTA GP 1                    Gladis Garzon PTA  1/4/2021

## 2021-01-05 ENCOUNTER — OFFICE VISIT (OUTPATIENT)
Dept: ORTHOPEDIC SURGERY | Facility: CLINIC | Age: 66
End: 2021-01-05

## 2021-01-05 ENCOUNTER — HOSPITAL ENCOUNTER (OUTPATIENT)
Dept: PHYSICAL THERAPY | Facility: HOSPITAL | Age: 66
Setting detail: THERAPIES SERIES
Discharge: HOME | End: 2021-01-05

## 2021-01-05 VITALS — BODY MASS INDEX: 27.79 KG/M2 | HEIGHT: 62 IN | WEIGHT: 151 LBS

## 2021-01-05 DIAGNOSIS — M24.661 ARTHROFIBROSIS OF KNEE JOINT, RIGHT: ICD-10-CM

## 2021-01-05 DIAGNOSIS — Z98.890 STATUS POST SURGICAL MANIPULATION OF KNEE JOINT: Primary | ICD-10-CM

## 2021-01-05 DIAGNOSIS — Z96.651 STATUS POST TOTAL RIGHT KNEE REPLACEMENT: Primary | ICD-10-CM

## 2021-01-05 DIAGNOSIS — Z96.651 STATUS POST TOTAL RIGHT KNEE REPLACEMENT: ICD-10-CM

## 2021-01-05 PROCEDURE — 97140 MANUAL THERAPY 1/> REGIONS: CPT

## 2021-01-05 PROCEDURE — 99024 POSTOP FOLLOW-UP VISIT: CPT | Performed by: NURSE PRACTITIONER

## 2021-01-05 PROCEDURE — 97110 THERAPEUTIC EXERCISES: CPT

## 2021-01-05 RX ORDER — PREDNISONE 1 MG/1
TABLET ORAL
Qty: 21 TABLET | Refills: 0 | Status: SHIPPED | OUTPATIENT
Start: 2021-01-05 | End: 2021-02-04

## 2021-01-05 NOTE — THERAPY TREATMENT NOTE
Outpatient Physical Therapy Ortho Treatment Note   Jenny Chávez     Patient Name: Yesi Benson  : 1955  MRN: [id number]  Today's Date: 2021      Visit Date: 2021    Visit Dx:    ICD-10-CM ICD-9-CM   1. Status post total right knee replacement  Z96.651 V43.65       Patient Active Problem List   Diagnosis   • Adenomatous polyp of colon   • Hyperlipidemia   • Hypothyroidism   • Status post surgical manipulation of knee joint   • Arthrofibrosis of knee joint, right        Past Medical History:   Diagnosis Date   • Colon polyp    • Disease of thyroid gland         Past Surgical History:   Procedure Laterality Date   • COLONOSCOPY N/A 3/27/2019    Procedure: COLONOSCOPY POLYPECTOMY;  Surgeon: Jaime Alaniz MD;  Location: [facility] OR;  Service: Gastroenterology   • DENTAL PROCEDURE Left    • JOINT MANIPULATION Right 2020    Procedure: KNEE MANIPULATION;  Surgeon: Elmer Fuentes MD;  Location: [facility] OR;  Service: Orthopedics;  Laterality: Right;   • SALPINGO OOPHORECTOMY Left     as an infant    • TOTAL KNEE ARTHROPLASTY Right 10/14/2020    Procedure: TOTAL KNEE ARTHROPLASTY AND ALL ASSOCIATED PROCEDURES;  Surgeon: Elmer Fuentes MD;  Location: [facility] OR;  Service: Orthopedics;  Laterality: Right;       PT Ortho     Row Name 21 0940       Right Lower Ext    Rt Knee Extension/Flexion AROM  0-2-116 post stretch  -KM      User Key  (r) = Recorded By, (t) = Taken By, (c) = Cosigned By    Initials Name Provider Type    Gladis Callahan PTA Physical Therapy Assistant                      PT Assessment/Plan     Row Name 21 0925          PT Assessment    Assessment Comments  Pt with improved motion with passive stretching, primarily with flexion.   -KM        PT Plan    PT Plan Comments  Pt to continue with HEP 2x/day. Continue per POC  -KM       User Key  (r) = Recorded By, (t) = Taken By, (c) = Cosigned By    Initials Name Provider Type    Gladis Callahan  SARAH Physical Therapy Assistant            OP Exercises     Row Name 01/05/21 0940             Subjective Comments    Subjective Comments  Pt states her f/u appointment went well; she will start Prednisone and to continue to push ROM.   -KM         Exercise 1    Exercise Name 1  Heel Slides  -KM      Time 1  10 min  -KM         Exercise 2    Exercise Name 2  Wall Slides  -KM      Time 2  10 min  -KM         Exercise 3    Exercise Name 3  Airdyne: Seat 1  -KM      Reps 3  Partial revolutions  -KM      Time 3  6 min  -KM         Exercise 4    Exercise Name 4  PROM: Flexion  -KM      Reps 4  10  -KM      Time 4  10 sec hold each  -KM         Exercise 5    Exercise Name 5  HS Stretch  -KM      Reps 5  10  -KM      Time 5  10 sec hold each  -KM         Exercise 6    Exercise Name 6  Heel Prop  -KM      Time 6  5 min  -KM         Exercise 7    Exercise Name 7  Prone Leg Hang  -KM      Time 7  5 min  -KM         Exercise 8    Exercise Name 8  PROM: Extension  -KM      Reps 8  10  -KM      Time 8  10 sec hold each  -KM         Exercise 9    Exercise Name 9  QS  -KM      Reps 9  25  -KM      Time 9  5 sec hold each  -KM         Exercise 10    Exercise Name 10  NWB gastroc stretch  -KM      Cueing 10  Verbal;Tactile;Demo  -KM      Reps 10  5  -KM      Time 10  20 sec  -KM         Exercise 11    Exercise Name 11  TKE  -KM      Reps 11  --  -KM      Time 11  --  -KM      Additional Comments  HEP  -KM        User Key  (r) = Recorded By, (t) = Taken By, (c) = Cosigned By    Initials Name Provider Type    Gladis Callahan PTA Physical Therapy Assistant                                          Time Calculation:   Start Time: 0940  Stop Time: 1030  Time Calculation (min): 50 min  Therapy Charges for Today     Code Description Service Date Service Provider Modifiers Qty    66140292668 HC PT MANUAL THERAPY EA 15 MIN 1/5/2021 Gladis Garzon PTA GP 1    44523305008 HC PT THER PROC EA 15 MIN 1/5/2021 Gladis Garzon PTA GP 1                     Gladis Garzon, PTA  1/5/2021

## 2021-01-05 NOTE — PROGRESS NOTES
CC: status post arthrofibrosis status post total knee arthroplasty, DOS 2020    Interval history: Yesi Benson returns to clinic for follow-up of her right knee.  Is continued with physical therapy daily as well as home strengthening exercises noted significant improvement in regards to range of motion.  She is continued ambulating with a cane denies presence of pain at the calf.  Does continue to experience swelling greater the lateral aspect of the knee joint increased pain noted with activities involving deep flexion but does continue working range of motion.  Denies presence of numbness or tingling right lower extremity.  Denies other concerns present this time.    Exam:  Right knee exam:   Range of motion 0 degrees to 115 degrees, 4 out of 5 strength  Positive sensation light touch all distributions right lower extremity  Incision well-healed  Stable to varus and valgus stress  Flex extend all digits right foot  2+ posterior tibialis pulse  Negative calf tenderness, negative Homans' sign    Assessment: status post arthrofibrosis status post total knee arthroplasty    Plan:  1.  We will continue with home strengthening exercises as well as physical therapy for range of motion and strengthening, gait normalization.  2.  We will have her follow-up with Dr. Fuentes in 3 weeks to reevaluate.  Will start prednisone taper given the amount of swelling and continued stiffness that she is experiencing.  All questions answered.  New Medications Ordered This Visit   Medications   • predniSONE (DELTASONE) 5 MG tablet     Si tabs day 1, 5 tabs day 2, 4 tabs day 3, 3 tabs day 4, 2 tabs day 5, 1 tabs day 6     Dispense:  21 tablet     Refill:  0

## 2021-01-06 ENCOUNTER — HOSPITAL ENCOUNTER (OUTPATIENT)
Dept: PHYSICAL THERAPY | Facility: HOSPITAL | Age: 66
Setting detail: THERAPIES SERIES
Discharge: HOME | End: 2021-01-06

## 2021-01-06 DIAGNOSIS — Z96.651 STATUS POST TOTAL RIGHT KNEE REPLACEMENT: Primary | ICD-10-CM

## 2021-01-06 PROCEDURE — 97110 THERAPEUTIC EXERCISES: CPT

## 2021-01-06 PROCEDURE — 97140 MANUAL THERAPY 1/> REGIONS: CPT

## 2021-01-06 NOTE — THERAPY TREATMENT NOTE
Outpatient Physical Therapy Ortho Treatment Note   Jenny Chávez     Patient Name: Yesi Benson  : 1955  MRN: [id number]  Today's Date: 2021      Visit Date: 2021    Visit Dx:    ICD-10-CM ICD-9-CM   1. Status post total right knee replacement  Z96.651 V43.65       Patient Active Problem List   Diagnosis   • Adenomatous polyp of colon   • Hyperlipidemia   • Hypothyroidism   • Status post surgical manipulation of knee joint   • Arthrofibrosis of knee joint, right        Past Medical History:   Diagnosis Date   • Colon polyp    • Disease of thyroid gland         Past Surgical History:   Procedure Laterality Date   • COLONOSCOPY N/A 3/27/2019    Procedure: COLONOSCOPY POLYPECTOMY;  Surgeon: Jaime Alaniz MD;  Location: [facility] OR;  Service: Gastroenterology   • DENTAL PROCEDURE Left    • JOINT MANIPULATION Right 2020    Procedure: KNEE MANIPULATION;  Surgeon: Elmer Fuentes MD;  Location: [facility] OR;  Service: Orthopedics;  Laterality: Right;   • SALPINGO OOPHORECTOMY Left     as an infant    • TOTAL KNEE ARTHROPLASTY Right 10/14/2020    Procedure: TOTAL KNEE ARTHROPLASTY AND ALL ASSOCIATED PROCEDURES;  Surgeon: Elmer Fuentes MD;  Location: [facility] OR;  Service: Orthopedics;  Laterality: Right;       PT Ortho     Row Name 21 1052       Right Lower Ext    Rt Knee Extension/Flexion PROM  0-1-121 post stretch  -KM      User Key  (r) = Recorded By, (t) = Taken By, (c) = Cosigned By    Initials Name Provider Type    Gladis Callahan PTA Physical Therapy Assistant                      PT Assessment/Plan     Row Name 21 1052          PT Assessment    Assessment Comments  Continue to push ROM with passive stretching completed in supine. Improved ROM measured post stretch.   -KM        PT Plan    PT Plan Comments  Continue per POC  -KM       User Key  (r) = Recorded By, (t) = Taken By, (c) = Cosigned By    Initials Name Provider Type    Gladis Callahan PTA  Physical Therapy Assistant            OP Exercises     Row Name 01/06/21 1052             Exercise 1    Exercise Name 1  Heel Slides  -KM      Time 1  10 min  -KM         Exercise 2    Exercise Name 2  Wall Slides  -KM      Time 2  10 min  -KM         Exercise 3    Exercise Name 3  Airdyne: Seat 1  -KM      Reps 3  Partial revolutions  -KM      Time 3  6 min  -KM         Exercise 4    Exercise Name 4  PROM: Flexion  -KM      Reps 4  10  -KM      Time 4  10 sec hold each  -KM         Exercise 5    Exercise Name 5  HS Stretch  -KM      Reps 5  10  -KM      Time 5  10 sec hold each  -KM         Exercise 6    Exercise Name 6  Heel Prop  -KM      Time 6  5 min  -KM      Additional Comments  5#  -KM         Exercise 7    Exercise Name 7  Prone Leg Hang  -KM      Time 7  5 min  -KM      Additional Comments  5#  -KM         Exercise 8    Exercise Name 8  PROM: Extension  -KM      Reps 8  10  -KM      Time 8  10 sec hold each  -KM         Exercise 9    Exercise Name 9  QS  -KM      Reps 9  25  -KM      Time 9  5 sec hold each  -KM         Exercise 10    Exercise Name 10  NWB gastroc stretch  -KM      Cueing 10  Verbal;Tactile;Demo  -KM      Reps 10  5  -KM      Time 10  20 sec  -KM         Exercise 11    Exercise Name 11  TKE  -KM        User Key  (r) = Recorded By, (t) = Taken By, (c) = Cosigned By    Initials Name Provider Type    Gladis Callahan PTA Physical Therapy Assistant                                          Time Calculation:   Start Time: 1052  Stop Time: 1150  Time Calculation (min): 58 min  Therapy Charges for Today     Code Description Service Date Service Provider Modifiers Qty    99299113250 HC PT MANUAL THERAPY EA 15 MIN 1/6/2021 Gladis Garzon PTA GP 1    14003175589 HC PT THER PROC EA 15 MIN 1/6/2021 Gladis Garzon PTA GP 1                    Gladis Garzon PTA  1/6/2021

## 2021-01-07 ENCOUNTER — HOSPITAL ENCOUNTER (OUTPATIENT)
Dept: PHYSICAL THERAPY | Facility: HOSPITAL | Age: 66
Setting detail: THERAPIES SERIES
Discharge: HOME | End: 2021-01-07

## 2021-01-07 DIAGNOSIS — Z96.651 STATUS POST TOTAL RIGHT KNEE REPLACEMENT: Primary | ICD-10-CM

## 2021-01-07 PROCEDURE — 97110 THERAPEUTIC EXERCISES: CPT

## 2021-01-07 PROCEDURE — 97140 MANUAL THERAPY 1/> REGIONS: CPT

## 2021-01-07 NOTE — THERAPY TREATMENT NOTE
Outpatient Physical Therapy Ortho Treatment Note   Jenny Chávez     Patient Name: Yesi Benson  : 1955  MRN: [id number]  Today's Date: 2021      Visit Date: 2021    Visit Dx:    ICD-10-CM ICD-9-CM   1. Status post total right knee replacement  Z96.651 V43.65       Patient Active Problem List   Diagnosis   • Adenomatous polyp of colon   • Hyperlipidemia   • Hypothyroidism   • Status post surgical manipulation of knee joint   • Arthrofibrosis of knee joint, right        Past Medical History:   Diagnosis Date   • Colon polyp    • Disease of thyroid gland         Past Surgical History:   Procedure Laterality Date   • COLONOSCOPY N/A 3/27/2019    Procedure: COLONOSCOPY POLYPECTOMY;  Surgeon: Jaime Alaniz MD;  Location: [facility];  Service: Gastroenterology   • DENTAL PROCEDURE Left    • JOINT MANIPULATION Right 2020    Procedure: KNEE MANIPULATION;  Surgeon: Elmer Fuentes MD;  Location: [facility] OR;  Service: Orthopedics;  Laterality: Right;   • SALPINGO OOPHORECTOMY Left     as an infant    • TOTAL KNEE ARTHROPLASTY Right 10/14/2020    Procedure: TOTAL KNEE ARTHROPLASTY AND ALL ASSOCIATED PROCEDURES;  Surgeon: Elmer Fuentes MD;  Location: [facility] OR;  Service: Orthopedics;  Laterality: Right;                       PT Assessment/Plan     Row Name 21 1100          PT Assessment    Assessment Comments  Tightness noted with passive stretching and measures slightly limited AROM. Pt does ambulate with improved gait; instructed to pt to complete HEP 2x at most.   -KM        PT Plan    PT Plan Comments  Continue to push ROM  -KM       User Key  (r) = Recorded By, (t) = Taken By, (c) = Cosigned By    Initials Name Provider Type    Gladis Callahan PTA Physical Therapy Assistant            OP Exercises     Row Name 21 1100             Subjective Comments    Subjective Comments  Pt states she has increased soreness primarily in quad. States she continues to  complete HEP 2-3x/day  -KM         Exercise 1    Exercise Name 1  Heel Slides  -KM      Time 1  10 min  -KM         Exercise 2    Exercise Name 2  Wall Slides  -KM      Time 2  10 min  -KM         Exercise 3    Exercise Name 3  Airdyne: Seat 1  -KM      Reps 3  Partial revolutions  -KM      Time 3  6 min  -KM         Exercise 4    Exercise Name 4  PROM: Flexion  -KM      Reps 4  10  -KM      Time 4  10 sec hold each  -KM         Exercise 5    Exercise Name 5  HS Stretch  -KM      Reps 5  10  -KM      Time 5  10 sec hold each  -KM         Exercise 6    Exercise Name 6  Heel Prop  -KM      Time 6  5 min  -KM         Exercise 7    Exercise Name 7  Prone Leg Hang  -KM      Time 7  5 min  -KM         Exercise 8    Exercise Name 8  PROM: Extension  -KM      Reps 8  10  -KM      Time 8  10 sec hold each  -KM         Exercise 9    Exercise Name 9  QS  -KM      Reps 9  25  -KM      Time 9  5 sec hold each  -KM         Exercise 10    Exercise Name 10  NWB gastroc stretch  -KM      Cueing 10  Verbal;Tactile;Demo  -KM      Reps 10  5  -KM      Time 10  20 sec  -KM         Exercise 11    Exercise Name 11  TKE  -KM        User Key  (r) = Recorded By, (t) = Taken By, (c) = Cosigned By    Initials Name Provider Type    Gladis Callahan PTA Physical Therapy Assistant                                          Time Calculation:   Start Time: 1100  Stop Time: 1200  Time Calculation (min): 60 min  Therapy Charges for Today     Code Description Service Date Service Provider Modifiers Qty    58034269178 HC PT MANUAL THERAPY EA 15 MIN 1/7/2021 Gladis Garzon PTA GP 1    19520684875 HC PT THER PROC EA 15 MIN 1/7/2021 Gladis Garzon PTA GP 1                    Gladis Garzon PTA  1/7/2021

## 2021-01-08 ENCOUNTER — HOSPITAL ENCOUNTER (OUTPATIENT)
Dept: PHYSICAL THERAPY | Facility: HOSPITAL | Age: 66
Setting detail: THERAPIES SERIES
Discharge: HOME | End: 2021-01-08

## 2021-01-08 DIAGNOSIS — Z96.651 STATUS POST TOTAL RIGHT KNEE REPLACEMENT: Primary | ICD-10-CM

## 2021-01-08 PROCEDURE — 97110 THERAPEUTIC EXERCISES: CPT

## 2021-01-08 PROCEDURE — 97140 MANUAL THERAPY 1/> REGIONS: CPT

## 2021-01-08 NOTE — THERAPY RE-EVALUATION
Outpatient Physical Therapy Ortho Re-Evaluation   Jenny Chávez     Patient Name: Yesi Benson  : 1955  MRN: [id number]  Today's Date: 2021      Visit Date: 2021    Patient Active Problem List   Diagnosis   • Adenomatous polyp of colon   • Hyperlipidemia   • Hypothyroidism   • Status post surgical manipulation of knee joint   • Arthrofibrosis of knee joint, right        Past Medical History:   Diagnosis Date   • Colon polyp    • Disease of thyroid gland         Past Surgical History:   Procedure Laterality Date   • COLONOSCOPY N/A 3/27/2019    Procedure: COLONOSCOPY POLYPECTOMY;  Surgeon: Jaime Alaniz MD;  Location: [facility] OR;  Service: Gastroenterology   • DENTAL PROCEDURE Left    • JOINT MANIPULATION Right 2020    Procedure: KNEE MANIPULATION;  Surgeon: Elmer Fuentes MD;  Location: [facility] OR;  Service: Orthopedics;  Laterality: Right;   • SALPINGO OOPHORECTOMY Left     as an infant    • TOTAL KNEE ARTHROPLASTY Right 10/14/2020    Procedure: TOTAL KNEE ARTHROPLASTY AND ALL ASSOCIATED PROCEDURES;  Surgeon: Elmer Fuentes MD;  Location: [facility] OR;  Service: Orthopedics;  Laterality: Right;       Visit Dx:     ICD-10-CM ICD-9-CM   1. Status post total right knee replacement  Z96.651 V43.65             PT Ortho     Row Name 21 1100       Subjective Comments    Subjective Comments  pt reports she is not as sore today  -MH       Precautions and Contraindications    Precautions/Limitations  no known precautions/limitations  -AS       Posture/Observations    Posture- WNL  Posture is WNL  -AS    Observations  Incision healing  -AS       Patellar Accessory Motions    Superior glide  Right:;Hypomobile  -AS    Inferior glide  Right:;Hypomobile  -AS    Medial glide  Right:;Hypomobile  -AS    Lateral glide  Right:;Hypomobile  -AS       Right Lower Ext    Rt Knee Extension/Flexion AROM  0-1-115   -AS    Rt Knee Extension/Flexion PROM  0-121  -AS       MMT Right  Lower Ext    Rt Hip Flexion MMT, Gross Movement  (4+/5) good plus  -AS    Rt Hip Extension MMT, Gross Movement  (4-/5) good minus  -AS    Rt Hip ABduction MMT, Gross Movement  (4/5) good  -AS    Rt Knee Extension MMT, Gross Movement  (4/5) good  -AS    Rt Knee Flexion MMT, Gross Movement  (4-/5) good minus  -AS       Sensation    Sensation WNL?  WNL  -AS    Light Touch  No apparent deficits  -AS       Lower Extremity Flexibility    Hamstrings  Right:;Moderately limited  -AS       Gait/Stairs (Locomotion)    Comment (Gait/Stairs)  Patient continues to ambulate with an AD and an antalgic gait pattern. Patient has decreased heel strike and shortened step and stride length on her right side.  -AS      User Key  (r) = Recorded By, (t) = Taken By, (c) = Cosigned By    Initials Name Provider Type    Braden Smith, PTA Physical Therapy Assistant    AS Jared Yoon, PT Physical Therapist                      Therapy Education  Education Details: pt instructed in scar massage  Given: HEP, Symptoms/condition management  Program: New, Reinforced  How Provided: Verbal, Demonstration  Provided to: Patient  Level of Understanding: Teach back education performed, Verbalized, Demonstrated     PT OP Goals     Row Name 01/08/21 1200          PT Short Term Goals    STG 1  Decrease right knee pain to 2-3/10 with activity.  -AS     STG 1 Progress  Met  -AS     STG 2  Increase right knee AROM to 0-1-115 degrees with testing.  -AS     STG 2 Progress  Met  -AS     STG 3  Increase right LE stength to at least 4/5 all planes with testing.  -AS     STG 3 Progress  Met  -AS     STG 4  Pt will be independent with her HEP issued by this therapist.  -AS     STG 4 Progress  Met  -AS        Long Term Goals    LTG Date to Achieve  11/27/20  -AS     LTG 1  Decrease right knee pain to 0-1/10 with activity.  -AS     LTG 1 Progress  Met  -AS     LTG 2  Increase right knee AROM to 0-125 degrees with testing.  -AS     LTG 2 Progress   Ongoing;Progressing  -AS     LTG 3  Increase right LE stength to at least 4+/5 all planes with testing.  -AS     LTG 3 Progress  Partially Met;Ongoing;Progressing  -AS     LTG 4  Pt will ambulate normally on levels and stairs without assistive device.  -AS     LTG 4 Progress  Ongoing;Progressing  -AS     LTG 5  Pt will be independent with all ADLs and have a LEFS score > 65.  -AS     LTG 5 Progress  Ongoing;Progressing  -AS       User Key  (r) = Recorded By, (t) = Taken By, (c) = Cosigned By    Initials Name Provider Type    AS Jared Yoon, PT Physical Therapist                OP Exercises     Row Name 01/08/21 1100             Subjective Comments    Subjective Comments  pt reports she is not as sore today  -MH         Exercise 1    Exercise Name 1  Heel Slides  -MH      Time 1  10 min  -MH         Exercise 2    Exercise Name 2  Wall Slides  -MH      Time 2  10 min  -MH         Exercise 3    Exercise Name 3  Airdyne: Seat 1  -MH      Reps 3  Partial revolutions  -MH      Time 3  6 min  -MH         Exercise 4    Exercise Name 4  PROM: Flexion  -MH      Reps 4  10  -MH      Time 4  10 sec hold each  -MH         Exercise 5    Exercise Name 5  HS Stretch  -MH      Reps 5  10  -MH      Time 5  10 sec hold each  -MH         Exercise 6    Exercise Name 6  Heel Prop  -MH      Time 6  5 min  -MH      Additional Comments  5#  -MH         Exercise 7    Exercise Name 7  Prone Leg Hang  -MH      Time 7  5 min  -MH      Additional Comments  5#  -MH         Exercise 8    Exercise Name 8  PROM: Extension  -MH      Reps 8  10  -MH      Time 8  10 sec hold each  -MH         Exercise 9    Exercise Name 9  QS  -MH      Reps 9  25  -MH      Time 9  5 sec hold each  -MH         Exercise 10    Exercise Name 10  NWB gastroc stretch  -MH         Exercise 11    Exercise Name 11  TKE  -MH         Exercise 12    Exercise Name 12  Patella mobs  -MH      Time 12  3 min  -MH        User Key  (r) = Recorded By, (t) = Taken By, (c) =  Cosigned By    Initials Name Provider Type    Braden Smith, PTA Physical Therapy Assistant                                  Time Calculation:     Start Time: 1100  Stop Time: 1210  Time Calculation (min): 70 min                   Jared Yoon, PT  1/8/2021

## 2021-01-08 NOTE — THERAPY RE-EVALUATION
Outpatient Physical Therapy Ortho Treatment Note   Jenny Chávez     Patient Name: Yesi Benson  : 1955  MRN: [id number]  Today's Date: 2021      Visit Date: 2021    Visit Dx:    ICD-10-CM ICD-9-CM   1. Status post total right knee replacement  Z96.651 V43.65       Patient Active Problem List   Diagnosis   • Adenomatous polyp of colon   • Hyperlipidemia   • Hypothyroidism   • Status post surgical manipulation of knee joint   • Arthrofibrosis of knee joint, right        Past Medical History:   Diagnosis Date   • Colon polyp    • Disease of thyroid gland         Past Surgical History:   Procedure Laterality Date   • COLONOSCOPY N/A 3/27/2019    Procedure: COLONOSCOPY POLYPECTOMY;  Surgeon: Jaime Alaniz MD;  Location: [facility];  Service: Gastroenterology   • DENTAL PROCEDURE Left    • JOINT MANIPULATION Right 2020    Procedure: KNEE MANIPULATION;  Surgeon: Elmer Fuentes MD;  Location: [facility];  Service: Orthopedics;  Laterality: Right;   • SALPINGO OOPHORECTOMY Left     as an infant    • TOTAL KNEE ARTHROPLASTY Right 10/14/2020    Procedure: TOTAL KNEE ARTHROPLASTY AND ALL ASSOCIATED PROCEDURES;  Surgeon: Elmer Fuentes MD;  Location: [facility];  Service: Orthopedics;  Laterality: Right;       PT Ortho     Row Name 21 1100       Subjective Comments    Subjective Comments  pt reports she is not as sore today  -      User Key  (r) = Recorded By, (t) = Taken By, (c) = Cosigned By    Initials Name Provider Type    Braden Smith PTA Physical Therapy Assistant                            OP Exercises     Row Name 21 1100             Subjective Comments    Subjective Comments  pt reports she is not as sore today  -         Exercise 1    Exercise Name 1  Heel Slides  -      Time 1  10 min  -         Exercise 2    Exercise Name 2  Wall Slides  -      Time 2  10 min  -         Exercise 3    Exercise Name 3  Airdyne: Seat 1  -      Reps  3  Partial revolutions  -MH      Time 3  6 min  -MH         Exercise 4    Exercise Name 4  PROM: Flexion  -MH      Reps 4  10  -MH      Time 4  10 sec hold each  -MH         Exercise 5    Exercise Name 5  HS Stretch  -MH      Reps 5  10  -MH      Time 5  10 sec hold each  -MH         Exercise 6    Exercise Name 6  Heel Prop  -MH      Time 6  5 min  -MH      Additional Comments  5#  -MH         Exercise 7    Exercise Name 7  Prone Leg Hang  -MH      Time 7  5 min  -MH      Additional Comments  5#  -MH         Exercise 8    Exercise Name 8  PROM: Extension  -MH      Reps 8  10  -MH      Time 8  10 sec hold each  -MH         Exercise 9    Exercise Name 9  QS  -MH      Reps 9  25  -MH      Time 9  5 sec hold each  -MH         Exercise 10    Exercise Name 10  NWB gastroc stretch  -MH         Exercise 11    Exercise Name 11  TKE  -MH         Exercise 12    Exercise Name 12  Patella mobs  -MH      Time 12  3 min  -MH        User Key  (r) = Recorded By, (t) = Taken By, (c) = Cosigned By    Initials Name Provider Type     Braden Mckeon PTA Physical Therapy Assistant                           Therapy Education  Education Details: pt instructed in scar massage  Given: HEP, Symptoms/condition management  Program: New, Reinforced  How Provided: Verbal, Demonstration  Provided to: Patient  Level of Understanding: Teach back education performed, Verbalized, Demonstrated              Time Calculation:   Start Time: 1100  Stop Time: 1210  Time Calculation (min): 70 min  Therapy Charges for Today     Code Description Service Date Service Provider Modifiers Qty    56070683942  PT MANUAL THERAPY EA 15 MIN 1/8/2021 Braden Mckeon PTA GP 1    24313522243 HC PT THER PROC EA 15 MIN 1/8/2021 Braden Mckeon PTA GP 2                    Braden Mckeon PTA  1/8/2021

## 2021-01-11 ENCOUNTER — HOSPITAL ENCOUNTER (OUTPATIENT)
Dept: PHYSICAL THERAPY | Facility: HOSPITAL | Age: 66
Setting detail: THERAPIES SERIES
Discharge: HOME | End: 2021-01-11

## 2021-01-11 DIAGNOSIS — Z96.651 STATUS POST TOTAL RIGHT KNEE REPLACEMENT: Primary | ICD-10-CM

## 2021-01-11 PROCEDURE — 97110 THERAPEUTIC EXERCISES: CPT

## 2021-01-11 PROCEDURE — 97140 MANUAL THERAPY 1/> REGIONS: CPT

## 2021-01-11 NOTE — THERAPY TREATMENT NOTE
Outpatient Physical Therapy Ortho Treatment Note   Jenny Chávez     Patient Name: Yesi Benson  : 1955  MRN: [id number]  Today's Date: 2021      Visit Date: 2021    Visit Dx:    ICD-10-CM ICD-9-CM   1. Status post total right knee replacement  Z96.651 V43.65       Patient Active Problem List   Diagnosis   • Adenomatous polyp of colon   • Hyperlipidemia   • Hypothyroidism   • Status post surgical manipulation of knee joint   • Arthrofibrosis of knee joint, right        Past Medical History:   Diagnosis Date   • Colon polyp    • Disease of thyroid gland         Past Surgical History:   Procedure Laterality Date   • COLONOSCOPY N/A 3/27/2019    Procedure: COLONOSCOPY POLYPECTOMY;  Surgeon: Jaime Alaniz MD;  Location: [facility] OR;  Service: Gastroenterology   • DENTAL PROCEDURE Left    • JOINT MANIPULATION Right 2020    Procedure: KNEE MANIPULATION;  Surgeon: Elmer Fuentes MD;  Location: [facility] OR;  Service: Orthopedics;  Laterality: Right;   • SALPINGO OOPHORECTOMY Left     as an infant    • TOTAL KNEE ARTHROPLASTY Right 10/14/2020    Procedure: TOTAL KNEE ARTHROPLASTY AND ALL ASSOCIATED PROCEDURES;  Surgeon: Elmer Fuentes MD;  Location: [facility] OR;  Service: Orthopedics;  Laterality: Right;       PT Ortho     Row Name 21 1050       Right Lower Ext    Rt Knee Extension/Flexion AROM  0-3-116 post stretch 103 degrees flexion pre stretch  -KM      User Key  (r) = Recorded By, (t) = Taken By, (c) = Cosigned By    Initials Name Provider Type    Gladis Callahan PTA Physical Therapy Assistant                      PT Assessment/Plan     Row Name 21 1050          PT Assessment    Assessment Comments  Pt continues to make slow but good progress with ROM  -KM        PT Plan    PT Plan Comments  Will see pt 3x this week to push ROM  -KM       User Key  (r) = Recorded By, (t) = Taken By, (c) = Cosigned By    Initials Name Provider Type    Gladis Callahan  SARAH Physical Therapy Assistant            OP Exercises     Row Name 01/11/21 1050             Subjective Comments    Subjective Comments  Pt states her knee is tight today; states she started massaging her knee with Vitamin E as instructed.   -KM         Exercise 1    Exercise Name 1  Heel Slides  -KM      Time 1  10 min  -KM         Exercise 2    Exercise Name 2  Wall Slides  -KM      Time 2  10 min  -KM         Exercise 3    Exercise Name 3  Airdyne: Seat 1  -KM      Reps 3  Partial revolutions  -KM      Time 3  6 min  -KM         Exercise 4    Exercise Name 4  PROM: Flexion  -KM      Reps 4  10  -KM      Time 4  10 sec hold each  -KM         Exercise 5    Exercise Name 5  HS Stretch  -KM      Reps 5  10  -KM      Time 5  10 sec hold each  -KM         Exercise 6    Exercise Name 6  Heel Prop  -KM      Time 6  5 min  -KM      Additional Comments  5#  -KM         Exercise 7    Exercise Name 7  Prone Leg Hang  -KM      Time 7  5 min  -KM      Additional Comments  5#  -KM         Exercise 8    Exercise Name 8  PROM: Extension  -KM      Reps 8  10  -KM      Time 8  10 sec hold each  -KM         Exercise 9    Exercise Name 9  QS  -KM      Reps 9  25  -KM      Time 9  5 sec hold each  -KM         Exercise 10    Exercise Name 10  NWB gastroc stretch  -KM         Exercise 11    Exercise Name 11  TKE  -KM         Exercise 12    Exercise Name 12  Patella mobs  -KM      Time 12  3 min  -KM        User Key  (r) = Recorded By, (t) = Taken By, (c) = Cosigned By    Initials Name Provider Type    Gladis Callahan PTA Physical Therapy Assistant                                          Time Calculation:   Start Time: 1050  Stop Time: 1155  Time Calculation (min): 65 min  Therapy Charges for Today     Code Description Service Date Service Provider Modifiers Qty    75921406134 HC PT MANUAL THERAPY EA 15 MIN 1/11/2021 Gladis Garzon PTA GP 1    01016228376 HC PT THER PROC EA 15 MIN 1/11/2021 Gladis Garzon PTA GP 1                     Gladis Garzon, PTA  1/11/2021

## 2021-01-13 ENCOUNTER — HOSPITAL ENCOUNTER (OUTPATIENT)
Dept: PHYSICAL THERAPY | Facility: HOSPITAL | Age: 66
Setting detail: THERAPIES SERIES
Discharge: HOME | End: 2021-01-13

## 2021-01-13 DIAGNOSIS — Z96.651 STATUS POST TOTAL RIGHT KNEE REPLACEMENT: Primary | ICD-10-CM

## 2021-01-13 PROCEDURE — 97110 THERAPEUTIC EXERCISES: CPT

## 2021-01-13 PROCEDURE — 97140 MANUAL THERAPY 1/> REGIONS: CPT

## 2021-01-13 NOTE — THERAPY TREATMENT NOTE
Outpatient Physical Therapy Ortho Treatment Note   Jenny Chávez     Patient Name: Yesi Benson  : 1955  MRN: [id number]  Today's Date: 2021      Visit Date: 2021    Visit Dx:    ICD-10-CM ICD-9-CM   1. Status post total right knee replacement  Z96.651 V43.65       Patient Active Problem List   Diagnosis   • Adenomatous polyp of colon   • Hyperlipidemia   • Hypothyroidism   • Status post surgical manipulation of knee joint   • Arthrofibrosis of knee joint, right        Past Medical History:   Diagnosis Date   • Colon polyp    • Disease of thyroid gland         Past Surgical History:   Procedure Laterality Date   • COLONOSCOPY N/A 3/27/2019    Procedure: COLONOSCOPY POLYPECTOMY;  Surgeon: Jaime Alaniz MD;  Location: [facility] OR;  Service: Gastroenterology   • DENTAL PROCEDURE Left    • JOINT MANIPULATION Right 2020    Procedure: KNEE MANIPULATION;  Surgeon: Elmer Fuentes MD;  Location: [facility] OR;  Service: Orthopedics;  Laterality: Right;   • SALPINGO OOPHORECTOMY Left     as an infant    • TOTAL KNEE ARTHROPLASTY Right 10/14/2020    Procedure: TOTAL KNEE ARTHROPLASTY AND ALL ASSOCIATED PROCEDURES;  Surgeon: Elmer Fuentes MD;  Location: [facility] OR;  Service: Orthopedics;  Laterality: Right;       PT Ortho     Row Name 21 1100       Right Lower Ext    Rt Knee Extension/Flexion AROM  0-1-117 post stretch  -KM      User Key  (r) = Recorded By, (t) = Taken By, (c) = Cosigned By    Initials Name Provider Type    Gladis Callahan PTA Physical Therapy Assistant                      PT Assessment/Plan     Row Name 21 1100          PT Assessment    Assessment Comments  Pt with improved tolerance and motion with passive stretching. encouraged pt to focus of proper gait technique  -KM        PT Plan    PT Plan Comments  Continue per POC  -KM       User Key  (r) = Recorded By, (t) = Taken By, (c) = Cosigned By    Initials Name Provider Type    RUDOLPH Garzon  SARAH Griffith Physical Therapy Assistant            OP Exercises     Row Name 01/13/21 1100             Subjective Comments    Subjective Comments  Pt states she is sore, but feels her knee is getting looser.   -KM         Exercise 1    Exercise Name 1  Heel Slides  -KM      Time 1  10 min  -KM         Exercise 2    Exercise Name 2  Wall Slides  -KM      Time 2  10 min  -KM         Exercise 3    Exercise Name 3  Airdyne: Seat 1  -KM      Reps 3  Partial revolutions  -KM      Time 3  6 min  -KM         Exercise 4    Exercise Name 4  PROM: Flexion  -KM      Reps 4  10  -KM      Time 4  10 sec hold each  -KM         Exercise 5    Exercise Name 5  HS Stretch  -KM      Reps 5  10  -KM      Time 5  10 sec hold each  -KM         Exercise 6    Exercise Name 6  Heel Prop  -KM      Time 6  5 min  -KM      Additional Comments  5#  -KM         Exercise 7    Exercise Name 7  Prone Leg Hang  -KM      Time 7  5 min  -KM      Additional Comments  5#  -KM         Exercise 8    Exercise Name 8  PROM: Extension  -KM      Reps 8  10  -KM      Time 8  10 sec hold each  -KM         Exercise 9    Exercise Name 9  QS  -KM      Reps 9  25  -KM      Time 9  5 sec hold each  -KM         Exercise 10    Exercise Name 10  NWB gastroc stretch  -KM         Exercise 11    Exercise Name 11  TKE  -KM         Exercise 12    Exercise Name 12  Patella mobs  -KM      Time 12  3 min  -KM        User Key  (r) = Recorded By, (t) = Taken By, (c) = Cosigned By    Initials Name Provider Type    Gladis Callahan PTA Physical Therapy Assistant                                          Time Calculation:   Start Time: 1100  Stop Time: 1156  Time Calculation (min): 56 min  Therapy Charges for Today     Code Description Service Date Service Provider Modifiers Qty    53514491344 HC PT MANUAL THERAPY EA 15 MIN 1/13/2021 Gladis Garzon PTA GP 1    29091470605 HC PT THER PROC EA 15 MIN 1/13/2021 Gladis Garzon PTA GP 1                    Gladis Garzon PTA  1/13/2021

## 2021-01-15 ENCOUNTER — HOSPITAL ENCOUNTER (OUTPATIENT)
Dept: PHYSICAL THERAPY | Facility: HOSPITAL | Age: 66
Setting detail: THERAPIES SERIES
Discharge: HOME | End: 2021-01-15

## 2021-01-15 DIAGNOSIS — Z96.651 STATUS POST TOTAL RIGHT KNEE REPLACEMENT: Primary | ICD-10-CM

## 2021-01-15 PROCEDURE — 97110 THERAPEUTIC EXERCISES: CPT

## 2021-01-15 PROCEDURE — 97140 MANUAL THERAPY 1/> REGIONS: CPT

## 2021-01-15 NOTE — THERAPY TREATMENT NOTE
Outpatient Physical Therapy Ortho Treatment Note   Jenny Chávez     Patient Name: Yesi Benson  : 1955  MRN: [id number]  Today's Date: 1/15/2021      Visit Date: 01/15/2021    Visit Dx:    ICD-10-CM ICD-9-CM   1. Status post total right knee replacement  Z96.651 V43.65       Patient Active Problem List   Diagnosis   • Adenomatous polyp of colon   • Hyperlipidemia   • Hypothyroidism   • Status post surgical manipulation of knee joint   • Arthrofibrosis of knee joint, right        Past Medical History:   Diagnosis Date   • Colon polyp    • Disease of thyroid gland         Past Surgical History:   Procedure Laterality Date   • COLONOSCOPY N/A 3/27/2019    Procedure: COLONOSCOPY POLYPECTOMY;  Surgeon: Jaime Alaniz MD;  Location: [facility];  Service: Gastroenterology   • DENTAL PROCEDURE Left    • JOINT MANIPULATION Right 2020    Procedure: KNEE MANIPULATION;  Surgeon: Elmer Fuentes MD;  Location: [facility] OR;  Service: Orthopedics;  Laterality: Right;   • SALPINGO OOPHORECTOMY Left     as an infant    • TOTAL KNEE ARTHROPLASTY Right 10/14/2020    Procedure: TOTAL KNEE ARTHROPLASTY AND ALL ASSOCIATED PROCEDURES;  Surgeon: Elmer Fuentes MD;  Location: [facility] OR;  Service: Orthopedics;  Laterality: Right;       PT Ortho     Row Name 01/15/21 1050       Right Lower Ext    Rt Knee Extension/Flexion AROM  0-1-117 post stretch  -KM      User Key  (r) = Recorded By, (t) = Taken By, (c) = Cosigned By    Initials Name Provider Type    Gladis Callahan PTA Physical Therapy Assistant                      PT Assessment/Plan     Row Name 01/15/21 1050          PT Assessment    Assessment Comments  Pt with improved AROM pre-tx  and continues to tolerate passive stretching well. Encouraged pt to walk more without use of AD to help normalize gait.   -KM        PT Plan    PT Plan Comments  will continues to see pt 3x/week to push ROM  -KM       User Key  (r) = Recorded By, (t) = Taken  By, (c) = Cosigned By    Initials Name Provider Type    Gladis Callahan PTA Physical Therapy Assistant            OP Exercises     Row Name 01/15/21 1050             Subjective Comments    Subjective Comments  Pt states she feels her knee continues to improve and has been compliant with HEP. States she is able to complete additional walking each day.   -KM         Exercise 1    Exercise Name 1  Heel Slides  -KM      Time 1  10 min  -KM         Exercise 2    Exercise Name 2  Wall Slides  -KM      Time 2  10 min  -KM         Exercise 3    Exercise Name 3  Airdyne: Seat 1  -KM      Reps 3  Partial revolutions  -KM      Time 3  6 min  -KM         Exercise 4    Exercise Name 4  PROM: Flexion  -KM      Reps 4  10  -KM      Time 4  10 sec hold each  -KM         Exercise 5    Exercise Name 5  HS Stretch  -KM      Reps 5  10  -KM      Time 5  10 sec hold each  -KM         Exercise 6    Exercise Name 6  Heel Prop  -KM      Time 6  5 min  -KM         Exercise 7    Exercise Name 7  Prone Leg Hang  -KM      Time 7  5 min  -KM         Exercise 8    Exercise Name 8  PROM: Extension  -KM      Reps 8  10  -KM      Time 8  10 sec hold each  -KM         Exercise 9    Exercise Name 9  QS  -KM      Reps 9  25  -KM      Time 9  5 sec hold each  -KM         Exercise 10    Exercise Name 10  NWB gastroc stretch  -KM         Exercise 11    Exercise Name 11  TKE  -KM         Exercise 12    Exercise Name 12  Patella mobs  -KM      Time 12  3 min  -KM        User Key  (r) = Recorded By, (t) = Taken By, (c) = Cosigned By    Initials Name Provider Type    Gladis Callahan PTA Physical Therapy Assistant                                          Time Calculation:   Start Time: 1050  Stop Time: 1155  Time Calculation (min): 65 min  Therapy Charges for Today     Code Description Service Date Service Provider Modifiers Qty    53033556719 HC PT MANUAL THERAPY EA 15 MIN 1/15/2021 Gladis Garzon PTA GP 1    06678842794 HC PT THER PROC EA 15 MIN  1/15/2021 Gladis Garzon, PTA GP 1                    Glaids Garzon, SARAH  1/15/2021

## 2021-01-18 ENCOUNTER — APPOINTMENT (OUTPATIENT)
Dept: PHYSICAL THERAPY | Facility: HOSPITAL | Age: 66
End: 2021-01-18

## 2021-01-21 ENCOUNTER — HOSPITAL ENCOUNTER (OUTPATIENT)
Dept: INFUSION THERAPY | Facility: HOSPITAL | Age: 66
Discharge: HOME | End: 2021-01-21
Admitting: INTERNAL MEDICINE

## 2021-01-21 VITALS
RESPIRATION RATE: 22 BRPM | BODY MASS INDEX: 26.87 KG/M2 | SYSTOLIC BLOOD PRESSURE: 158 MMHG | TEMPERATURE: 98.6 F | OXYGEN SATURATION: 97 % | HEART RATE: 91 BPM | DIASTOLIC BLOOD PRESSURE: 68 MMHG | HEIGHT: 62 IN | WEIGHT: 146 LBS

## 2021-01-21 PROCEDURE — 25010000006 BAMLANIVIMAB 700 MG/20ML SOLUTION 20 ML VIAL: Performed by: INTERNAL MEDICINE

## 2021-01-21 PROCEDURE — M0239 BAMLANIVIMAB-XXXX INFUSION: HCPCS | Performed by: INTERNAL MEDICINE

## 2021-01-21 RX ORDER — METHYLPREDNISOLONE SODIUM SUCCINATE 125 MG/2ML
125 INJECTION, POWDER, LYOPHILIZED, FOR SOLUTION INTRAMUSCULAR; INTRAVENOUS ONCE AS NEEDED
Status: DISCONTINUED | OUTPATIENT
Start: 2021-01-21 | End: 2021-01-23 | Stop reason: HOSPADM

## 2021-01-21 RX ORDER — EPINEPHRINE 1 MG/ML
0.3 INJECTION, SOLUTION INTRAMUSCULAR; SUBCUTANEOUS ONCE AS NEEDED
Status: DISCONTINUED | OUTPATIENT
Start: 2021-01-21 | End: 2021-01-23 | Stop reason: HOSPADM

## 2021-01-21 RX ORDER — SODIUM CHLORIDE 9 MG/ML
30 INJECTION, SOLUTION INTRAVENOUS ONCE
Status: COMPLETED | OUTPATIENT
Start: 2021-01-21 | End: 2021-01-21

## 2021-01-21 RX ORDER — SODIUM CHLORIDE 9 MG/ML
INJECTION, SOLUTION INTRAVENOUS
Status: COMPLETED
Start: 2021-01-21 | End: 2021-01-21

## 2021-01-21 RX ORDER — DIPHENHYDRAMINE HYDROCHLORIDE 50 MG/ML
50 INJECTION INTRAMUSCULAR; INTRAVENOUS ONCE AS NEEDED
Status: DISCONTINUED | OUTPATIENT
Start: 2021-01-21 | End: 2021-01-23 | Stop reason: HOSPADM

## 2021-01-21 RX ADMIN — SODIUM CHLORIDE 700 MG: 9 INJECTION, SOLUTION INTRAVENOUS at 08:32

## 2021-01-21 RX ADMIN — SODIUM CHLORIDE 30 ML: 9 INJECTION, SOLUTION INTRAVENOUS at 09:42

## 2021-01-21 NOTE — NURSING NOTE
1105 Patient to [facility] today for scheduled infusion. EUA information given to patient prior to starting, verbalizes understanding. Infusion over 1 hour without complications, vss. Patient monitored for 1 hour after infusion , no distress, denies c/o. Monitoring patient for this visit included heart monitor with ever 15 minute vital signs. Patient in NSR for this visit. AVS printed & reviewed with patient.

## 2021-01-21 NOTE — PATIENT INSTRUCTIONS
"Call Dr. Mani Gorman [facility] at [phone] if you have any problems or concerns.    We know you have a Choice in healthcare and appreciate you using [facility].  Our purpose is to provide you \"Excellent Care\".  We hope that you will always choose us in the future and continue to recommend us to your family and friends.      "

## 2021-02-01 ENCOUNTER — HOSPITAL ENCOUNTER (OUTPATIENT)
Dept: PHYSICAL THERAPY | Facility: HOSPITAL | Age: 66
Setting detail: THERAPIES SERIES
Discharge: HOME | End: 2021-02-01

## 2021-02-01 DIAGNOSIS — Z96.651 STATUS POST TOTAL RIGHT KNEE REPLACEMENT: Primary | ICD-10-CM

## 2021-02-01 PROCEDURE — 97110 THERAPEUTIC EXERCISES: CPT

## 2021-02-01 PROCEDURE — 97140 MANUAL THERAPY 1/> REGIONS: CPT

## 2021-02-01 NOTE — THERAPY TREATMENT NOTE
Outpatient Physical Therapy Ortho Treatment Note   Jenny Chávez     Patient Name: Yesi Benson  : 1955  MRN: [id number]  Today's Date: 2021      Visit Date: 2021    Visit Dx:    ICD-10-CM ICD-9-CM   1. Status post total right knee replacement  Z96.651 V43.65       Patient Active Problem List   Diagnosis   • Adenomatous polyp of colon   • Hyperlipidemia   • Hypothyroidism   • Status post surgical manipulation of knee joint   • Arthrofibrosis of knee joint, right        Past Medical History:   Diagnosis Date   • Colon polyp    • Disease of thyroid gland         Past Surgical History:   Procedure Laterality Date   • COLONOSCOPY N/A 3/27/2019    Procedure: COLONOSCOPY POLYPECTOMY;  Surgeon: Jaime Alaniz MD;  Location: [facility];  Service: Gastroenterology   • DENTAL PROCEDURE Left    • JOINT MANIPULATION Right 2020    Procedure: KNEE MANIPULATION;  Surgeon: Elmer Fuentes MD;  Location: [facility] OR;  Service: Orthopedics;  Laterality: Right;   • SALPINGO OOPHORECTOMY Left     as an infant    • TOTAL KNEE ARTHROPLASTY Right 10/14/2020    Procedure: TOTAL KNEE ARTHROPLASTY AND ALL ASSOCIATED PROCEDURES;  Surgeon: Elmer Fuentes MD;  Location: [facility] OR;  Service: Orthopedics;  Laterality: Right;       PT Ortho     Row Name 21 1052       General ROM    GENERAL ROM COMMENTS  0-1-118 post stretch  -KM       Right Lower Ext    Rt Knee Extension/Flexion AROM  -- 110 degrees pre stretch  -KM      User Key  (r) = Recorded By, (t) = Taken By, (c) = Cosigned By    Initials Name Provider Type    Gladis Callahan PTA Physical Therapy Assistant                      PT Assessment/Plan     Row Name 21 1052          PT Assessment    Assessment Comments  Pt presents after being off for two weeks due to Covid.Pt continues to ambulate with SPC but has made good improvements with AROM.   -KM        PT Plan    PT Plan Comments  Continue to progress as tolerated.   -KM        User Key  (r) = Recorded By, (t) = Taken By, (c) = Cosigned By    Initials Name Provider Type    Gladis Callahan PTA Physical Therapy Assistant            OP Exercises     Row Name 02/01/21 1052             Subjective Comments    Subjective Comments  Pt states her knee is doing well and has been compliant with HEP. States she is able to make full revolutions on the bike now.   -KM         Exercise 1    Exercise Name 1  Heel Slides  -KM      Time 1  10 min  -KM         Exercise 2    Exercise Name 2  Wall Slides  -KM      Time 2  10 min  -KM         Exercise 3    Exercise Name 3  Airdyne: Seat 1  -KM      Reps 3  --  -KM      Time 3  6 min  -KM         Exercise 4    Exercise Name 4  PROM: Flexion  -KM      Reps 4  10  -KM      Time 4  10 sec hold each  -KM         Exercise 5    Exercise Name 5  HS Stretch  -KM      Reps 5  10  -KM      Time 5  10 sec hold each  -KM         Exercise 6    Exercise Name 6  --  -KM      Time 6  --  -KM         Exercise 7    Exercise Name 7  Prone Leg Hang  -KM      Time 7  5 min  -KM      Additional Comments  5#  -KM         Exercise 8    Exercise Name 8  PROM: Extension  -KM      Reps 8  10  -KM      Time 8  10 sec hold each  -KM         Exercise 9    Exercise Name 9  QS  -KM      Reps 9  25  -KM      Time 9  5 sec hold each  -KM         Exercise 10    Exercise Name 10  NWB gastroc stretch  -KM         Exercise 11    Exercise Name 11  TKE  -KM         Exercise 12    Exercise Name 12  Patella mobs  -KM      Time 12  3 min  -KM        User Key  (r) = Recorded By, (t) = Taken By, (c) = Cosigned By    Initials Name Provider Type    Gladis Callahan PTA Physical Therapy Assistant                                          Time Calculation:   Start Time: 1052  Stop Time: 1143  Time Calculation (min): 51 min  Therapy Charges for Today     Code Description Service Date Service Provider Modifiers Qty    52284652398 HC PT MANUAL THERAPY EA 15 MIN 2/1/2021 Gladis Garzon PTA GP 1     [id number]  PT THER PROC EA 15 MIN 2/1/2021 lGadis Garzon, PTA GP 1                    Gladis Garzon PTA  2/1/2021

## 2021-02-03 ENCOUNTER — HOSPITAL ENCOUNTER (OUTPATIENT)
Dept: PHYSICAL THERAPY | Facility: HOSPITAL | Age: 66
Setting detail: THERAPIES SERIES
Discharge: HOME | End: 2021-02-03

## 2021-02-03 DIAGNOSIS — Z96.651 STATUS POST TOTAL RIGHT KNEE REPLACEMENT: Primary | ICD-10-CM

## 2021-02-03 PROCEDURE — 97110 THERAPEUTIC EXERCISES: CPT

## 2021-02-03 PROCEDURE — 97140 MANUAL THERAPY 1/> REGIONS: CPT

## 2021-02-03 NOTE — THERAPY TREATMENT NOTE
Outpatient Physical Therapy Ortho Treatment Note   Jenny Chávez     Patient Name: Yesi Benson  : 1955  MRN: [id number]  Today's Date: 2/3/2021      Visit Date: 2021    Visit Dx:    ICD-10-CM ICD-9-CM   1. Status post total right knee replacement  Z96.651 V43.65       Patient Active Problem List   Diagnosis   • Adenomatous polyp of colon   • Hyperlipidemia   • Hypothyroidism   • Status post surgical manipulation of knee joint   • Arthrofibrosis of knee joint, right        Past Medical History:   Diagnosis Date   • Colon polyp    • Disease of thyroid gland         Past Surgical History:   Procedure Laterality Date   • COLONOSCOPY N/A 3/27/2019    Procedure: COLONOSCOPY POLYPECTOMY;  Surgeon: Jaime Alaniz MD;  Location: [facility];  Service: Gastroenterology   • DENTAL PROCEDURE Left    • JOINT MANIPULATION Right 2020    Procedure: KNEE MANIPULATION;  Surgeon: Elmer Fuentes MD;  Location: [facility] OR;  Service: Orthopedics;  Laterality: Right;   • SALPINGO OOPHORECTOMY Left     as an infant    • TOTAL KNEE ARTHROPLASTY Right 10/14/2020    Procedure: TOTAL KNEE ARTHROPLASTY AND ALL ASSOCIATED PROCEDURES;  Surgeon: Elmer Fuentes MD;  Location: [facility] OR;  Service: Orthopedics;  Laterality: Right;       PT Ortho     Row Name 21 0950       General ROM    GENERAL ROM COMMENTS  0-118 post stretch  -KM      User Key  (r) = Recorded By, (t) = Taken By, (c) = Cosigned By    Initials Name Provider Type    Gladis Callahan PTA Physical Therapy Assistant                      PT Assessment/Plan     Row Name 21 0950          PT Assessment    Assessment Comments  Pt is making slow and steady progress with ROM. Pt slightly limited pre-tx but progresses  well post-stretch.  -KM        PT Plan    PT Plan Comments  Continue POC per MD.   -KM       User Key  (r) = Recorded By, (t) = Taken By, (c) = Cosigned By    Initials Name Provider Type    Gladis Callahan PTA  Physical Therapy Assistant            OP Exercises     Row Name 02/03/21 0950             Exercise 1    Exercise Name 1  Heel Slides  -KM      Time 1  10 min  -KM         Exercise 2    Exercise Name 2  Wall Slides  -KM      Time 2  10 min  -KM         Exercise 3    Exercise Name 3  Airdyne: Seat 1  -KM      Time 3  6 min  -KM         Exercise 4    Exercise Name 4  PROM: Flexion  -KM      Reps 4  10  -KM      Time 4  10 sec hold each  -KM         Exercise 5    Exercise Name 5  HS Stretch  -KM      Reps 5  10  -KM      Time 5  10 sec hold each  -KM         Exercise 7    Exercise Name 7  Prone Leg Hang  -KM      Time 7  5 min  -KM         Exercise 8    Exercise Name 8  PROM: Extension  -KM      Reps 8  10  -KM      Time 8  10 sec hold each  -KM         Exercise 9    Exercise Name 9  QS  -KM      Reps 9  25  -KM      Time 9  5 sec hold each  -KM         Exercise 10    Exercise Name 10  NWB gastroc stretch  -KM         Exercise 11    Exercise Name 11  TKE  -KM         Exercise 12    Exercise Name 12  Patella mobs  -KM      Time 12  3 min  -KM        User Key  (r) = Recorded By, (t) = Taken By, (c) = Cosigned By    Initials Name Provider Type    Gladis Callahan PTA Physical Therapy Assistant                                          Time Calculation:   Start Time: 0950  Stop Time: 1043  Time Calculation (min): 53 min  Therapy Charges for Today     Code Description Service Date Service Provider Modifiers Qty    87827055939 HC PT MANUAL THERAPY EA 15 MIN 2/3/2021 Gladis Garzon PTA GP 1    08531256838 HC PT THER PROC EA 15 MIN 2/3/2021 Gladis Garzon PTA GP 1                    Gladis Garzon PTA  2/3/2021

## 2021-02-04 ENCOUNTER — OFFICE VISIT (OUTPATIENT)
Dept: ORTHOPEDIC SURGERY | Facility: CLINIC | Age: 66
End: 2021-02-04

## 2021-02-04 DIAGNOSIS — M24.661 ARTHROFIBROSIS OF KNEE JOINT, RIGHT: ICD-10-CM

## 2021-02-04 DIAGNOSIS — Z96.651 STATUS POST TOTAL RIGHT KNEE REPLACEMENT: ICD-10-CM

## 2021-02-04 DIAGNOSIS — Z98.890 STATUS POST SURGICAL MANIPULATION OF KNEE JOINT: Primary | ICD-10-CM

## 2021-02-04 PROCEDURE — 99024 POSTOP FOLLOW-UP VISIT: CPT | Performed by: ORTHOPAEDIC SURGERY

## 2021-02-04 RX ORDER — CELECOXIB 200 MG/1
200 CAPSULE ORAL 2 TIMES DAILY
Qty: 60 CAPSULE | Refills: 0 | Status: SHIPPED | OUTPATIENT
Start: 2021-02-04 | End: 2021-03-18

## 2021-02-04 NOTE — PROGRESS NOTES
CC: s/p right total knee arthroplasty, DOS 10/14/2020          Status post right total knee manipulation-12/21/2020  Interval History: Yesi Benson returns 6 week postoperative visit for manipulation.  She is making progress, doing PT 2x/week, missed 2 weeks of PT due to COVID.  She denies any wound problem, fevers, or chills. Patient is continuing to work with outpatient PT.     Physical Examination: Right knee was examined   Incision well healed   ROM 0- 115,  4/5 strength   No residual extensor lag   Stable to varus and valgus stress   Flex/extend ankle and toes   Positive sensation right foot   No calf pain, negative Homans sign bilaterally    Radiographic review:   None    Assessment/Plan:  1. F/u in 6 weeks with xray right knee  2. Continue with PT and home exercises  3. All questions answered      Medications:  New Medications Ordered This Visit   Medications   • celecoxib (CeleBREX) 200 MG capsule     Sig: Take 1 capsule by mouth 2 (Two) Times a Day.     Dispense:  60 capsule     Refill:  0

## 2021-02-05 ENCOUNTER — HOSPITAL ENCOUNTER (OUTPATIENT)
Dept: PHYSICAL THERAPY | Facility: HOSPITAL | Age: 66
Setting detail: THERAPIES SERIES
Discharge: HOME | End: 2021-02-05

## 2021-02-05 DIAGNOSIS — Z96.651 STATUS POST TOTAL RIGHT KNEE REPLACEMENT: Primary | ICD-10-CM

## 2021-02-05 PROCEDURE — 97110 THERAPEUTIC EXERCISES: CPT

## 2021-02-05 PROCEDURE — 97140 MANUAL THERAPY 1/> REGIONS: CPT

## 2021-02-05 NOTE — THERAPY TREATMENT NOTE
Outpatient Physical Therapy Ortho Treatment Note   Jenny Chávez     Patient Name: Yesi Benson  : 1955  MRN: [id number]  Today's Date: 2021      Visit Date: 2021    Visit Dx:    ICD-10-CM ICD-9-CM   1. Status post total right knee replacement  Z96.651 V43.65       Patient Active Problem List   Diagnosis   • Adenomatous polyp of colon   • Hyperlipidemia   • Hypothyroidism   • Status post surgical manipulation of knee joint   • Arthrofibrosis of knee joint, right        Past Medical History:   Diagnosis Date   • Colon polyp    • Disease of thyroid gland    • Knee swelling         Past Surgical History:   Procedure Laterality Date   • COLONOSCOPY N/A 3/27/2019    Procedure: COLONOSCOPY POLYPECTOMY;  Surgeon: Jaime Alaniz MD;  Location: [facility];  Service: Gastroenterology   • DENTAL PROCEDURE Left    • JOINT MANIPULATION Right 2020    Procedure: KNEE MANIPULATION;  Surgeon: Elmer Fuentes MD;  Location: [facility];  Service: Orthopedics;  Laterality: Right;   • JOINT REPLACEMENT     • SALPINGO OOPHORECTOMY Left     as an infant    • TOTAL KNEE ARTHROPLASTY Right 10/14/2020    Procedure: TOTAL KNEE ARTHROPLASTY AND ALL ASSOCIATED PROCEDURES;  Surgeon: Elmer Fuentes MD;  Location: [facility];  Service: Orthopedics;  Laterality: Right;                       PT Assessment/Plan     Row Name 21 1642          PT Assessment    Assessment Comments  Good progress with AROM; quad/hip strengthening incorporated into plan wiritten HEP provided.   -KM        PT Plan    PT Plan Comments  Will continue to see pt 2x/week to progress ROM  -KM       User Key  (r) = Recorded By, (t) = Taken By, (c) = Cosigned By    Initials Name Provider Type    Gladis Callahan PTA Physical Therapy Assistant            OP Exercises     Row Name 21 1136             Subjective Comments    Subjective Comments  Pt states her f/u appointment went well; she was prescribed an  anti-inflammatory and should incorporate strengthening exercises back into plan.   -KM         Exercise 1    Exercise Name 1  Heel Slides  -KM      Time 1  10 min  -KM         Exercise 2    Exercise Name 2  Wall Slides  -KM      Time 2  10 min  -KM         Exercise 3    Exercise Name 3  Airdyne: Seat 1  -KM      Time 3  6 min  -KM         Exercise 4    Exercise Name 4  PROM: Flexion  -KM      Reps 4  10  -KM      Time 4  10 sec hold each  -KM         Exercise 5    Exercise Name 5  HS Stretch  -KM      Reps 5  10  -KM      Time 5  10 sec hold each  -KM         Exercise 7    Exercise Name 7  Prone Leg Hang  -KM      Time 7  5 min  -KM         Exercise 8    Exercise Name 8  PROM: Extension  -KM      Reps 8  10  -KM      Time 8  10 sec hold each  -KM         Exercise 9    Exercise Name 9  QS  -KM      Reps 9  25  -KM      Time 9  5 sec hold each  -KM         Exercise 10    Exercise Name 10  NWB gastroc stretch  -KM         Exercise 11    Exercise Name 11  TKE  -KM      Reps 11  25  -KM      Time 11  Gold  -KM         Exercise 12    Exercise Name 12  Patella mobs  -KM      Time 12  3 min  -KM         Exercise 13    Exercise Name 13  3-Way Hip  -KM      Reps 13  25  -KM         Exercise 14    Exercise Name 14  LAQ  -KM      Reps 14  25  -KM         Exercise 15    Exercise Name 15  Mini Squats  -KM      Reps 15  25  -KM        User Key  (r) = Recorded By, (t) = Taken By, (c) = Cosigned By    Initials Name Provider Type    Gladis Callahan PTA Physical Therapy Assistant                                          Time Calculation:   Start Time: 1130  Stop Time: 1240  Time Calculation (min): 70 min  Therapy Charges for Today     Code Description Service Date Service Provider Modifiers Qty    49764121783 HC PT MANUAL THERAPY EA 15 MIN 2/5/2021 Gladis Garzon PTA GP 1    56447776829 HC PT THER PROC EA 15 MIN 2/5/2021 Gladis Garzon PTA GP 1                    Gladis Garzon PTA  2/5/2021

## 2021-02-09 ENCOUNTER — HOSPITAL ENCOUNTER (OUTPATIENT)
Dept: PHYSICAL THERAPY | Facility: HOSPITAL | Age: 66
Setting detail: THERAPIES SERIES
Discharge: HOME | End: 2021-02-09

## 2021-02-09 DIAGNOSIS — Z96.651 STATUS POST TOTAL RIGHT KNEE REPLACEMENT: Primary | ICD-10-CM

## 2021-02-09 PROCEDURE — 97110 THERAPEUTIC EXERCISES: CPT

## 2021-02-09 PROCEDURE — 97140 MANUAL THERAPY 1/> REGIONS: CPT

## 2021-02-09 NOTE — THERAPY TREATMENT NOTE
Outpatient Physical Therapy Ortho Treatment Note   Jenny Chávez     Patient Name: Yesi Benson  : 1955  MRN: [id number]  Today's Date: 2021      Visit Date: 2021    Visit Dx:    ICD-10-CM ICD-9-CM   1. Status post total right knee replacement  Z96.651 V43.65       Patient Active Problem List   Diagnosis   • Adenomatous polyp of colon   • Hyperlipidemia   • Hypothyroidism   • Status post surgical manipulation of knee joint   • Arthrofibrosis of knee joint, right        Past Medical History:   Diagnosis Date   • Colon polyp    • Disease of thyroid gland    • Knee swelling         Past Surgical History:   Procedure Laterality Date   • COLONOSCOPY N/A 3/27/2019    Procedure: COLONOSCOPY POLYPECTOMY;  Surgeon: Jaime Alaniz MD;  Location: [facility];  Service: Gastroenterology   • DENTAL PROCEDURE Left    • JOINT MANIPULATION Right 2020    Procedure: KNEE MANIPULATION;  Surgeon: Elmer Fuentes MD;  Location: [facility] OR;  Service: Orthopedics;  Laterality: Right;   • JOINT REPLACEMENT     • SALPINGO OOPHORECTOMY Left     as an infant    • TOTAL KNEE ARTHROPLASTY Right 10/14/2020    Procedure: TOTAL KNEE ARTHROPLASTY AND ALL ASSOCIATED PROCEDURES;  Surgeon: Elmer Fuentes MD;  Location: [facility];  Service: Orthopedics;  Laterality: Right;       PT Ortho     Row Name 21 1248       Right Lower Ext    Rt Knee Extension/Flexion AROM  0-118 post stretch  -KM    Rt Knee Extension/Flexion PROM  0-125 post stretch  -KM      User Key  (r) = Recorded By, (t) = Taken By, (c) = Cosigned By    Initials Name Provider Type    Gladis Callahan, PTA Physical Therapy Assistant                      PT Assessment/Plan     Row Name 21 1248          PT Assessment    Assessment Comments  Some decreaesed in edema noted. Consistent AROM measured with much improved PROM post stretch. Pt plans to complete strengthening exercises at home.   -KM        PT Plan    PT Plan Comments   Continue per POC  -KM       User Key  (r) = Recorded By, (t) = Taken By, (c) = Cosigned By    Initials Name Provider Type    Gladis Callahan PTA Physical Therapy Assistant            OP Exercises     Row Name 02/09/21 1248             Subjective Comments    Subjective Comments  Pt states her knee is going well and continues to push HEP  -KM         Exercise 1    Exercise Name 1  Heel Slides  -KM      Time 1  10 min  -KM         Exercise 2    Exercise Name 2  Wall Slides  -KM      Time 2  10 min  -KM         Exercise 3    Exercise Name 3  Airdyne: Seat 1  -KM      Time 3  6 min  -KM         Exercise 4    Exercise Name 4  PROM: Flexion  -KM      Reps 4  10  -KM      Time 4  10 sec hold each  -KM         Exercise 5    Exercise Name 5  HS Stretch  -KM      Reps 5  10  -KM      Time 5  10 sec hold each  -KM         Exercise 7    Exercise Name 7  Prone Leg Hang  -KM      Time 7  5 min  -KM      Additional Comments  5#  -KM         Exercise 8    Exercise Name 8  PROM: Extension  -KM      Reps 8  10  -KM      Time 8  10 sec hold each  -KM         Exercise 9    Exercise Name 9  QS  -KM      Reps 9  --  -KM      Time 9  --  -KM         Exercise 10    Exercise Name 10  NWB gastroc stretch  -KM         Exercise 11    Exercise Name 11  TKE  -KM      Reps 11  --  -KM      Time 11  --  -KM         Exercise 12    Exercise Name 12  Patella mobs  -KM      Time 12  3 min  -KM         Exercise 13    Exercise Name 13  3-Way Hip  -KM      Reps 13  --  -KM         Exercise 14    Exercise Name 14  LAQ  -KM      Reps 14  --  -KM         Exercise 15    Exercise Name 15  Mini Squats  -KM      Reps 15  --  -KM        User Key  (r) = Recorded By, (t) = Taken By, (c) = Cosigned By    Initials Name Provider Type    Gladis Callahan PTA Physical Therapy Assistant                                          Time Calculation:   Start Time: 1248  Stop Time: 1333  Time Calculation (min): 45 min  Therapy Charges for Today     Code Description Service  Date Service Provider Modifiers Qty    87041786537 HC PT MANUAL THERAPY EA 15 MIN 2/9/2021 Gladis Garzon, SARAH GP 1    23337463927 HC PT THER PROC EA 15 MIN 2/9/2021 Gladis Garzon PTA GP 1                    Gladis Garzon PTA  2/9/2021

## 2021-02-12 ENCOUNTER — HOSPITAL ENCOUNTER (OUTPATIENT)
Dept: PHYSICAL THERAPY | Facility: HOSPITAL | Age: 66
Setting detail: THERAPIES SERIES
Discharge: HOME | End: 2021-02-12

## 2021-02-12 DIAGNOSIS — Z96.651 STATUS POST TOTAL RIGHT KNEE REPLACEMENT: Primary | ICD-10-CM

## 2021-02-12 PROCEDURE — 97110 THERAPEUTIC EXERCISES: CPT

## 2021-02-12 PROCEDURE — 97140 MANUAL THERAPY 1/> REGIONS: CPT

## 2021-02-12 NOTE — THERAPY TREATMENT NOTE
Outpatient Physical Therapy Ortho Treatment Note   Jenny Chávez     Patient Name: Yesi Benson  : 1955  MRN: [id number]  Today's Date: 2021      Visit Date: 2021    Visit Dx:    ICD-10-CM ICD-9-CM   1. Status post total right knee replacement  Z96.651 V43.65       Patient Active Problem List   Diagnosis   • Adenomatous polyp of colon   • Hyperlipidemia   • Hypothyroidism   • Status post surgical manipulation of knee joint   • Arthrofibrosis of knee joint, right        Past Medical History:   Diagnosis Date   • Colon polyp    • Disease of thyroid gland    • Knee swelling         Past Surgical History:   Procedure Laterality Date   • COLONOSCOPY N/A 3/27/2019    Procedure: COLONOSCOPY POLYPECTOMY;  Surgeon: Jaime Alaniz MD;  Location: [facility];  Service: Gastroenterology   • DENTAL PROCEDURE Left    • JOINT MANIPULATION Right 2020    Procedure: KNEE MANIPULATION;  Surgeon: Elmer Fuentes MD;  Location: [facility] OR;  Service: Orthopedics;  Laterality: Right;   • JOINT REPLACEMENT     • SALPINGO OOPHORECTOMY Left     as an infant    • TOTAL KNEE ARTHROPLASTY Right 10/14/2020    Procedure: TOTAL KNEE ARTHROPLASTY AND ALL ASSOCIATED PROCEDURES;  Surgeon: Elmer Fuentes MD;  Location: [facility];  Service: Orthopedics;  Laterality: Right;       PT Ortho     Row Name 21 0900       Subjective Comments    Subjective Comments  pt states she is doing well today - states she tries to make sure she focuses on correct gait but sometimes finds herself resorting back to her old ways  -      User Key  (r) = Recorded By, (t) = Taken By, (c) = Cosigned By    Initials Name Provider Type    Braden Smith, PTA Physical Therapy Assistant                      PT Assessment/Plan     Row Name 21 1027          PT Assessment    Assessment Comments  pt continues to show improved gait pattern and tolerance to passive stretching;  maintains good ROM - pt opted to  complete strength ex's at home  -MH        PT Plan    PT Plan Comments  Cont per POC  -MH       User Key  (r) = Recorded By, (t) = Taken By, (c) = Cosigned By    Initials Name Provider Type    Braden Smith PTA Physical Therapy Assistant            OP Exercises     Row Name 02/12/21 0900             Subjective Comments    Subjective Comments  pt states she is doing well today - states she tries to make sure she focuses on correct gait but sometimes finds herself resorting back to her old ways  -MH         Exercise 1    Exercise Name 1  Heel Slides  -MH      Time 1  10 min  -MH         Exercise 2    Exercise Name 2  Wall Slides  -MH      Time 2  10 min  -MH         Exercise 3    Exercise Name 3  Airdyne: Seat 1  -MH      Time 3  5 min  -MH         Exercise 4    Exercise Name 4  PROM: Flexion  -MH      Reps 4  10  -MH      Time 4  10 sec hold each  -MH         Exercise 5    Exercise Name 5  HS Stretch  -MH      Reps 5  10  -MH      Time 5  10 sec hold each  -MH         Exercise 7    Exercise Name 7  Prone Leg Hang  -MH      Time 7  5 min  -MH      Additional Comments  5#  -MH         Exercise 8    Exercise Name 8  PROM: Extension  -MH      Reps 8  10  -MH      Time 8  10 sec hold each  -MH         Exercise 9    Exercise Name 9  QS  -MH         Exercise 10    Exercise Name 10  NWB gastroc stretch  -MH         Exercise 11    Exercise Name 11  TKE  -MH         Exercise 12    Exercise Name 12  Patella mobs  -MH      Time 12  3 min  -MH         Exercise 13    Exercise Name 13  3-Way Hip  -MH         Exercise 14    Exercise Name 14  LAQ  -MH         Exercise 15    Exercise Name 15  Mini Squats  -MH        User Key  (r) = Recorded By, (t) = Taken By, (c) = Cosigned By    Initials Name Provider Type    Braden Smith PTA Physical Therapy Assistant                           Therapy Education  Given: HEP, Symptoms/condition management  Program: Reinforced  How Provided: Verbal  Provided to: Patient  Level of  Understanding: Teach back education performed, Verbalized, Demonstrated              Time Calculation:   Start Time: 0900  Stop Time: 1004  Time Calculation (min): 64 min  Therapy Charges for Today     Code Description Service Date Service Provider Modifiers Qty    64171500463 HC PT MANUAL THERAPY EA 15 MIN 2/12/2021 Braden Mckeon, SARAH GP 1    10985685167 HC PT THER PROC EA 15 MIN 2/12/2021 Braden Mckeon PTA GP 1                    Braden Mckeon PTA  2/12/2021

## 2021-02-17 ENCOUNTER — HOSPITAL ENCOUNTER (OUTPATIENT)
Dept: PHYSICAL THERAPY | Facility: HOSPITAL | Age: 66
Setting detail: THERAPIES SERIES
Discharge: HOME | End: 2021-02-17

## 2021-02-17 DIAGNOSIS — Z96.651 STATUS POST TOTAL RIGHT KNEE REPLACEMENT: Primary | ICD-10-CM

## 2021-02-17 PROCEDURE — 97110 THERAPEUTIC EXERCISES: CPT

## 2021-02-17 PROCEDURE — 97140 MANUAL THERAPY 1/> REGIONS: CPT

## 2021-02-17 NOTE — THERAPY TREATMENT NOTE
Outpatient Physical Therapy Ortho Treatment Note   Jenny Chávez     Patient Name: Yesi Benson  : 1955  MRN: [id number]  Today's Date: 2021      Visit Date: 2021    Visit Dx:    ICD-10-CM ICD-9-CM   1. Status post total right knee replacement  Z96.651 V43.65       Patient Active Problem List   Diagnosis   • Adenomatous polyp of colon   • Hyperlipidemia   • Hypothyroidism   • Status post surgical manipulation of knee joint   • Arthrofibrosis of knee joint, right        Past Medical History:   Diagnosis Date   • Colon polyp    • Disease of thyroid gland    • Knee swelling         Past Surgical History:   Procedure Laterality Date   • COLONOSCOPY N/A 3/27/2019    Procedure: COLONOSCOPY POLYPECTOMY;  Surgeon: Jaime Alaniz MD;  Location: [facility];  Service: Gastroenterology   • DENTAL PROCEDURE Left    • JOINT MANIPULATION Right 2020    Procedure: KNEE MANIPULATION;  Surgeon: Elmer Fuentes MD;  Location: [facility] OR;  Service: Orthopedics;  Laterality: Right;   • JOINT REPLACEMENT     • SALPINGO OOPHORECTOMY Left     as an infant    • TOTAL KNEE ARTHROPLASTY Right 10/14/2020    Procedure: TOTAL KNEE ARTHROPLASTY AND ALL ASSOCIATED PROCEDURES;  Surgeon: Elmer Fuentes MD;  Location: [facility] OR;  Service: Orthopedics;  Laterality: Right;       PT Ortho     Row Name 21 1100       Subjective Comments    Subjective Comments  pt states Dr. Gravesney wanted her to go to [facility] and get a knee sleeve - she was only able to wear it for an hour before she started having significant pain in the knee cap and had to take it off  -      User Key  (r) = Recorded By, (t) = Taken By, (c) = Cosigned By    Initials Name Provider Type     Braden Mckeon, SARAH Physical Therapy Assistant                      PT Assessment/Plan     Row Name 21 1351          PT Assessment    Assessment Comments  pt continues to show improvement with ROM and gait; still with areas of  adherence over the patella - reviewed scar massage with patient  -MH        PT Plan    PT Plan Comments  Cont - needs 10th visit medicare note next visit  -MH       User Key  (r) = Recorded By, (t) = Taken By, (c) = Cosigned By    Initials Name Provider Type    Braden Smith PTA Physical Therapy Assistant            OP Exercises     Row Name 02/17/21 1100             Subjective Comments    Subjective Comments  pt states Dr. Fuentes wanted her to go to [facility] and get a knee sleeve - she was only able to wear it for an hour before she started having significant pain in the knee cap and had to take it off  -MH         Exercise 1    Exercise Name 1  Heel Slides  -MH      Time 1  10 min  -MH         Exercise 2    Exercise Name 2  Wall Slides  -MH      Time 2  10 min  -MH         Exercise 3    Exercise Name 3  Airdyne: Seat 1  -MH      Time 3  5 min  -MH         Exercise 4    Exercise Name 4  PROM: Flexion  -MH      Reps 4  10  -MH      Time 4  10 sec hold each  -MH         Exercise 5    Exercise Name 5  HS Stretch  -MH      Reps 5  10  -MH      Time 5  10 sec hold each  -MH         Exercise 7    Exercise Name 7  Prone Leg Hang  -MH      Time 7  5 min  -MH      Additional Comments  5#  -MH         Exercise 8    Exercise Name 8  PROM: Extension  -MH      Reps 8  10  -MH      Time 8  10 sec hold each  -MH         Exercise 9    Exercise Name 9  QS  -MH         Exercise 10    Exercise Name 10  NWB gastroc stretch  -MH         Exercise 11    Exercise Name 11  TKE  -MH         Exercise 12    Exercise Name 12  Patella mobs  -MH      Time 12  3 min  -MH         Exercise 13    Exercise Name 13  3-Way Hip  -MH         Exercise 14    Exercise Name 14  LAQ  -MH         Exercise 15    Exercise Name 15  Mini Squats  -MH        User Key  (r) = Recorded By, (t) = Taken By, (c) = Cosigned By    Initials Name Provider Type    Braden Smith PTA Physical Therapy Assistant                           Therapy Education  Given: HEP,  Symptoms/condition management  Program: Reinforced  How Provided: Verbal  Provided to: Patient  Level of Understanding: Teach back education performed, Verbalized, Demonstrated              Time Calculation:   Start Time: 1100  Stop Time: 1212  Time Calculation (min): 72 min  Therapy Charges for Today     Code Description Service Date Service Provider Modifiers Qty    43390338060 HC PT MANUAL THERAPY EA 15 MIN 2/17/2021 Braden Mckeon PTA GP 1    29163070392 HC PT THER PROC EA 15 MIN 2/17/2021 Braden Mckeon PTA GP 1                    Braden Mckeon PTA  2/17/2021

## 2021-02-19 ENCOUNTER — HOSPITAL ENCOUNTER (OUTPATIENT)
Dept: PHYSICAL THERAPY | Facility: HOSPITAL | Age: 66
Setting detail: THERAPIES SERIES
Discharge: HOME | End: 2021-02-19

## 2021-02-19 DIAGNOSIS — Z96.651 STATUS POST TOTAL RIGHT KNEE REPLACEMENT: Primary | ICD-10-CM

## 2021-02-22 ENCOUNTER — APPOINTMENT (OUTPATIENT)
Dept: WOMENS IMAGING | Facility: HOSPITAL | Age: 66
End: 2021-02-22

## 2021-02-22 PROCEDURE — 77063 BREAST TOMOSYNTHESIS BI: CPT | Performed by: RADIOLOGY

## 2021-02-22 PROCEDURE — 77067 SCR MAMMO BI INCL CAD: CPT | Performed by: RADIOLOGY

## 2021-02-24 ENCOUNTER — HOSPITAL ENCOUNTER (OUTPATIENT)
Dept: PHYSICAL THERAPY | Facility: HOSPITAL | Age: 66
Setting detail: THERAPIES SERIES
Discharge: HOME | End: 2021-02-24

## 2021-02-24 DIAGNOSIS — Z96.651 STATUS POST TOTAL RIGHT KNEE REPLACEMENT: Primary | ICD-10-CM

## 2021-02-24 PROCEDURE — 97110 THERAPEUTIC EXERCISES: CPT

## 2021-02-24 PROCEDURE — 97140 MANUAL THERAPY 1/> REGIONS: CPT

## 2021-02-24 NOTE — THERAPY TREATMENT NOTE
Outpatient Physical Therapy Ortho Treatment Note   Jenny Chávez     Patient Name: Yesi Benson  : 1955  MRN: [id number]  Today's Date: 2021      Visit Date: 2021    Visit Dx:    ICD-10-CM ICD-9-CM   1. Status post total right knee replacement  Z96.651 V43.65       Patient Active Problem List   Diagnosis   • Adenomatous polyp of colon   • Hyperlipidemia   • Hypothyroidism   • Status post surgical manipulation of knee joint   • Arthrofibrosis of knee joint, right        Past Medical History:   Diagnosis Date   • Colon polyp    • Disease of thyroid gland    • Knee swelling         Past Surgical History:   Procedure Laterality Date   • COLONOSCOPY N/A 3/27/2019    Procedure: COLONOSCOPY POLYPECTOMY;  Surgeon: Jaime Alaniz MD;  Location: [facility];  Service: Gastroenterology   • DENTAL PROCEDURE Left    • JOINT MANIPULATION Right 2020    Procedure: KNEE MANIPULATION;  Surgeon: Elmer Fuentes MD;  Location: [facility] OR;  Service: Orthopedics;  Laterality: Right;   • JOINT REPLACEMENT     • SALPINGO OOPHORECTOMY Left     as an infant    • TOTAL KNEE ARTHROPLASTY Right 10/14/2020    Procedure: TOTAL KNEE ARTHROPLASTY AND ALL ASSOCIATED PROCEDURES;  Surgeon: Elmer Fuentes MD;  Location: [facility];  Service: Orthopedics;  Laterality: Right;       PT Ortho     Row Name 21 1045       Right Lower Ext    Rt Knee Extension/Flexion AROM  0-121 post stretch  -KM      User Key  (r) = Recorded By, (t) = Taken By, (c) = Cosigned By    Initials Name Provider Type    Gladis Callahan PTA Physical Therapy Assistant                      PT Assessment/Plan     Row Name 21 1045          PT Assessment    Assessment Comments  Pt presents with compression sleeve and use of SPC. Pt continues to make good, slow progress with ROM. Encouraged pt to try and ambulate without use of SPC  -KM        PT Plan    PT Plan Comments  Continue per POC  -KM       User Key  (r) = Recorded  By, (t) = Taken By, (c) = Cosigned By    Initials Name Provider Type    Gladis Callahan PTA Physical Therapy Assistant            OP Exercises     Row Name 02/24/21 1045             Subjective Comments    Subjective Comments  Pt states she has started wearing a compression sleeve and seems to help with swelling and support. States she will only use her SPC when walking long distances.   -KM         Exercise 1    Exercise Name 1  Heel Slides  -KM      Time 1  10 min  -KM         Exercise 2    Exercise Name 2  Wall Slides  -KM      Time 2  10 min  -KM         Exercise 3    Exercise Name 3  Airdyne: Seat 1  -KM      Time 3  5 min  -KM         Exercise 4    Exercise Name 4  PROM: Flexion  -KM      Reps 4  10  -KM      Time 4  10 sec hold each  -KM         Exercise 5    Exercise Name 5  HS Stretch  -KM      Reps 5  10  -KM      Time 5  10 sec hold each  -KM         Exercise 7    Exercise Name 7  Prone Leg Hang  -KM      Time 7  5 min  -KM      Additional Comments  5#  -KM         Exercise 8    Exercise Name 8  PROM: Extension  -KM      Reps 8  10  -KM      Time 8  10 sec hold each  -KM         Exercise 9    Exercise Name 9  QS  -KM         Exercise 10    Exercise Name 10  NWB gastroc stretch  -KM         Exercise 11    Exercise Name 11  TKE  -KM         Exercise 12    Exercise Name 12  Patella mobs  -KM      Time 12  3 min  -KM         Exercise 13    Exercise Name 13  3-Way Hip  -KM         Exercise 14    Exercise Name 14  LAQ  -KM         Exercise 15    Exercise Name 15  Mini Squats  -KM        User Key  (r) = Recorded By, (t) = Taken By, (c) = Cosigned By    Initials Name Provider Type    Gladis Callahan PTA Physical Therapy Assistant                                          Time Calculation:   Start Time: 1045  Stop Time: 1133  Time Calculation (min): 48 min  Therapy Charges for Today     Code Description Service Date Service Provider Modifiers Qty    60588107147  PT MANUAL THERAPY EA 15 MIN 2/24/2021  Gladis Garzon, PTA GP 1    34346955599 HC PT THER PROC EA 15 MIN 2/24/2021 Gladis Garzon PTA GP 1                    Gladis Garzon, SARAH  2/24/2021

## 2021-02-26 ENCOUNTER — HOSPITAL ENCOUNTER (OUTPATIENT)
Dept: PHYSICAL THERAPY | Facility: HOSPITAL | Age: 66
Setting detail: THERAPIES SERIES
Discharge: HOME | End: 2021-02-26

## 2021-02-26 DIAGNOSIS — Z96.651 STATUS POST TOTAL RIGHT KNEE REPLACEMENT: Primary | ICD-10-CM

## 2021-02-26 PROCEDURE — 97140 MANUAL THERAPY 1/> REGIONS: CPT

## 2021-02-26 PROCEDURE — 97110 THERAPEUTIC EXERCISES: CPT

## 2021-02-26 NOTE — THERAPY TREATMENT NOTE
Outpatient Physical Therapy Ortho Treatment Note   Jenny Chávez     Patient Name: Yesi Benson  : 1955  MRN: [id number]  Today's Date: 2021      Visit Date: 2021    Visit Dx:    ICD-10-CM ICD-9-CM   1. Status post total right knee replacement  Z96.651 V43.65       Patient Active Problem List   Diagnosis   • Adenomatous polyp of colon   • Hyperlipidemia   • Hypothyroidism   • Status post surgical manipulation of knee joint   • Arthrofibrosis of knee joint, right        Past Medical History:   Diagnosis Date   • Colon polyp    • Disease of thyroid gland    • Knee swelling         Past Surgical History:   Procedure Laterality Date   • COLONOSCOPY N/A 3/27/2019    Procedure: COLONOSCOPY POLYPECTOMY;  Surgeon: Jaime Alaniz MD;  Location: [facility];  Service: Gastroenterology   • DENTAL PROCEDURE Left    • JOINT MANIPULATION Right 2020    Procedure: KNEE MANIPULATION;  Surgeon: Elmer Fuentes MD;  Location: [facility];  Service: Orthopedics;  Laterality: Right;   • JOINT REPLACEMENT     • SALPINGO OOPHORECTOMY Left     as an infant    • TOTAL KNEE ARTHROPLASTY Right 10/14/2020    Procedure: TOTAL KNEE ARTHROPLASTY AND ALL ASSOCIATED PROCEDURES;  Surgeon: Elmer Fuentes MD;  Location: [facility];  Service: Orthopedics;  Laterality: Right;                       PT Assessment/Plan     Row Name 21          PT Assessment    Assessment Comments  Decreased edema noted and pt continues to maintain ROM well.   -KM        PT Plan    PT Plan Comments  Pt to continue with HEP. Continue to push ROM  -KM       User Key  (r) = Recorded By, (t) = Taken By, (c) = Cosigned By    Initials Name Provider Type    Gladis Callahan PTA Physical Therapy Assistant            OP Exercises     Row Name 21             Subjective Comments    Subjective Comments  Pt states her knee feels tight this morning.   -KM         Exercise 1    Exercise Name 1  Heel Slides  -KM       Time 1  8 min  -KM         Exercise 2    Exercise Name 2  Wall Slides  -KM      Time 2  8 min  -KM         Exercise 3    Exercise Name 3  Airdyne: Seat 1  -KM      Time 3  5 min  -KM         Exercise 4    Exercise Name 4  PROM: Flexion  -KM      Reps 4  10  -KM      Time 4  10 sec hold each  -KM         Exercise 5    Exercise Name 5  HS Stretch  -KM      Reps 5  10  -KM      Time 5  10 sec hold each  -KM         Exercise 7    Exercise Name 7  Prone Leg Hang  -KM      Time 7  5 min  -KM      Additional Comments  5#  -KM         Exercise 8    Exercise Name 8  PROM: Extension  -KM      Reps 8  10  -KM      Time 8  10 sec hold each  -KM         Exercise 9    Exercise Name 9  QS  -KM         Exercise 10    Exercise Name 10  NWB gastroc stretch  -KM         Exercise 11    Exercise Name 11  TKE  -KM         Exercise 12    Exercise Name 12  Patella mobs  -KM      Time 12  3 min  -KM         Exercise 13    Exercise Name 13  3-Way Hip  -KM         Exercise 14    Exercise Name 14  LAQ  -KM         Exercise 15    Exercise Name 15  Mini Squats  -KM        User Key  (r) = Recorded By, (t) = Taken By, (c) = Cosigned By    Initials Name Provider Type    Gladis Callahan PTA Physical Therapy Assistant                                          Time Calculation:   Start Time: 0920  Stop Time: 1010  Time Calculation (min): 50 min  Therapy Charges for Today     Code Description Service Date Service Provider Modifiers Qty    89269364505 HC PT MANUAL THERAPY EA 15 MIN 2/26/2021 Gladis Garzon PTA GP 1    11451718212 HC PT THER PROC EA 15 MIN 2/26/2021 Gladis Garzon PTA GP 1                    Gladis Garzon PTA  2/26/2021

## 2021-03-02 ENCOUNTER — HOSPITAL ENCOUNTER (OUTPATIENT)
Dept: PHYSICAL THERAPY | Facility: HOSPITAL | Age: 66
Setting detail: THERAPIES SERIES
Discharge: HOME | End: 2021-03-02

## 2021-03-02 DIAGNOSIS — Z96.651 STATUS POST TOTAL RIGHT KNEE REPLACEMENT: Primary | ICD-10-CM

## 2021-03-02 PROCEDURE — 97110 THERAPEUTIC EXERCISES: CPT

## 2021-03-02 PROCEDURE — 97140 MANUAL THERAPY 1/> REGIONS: CPT

## 2021-03-02 NOTE — THERAPY TREATMENT NOTE
"    Outpatient Physical Therapy Ortho Treatment Note   [place]     Patient Name: Yesi Benson  : 1955  MRN: [id number]  Today's Date: 3/2/2021      Visit Date: 2021    Visit Dx:    ICD-10-CM ICD-9-CM   1. Status post total right knee replacement  Z96.651 V43.65       Patient Active Problem List   Diagnosis   • Adenomatous polyp of colon   • Hyperlipidemia   • Hypothyroidism   • Status post surgical manipulation of knee joint   • Arthrofibrosis of knee joint, right        Past Medical History:   Diagnosis Date   • Colon polyp    • Disease of thyroid gland    • Knee swelling         Past Surgical History:   Procedure Laterality Date   • COLONOSCOPY N/A 3/27/2019    Procedure: COLONOSCOPY POLYPECTOMY;  Surgeon: Jaime Alaniz MD;  Location: [facility];  Service: Gastroenterology   • DENTAL PROCEDURE Left    • JOINT MANIPULATION Right 2020    Procedure: KNEE MANIPULATION;  Surgeon: Elmer Fuentes MD;  Location: [facility] OR;  Service: Orthopedics;  Laterality: Right;   • JOINT REPLACEMENT     • SALPINGO OOPHORECTOMY Left     as an infant    • TOTAL KNEE ARTHROPLASTY Right 10/14/2020    Procedure: TOTAL KNEE ARTHROPLASTY AND ALL ASSOCIATED PROCEDURES;  Surgeon: Elmer Fuentes MD;  Location: [facility] OR;  Service: Orthopedics;  Laterality: Right;       PT Ortho     Row Name 21 1000       Subjective Comments    Subjective Comments  pt states she continues to work on scar massage and massaging her knee \"but sometimes it feels like it makes it worse\"  -      User Key  (r) = Recorded By, (t) = Taken By, (c) = Cosigned By    Initials Name Provider Type     Braden Mckeon, PTA Physical Therapy Assistant                      PT Assessment/Plan     Row Name 21 1047          PT Assessment    Assessment Comments  pt continues to do well with ex's and maintaining good ROM; still with mild antalgic gait without A.D.  -        PT Plan    PT Plan Comments  Continue " "with focus on ROM  -MH       User Key  (r) = Recorded By, (t) = Taken By, (c) = Cosigned By    Initials Name Provider Type    Braden Smith PTA Physical Therapy Assistant            OP Exercises     Row Name 03/02/21 1000             Subjective Comments    Subjective Comments  pt states she continues to work on scar massage and massaging her knee \"but sometimes it feels like it makes it worse\"  -MH         Exercise 1    Exercise Name 1  Heel Slides  -MH      Time 1  8 min  -MH         Exercise 2    Exercise Name 2  Wall Slides  -MH      Time 2  8 min  -MH         Exercise 3    Exercise Name 3  Airdyne: Seat 1  -MH      Time 3  5 min  -MH         Exercise 4    Exercise Name 4  PROM: Flexion  -MH      Reps 4  10  -MH      Time 4  10 sec hold each  -MH         Exercise 5    Exercise Name 5  HS Stretch  -MH      Reps 5  10  -MH      Time 5  10 sec hold each  -MH         Exercise 7    Exercise Name 7  Prone Leg Hang  -MH      Time 7  5 min  -MH      Additional Comments  5#  -MH         Exercise 8    Exercise Name 8  PROM: Extension  -MH      Reps 8  10  -MH      Time 8  10 sec hold each  -MH         Exercise 9    Exercise Name 9  QS  -MH         Exercise 10    Exercise Name 10  NWB gastroc stretch  -MH         Exercise 11    Exercise Name 11  TKE  -MH         Exercise 12    Exercise Name 12  Patella mobs  -MH      Time 12  3 min  -MH         Exercise 13    Exercise Name 13  3-Way Hip  -MH         Exercise 14    Exercise Name 14  LAQ  -MH         Exercise 15    Exercise Name 15  Mini Squats  -MH        User Key  (r) = Recorded By, (t) = Taken By, (c) = Cosigned By    Initials Name Provider Type    JOSSIE Braden Mckeon PTA Physical Therapy Assistant                           Therapy Education  Education Details: pt instructed to back off instensity of scar massage if her knee is feeling irritated for long period of time afterwards  Given: HEP, Symptoms/condition management  Program: Reinforced  How Provided: " Verbal  Provided to: Patient  Level of Understanding: Teach back education performed, Verbalized, Demonstrated              Time Calculation:   Start Time: 0955  Stop Time: 1040  Time Calculation (min): 45 min  Therapy Charges for Today     Code Description Service Date Service Provider Modifiers Qty    30428798812 HC PT MANUAL THERAPY EA 15 MIN 3/2/2021 Braden Mckeon PTA GP 1    03934124266 HC PT THER PROC EA 15 MIN 3/2/2021 Braden Mckeon PTA GP 1                    Braden Mckeon PTA  3/2/2021

## 2021-03-04 ENCOUNTER — TELEPHONE (OUTPATIENT)
Dept: ORTHOPEDIC SURGERY | Facility: CLINIC | Age: 66
End: 2021-03-04

## 2021-03-04 RX ORDER — PREDNISONE 10 MG/1
TABLET ORAL
Qty: 39 TABLET | Refills: 0 | Status: SHIPPED | OUTPATIENT
Start: 2021-03-04 | End: 2021-03-18

## 2021-03-04 NOTE — TELEPHONE ENCOUNTER
Date of Operation: 12/21/2020     PREOPERATIVE DIAGNOSIS: right knee arthrofibrosis status post total knee arthroplasty     POSTOPERATIVE DIAGNOSIS: right knee arthrofibrosis status post total knee arthroplasty       PROCEDURE PERFORMED:   1.  right knee manipulation under anesthesia  2.  right knee intra-articular injection      Pt states Celebrex has only helped with some of the swelling, she is still having swelling and wants to try the prednisone please.

## 2021-03-05 ENCOUNTER — HOSPITAL ENCOUNTER (OUTPATIENT)
Dept: PHYSICAL THERAPY | Facility: HOSPITAL | Age: 66
Setting detail: THERAPIES SERIES
Discharge: HOME | End: 2021-03-05

## 2021-03-05 DIAGNOSIS — Z96.651 STATUS POST TOTAL RIGHT KNEE REPLACEMENT: Primary | ICD-10-CM

## 2021-03-10 ENCOUNTER — HOSPITAL ENCOUNTER (OUTPATIENT)
Dept: PHYSICAL THERAPY | Facility: HOSPITAL | Age: 66
Setting detail: THERAPIES SERIES
Discharge: HOME | End: 2021-03-10

## 2021-03-10 DIAGNOSIS — Z96.651 STATUS POST TOTAL RIGHT KNEE REPLACEMENT: Primary | ICD-10-CM

## 2021-03-10 PROCEDURE — 97140 MANUAL THERAPY 1/> REGIONS: CPT

## 2021-03-10 PROCEDURE — 97110 THERAPEUTIC EXERCISES: CPT

## 2021-03-10 NOTE — THERAPY TREATMENT NOTE
Outpatient Physical Therapy Ortho Treatment Note   Jenny Chávez     Patient Name: Yesi Benson  : 1955  MRN: [id number]  Today's Date: 3/10/2021      Visit Date: 03/10/2021    Visit Dx:    ICD-10-CM ICD-9-CM   1. Status post total right knee replacement  Z96.651 V43.65       Patient Active Problem List   Diagnosis   • Adenomatous polyp of colon   • Hyperlipidemia   • Hypothyroidism   • Status post surgical manipulation of knee joint   • Arthrofibrosis of knee joint, right        Past Medical History:   Diagnosis Date   • Colon polyp    • Disease of thyroid gland    • Knee swelling         Past Surgical History:   Procedure Laterality Date   • COLONOSCOPY N/A 3/27/2019    Procedure: COLONOSCOPY POLYPECTOMY;  Surgeon: Jaime Alaniz MD;  Location: [facility];  Service: Gastroenterology   • DENTAL PROCEDURE Left    • JOINT MANIPULATION Right 2020    Procedure: KNEE MANIPULATION;  Surgeon: Elmer Fuentes MD;  Location: [facility] OR;  Service: Orthopedics;  Laterality: Right;   • JOINT REPLACEMENT     • SALPINGO OOPHORECTOMY Left     as an infant    • TOTAL KNEE ARTHROPLASTY Right 10/14/2020    Procedure: TOTAL KNEE ARTHROPLASTY AND ALL ASSOCIATED PROCEDURES;  Surgeon: Elmer Fuentes MD;  Location: [facility] OR;  Service: Orthopedics;  Laterality: Right;       PT Ortho     Row Name 03/10/21 2926       Right Lower Ext    Rt Knee Extension/Flexion AROM  0-122 post stretch  -KM      User Key  (r) = Recorded By, (t) = Taken By, (c) = Cosigned By    Initials Name Provider Type    Gladis Callahan PTA Physical Therapy Assistant                      PT Assessment/Plan     Row Name 03/10/21 2415          PT Assessment    Assessment Comments  Pt ambulates without use of AD; Improved motion with passive stretching.   -KM        PT Plan    PT Plan Comments  Continue per POC  -KM       User Key  (r) = Recorded By, (t) = Taken By, (c) = Cosigned By    Initials Name Provider Type    RUDOLPH  Gladis Garzon PTA Physical Therapy Assistant            OP Exercises     Row Name 03/10/21 0945             Subjective Comments    Subjective Comments  Pt states her knee is doing well and started an anti-inflammatory.  -KM         Exercise 1    Exercise Name 1  Heel Slides  -KM      Time 1  8 min  -KM         Exercise 2    Exercise Name 2  Wall Slides  -KM      Time 2  8 min  -KM         Exercise 3    Exercise Name 3  Airdyne: Seat 1  -KM      Time 3  5 min  -KM         Exercise 4    Exercise Name 4  PROM: Flexion  -KM      Reps 4  10  -KM      Time 4  10 sec hold each  -KM         Exercise 5    Exercise Name 5  HS Stretch  -KM      Reps 5  10  -KM      Time 5  10 sec hold each  -KM         Exercise 7    Exercise Name 7  Prone Leg Hang  -KM      Time 7  5 min  -KM         Exercise 8    Exercise Name 8  PROM: Extension  -KM      Reps 8  10  -KM      Time 8  10 sec hold each  -KM         Exercise 9    Exercise Name 9  QS  -KM         Exercise 10    Exercise Name 10  NWB gastroc stretch  -KM         Exercise 11    Exercise Name 11  TKE  -KM         Exercise 12    Exercise Name 12  Patella mobs  -KM      Time 12  3 min  -KM         Exercise 13    Exercise Name 13  3-Way Hip  -KM         Exercise 14    Exercise Name 14  LAQ  -KM         Exercise 15    Exercise Name 15  Mini Squats  -KM        User Key  (r) = Recorded By, (t) = Taken By, (c) = Cosigned By    Initials Name Provider Type    Gladis Callahan PTA Physical Therapy Assistant                                          Time Calculation:   Start Time: 0945  Stop Time: 1028  Time Calculation (min): 43 min  Therapy Charges for Today     Code Description Service Date Service Provider Modifiers Qty    68856231940 HC PT MANUAL THERAPY EA 15 MIN 3/10/2021 Gladis Garzon PTA GP 1    75766655992 HC PT THER PROC EA 15 MIN 3/10/2021 Gladis Garzon PTA GP 1                    Gladis Garzon PTA  3/10/2021

## 2021-03-12 ENCOUNTER — HOSPITAL ENCOUNTER (OUTPATIENT)
Dept: PHYSICAL THERAPY | Facility: HOSPITAL | Age: 66
Setting detail: THERAPIES SERIES
Discharge: HOME | End: 2021-03-12

## 2021-03-12 DIAGNOSIS — Z96.651 STATUS POST TOTAL RIGHT KNEE REPLACEMENT: Primary | ICD-10-CM

## 2021-03-12 PROCEDURE — 97110 THERAPEUTIC EXERCISES: CPT

## 2021-03-12 PROCEDURE — 97140 MANUAL THERAPY 1/> REGIONS: CPT

## 2021-03-12 NOTE — THERAPY TREATMENT NOTE
Outpatient Physical Therapy Ortho Treatment Note   Jenny Chávez     Patient Name: Yesi Benson  : 1955  MRN: [id number]  Today's Date: 3/12/2021      Visit Date: 2021    Visit Dx:    ICD-10-CM ICD-9-CM   1. Status post total right knee replacement  Z96.651 V43.65       Patient Active Problem List   Diagnosis   • Adenomatous polyp of colon   • Hyperlipidemia   • Hypothyroidism   • Status post surgical manipulation of knee joint   • Arthrofibrosis of knee joint, right        Past Medical History:   Diagnosis Date   • Colon polyp    • Disease of thyroid gland    • Knee swelling         Past Surgical History:   Procedure Laterality Date   • COLONOSCOPY N/A 3/27/2019    Procedure: COLONOSCOPY POLYPECTOMY;  Surgeon: Jaime Alaniz MD;  Location: [facility];  Service: Gastroenterology   • DENTAL PROCEDURE Left    • JOINT MANIPULATION Right 2020    Procedure: KNEE MANIPULATION;  Surgeon: Elmer Fuentes MD;  Location: [facility] OR;  Service: Orthopedics;  Laterality: Right;   • JOINT REPLACEMENT     • SALPINGO OOPHORECTOMY Left     as an infant    • TOTAL KNEE ARTHROPLASTY Right 10/14/2020    Procedure: TOTAL KNEE ARTHROPLASTY AND ALL ASSOCIATED PROCEDURES;  Surgeon: Elmer Fuentes MD;  Location: [facility];  Service: Orthopedics;  Laterality: Right;                       PT Assessment/Plan     Row Name 21 1000          PT Assessment    Assessment Comments  Pt presents with overall decreased edema. Tolerated treatment session well.   -KM        PT Plan    PT Plan Comments  Plan to see pt 2x next week prior to MD appointment.   -KM       User Key  (r) = Recorded By, (t) = Taken By, (c) = Cosigned By    Initials Name Provider Type    Gladis Callahan PTA Physical Therapy Assistant            OP Exercises     Row Name 21 1000             Subjective Comments    Subjective Comments  Pt states her knee is a little tight today, but feels her swelling has decreased.    -KM         Exercise 1    Exercise Name 1  Heel Slides  -KM      Time 1  8 min  -KM         Exercise 2    Exercise Name 2  Wall Slides  -KM      Time 2  8 min  -KM         Exercise 3    Exercise Name 3  Airdyne: Seat 1  -KM      Time 3  5 min  -KM         Exercise 4    Exercise Name 4  PROM: Flexion  -KM      Reps 4  10  -KM      Time 4  10 sec hold each  -KM         Exercise 5    Exercise Name 5  HS Stretch  -KM      Reps 5  10  -KM      Time 5  10 sec hold each  -KM         Exercise 7    Exercise Name 7  Prone Leg Hang  -KM      Time 7  5 min  -KM         Exercise 8    Exercise Name 8  PROM: Extension  -KM      Reps 8  10  -KM      Time 8  10 sec hold each  -KM         Exercise 9    Exercise Name 9  QS  -KM         Exercise 10    Exercise Name 10  NWB gastroc stretch  -KM         Exercise 11    Exercise Name 11  TKE  -KM         Exercise 12    Exercise Name 12  Patella mobs  -KM      Time 12  3 min  -KM         Exercise 13    Exercise Name 13  3-Way Hip  -KM         Exercise 14    Exercise Name 14  LAQ  -KM         Exercise 15    Exercise Name 15  Mini Squats  -KM        User Key  (r) = Recorded By, (t) = Taken By, (c) = Cosigned By    Initials Name Provider Type    Gladis Callahan PTA Physical Therapy Assistant                                          Time Calculation:   Start Time: 1000  Stop Time: 1045  Time Calculation (min): 45 min  Therapy Charges for Today     Code Description Service Date Service Provider Modifiers Qty    10525299316 HC PT MANUAL THERAPY EA 15 MIN 3/12/2021 Gladis Garzon PTA GP 1    97084067295 HC PT THER PROC EA 15 MIN 3/12/2021 Gladis Garzon PTA GP 1                    Gladis Garzon PTA  3/12/2021

## 2021-03-15 ENCOUNTER — HOSPITAL ENCOUNTER (OUTPATIENT)
Dept: PHYSICAL THERAPY | Facility: HOSPITAL | Age: 66
Setting detail: THERAPIES SERIES
Discharge: HOME | End: 2021-03-15

## 2021-03-15 DIAGNOSIS — Z96.651 STATUS POST TOTAL RIGHT KNEE REPLACEMENT: Primary | ICD-10-CM

## 2021-03-15 PROCEDURE — 97140 MANUAL THERAPY 1/> REGIONS: CPT

## 2021-03-15 PROCEDURE — 97110 THERAPEUTIC EXERCISES: CPT

## 2021-03-15 NOTE — THERAPY TREATMENT NOTE
Outpatient Physical Therapy Ortho Treatment Note   Jenny Chávez     Patient Name: Yesi Benson  : 1955  MRN: [id number]  Today's Date: 3/15/2021      Visit Date: 03/15/2021    Visit Dx:    ICD-10-CM ICD-9-CM   1. Status post total right knee replacement  Z96.651 V43.65       Patient Active Problem List   Diagnosis   • Adenomatous polyp of colon   • Hyperlipidemia   • Hypothyroidism   • Status post surgical manipulation of knee joint   • Arthrofibrosis of knee joint, right        Past Medical History:   Diagnosis Date   • Colon polyp    • Disease of thyroid gland    • Knee swelling         Past Surgical History:   Procedure Laterality Date   • COLONOSCOPY N/A 3/27/2019    Procedure: COLONOSCOPY POLYPECTOMY;  Surgeon: Jaime Alaniz MD;  Location: [facility];  Service: Gastroenterology   • DENTAL PROCEDURE Left    • JOINT MANIPULATION Right 2020    Procedure: KNEE MANIPULATION;  Surgeon: Elmer Fuentes MD;  Location: [facility];  Service: Orthopedics;  Laterality: Right;   • JOINT REPLACEMENT     • SALPINGO OOPHORECTOMY Left     as an infant    • TOTAL KNEE ARTHROPLASTY Right 10/14/2020    Procedure: TOTAL KNEE ARTHROPLASTY AND ALL ASSOCIATED PROCEDURES;  Surgeon: Elmer Fuentes MD;  Location: [facility];  Service: Orthopedics;  Laterality: Right;                       PT Assessment/Plan     Row Name 03/15/21 0915          PT Assessment    Assessment Comments  Pt tolerated treatment session well and continues to make good progress with ROM  -KM        PT Plan    PT Plan Comments  Continue per POC. Take measurements for MD appointment  -KM       User Key  (r) = Recorded By, (t) = Taken By, (c) = Cosigned By    Initials Name Provider Type    Gladis Callahan, SARAH Physical Therapy Assistant            OP Exercises     Row Name 03/15/21 0904             Subjective Comments    Subjective Comments  Pt states she had increased tightness Saturday morning but feels better today.  She feels she has decreased swelling.  -KM         Exercise 1    Exercise Name 1  Heel Slides  -KM      Time 1  8 min  -KM         Exercise 2    Exercise Name 2  Wall Slides  -KM      Time 2  8 min  -KM         Exercise 3    Exercise Name 3  Airdyne: Seat 1  -KM      Time 3  5 min  -KM         Exercise 4    Exercise Name 4  PROM: Flexion  -KM      Reps 4  10  -KM      Time 4  10 sec hold each  -KM         Exercise 5    Exercise Name 5  HS Stretch  -KM      Reps 5  10  -KM      Time 5  10 sec hold each  -KM         Exercise 7    Exercise Name 7  Prone Leg Hang  -KM      Time 7  5 min  -KM         Exercise 8    Exercise Name 8  PROM: Extension  -KM      Reps 8  10  -KM      Time 8  10 sec hold each  -KM         Exercise 9    Exercise Name 9  QS  -KM         Exercise 10    Exercise Name 10  NWB gastroc stretch  -KM         Exercise 11    Exercise Name 11  TKE  -KM         Exercise 12    Exercise Name 12  Patella mobs  -KM      Time 12  3 min  -KM         Exercise 13    Exercise Name 13  3-Way Hip  -KM         Exercise 14    Exercise Name 14  LAQ  -KM         Exercise 15    Exercise Name 15  Mini Squats  -KM        User Key  (r) = Recorded By, (t) = Taken By, (c) = Cosigned By    Initials Name Provider Type    Gladis Callahan PTA Physical Therapy Assistant                                          Time Calculation:   Start Time: 0915  Stop Time: 1002  Time Calculation (min): 47 min  Therapy Charges for Today     Code Description Service Date Service Provider Modifiers Qty    12006799537 HC PT MANUAL THERAPY EA 15 MIN 3/15/2021 Gladis Garzon PTA GP 1    67048637885 HC PT THER PROC EA 15 MIN 3/15/2021 Gladis Garzon PTA GP 1                    Gladis Garzon PTA  3/15/2021

## 2021-03-17 ENCOUNTER — HOSPITAL ENCOUNTER (OUTPATIENT)
Dept: PHYSICAL THERAPY | Facility: HOSPITAL | Age: 66
Setting detail: THERAPIES SERIES
Discharge: HOME | End: 2021-03-17

## 2021-03-17 DIAGNOSIS — Z96.651 STATUS POST TOTAL RIGHT KNEE REPLACEMENT: Primary | ICD-10-CM

## 2021-03-17 PROCEDURE — 97110 THERAPEUTIC EXERCISES: CPT

## 2021-03-17 PROCEDURE — 97140 MANUAL THERAPY 1/> REGIONS: CPT

## 2021-03-17 NOTE — THERAPY TREATMENT NOTE
Outpatient Physical Therapy Ortho Treatment Note   Jenny Chávez     Patient Name: Yesi Benson  : 1955  MRN: [id number]  Today's Date: 3/17/2021      Visit Date: 2021    Visit Dx:    ICD-10-CM ICD-9-CM   1. Status post total right knee replacement  Z96.651 V43.65       Patient Active Problem List   Diagnosis   • Adenomatous polyp of colon   • Hyperlipidemia   • Hypothyroidism   • Status post surgical manipulation of knee joint   • Arthrofibrosis of knee joint, right        Past Medical History:   Diagnosis Date   • Colon polyp    • Disease of thyroid gland    • Knee swelling         Past Surgical History:   Procedure Laterality Date   • COLONOSCOPY N/A 3/27/2019    Procedure: COLONOSCOPY POLYPECTOMY;  Surgeon: Jaime Alaniz MD;  Location: [facility];  Service: Gastroenterology   • DENTAL PROCEDURE Left    • JOINT MANIPULATION Right 2020    Procedure: KNEE MANIPULATION;  Surgeon: Elmer Fuentes MD;  Location: [facility] OR;  Service: Orthopedics;  Laterality: Right;   • JOINT REPLACEMENT     • SALPINGO OOPHORECTOMY Left     as an infant    • TOTAL KNEE ARTHROPLASTY Right 10/14/2020    Procedure: TOTAL KNEE ARTHROPLASTY AND ALL ASSOCIATED PROCEDURES;  Surgeon: Elmer Fuentes MD;  Location: [facility] OR;  Service: Orthopedics;  Laterality: Right;       PT Ortho     Row Name 21 0955       Right Lower Ext    Rt Knee Extension/Flexion AROM  0-123 post stretch  -KM      User Key  (r) = Recorded By, (t) = Taken By, (c) = Cosigned By    Initials Name Provider Type     Gladis Garzon PTA Physical Therapy Assistant                      PT Assessment/Plan     Row Name 21 0955          PT Assessment    Assessment Comments  Pt ambulates with improved gait and measures improved AROM post stretch. Improved edema noted.   -KM        PT Plan    PT Plan Comments  Continue POC per MD.  -KM       User Key  (r) = Recorded By, (t) = Taken By, (c) = Cosigned By    Initials Name  Provider Type    Gladis Callahan PTA Physical Therapy Assistant            OP Exercises     Row Name 03/17/21 0955             Subjective Comments    Subjective Comments  Pt states her knee is doing well with tightness primarily in the morning, states she is still hesitant ambulating steps and over uneven ground.   -KM         Exercise 1    Exercise Name 1  Heel Slides  -KM      Time 1  8 min  -KM         Exercise 2    Exercise Name 2  Wall Slides  -KM      Time 2  8 min  -KM         Exercise 3    Exercise Name 3  Airdyne: Seat 1  -KM      Time 3  5 min  -KM         Exercise 4    Exercise Name 4  PROM: Flexion  -KM      Reps 4  10  -KM      Time 4  10 sec hold each  -KM         Exercise 5    Exercise Name 5  HS Stretch  -KM      Reps 5  10  -KM      Time 5  10 sec hold each  -KM         Exercise 7    Exercise Name 7  Prone Leg Hang  -KM      Time 7  5 min  -KM         Exercise 8    Exercise Name 8  PROM: Extension  -KM      Reps 8  10  -KM      Time 8  10 sec hold each  -KM         Exercise 9    Exercise Name 9  QS  -KM         Exercise 10    Exercise Name 10  NWB gastroc stretch  -KM         Exercise 11    Exercise Name 11  TKE  -KM         Exercise 12    Exercise Name 12  Patella mobs  -KM      Time 12  3 min  -KM         Exercise 13    Exercise Name 13  3-Way Hip  -KM         Exercise 14    Exercise Name 14  LAQ  -KM         Exercise 15    Exercise Name 15  Mini Squats  -KM        User Key  (r) = Recorded By, (t) = Taken By, (c) = Cosigned By    Initials Name Provider Type    Gladis Callahan PTA Physical Therapy Assistant                                          Time Calculation:   Start Time: 0955  Stop Time: 1040  Time Calculation (min): 45 min  Therapy Charges for Today     Code Description Service Date Service Provider Modifiers Qty    08798365058 HC PT MANUAL THERAPY EA 15 MIN 3/17/2021 Gladis Garzon PTA GP 1    15420367432 HC PT THER PROC EA 15 MIN 3/17/2021 Gladis Garzon PTA GP 1                     Gladis Garzon, PTA  3/17/2021

## 2021-03-18 ENCOUNTER — OFFICE VISIT (OUTPATIENT)
Dept: ORTHOPEDIC SURGERY | Facility: CLINIC | Age: 66
End: 2021-03-18

## 2021-03-18 VITALS — HEIGHT: 62 IN | BODY MASS INDEX: 26.87 KG/M2 | WEIGHT: 146 LBS

## 2021-03-18 DIAGNOSIS — M24.661 ARTHROFIBROSIS OF KNEE JOINT, RIGHT: Primary | ICD-10-CM

## 2021-03-18 DIAGNOSIS — Z96.651 STATUS POST TOTAL RIGHT KNEE REPLACEMENT: ICD-10-CM

## 2021-03-18 DIAGNOSIS — Z98.890 STATUS POST SURGICAL MANIPULATION OF KNEE JOINT: ICD-10-CM

## 2021-03-18 PROCEDURE — 99024 POSTOP FOLLOW-UP VISIT: CPT | Performed by: ORTHOPAEDIC SURGERY

## 2021-03-18 PROCEDURE — 73562 X-RAY EXAM OF KNEE 3: CPT | Performed by: ORTHOPAEDIC SURGERY

## 2021-03-18 RX ORDER — AMOXICILLIN AND CLAVULANATE POTASSIUM 875; 125 MG/1; MG/1
TABLET, FILM COATED ORAL
COMMUNITY
Start: 2021-02-09 | End: 2021-03-18

## 2021-03-18 RX ORDER — CYCLOBENZAPRINE HCL 5 MG
5 TABLET ORAL 3 TIMES DAILY PRN
Qty: 45 TABLET | Refills: 0 | Status: SHIPPED | OUTPATIENT
Start: 2021-03-18 | End: 2021-04-13

## 2021-03-18 RX ORDER — LEVOTHYROXINE SODIUM 88 UG/1
TABLET ORAL
COMMUNITY

## 2021-03-18 NOTE — PROGRESS NOTES
CC: s/p right total knee arthroplasty, DOS 10/14/2020          Status post right total knee manipulation-12/21/2020    Interval History: Yesi Benson returns for 12 week postoperative visit status post right knee manipulation.  She is making progress, doing PT 2x/week.  She denies any wound problem, fevers, or chills. Patient is continuing to work with outpatient PT and making progress    Physical Examination: Right knee was examined   Incision well healed   ROM 1-120,  4/5 strength   No residual extensor lag   Stable to varus and valgus stress   Flex/extend ankle and toes   Positive sensation right foot   No calf pain, negative Homans sign bilaterally    Radiographic review:   Right Knee X-Ray  Indication: s/p total knee     AP, Lateral, and [place] views    Findings:  Total knee arthroplasty components are in stable position with acceptable overall alignment, no evidence of periprosthetic fracture, loosening, osteolysis, or reactive bone formation.    Compared to prior postoperative x-rays      Assessment/Plan:  1. Continue with PT for ROM and strength  2. Apply topical cream to medial joint line where she still has some sensitivity, flexeril for muscle stiffness  3. F/u in 3 months with repeat xrays    Medications:  No orders of the defined types were placed in this encounter.

## 2021-03-24 ENCOUNTER — HOSPITAL ENCOUNTER (OUTPATIENT)
Dept: PHYSICAL THERAPY | Facility: HOSPITAL | Age: 66
Setting detail: THERAPIES SERIES
Discharge: HOME | End: 2021-03-24

## 2021-03-24 DIAGNOSIS — Z96.651 STATUS POST TOTAL RIGHT KNEE REPLACEMENT: Primary | ICD-10-CM

## 2021-03-24 PROCEDURE — 97140 MANUAL THERAPY 1/> REGIONS: CPT

## 2021-03-24 PROCEDURE — 97110 THERAPEUTIC EXERCISES: CPT

## 2021-03-24 NOTE — THERAPY TREATMENT NOTE
Outpatient Physical Therapy Ortho Treatment Note   Jenny Chávez     Patient Name: Yesi Benson  : 1955  MRN: [id number]  Today's Date: 3/24/2021      Visit Date: 2021    Visit Dx:    ICD-10-CM ICD-9-CM   1. Status post total right knee replacement  Z96.651 V43.65       Patient Active Problem List   Diagnosis   • Adenomatous polyp of colon   • Hyperlipidemia   • Hypothyroidism   • Status post surgical manipulation of knee joint   • Arthrofibrosis of knee joint, right        Past Medical History:   Diagnosis Date   • Colon polyp    • Disease of thyroid gland    • Knee swelling         Past Surgical History:   Procedure Laterality Date   • COLONOSCOPY N/A 3/27/2019    Procedure: COLONOSCOPY POLYPECTOMY;  Surgeon: Jaime Alaniz MD;  Location: [facility];  Service: Gastroenterology   • DENTAL PROCEDURE Left    • JOINT MANIPULATION Right 2020    Procedure: KNEE MANIPULATION;  Surgeon: Elmer Fuentes MD;  Location: [facility] OR;  Service: Orthopedics;  Laterality: Right;   • JOINT REPLACEMENT     • SALPINGO OOPHORECTOMY Left     as an infant    • TOTAL KNEE ARTHROPLASTY Right 10/14/2020    Procedure: TOTAL KNEE ARTHROPLASTY AND ALL ASSOCIATED PROCEDURES;  Surgeon: Elmer Fuentes MD;  Location: [facility] OR;  Service: Orthopedics;  Laterality: Right;                       PT Assessment/Plan     Row Name 21 1100          PT Assessment    Assessment Comments  Pt with improved edema and overall ROM. Incorporated steps and lat dips for progress quad strength.   -KM        PT Plan    PT Plan Comments  Will continue to see pt 1x/week. Pt to continue with HEP  -KM       User Key  (r) = Recorded By, (t) = Taken By, (c) = Cosigned By    Initials Name Provider Type    Gladis Callahan PTA Physical Therapy Assistant            OP Exercises     Row Name 21 1100             Subjective Comments    Subjective Comments  Pt states her f/u appointment went well; states MD was  "pleased with her ROM and to continue PT 1x/week for 3-4 weeks. Pt reports her knee is feel good today.   -KM         Exercise 1    Exercise Name 1  Heel Slides  -KM      Time 1  8 min  -KM         Exercise 2    Exercise Name 2  Wall Slides  -KM      Time 2  8 min  -KM         Exercise 3    Exercise Name 3  Airdyne: Seat 1  -KM      Time 3  5 min  -KM         Exercise 4    Exercise Name 4  PROM: Flexion  -KM      Reps 4  10  -KM      Time 4  10 sec hold each  -KM         Exercise 5    Exercise Name 5  HS Stretch  -KM      Reps 5  10  -KM      Time 5  10 sec hold each  -KM         Exercise 7    Exercise Name 7  Prone Leg Hang  -KM      Time 7  5 min  -KM      Additional Comments  5#  -KM         Exercise 8    Exercise Name 8  PROM: Extension  -KM      Reps 8  10  -KM      Time 8  10 sec hold each  -KM         Exercise 9    Exercise Name 9  6\" Fwd Step Ups  -KM      Reps 9  20 each  -KM         Exercise 10    Exercise Name 10  4\" Lat Dips  -KM      Reps 10  20  -KM         Exercise 11    Exercise Name 11  TKE  -KM         Exercise 12    Exercise Name 12  Patella mobs  -KM      Time 12  3 min  -KM         Exercise 13    Exercise Name 13  3-Way Hip  -KM         Exercise 14    Exercise Name 14  LAQ  -KM         Exercise 15    Exercise Name 15  Mini Squats  -KM        User Key  (r) = Recorded By, (t) = Taken By, (c) = Cosigned By    Initials Name Provider Type    Gladis Callahan PTA Physical Therapy Assistant                                          Time Calculation:   Start Time: 1100  Stop Time: 1150  Time Calculation (min): 50 min  Therapy Charges for Today     Code Description Service Date Service Provider Modifiers Qty    26192752625 HC PT MANUAL THERAPY EA 15 MIN 3/24/2021 Gladis Garzon PTA GP 1    56963037475 HC PT THER PROC EA 15 MIN 3/24/2021 Gladis Garzon PTA GP 1                    Gladis Garzon PTA  3/24/2021     "

## 2021-03-31 ENCOUNTER — HOSPITAL ENCOUNTER (OUTPATIENT)
Dept: PHYSICAL THERAPY | Facility: HOSPITAL | Age: 66
Setting detail: THERAPIES SERIES
Discharge: HOME | End: 2021-03-31

## 2021-03-31 DIAGNOSIS — Z96.651 STATUS POST TOTAL RIGHT KNEE REPLACEMENT: Primary | ICD-10-CM

## 2021-03-31 PROCEDURE — 97140 MANUAL THERAPY 1/> REGIONS: CPT

## 2021-03-31 PROCEDURE — 97110 THERAPEUTIC EXERCISES: CPT

## 2021-04-06 ENCOUNTER — HOSPITAL ENCOUNTER (OUTPATIENT)
Dept: PHYSICAL THERAPY | Facility: HOSPITAL | Age: 66
Setting detail: THERAPIES SERIES
Discharge: HOME | End: 2021-04-06

## 2021-04-06 DIAGNOSIS — Z96.651 STATUS POST TOTAL RIGHT KNEE REPLACEMENT: Primary | ICD-10-CM

## 2021-04-06 PROCEDURE — 97110 THERAPEUTIC EXERCISES: CPT

## 2021-04-06 NOTE — THERAPY TREATMENT NOTE
Outpatient Physical Therapy Ortho Treatment Note   Jenny Chávez     Patient Name: Yesi Benson  : 1955  MRN: [id number]  Today's Date: 2021      Visit Date: 2021    Visit Dx:    ICD-10-CM ICD-9-CM   1. Status post total right knee replacement  Z96.651 V43.65       Patient Active Problem List   Diagnosis   • Adenomatous polyp of colon   • Hyperlipidemia   • Hypothyroidism   • Status post surgical manipulation of knee joint   • Arthrofibrosis of knee joint, right        Past Medical History:   Diagnosis Date   • Colon polyp    • Disease of thyroid gland    • Knee swelling         Past Surgical History:   Procedure Laterality Date   • COLONOSCOPY N/A 3/27/2019    Procedure: COLONOSCOPY POLYPECTOMY;  Surgeon: Jaime Alaniz MD;  Location: [facility];  Service: Gastroenterology   • DENTAL PROCEDURE Left    • JOINT MANIPULATION Right 2020    Procedure: KNEE MANIPULATION;  Surgeon: Elmer Fuentes MD;  Location: [facility] OR;  Service: Orthopedics;  Laterality: Right;   • JOINT REPLACEMENT     • SALPINGO OOPHORECTOMY Left     as an infant    • TOTAL KNEE ARTHROPLASTY Right 10/14/2020    Procedure: TOTAL KNEE ARTHROPLASTY AND ALL ASSOCIATED PROCEDURES;  Surgeon: Elmer Fuentes MD;  Location: [facility];  Service: Orthopedics;  Laterality: Right;                       PT Assessment/Plan     Row Name 21 1200          PT Assessment    Assessment Comments  Improved motion with passive stretching and pt tolerated exercises well.  -KM        PT Plan    PT Plan Comments  With pts improved status plan to see pt in 2 weeks; pt to complete HEP daily.  -KM       User Key  (r) = Recorded By, (t) = Taken By, (c) = Cosigned By    Initials Name Provider Type    Gladis Callahan PTA Physical Therapy Assistant            OP Exercises     Row Name 21 1200             Subjective Comments    Subjective Comments  Pt states her knee is doing well overall. States the level of  "stiffness varies each morning  -KM         Exercise 1    Exercise Name 1  Heel Slides  -KM      Time 1  8 min  -KM         Exercise 2    Exercise Name 2  Wall Slides  -KM      Time 2  8 min  -KM         Exercise 3    Exercise Name 3  Airdyne: Seat 1  -KM      Time 3  5 min  -KM         Exercise 4    Exercise Name 4  PROM: Flexion  -KM      Reps 4  10  -KM      Time 4  10 sec hold each  -KM         Exercise 5    Exercise Name 5  HS Stretch  -KM      Reps 5  10  -KM      Time 5  10 sec hold each  -KM         Exercise 7    Exercise Name 7  Prone Leg Hang  -KM      Time 7  5 min  -KM         Exercise 8    Exercise Name 8  PROM: Extension  -KM      Reps 8  10  -KM      Time 8  10 sec hold each  -KM         Exercise 9    Exercise Name 9  6\" Fwd Step Ups  -KM         Exercise 10    Exercise Name 10  4\" Lat Dips  -KM      Reps 10  25  -KM         Exercise 11    Exercise Name 11  TKE  -KM      Reps 11  25  -KM      Time 11  Gold  -KM         Exercise 12    Exercise Name 12  Patella mobs  -KM      Time 12  3 min  -KM         Exercise 13    Exercise Name 13  3-Way Hip  -KM         Exercise 14    Exercise Name 14  LAQ  -KM         Exercise 15    Exercise Name 15  Mini Squats  -KM        User Key  (r) = Recorded By, (t) = Taken By, (c) = Cosigned By    Initials Name Provider Type    Gladis Callahan PTA Physical Therapy Assistant                                          Time Calculation:   Start Time: 1200  Stop Time: 1250  Time Calculation (min): 50 min  Therapy Charges for Today     Code Description Service Date Service Provider Modifiers Qty    34332127645 HC PT THER PROC EA 15 MIN 4/6/2021 Gladis Garzon PTA GP 1                    Gladis Garzon PTA  4/6/2021     "

## 2021-04-13 RX ORDER — CYCLOBENZAPRINE HCL 5 MG
TABLET ORAL
Qty: 45 TABLET | Refills: 0 | Status: SHIPPED | OUTPATIENT
Start: 2021-04-13 | End: 2021-10-25

## 2021-04-20 ENCOUNTER — HOSPITAL ENCOUNTER (OUTPATIENT)
Dept: PHYSICAL THERAPY | Facility: HOSPITAL | Age: 66
Setting detail: THERAPIES SERIES
Discharge: HOME | End: 2021-04-20

## 2021-04-20 DIAGNOSIS — Z96.651 STATUS POST TOTAL RIGHT KNEE REPLACEMENT: Primary | ICD-10-CM

## 2021-04-20 PROCEDURE — 97110 THERAPEUTIC EXERCISES: CPT

## 2021-04-20 PROCEDURE — 97140 MANUAL THERAPY 1/> REGIONS: CPT

## 2021-04-20 NOTE — THERAPY TREATMENT NOTE
Outpatient Physical Therapy Ortho Treatment Note   Jenny Chávez     Patient Name: Yesi Benson  : 1955  MRN: [id number]  Today's Date: 2021      Visit Date: 2021    Visit Dx:    ICD-10-CM ICD-9-CM   1. Status post total right knee replacement  Z96.651 V43.65       Patient Active Problem List   Diagnosis   • Adenomatous polyp of colon   • Hyperlipidemia   • Hypothyroidism   • Status post surgical manipulation of knee joint   • Arthrofibrosis of knee joint, right        Past Medical History:   Diagnosis Date   • Colon polyp    • Disease of thyroid gland    • Knee swelling         Past Surgical History:   Procedure Laterality Date   • COLONOSCOPY N/A 3/27/2019    Procedure: COLONOSCOPY POLYPECTOMY;  Surgeon: Jaime Alaniz MD;  Location: [facility];  Service: Gastroenterology   • DENTAL PROCEDURE Left    • JOINT MANIPULATION Right 2020    Procedure: KNEE MANIPULATION;  Surgeon: Elmer Fuentes MD;  Location: [facility] OR;  Service: Orthopedics;  Laterality: Right;   • JOINT REPLACEMENT     • SALPINGO OOPHORECTOMY Left     as an infant    • TOTAL KNEE ARTHROPLASTY Right 10/14/2020    Procedure: TOTAL KNEE ARTHROPLASTY AND ALL ASSOCIATED PROCEDURES;  Surgeon: Elmer Fuentes MD;  Location: [facility];  Service: Orthopedics;  Laterality: Right;                       PT Assessment/Plan     Row Name 21 1050          PT Assessment    Assessment Comments  Pt with some tightness prior to treatment but improves post stretch.   -KM        PT Plan    PT Plan Comments  Plan to see pt in 2 weeks, pt to continue with HEP.   -KM       User Key  (r) = Recorded By, (t) = Taken By, (c) = Cosigned By    Initials Name Provider Type    Gladis Callahan PTA Physical Therapy Assistant            OP Exercises     Row Name 21 1139 21 1050          Subjective Comments    Subjective Comments  --  Pt states her knee is holding up well and continues to complete HEP daily.  "States she is able to complete daily activities without limiations.   -KM        Total Minutes    68490 - PT Therapeutic Exercise Minutes  20  -KM  --     69727 - PT Manual Therapy Minutes  15  -KM  --        Exercise 1    Exercise Name 1  --  Heel Slides  -KM     Time 1  --  8 min  -KM        Exercise 2    Exercise Name 2  --  Wall Slides  -KM     Time 2  --  8 min  -KM        Exercise 3    Exercise Name 3  --  Airdyne: Seat 1  -KM     Time 3  --  5 min  -KM        Exercise 4    Exercise Name 4  --  PROM: Flexion  -KM     Reps 4  --  10  -KM     Time 4  --  10 sec hold each  -KM        Exercise 5    Exercise Name 5  --  HS Stretch  -KM     Reps 5  --  10  -KM     Time 5  --  10 sec hold each  -KM        Exercise 7    Exercise Name 7  --  Prone Leg Hang  -KM     Time 7  --  5 min  -KM        Exercise 8    Exercise Name 8  --  PROM: Extension  -KM     Reps 8  --  10  -KM     Time 8  --  10 sec hold each  -KM        Exercise 9    Exercise Name 9  --  6\" Fwd Step Ups  -KM        Exercise 10    Exercise Name 10  --  4\" Lat Dips  -KM     Reps 10  --  --  -KM        Exercise 11    Exercise Name 11  --  TKE  -KM     Reps 11  --  --  -KM     Time 11  --  --  -KM        Exercise 12    Exercise Name 12  --  Patella mobs  -KM     Time 12  --  3 min  -KM        Exercise 13    Exercise Name 13  --  3-Way Hip  -KM        Exercise 14    Exercise Name 14  --  LAQ  -KM        Exercise 15    Exercise Name 15  --  Mini Squats  -KM       User Key  (r) = Recorded By, (t) = Taken By, (c) = Cosigned By    Initials Name Provider Type    Gladis Callahan PTA Physical Therapy Assistant                      Manual Rx (last 36 hours)      Manual Treatments     Row Name 04/20/21 1139             Total Minutes    64415 - PT Manual Therapy Minutes  15  -KM        User Key  (r) = Recorded By, (t) = Taken By, (c) = Cosigned By    Initials Name Provider Type    Gladis Callahan PTA Physical Therapy Assistant                             Time " Calculation:   Start Time: 1050  Stop Time: 1130  Time Calculation (min): 40 min  Therapy Charges for Today     Code Description Service Date Service Provider Modifiers Qty    94641320225 HC PT MANUAL THERAPY EA 15 MIN 4/20/2021 Gladis Garzon PTA GP 1    81802985949 HC PT THER PROC EA 15 MIN 4/20/2021 Gladis Garzon PTA GP 1                    Gladis Garzon PTA  4/20/2021

## 2021-06-24 ENCOUNTER — OFFICE VISIT (OUTPATIENT)
Dept: ORTHOPEDIC SURGERY | Facility: CLINIC | Age: 66
End: 2021-06-24

## 2021-06-24 VITALS — HEIGHT: 62 IN | BODY MASS INDEX: 26.87 KG/M2 | WEIGHT: 146 LBS

## 2021-06-24 DIAGNOSIS — M24.661 ARTHROFIBROSIS OF KNEE JOINT, RIGHT: Primary | ICD-10-CM

## 2021-06-24 DIAGNOSIS — Z98.890 STATUS POST SURGICAL MANIPULATION OF KNEE JOINT: ICD-10-CM

## 2021-06-24 PROCEDURE — 73562 X-RAY EXAM OF KNEE 3: CPT | Performed by: ORTHOPAEDIC SURGERY

## 2021-06-24 PROCEDURE — 99213 OFFICE O/P EST LOW 20 MIN: CPT | Performed by: ORTHOPAEDIC SURGERY

## 2021-06-24 RX ORDER — CYCLOBENZAPRINE HCL 5 MG
5 TABLET ORAL 3 TIMES DAILY PRN
Qty: 45 TABLET | Refills: 0 | Status: SHIPPED | OUTPATIENT
Start: 2021-06-24 | End: 2021-08-10

## 2021-06-24 RX ORDER — PREDNISONE 10 MG/1
TABLET ORAL
Qty: 39 TABLET | Refills: 0 | Status: SHIPPED | OUTPATIENT
Start: 2021-06-24 | End: 2021-10-25

## 2021-07-26 RX ORDER — CEPHALEXIN 500 MG/1
CAPSULE ORAL
Qty: 4 CAPSULE | Refills: 2 | Status: SHIPPED | OUTPATIENT
Start: 2021-07-26 | End: 2021-10-25 | Stop reason: SDUPTHER

## 2021-07-26 NOTE — TELEPHONE ENCOUNTER
RX Refill request.    s/p right total knee arthroplasty, DOS 10/14/2020          Status post right total knee manipulation-12/21/2020

## 2021-08-10 ENCOUNTER — TRANSCRIBE ORDERS (OUTPATIENT)
Dept: BONE DENSITY | Facility: HOSPITAL | Age: 66
End: 2021-08-10

## 2021-08-10 DIAGNOSIS — Z78.0 MENOPAUSE: Primary | ICD-10-CM

## 2021-08-10 RX ORDER — CYCLOBENZAPRINE HCL 5 MG
TABLET ORAL
Qty: 45 TABLET | Refills: 0 | Status: SHIPPED | OUTPATIENT
Start: 2021-08-10 | End: 2021-09-27

## 2021-08-16 ENCOUNTER — HOSPITAL ENCOUNTER (OUTPATIENT)
Dept: BONE DENSITY | Facility: HOSPITAL | Age: 66
Discharge: HOME | End: 2021-08-16
Admitting: INTERNAL MEDICINE

## 2021-08-16 DIAGNOSIS — Z78.0 MENOPAUSE: ICD-10-CM

## 2021-08-16 PROCEDURE — 77080 DXA BONE DENSITY AXIAL: CPT

## 2021-09-27 RX ORDER — CYCLOBENZAPRINE HCL 5 MG
TABLET ORAL
Qty: 45 TABLET | Refills: 0 | Status: SHIPPED | OUTPATIENT
Start: 2021-09-27 | End: 2021-10-20

## 2021-10-20 RX ORDER — CYCLOBENZAPRINE HCL 5 MG
TABLET ORAL
Qty: 45 TABLET | Refills: 0 | Status: SHIPPED | OUTPATIENT
Start: 2021-10-20 | End: 2021-11-17

## 2021-10-25 ENCOUNTER — OFFICE VISIT (OUTPATIENT)
Dept: ORTHOPEDIC SURGERY | Facility: CLINIC | Age: 66
End: 2021-10-25

## 2021-10-25 VITALS — HEIGHT: 62 IN | BODY MASS INDEX: 26.87 KG/M2 | WEIGHT: 146 LBS

## 2021-10-25 DIAGNOSIS — Z96.651 STATUS POST TOTAL RIGHT KNEE REPLACEMENT: Primary | ICD-10-CM

## 2021-10-25 DIAGNOSIS — M24.661 ARTHROFIBROSIS OF KNEE JOINT, RIGHT: ICD-10-CM

## 2021-10-25 PROCEDURE — 99213 OFFICE O/P EST LOW 20 MIN: CPT | Performed by: ORTHOPAEDIC SURGERY

## 2021-10-25 PROCEDURE — 73562 X-RAY EXAM OF KNEE 3: CPT | Performed by: ORTHOPAEDIC SURGERY

## 2021-10-25 RX ORDER — CEPHALEXIN 500 MG/1
CAPSULE ORAL
Qty: 4 CAPSULE | Refills: 2 | Status: SHIPPED | OUTPATIENT
Start: 2021-10-25

## 2021-10-25 ASSESSMENT — KOOS JR
KOOS JR SCORE: 12
KOOS JR SCORE: 57.14

## 2021-10-25 NOTE — PROGRESS NOTES
"Subjective:     Patient ID: Yesi Benson is a 66 y.o. female.    Chief Complaint:  s/p right total knee arthroplasty, DOS 10/14/2020          Status post right total knee manipulation-12/21/2020  History of Present Illness  Yesi Benson returns to clinic today for evaluation of her right knee. She states that she continues to feel a pulling sensation on the lateral aspect of her right knee along with some catching on the medial aspect. She reports that she has a \"strap\" on the anterior aspect of the knee; however, she continues to get around well. She reports that she massages her entire right lower extremity due to having a sensation anywhere that she pulls. She reports having tightness and has been applying diclofenac cream; however, it does not provide her relief. She reports continuing to wear the compression sleeve intermittently. She states that she will only don the compression sleeve at home if her knee is painful and she was able to ambulate from the parking lot into the clinic today without the sleeve donned.     She states that she needs a refill of Keflex due to having dental work in 11/2021.       Social History     Occupational History   • Not on file   Tobacco Use   • Smoking status: Never Smoker   • Smokeless tobacco: Never Used   Vaping Use   • Vaping Use: Never used   Substance and Sexual Activity   • Alcohol use: No   • Drug use: No   • Sexual activity: Defer      Past Medical History:   Diagnosis Date   • Colon polyp    • Disease of thyroid gland    • Knee swelling      Past Surgical History:   Procedure Laterality Date   • COLONOSCOPY N/A 3/27/2019    Procedure: COLONOSCOPY POLYPECTOMY;  Surgeon: Jaime Alaniz MD;  Location: [facility] OR;  Service: Gastroenterology   • DENTAL PROCEDURE Left 2011,2014   • JOINT MANIPULATION Right 12/21/2020    Procedure: KNEE MANIPULATION;  Surgeon: Elmer Fuentes MD;  Location: [facility] OR;  Service: Orthopedics;  Laterality: Right;   • JOINT " "REPLACEMENT     • SALPINGO OOPHORECTOMY Left     as an infant    • TOTAL KNEE ARTHROPLASTY Right 10/14/2020    Procedure: TOTAL KNEE ARTHROPLASTY AND ALL ASSOCIATED PROCEDURES;  Surgeon: Elmer Funetes MD;  Location: [facility] OR;  Service: Orthopedics;  Laterality: Right;       Family History   Problem Relation Age of Onset   • Cancer Father    • Malig Hyperthermia Neg Hx          Review of Systems        Objective:  Vitals:    10/25/21 0916   Weight: 66.2 kg (146 lb)   Height: 157 cm (61.81\")         10/25/21  0916   Weight: 66.2 kg (146 lb)     Body mass index is 26.87 kg/m².  General: No acute distress.  Resp: normal respiratory effort  Skin: no rashes or wounds; normal turgor  Psych: mood and affect appropriate; recent and remote memory intact          Ortho Exam     Right Knee-     ROM 2 to 120 degrees  4+5 on flexion  4+/5 on extension  Midline incision is well-healed     Stable opening on varus and valgus stress at 0 and 30  Positive sensation light touch all distributions right foot symmetric to the left    Imaging:  Right Knee X-Ray  Indication: s/p total knee     AP, Lateral, and Forsan views    Findings:  Total knee arthroplasty components are in stable position with acceptable overall alignment, no evidence of periprosthetic fracture, loosening, osteolysis, or reactive bone formation.    Compared to prior postoperative x-rays    Assessment:        1. Status post total right knee replacement           Plan:          1. Discussed treatment options at length with patient at today's visit. Given that the patient continues to have an extension lag and the diclofenac gel not provided much relief, I have prescribed her a compound topical cream to apply to reduce the stiffness and improve her range of motion.  2. I have advised her to begin decreasing the use of the right knee compression brace to improve her range of motion and only don it if she is symptomatic.   3. I have refilled her Keflex and sent the " prescription to her pharmacy today due to her having dental work in 11/2021.  4. I reassured her that she may continue to massage her right knee as well as her neck.  5. All of her questions were answered.  6. Follow up: She will follow up in 6 months for repeat x-rays.         Yesi Benson was in agreement with plan and had all questions answered.     Orders:  Orders Placed This Encounter   Procedures   • XR Knee 3 View Right       Medications:  No orders of the defined types were placed in this encounter.      Followup:  No follow-ups on file.    Diagnoses and all orders for this visit:    1. Status post total right knee replacement (Primary)  -     XR Knee 3 View Right          Dictated utilizing Dragon dictation     Transcribed from ambient dictation for Elmer Fuentes MD by Fredy Wright.  10/25/21   11:19 EDT    I have personally performed the services described in this document as transcribed by the above individual, and it is both accurate and complete.  Elmer Fuentes MD  10/25/2021  12:43 EDT

## 2021-11-17 RX ORDER — CYCLOBENZAPRINE HCL 5 MG
TABLET ORAL
Qty: 45 TABLET | Refills: 0 | Status: SHIPPED | OUTPATIENT
Start: 2021-11-17 | End: 2021-12-22

## 2021-12-02 ENCOUNTER — TELEPHONE (OUTPATIENT)
Dept: ORTHOPEDIC SURGERY | Facility: CLINIC | Age: 66
End: 2021-12-02

## 2021-12-02 NOTE — TELEPHONE ENCOUNTER
Patient called requesting refill on her compound from [facility].  There was O refills on her prescription.  I will check and see if we can get it from Dr. Fuentes.

## 2021-12-22 RX ORDER — CYCLOBENZAPRINE HCL 5 MG
TABLET ORAL
Qty: 45 TABLET | Refills: 0 | Status: SHIPPED | OUTPATIENT
Start: 2021-12-22 | End: 2022-02-01

## 2021-12-22 NOTE — TELEPHONE ENCOUNTER
RX Refill request.  s/p right total knee arthroplasty, DOS 10/14/2020          Status post right total knee manipulation-12/21/2020    Last seen 10.25.2021

## 2022-02-01 RX ORDER — CYCLOBENZAPRINE HCL 5 MG
TABLET ORAL
Qty: 45 TABLET | Refills: 0 | Status: SHIPPED | OUTPATIENT
Start: 2022-02-01 | End: 2022-03-07 | Stop reason: SDUPTHER

## 2022-02-01 NOTE — TELEPHONE ENCOUNTER
Rx Refill Note  Requested Prescriptions     Pending Prescriptions Disp Refills    cyclobenzaprine (FLEXERIL) 5 MG tablet [Pharmacy Med Name: cyclobenzaprine 5 mg tablet] 45 tablet 0     Sig: TAKE ONE TABLET BY MOUTH THREE TIMES DAILY as needed for muscle spasms      Last office visit with prescribing clinician: 10/25/2021      Next office visit with prescribing clinician: 4/25/2022   Last Filled:12/22/2021    s/p right total knee arthroplasty, DOS 10/14/2020          Status post right total knee manipulation-12/21/2020       Rikki Wells  02/01/22, 11:21 EST    {TIP  Encounters:    {TIP  Please add Last Relevant Lab Date if appropriate:  {TIP  Is Refill Pharmacy correct?:

## 2022-02-23 ENCOUNTER — APPOINTMENT (OUTPATIENT)
Dept: WOMENS IMAGING | Facility: HOSPITAL | Age: 67
End: 2022-02-23

## 2022-02-23 PROCEDURE — 77063 BREAST TOMOSYNTHESIS BI: CPT | Performed by: RADIOLOGY

## 2022-02-23 PROCEDURE — 77067 SCR MAMMO BI INCL CAD: CPT | Performed by: RADIOLOGY

## 2022-03-07 RX ORDER — CYCLOBENZAPRINE HCL 5 MG
5 TABLET ORAL 3 TIMES DAILY PRN
Qty: 45 TABLET | Refills: 0 | Status: SHIPPED | OUTPATIENT
Start: 2022-03-07 | End: 2022-03-31

## 2022-03-07 NOTE — TELEPHONE ENCOUNTER
Rx Refill Note  Requested Prescriptions     Pending Prescriptions Disp Refills   • cyclobenzaprine (FLEXERIL) 5 MG tablet 45 tablet 0     Sig: Take 1 tablet by mouth 3 (Three) Times a Day As Needed for Muscle Spasms.      Last office visit with prescribing clinician: 10/25/2021      Next office visit with prescribing clinician: 4/25/2022   Last Filled:02.01.2022    s/p right total knee arthroplasty, DOS 10/14/2020  Status post right total knee manipulation-12/21/2020     Daphney Paris MA  03/07/22, 11:03 EST    Previous RX pended for your approval, change or denial.     {TIP  Encounters:    {TIP  Please add Last Relevant Lab Date if appropriate:  {TIP  Is Refill Pharmacy correct?:

## 2022-03-31 RX ORDER — CYCLOBENZAPRINE HCL 5 MG
TABLET ORAL
Qty: 45 TABLET | Refills: 0 | Status: SHIPPED | OUTPATIENT
Start: 2022-03-31 | End: 2022-04-25 | Stop reason: SDUPTHER

## 2022-03-31 NOTE — TELEPHONE ENCOUNTER
Rx Refill Note  Requested Prescriptions     Pending Prescriptions Disp Refills    cyclobenzaprine (FLEXERIL) 5 MG tablet [Pharmacy Med Name: CYCLOBENZAPRINE 5 MG TABLET] 45 tablet 0     Sig: TAKE 1 TABLET BY MOUTH 3 TIMES A DAY AS NEEDED FOR MUSCLE SPASMS.      Last office visit with prescribing clinician: 10/25/2021      Next office visit with prescribing clinician: 4/25/2022   Last Filled:03.07.2022    s/p right total knee arthroplasty, DOS 10/14/2020          Status post right total knee manipulation-12/21/2020         Daphney Paris MA  03/31/22, 15:14 EDT    {TIP  Encounters:    {TIP  Please add Last Relevant Lab Date if appropriate:  {TIP  Is Refill Pharmacy correct?:

## 2022-04-25 ENCOUNTER — OFFICE VISIT (OUTPATIENT)
Dept: ORTHOPEDIC SURGERY | Facility: CLINIC | Age: 67
End: 2022-04-25

## 2022-04-25 VITALS — BODY MASS INDEX: 26.87 KG/M2 | HEIGHT: 62 IN | WEIGHT: 146 LBS

## 2022-04-25 DIAGNOSIS — M17.11 PRIMARY OSTEOARTHRITIS OF RIGHT KNEE: Primary | ICD-10-CM

## 2022-04-25 DIAGNOSIS — Z96.651 STATUS POST TOTAL RIGHT KNEE REPLACEMENT: ICD-10-CM

## 2022-04-25 PROCEDURE — 99212 OFFICE O/P EST SF 10 MIN: CPT | Performed by: ORTHOPAEDIC SURGERY

## 2022-04-25 PROCEDURE — 73562 X-RAY EXAM OF KNEE 3: CPT | Performed by: ORTHOPAEDIC SURGERY

## 2022-04-25 RX ORDER — CYCLOBENZAPRINE HCL 5 MG
5 TABLET ORAL 3 TIMES DAILY PRN
Qty: 45 TABLET | Refills: 0 | Status: SHIPPED | OUTPATIENT
Start: 2022-04-25 | End: 2022-05-25

## 2022-04-25 RX ORDER — CEPHALEXIN 500 MG/1
CAPSULE ORAL
Qty: 4 CAPSULE | Refills: 2 | Status: SHIPPED | OUTPATIENT
Start: 2022-04-25 | End: 2022-06-29

## 2022-05-25 RX ORDER — CYCLOBENZAPRINE HCL 5 MG
TABLET ORAL
Qty: 45 TABLET | Refills: 0 | Status: SHIPPED | OUTPATIENT
Start: 2022-05-25 | End: 2022-06-17

## 2022-05-25 NOTE — TELEPHONE ENCOUNTER
Rx Refill Note  Requested Prescriptions     Pending Prescriptions Disp Refills    cyclobenzaprine (FLEXERIL) 5 MG tablet [Pharmacy Med Name: CYCLOBENZAPRINE 5 MG TABLET] 45 tablet 0     Sig: TAKE 1 TABLET BY MOUTH 3 TIMES A DAY AS NEEDED FOR MUSCLE SPASMS.      Last office visit with prescribing clinician: 4/25/2022      Next office visit with prescribing clinician: 10/24/2022   Last Filled:04.25.2022    DX:status post right total knee arthroplasty with subsequent manipulation-10/14/2020      Daphney Paris MA  05/25/22, 10:08 EDT    {TIP  Encounters:    {TIP  Please add Last Relevant Lab Date if appropriate:  {TIP  Is Refill Pharmacy correct?:

## 2022-06-16 ENCOUNTER — TELEPHONE (OUTPATIENT)
Dept: ORTHOPEDIC SURGERY | Facility: CLINIC | Age: 67
End: 2022-06-16

## 2022-06-16 NOTE — TELEPHONE ENCOUNTER
Caller: Yesi Benson    Relationship to patient: Self    Best call back number:     Chief complaint: RIGHT KNEE PAIN FROM FALL    Type of visit: NEW PROBLEM     Requested date: ASAP    Additional notes:PATIENT HAD A RECENT FALL AND WOULD LIKE TO BE SEEN SOONER THAN FIRST AVAILABLE APPT SHOWED OF 7/14/22

## 2022-06-17 RX ORDER — CYCLOBENZAPRINE HCL 5 MG
TABLET ORAL
Qty: 45 TABLET | Refills: 0 | Status: SHIPPED | OUTPATIENT
Start: 2022-06-17 | End: 2022-07-13

## 2022-06-17 NOTE — TELEPHONE ENCOUNTER
Rx Refill Note  Requested Prescriptions     Pending Prescriptions Disp Refills    cyclobenzaprine (FLEXERIL) 5 MG tablet [Pharmacy Med Name: CYCLOBENZAPRINE 5 MG TABLET] 45 tablet 0     Sig: TAKE 1 TABLET BY MOUTH 3 TIMES A DAY AS NEEDED FOR MUSCLE SPASMS.      Last office visit with prescribing clinician: 4/25/2022      Next office visit with prescribing clinician: 10/24/2022   Last Filled:05.25.2022    Dx:   DX:status post right total knee arthroplasty with subsequent manipulation-10/14/2020      Daphney Paris MA  06/17/22, 11:12 EDT    {TIP  Encounters:    {TIP  Please add Last Relevant Lab Date if appropriate:  {TIP  Is Refill Pharmacy correct?:

## 2022-06-29 ENCOUNTER — OFFICE VISIT (OUTPATIENT)
Dept: ORTHOPEDIC SURGERY | Facility: CLINIC | Age: 67
End: 2022-06-29

## 2022-06-29 VITALS
HEART RATE: 99 BPM | DIASTOLIC BLOOD PRESSURE: 87 MMHG | BODY MASS INDEX: 26.87 KG/M2 | HEIGHT: 62 IN | SYSTOLIC BLOOD PRESSURE: 160 MMHG | WEIGHT: 146 LBS

## 2022-06-29 DIAGNOSIS — Z96.651 STATUS POST TOTAL RIGHT KNEE REPLACEMENT: Primary | ICD-10-CM

## 2022-06-29 PROCEDURE — 73562 X-RAY EXAM OF KNEE 3: CPT | Performed by: NURSE PRACTITIONER

## 2022-06-29 PROCEDURE — 99214 OFFICE O/P EST MOD 30 MIN: CPT | Performed by: NURSE PRACTITIONER

## 2022-06-29 RX ORDER — METHYLPREDNISOLONE 4 MG/1
TABLET ORAL
Qty: 21 TABLET | Refills: 0 | Status: SHIPPED | OUTPATIENT
Start: 2022-06-29 | End: 2022-07-20

## 2022-06-29 NOTE — PROGRESS NOTES
Subjective:     Patient ID: Yesi Benson is a 67 y.o. female.    Chief Complaint:  S/p right total knee arthroplasty  Right knee injury 06/10/2022  History of Present Illness  Yesi Benson Presents to clinic for evaluation of her right knee.  New onset of pain as of 6/10/2022 when she was at her home seated in front of her computer when the phone rang got up to answer the phone tripped over a cable cord.  She landed on the anterior aspect of her knee and is experienced pain ever since.  She has continued with her exercise regimen including riding bike and strengthening exercises of the right lower extremity able to tolerate.  She does note feeling as if something is shifting around in her knee denies any patella dislocation denies any pain with activity does report some slight discomfort.  She is experiencing some swelling anteriorly.  Denies that the knee is locking, catching or giving way.  Denies other concerns present this time.    Social History     Occupational History   • Not on file   Tobacco Use   • Smoking status: Never Smoker   • Smokeless tobacco: Never Used   Vaping Use   • Vaping Use: Never used   Substance and Sexual Activity   • Alcohol use: No   • Drug use: No   • Sexual activity: Defer      Past Medical History:   Diagnosis Date   • Colon polyp    • Disease of thyroid gland    • Knee swelling    • Tear of meniscus of knee      Past Surgical History:   Procedure Laterality Date   • COLONOSCOPY N/A 03/27/2019    Procedure: COLONOSCOPY POLYPECTOMY;  Surgeon: Jaime Alaniz MD;  Location: [facility];  Service: Gastroenterology   • DENTAL PROCEDURE Left 2011,2014   • JOINT MANIPULATION Right 12/21/2020    Procedure: KNEE MANIPULATION;  Surgeon: Elmer Fuentes MD;  Location: [facility] OR;  Service: Orthopedics;  Laterality: Right;   • JOINT REPLACEMENT     • SALPINGO OOPHORECTOMY Left     as an infant    • TOTAL KNEE ARTHROPLASTY Right 10/14/2020    Procedure: TOTAL KNEE ARTHROPLASTY AND  "ALL ASSOCIATED PROCEDURES;  Surgeon: Elmer Fuentes MD;  Location: [facility];  Service: Orthopedics;  Laterality: Right;       Family History   Problem Relation Age of Onset   • Cancer Father         Penile Cancer   • Cancer Maternal Grandmother         Breast Cancer   • Cancer Sister         Breast Cancer   • Malig Hyperthermia Neg Hx                Objective:  Physical Exam    Vital signs reviewed.   General: No acute distress.  Eyes: conjunctiva clear; pupils equally round and reactive  ENT: external ears and nose atraumatic; oropharynx clear  CV: no peripheral edema  Resp: normal respiratory effort  Skin: no rashes or wounds; normal turgor  Psych: mood and affect appropriate; recent and remote memory intact    Vitals:    06/29/22 1430 06/29/22 1443   BP: (!) 174/105 160/87   BP Location: Right arm Right arm   Pulse: 99    Weight: 66.2 kg (146 lb)    Height: 157 cm (61.81\")          06/29/22  1430   Weight: 66.2 kg (146 lb)     Body mass index is 26.87 kg/m².      Ortho Exam     Right knee examined  Incision clean dry intact well-healed  Mild to moderate swelling, negative effusion  Positive sensation light touch all distributions of the right lower extremity  Negative erythema  Range of motion, active range of motion 0 degrees 125 degrees stable to varus and valgus stress at 0 degrees and 30 degrees  Negative clicking negative clunking    Imaging:  3 view x-ray imaging ordered and independently reviewed by myself indication fall status post total knee arthroplasty  Implant well-positioned anatomical alignment appreciated no evidence of periprosthetic fracture no evidence of loosening, osteolysis x-ray imaging compared to prior imaging completed in office and reviewed with patient    Assessment:        1. Status post total right knee replacement           Plan:  1.  Discussed plan of care with patient.  We will start  Medrol dose taper.  Discussed if not improving can proceed with MRI to evaluate loosening " for now we will start Medrol taper continue with exercise regimen.  Plan to see her back in clinic in 3 weeks to reevaluate.  Encouraged to call with any questions concerns she has between now and follow-up.  She verbalized understanding of all information agrees with plan of care.  Denies other concerns that she has at this time.    Orders:  Orders Placed This Encounter   Procedures   • XR Knee 3 View Right     New Medications Ordered This Visit   Medications   • methylPREDNISolone (MEDROL) 4 MG dose pack     Sig: Use as directed by package instructions     Dispense:  21 tablet     Refill:  0      BMI is >= 25 and <30. (Overweight) The following options were offered after discussion;: weight loss educational material (shared in after visit summary)        I ordered and reviewed the FARIDA today.     Dragon Dictation utilized.

## 2022-06-29 NOTE — PROGRESS NOTES
Presents to clinic for evaluation of her right knee. New onset of pain as of 6/10/2022 when she was at her home seated in front of her computer when the phone rang got up to answer the phone tripped over a cable cord. She landed on the anterior aspect of her knee and is experienced pain ever since. She has continued with her exercise regimen including riding bike and strengthening exercises of the right lower extremity able to tolerate. She does note feeling as if something is shifting around in her knee denies any patella dislocation denies any pain with activity does report some slight discomfort. She is experiencing some swelling anteriorly. Denies that the knee is locking, catching or giving way. Denies other concerns present this time.    Exam:  Right knee examined  Incision clean dry intact well-healed  Mild to moderate swelling, negative effusion  Positive sensation light touch all distributions of the right lower extremity  Negative erythema  Range of motion, active range of motion 0 degrees 125 degrees stable to varus and valgus stress at 0 degrees and 30 degrees  Negative clicking negative clunking    Imaging:  3 view x-ray imaging ordered and independently reviewed by myself indication fall status post total knee arthroplasty  Implant well-positioned anatomical alignment appreciated no evidence of periprosthetic fracture no evidence of loosening, osteolysis x-ray imaging compared to prior imaging completed in office and reviewed with patient    Plan:  1. Discussed plan of care with patient. We will start Medrol dose taper. Discussed if not improving can proceed with MRI to evaluate loosening for now we will start Medrol taper continue with exercise regimen. Plan to see her back in clinic in 3 weeks to reevaluate. Encouraged to call with any questions concerns she has between now and follow-up. She verbalized understanding of all information agrees with plan of care. Denies other concerns that she has at  this time.

## 2022-07-13 RX ORDER — CYCLOBENZAPRINE HCL 5 MG
TABLET ORAL
Qty: 45 TABLET | Refills: 0 | Status: SHIPPED | OUTPATIENT
Start: 2022-07-13 | End: 2022-07-28

## 2022-07-13 NOTE — TELEPHONE ENCOUNTER
Rx Refill Note  Requested Prescriptions     Pending Prescriptions Disp Refills    cyclobenzaprine (FLEXERIL) 5 MG tablet [Pharmacy Med Name: CYCLOBENZAPRINE 5 MG TABLET] 45 tablet 0     Sig: TAKE 1 TABLET BY MOUTH 3 TIMES A DAY AS NEEDED FOR MUSCLE SPASMS.      Last office visit with prescribing clinician: 4/25/2022      Next office visit with prescribing clinician: 10/24/2022   Last Filled:06.17.2011    Dx:S/p right total knee arthroplasty  Right knee injury 06/10/2022      Daphney Paris MA  07/13/22, 15:56 EDT    {TIP  Encounters:    {TIP  Please add Last Relevant Lab Date if appropriate:  {TIP  Is Refill Pharmacy correct?:

## 2022-07-20 ENCOUNTER — OFFICE VISIT (OUTPATIENT)
Dept: ORTHOPEDIC SURGERY | Facility: CLINIC | Age: 67
End: 2022-07-20

## 2022-07-20 VITALS — WEIGHT: 146 LBS | BODY MASS INDEX: 26.87 KG/M2 | HEIGHT: 62 IN

## 2022-07-20 DIAGNOSIS — Z96.651 STATUS POST TOTAL RIGHT KNEE REPLACEMENT: Primary | ICD-10-CM

## 2022-07-20 DIAGNOSIS — M24.661 ARTHROFIBROSIS OF KNEE JOINT, RIGHT: ICD-10-CM

## 2022-07-20 PROCEDURE — 99214 OFFICE O/P EST MOD 30 MIN: CPT | Performed by: NURSE PRACTITIONER

## 2022-07-20 RX ORDER — PREDNISONE 10 MG/1
TABLET ORAL
Qty: 39 TABLET | Refills: 0 | Status: SHIPPED | OUTPATIENT
Start: 2022-07-20 | End: 2022-08-18

## 2022-07-20 NOTE — PROGRESS NOTES
Subjective:     Patient ID: Yesi Benson is a 67 y.o. female.    Chief Complaint:  S/p right total knee arthroplasty DOS 10/14/2020  Status post right total knee manipulation-12/21/2020  History of Present Illness  Yesi Benson returns to clinic for follow-up of her right knee.  She has completed Medrol Dosepak that was prescribed last visit.  Feels loose in am and gets tighter throughout day with deep flexion. Not much relief with medrol. Has continued with range of motion, rates discomfort at 7/10 with deep flexion. Discomfort present along distal thigh, currently taking cyclobenzaprine three times daily, 5 mg.  Is continued with active range of including flexion and extension.  She does report some Trible feeling across the anterior aspect of the knee as well as a tight bandlike pressure.  Bandlike pressure also present across the posterior joint line.  Bandlike pressure is been present since initial surgery and even after manipulation.  Denies significant swelling at the right lower extremity.    Social History     Occupational History   • Not on file   Tobacco Use   • Smoking status: Never Smoker   • Smokeless tobacco: Never Used   Vaping Use   • Vaping Use: Never used   Substance and Sexual Activity   • Alcohol use: Never   • Drug use: Never   • Sexual activity: Defer      Past Medical History:   Diagnosis Date   • Colon polyp    • Disease of thyroid gland    • Knee swelling    • Tear of meniscus of knee      Past Surgical History:   Procedure Laterality Date   • COLONOSCOPY N/A 03/27/2019    Procedure: COLONOSCOPY POLYPECTOMY;  Surgeon: Jaime Alaniz MD;  Location: [facility] OR;  Service: Gastroenterology   • DENTAL PROCEDURE Left 2011,2014   • JOINT MANIPULATION Right 12/21/2020    Procedure: KNEE MANIPULATION;  Surgeon: Elmer Fuentes MD;  Location: [facility] OR;  Service: Orthopedics;  Laterality: Right;   • JOINT REPLACEMENT     • SALPINGO OOPHORECTOMY Left     as an infant    • TOTAL KNEE  "ARTHROPLASTY Right 10/14/2020    Procedure: TOTAL KNEE ARTHROPLASTY AND ALL ASSOCIATED PROCEDURES;  Surgeon: Elmer Fuentes MD;  Location: [facility];  Service: Orthopedics;  Laterality: Right;       Family History   Problem Relation Age of Onset   • Cancer Father         Penile Cancer   • Cancer Maternal Grandmother         Breast Cancer   • Cancer Sister         Breast Cancer   • Malig Hyperthermia Neg Hx                Objective:  Physical Exam    General: No acute distress.  Eyes: conjunctiva clear; pupils equally round and reactive  ENT: external ears and nose atraumatic; oropharynx clear  CV: no peripheral edema  Resp: normal respiratory effort  Skin: no rashes or wounds; normal turgor  Psych: mood and affect appropriate; recent and remote memory intact    Vitals:    07/20/22 1343   Weight: 66.2 kg (146 lb)   Height: 157 cm (61.81\")         07/20/22  1343   Weight: 66.2 kg (146 lb)     Body mass index is 26.87 kg/m².      Right Knee Exam     Tenderness   The patient is experiencing tenderness in the patella.    Range of Motion   Extension: 0   Flexion: 120     Tests   Varus: negative Valgus: negative  Lachman:  Anterior - 1+    Posterior - negative    Other   Erythema: absent  Sensation: normal  Pulse: present  Swelling: moderate  Effusion: no effusion present    Comments:  Incision clean dry intact well-healed  Negative straight leg raise  Negative logroll exam, negative Stinchfield exam           Assessment:        1. Status post total right knee replacement    2. Arthrofibrosis of knee joint, right           Plan:  1. Continue with cyclobenzaprine TID however I recommend increasing pm dose to two tablets at night.  Will increase prednisone taper dose for the next 12 days.  Also discussed if she is not improving may try Lyrica twice daily short course to see if this will provide her with any greater symptom relief after she is completed the oral steroid.  Discussed diet in 2 weeks if she is not improving " call to clinic we will further discuss Lyrica as treatment option.  She verbalized understanding of all information agrees with plan of care.  Denies other concerns present this time.  Orders:  No orders of the defined types were placed in this encounter.    New Medications Ordered This Visit   Medications   • predniSONE (DELTASONE) 10 MG tablet     Si mg daily x 3 days, 40 mg daily x 3 days, 20 mg daily x 3 days, 10 mg daily x 3 days     Dispense:  39 tablet     Refill:  0           I ordered and reviewed the FARIDA today.

## 2022-07-28 RX ORDER — CYCLOBENZAPRINE HCL 5 MG
TABLET ORAL
Qty: 45 TABLET | Refills: 0 | Status: SHIPPED | OUTPATIENT
Start: 2022-07-28 | End: 2022-08-09

## 2022-07-28 NOTE — TELEPHONE ENCOUNTER
Rx Refill Note  Requested Prescriptions     Pending Prescriptions Disp Refills    cyclobenzaprine (FLEXERIL) 5 MG tablet [Pharmacy Med Name: CYCLOBENZAPRINE 5 MG TABLET] 45 tablet 0     Sig: TAKE 1 TABLET BY MOUTH 3 TIMES A DAY AS NEEDED FOR MUSCLE SPASMS.      Last office visit with prescribing clinician: 4/25/2022      Next office visit with prescribing clinician: 10/24/2022   Last Filled:07.13.2022      DX:  1. Status post total right knee replacement    2. Arthrofibrosis of knee joint, right          Daphney Paris MA  07/28/22, 10:20 EDT    {TIP  Encounters:    {TIP  Please add Last Relevant Lab Date if appropriate:  {TIP  Is Refill Pharmacy correct?:

## 2022-08-09 RX ORDER — CYCLOBENZAPRINE HCL 5 MG
TABLET ORAL
Qty: 45 TABLET | Refills: 0 | Status: SHIPPED | OUTPATIENT
Start: 2022-08-09 | End: 2022-08-23

## 2022-08-09 NOTE — TELEPHONE ENCOUNTER
Rx Refill Note  Requested Prescriptions     Pending Prescriptions Disp Refills    cyclobenzaprine (FLEXERIL) 5 MG tablet [Pharmacy Med Name: CYCLOBENZAPRINE 5 MG TABLET] 45 tablet 0     Sig: TAKE 1 TABLET BY MOUTH 3 TIMES A DAY AS NEEDED FOR MUSCLE SPASMS.      Last office visit with prescribing clinician: 4/25/2022      Next office visit with prescribing clinician: 10/24/2022   Last Filled:07.28.2022    DX:  1. Status post total right knee replacement    2. Arthrofibrosis of knee joint, right           Daphney Paris MA  08/09/22, 09:17 EDT    {TIP  Encounters:    {TIP  Please add Last Relevant Lab Date if appropriate:  {TIP  Is Refill Pharmacy correct?:

## 2022-08-18 ENCOUNTER — OFFICE VISIT (OUTPATIENT)
Dept: ORTHOPEDIC SURGERY | Facility: CLINIC | Age: 67
End: 2022-08-18

## 2022-08-18 VITALS — HEIGHT: 62 IN | WEIGHT: 146 LBS | BODY MASS INDEX: 26.87 KG/M2

## 2022-08-18 DIAGNOSIS — M70.61 GREATER TROCHANTERIC BURSITIS OF RIGHT HIP: Primary | ICD-10-CM

## 2022-08-18 DIAGNOSIS — Z96.651 STATUS POST TOTAL RIGHT KNEE REPLACEMENT: ICD-10-CM

## 2022-08-18 DIAGNOSIS — M24.661 ARTHROFIBROSIS OF KNEE JOINT, RIGHT: ICD-10-CM

## 2022-08-18 PROCEDURE — 20610 DRAIN/INJ JOINT/BURSA W/O US: CPT | Performed by: NURSE PRACTITIONER

## 2022-08-18 PROCEDURE — 99213 OFFICE O/P EST LOW 20 MIN: CPT | Performed by: NURSE PRACTITIONER

## 2022-08-18 RX ORDER — LIDOCAINE HYDROCHLORIDE 10 MG/ML
4 INJECTION, SOLUTION EPIDURAL; INFILTRATION; INTRACAUDAL; PERINEURAL
Status: COMPLETED | OUTPATIENT
Start: 2022-08-18 | End: 2022-08-18

## 2022-08-18 RX ORDER — TRIAMCINOLONE ACETONIDE 40 MG/ML
80 INJECTION, SUSPENSION INTRA-ARTICULAR; INTRAMUSCULAR
Status: COMPLETED | OUTPATIENT
Start: 2022-08-18 | End: 2022-08-18

## 2022-08-18 RX ORDER — TERBINAFINE HYDROCHLORIDE 250 MG/1
TABLET ORAL
COMMUNITY

## 2022-08-18 RX ORDER — TERBINAFINE HYDROCHLORIDE 250 MG/1
TABLET ORAL
COMMUNITY
Start: 2022-08-16 | End: 2022-09-27

## 2022-08-18 RX ADMIN — TRIAMCINOLONE ACETONIDE 80 MG: 40 INJECTION, SUSPENSION INTRA-ARTICULAR; INTRAMUSCULAR at 13:27

## 2022-08-18 RX ADMIN — LIDOCAINE HYDROCHLORIDE 4 ML: 10 INJECTION, SOLUTION EPIDURAL; INFILTRATION; INTRACAUDAL; PERINEURAL at 13:27

## 2022-08-18 NOTE — PROGRESS NOTES
Subjective:     Patient ID: Yesi Benson is a 67 y.o. female.    Chief Complaint:  S/p right total knee arthroplasty DOS 10/14/2020  Status post right total knee manipulation-12/21/2020  History of Present Illness  Yesi Benson Returns to clinic for follow-up of her right lower extremity.  She has increased the Flexeril to 2 tablets at night and continue taking during the day as well.  Has completed steroid taper and notices slight improvement of symptoms including flexion of the knee.  She does continue to experience tightness along the distal femur, medial and lateral joint line, posterior joint line and into the tib-fib.  On occasion when she flexes she can feel a what she describes as a water trickle down the lower extremity.  She has continued with exercises twice daily and stationary bike for 20 minutes a day tolerating well.  Again has noted slight improvement of flexion.  Pain is not radiating to the groin.  Is not experiencing numbness or tingling radiating down the right lower extremity.  Right lower extremity.  Numbness or tingling radiating down the right lower extremity.  Denies other concerns present this time.    Social History     Occupational History   • Not on file   Tobacco Use   • Smoking status: Never Smoker   • Smokeless tobacco: Never Used   Vaping Use   • Vaping Use: Never used   Substance and Sexual Activity   • Alcohol use: Never   • Drug use: Never   • Sexual activity: Defer      Past Medical History:   Diagnosis Date   • Colon polyp    • Disease of thyroid gland    • Knee swelling    • Tear of meniscus of knee      Past Surgical History:   Procedure Laterality Date   • COLONOSCOPY N/A 03/27/2019    Procedure: COLONOSCOPY POLYPECTOMY;  Surgeon: Jaime Alaniz MD;  Location: [facility];  Service: Gastroenterology   • DENTAL PROCEDURE Left 2011,2014   • JOINT MANIPULATION Right 12/21/2020    Procedure: KNEE MANIPULATION;  Surgeon: Elmer Fuentes MD;  Location: [facility] OR;   "Service: Orthopedics;  Laterality: Right;   • JOINT REPLACEMENT     • SALPINGO OOPHORECTOMY Left     as an infant    • TOTAL KNEE ARTHROPLASTY Right 10/14/2020    Procedure: TOTAL KNEE ARTHROPLASTY AND ALL ASSOCIATED PROCEDURES;  Surgeon: Elmer Fuentes MD;  Location: [facility];  Service: Orthopedics;  Laterality: Right;       Family History   Problem Relation Age of Onset   • Cancer Father         Penile Cancer   • Cancer Maternal Grandmother         Breast Cancer   • Cancer Sister         Breast Cancer   • Malig Hyperthermia Neg Hx                Objective:  Physical Exam    General: No acute distress.  Eyes: conjunctiva clear; pupils equally round and reactive  ENT: external ears and nose atraumatic; oropharynx clear  CV: no peripheral edema  Resp: normal respiratory effort  Skin: no rashes or wounds; normal turgor  Psych: mood and affect appropriate; recent and remote memory intact    Vitals:    08/18/22 1255   Weight: 66.2 kg (146 lb)   Height: 157 cm (61.81\")         08/18/22  1255   Weight: 66.2 kg (146 lb)     Body mass index is 26.87 kg/m².      Right Hip Exam     Tenderness   The patient is experiencing tenderness in the greater trochanter.    Tests   BELKIS: negative  Manas: positive  Fadir:  Negative FADIR test    Other   Erythema: absent  Sensation: normal  Pulse: present    Comments:  Negative logroll exam  negative stinchfield exam               Right Knee Exam      Tenderness   The patient is experiencing tenderness in the patella, distal femur, medial and lateral aspect of knee, posterior joint line     Range of Motion   Extension: 0   Flexion: 120      Tests   Varus: negative Valgus: negative     Other   Erythema: absent  Sensation: normal  Pulse: present  Swelling: moderate  Effusion: no effusion present     Comments:    Incision clean dry intact well-healed  Negative straight leg raise  Negative logroll exam, negative Stinchfield exam      Assessment:        1. Greater trochanteric bursitis of " right hip    2. Arthrofibrosis of knee joint, right    3. Status post total right knee replacement           Plan:  Large Joint Arthrocentesis: R greater trochanteric bursa  Date/Time: 8/18/2022 1:27 PM  Consent given by: patient  Site marked: site marked  Timeout: Immediately prior to procedure a time out was called to verify the correct patient, procedure, equipment, support staff and site/side marked as required   Supporting Documentation  Indications: pain   Procedure Details  Location: hip - R greater trochanteric bursa  Preparation: Patient was prepped and draped in the usual sterile fashion  Needle size: 22 G  Approach: lateral  Medications administered: 4 mL lidocaine PF 1% 1 %; 80 mg triamcinolone acetonide 40 MG/ML  Patient tolerance: patient tolerated the procedure well with no immediate complications          1.  Discussed plan of care with patient.  Discussed treatment options including corticosteroid injection right knee versus wait-and-see approach given that she is improving.  We will hold off on the injection.  I also discussed treatment options including corticosteroid injection over the greater trochanter to see if this will provide her with any symptom relief.  She does wish to proceed with injection greater enteric bursa.  Discussed application of ice this evening.  If no improvement in 4 weeks we will proceed with CBC, sed rate, CRP.  If all lab work normal plan to see her back in clinic the following week with corticosteroid injection right knee.  She verbalized understanding of all information agrees with plan of care.  Denies other concerns present this time.  Orders:  Orders Placed This Encounter   Procedures   • Large Joint Arthrocentesis: R greater trochanteric bursa     No orders of the defined types were placed in this encounter.          Dragon dictation utilized.

## 2022-08-18 NOTE — PROGRESS NOTES
Returns to clinic for follow-up of her right lower extremity. She has increased the Flexeril to 2 tablets at night and continue taking during the day as well. Has completed steroid taper and notices slight improvement of symptoms including flexion of the knee. She does continue to experience tightness along the distal femur, medial and lateral joint line, posterior joint line and into the tib-fib. On occasion when she flexes she can feel a what she describes as a water trickle down the lower extremity. She has continued with exercises twice daily and stationary bike for 20 minutes a day tolerating well. Again has noted slight improvement of flexion. Pain is not radiating to the groin. Is not experiencing numbness or tingling radiating down the right lower extremity. Right lower extremity. Numbness or tingling radiating down the right lower extremity. Denies other concerns present this time.    Plan:  1. Discussed plan of care with patient. Discussed treatment options including corticosteroid injection right knee versus wait-and-see approach given that she is improving. We will hold off on the injection. I also discussed treatment options including corticosteroid injection over the greater trochanter to see if this will provide her with any symptom relief. She does wish to proceed with injection greater enteric bursa. Discussed application of ice this evening. If no improvement in 4 weeks we will proceed with CBC, sed rate, CRP. If all lab work normal plan to see her back in clinic the following week with corticosteroid injection right knee. She verbalized understanding of all information agrees with plan of care. Denies other concerns present this time.

## 2022-08-23 RX ORDER — CYCLOBENZAPRINE HCL 5 MG
TABLET ORAL
Qty: 45 TABLET | Refills: 0 | Status: SHIPPED | OUTPATIENT
Start: 2022-08-23 | End: 2022-09-06

## 2022-08-23 NOTE — TELEPHONE ENCOUNTER
Rx Refill Note  Requested Prescriptions     Pending Prescriptions Disp Refills    cyclobenzaprine (FLEXERIL) 5 MG tablet [Pharmacy Med Name: CYCLOBENZAPRINE 5 MG TABLET] 45 tablet 0     Sig: TAKE 1 TABLET BY MOUTH 3 TIMES A DAY AS NEEDED FOR MUSCLE SPASMS.      Last office visit with prescribing clinician: 4/25/2022      Next office visit with prescribing clinician: 10/24/2022   Last Filled:08.09.2022    DX:  1. Greater trochanteric bursitis of right hip    2. Arthrofibrosis of knee joint, right    3. Status post total right knee replacement           Daphney Paris MA  08/23/22, 16:08 EDT    {TIP  Encounters:    {TIP  Please add Last Relevant Lab Date if appropriate:  {TIP  Is Refill Pharmacy correct?:

## 2022-08-25 ENCOUNTER — TELEPHONE (OUTPATIENT)
Dept: ORTHOPEDIC SURGERY | Facility: CLINIC | Age: 67
End: 2022-08-25

## 2022-08-25 NOTE — TELEPHONE ENCOUNTER
Yesi called to let you know her knee feels much better. It is looser and able to bend and squat. She does feel like there is a band on her knee and her feet swell only if she is up all day. She said she was supposed to call you and let you know

## 2022-09-06 RX ORDER — CYCLOBENZAPRINE HCL 5 MG
TABLET ORAL
Qty: 45 TABLET | Refills: 0 | Status: SHIPPED | OUTPATIENT
Start: 2022-09-06 | End: 2022-09-15

## 2022-09-06 NOTE — TELEPHONE ENCOUNTER
Rx Refill Note  Requested Prescriptions     Pending Prescriptions Disp Refills    cyclobenzaprine (FLEXERIL) 5 MG tablet [Pharmacy Med Name: CYCLOBENZAPRINE 5 MG TABLET] 45 tablet 0     Sig: TAKE 1 TABLET BY MOUTH 3 TIMES A DAY AS NEEDED FOR MUSCLE SPASMS.      Last office visit with prescribing clinician: 4/25/2022      Next office visit with prescribing clinician: 10/24/2022   Last Filled:08.23.2022    DX:  1. Greater trochanteric bursitis of right hip    2. Arthrofibrosis of knee joint, right    3. Status post total right knee replacement          Daphney Paris MA  09/06/22, 09:44 EDT    {TIP  Encounters:    {TIP  Please add Last Relevant Lab Date if appropriate:  {TIP  Is Refill Pharmacy correct?:

## 2022-09-09 ENCOUNTER — TELEPHONE (OUTPATIENT)
Dept: ORTHOPEDIC SURGERY | Facility: CLINIC | Age: 67
End: 2022-09-09

## 2022-09-09 DIAGNOSIS — Z96.651 STATUS POST TOTAL RIGHT KNEE REPLACEMENT: Primary | ICD-10-CM

## 2022-09-09 NOTE — TELEPHONE ENCOUNTER
Patient states shot helped her hip but knee still feels tight.  Do you want to see if she will consider a knee injection or something else?

## 2022-09-10 ENCOUNTER — LAB (OUTPATIENT)
Dept: LAB | Facility: HOSPITAL | Age: 67
End: 2022-09-10

## 2022-09-10 DIAGNOSIS — Z96.651 STATUS POST TOTAL RIGHT KNEE REPLACEMENT: ICD-10-CM

## 2022-09-10 PROCEDURE — 36415 COLL VENOUS BLD VENIPUNCTURE: CPT

## 2022-09-10 PROCEDURE — 86140 C-REACTIVE PROTEIN: CPT

## 2022-09-10 PROCEDURE — 85025 COMPLETE CBC W/AUTO DIFF WBC: CPT

## 2022-09-10 PROCEDURE — 85652 RBC SED RATE AUTOMATED: CPT

## 2022-09-11 LAB
BASOPHILS # BLD AUTO: 0.05 10*3/MM3 (ref 0–0.2)
BASOPHILS NFR BLD AUTO: 0.7 % (ref 0–1.5)
CRP SERPL-MCNC: <0.3 MG/DL (ref 0–0.5)
DEPRECATED RDW RBC AUTO: 48.7 FL (ref 37–54)
EOSINOPHIL # BLD AUTO: 0.08 10*3/MM3 (ref 0–0.4)
EOSINOPHIL NFR BLD AUTO: 1.1 % (ref 0.3–6.2)
ERYTHROCYTE [DISTWIDTH] IN BLOOD BY AUTOMATED COUNT: 14.1 % (ref 12.3–15.4)
ERYTHROCYTE [SEDIMENTATION RATE] IN BLOOD: 23 MM/HR (ref 0–30)
HCT VFR BLD AUTO: 46.3 % (ref 34–46.6)
HGB BLD-MCNC: 14.4 G/DL (ref 12–15.9)
IMM GRANULOCYTES # BLD AUTO: 0.01 10*3/MM3 (ref 0–0.05)
IMM GRANULOCYTES NFR BLD AUTO: 0.1 % (ref 0–0.5)
LYMPHOCYTES # BLD AUTO: 1.93 10*3/MM3 (ref 0.7–3.1)
LYMPHOCYTES NFR BLD AUTO: 26.7 % (ref 19.6–45.3)
MCH RBC QN AUTO: 29 PG (ref 26.6–33)
MCHC RBC AUTO-ENTMCNC: 31.1 G/DL (ref 31.5–35.7)
MCV RBC AUTO: 93.2 FL (ref 79–97)
MONOCYTES # BLD AUTO: 0.46 10*3/MM3 (ref 0.1–0.9)
MONOCYTES NFR BLD AUTO: 6.4 % (ref 5–12)
NEUTROPHILS NFR BLD AUTO: 4.7 10*3/MM3 (ref 1.7–7)
NEUTROPHILS NFR BLD AUTO: 65 % (ref 42.7–76)
NRBC BLD AUTO-RTO: 0 /100 WBC (ref 0–0.2)
PLATELET # BLD AUTO: 242 10*3/MM3 (ref 140–450)
PMV BLD AUTO: 11 FL (ref 6–12)
RBC # BLD AUTO: 4.97 10*6/MM3 (ref 3.77–5.28)
WBC NRBC COR # BLD: 7.23 10*3/MM3 (ref 3.4–10.8)

## 2022-09-14 ENCOUNTER — TELEPHONE (OUTPATIENT)
Dept: ORTHOPEDIC SURGERY | Facility: CLINIC | Age: 67
End: 2022-09-14

## 2022-09-14 NOTE — TELEPHONE ENCOUNTER
I called and spoke with patient and gave results.  She has decided not to proceed with injection for right knee at this time.  She is asking about increasing her dose of the cyclobenzaprine, says that maybe it was brought up in a previous visit and is wanting to try that.

## 2022-09-15 RX ORDER — CYCLOBENZAPRINE HCL 10 MG
10 TABLET ORAL 3 TIMES DAILY PRN
Qty: 30 TABLET | Refills: 0 | Status: SHIPPED | OUTPATIENT
Start: 2022-09-15 | End: 2022-09-27 | Stop reason: SDUPTHER

## 2022-09-27 ENCOUNTER — OFFICE VISIT (OUTPATIENT)
Dept: ORTHOPEDIC SURGERY | Facility: CLINIC | Age: 67
End: 2022-09-27

## 2022-09-27 VITALS — WEIGHT: 146 LBS | HEIGHT: 62 IN | BODY MASS INDEX: 26.87 KG/M2

## 2022-09-27 DIAGNOSIS — Z96.651 STATUS POST TOTAL RIGHT KNEE REPLACEMENT: Primary | ICD-10-CM

## 2022-09-27 DIAGNOSIS — M24.661 ARTHROFIBROSIS OF KNEE JOINT, RIGHT: ICD-10-CM

## 2022-09-27 PROCEDURE — 99213 OFFICE O/P EST LOW 20 MIN: CPT | Performed by: NURSE PRACTITIONER

## 2022-09-27 RX ORDER — CYCLOBENZAPRINE HCL 10 MG
10 TABLET ORAL 3 TIMES DAILY PRN
Qty: 60 TABLET | Refills: 0 | Status: SHIPPED | OUTPATIENT
Start: 2022-09-27 | End: 2022-10-17

## 2022-09-27 NOTE — PROGRESS NOTES
Subjective:     Patient ID: Yesi Benson is a 67 y.o. female.    Chief Complaint:  S/p right total knee arthroplasty DOS 10/14/2020  Status post right total knee manipulation-12/21/2020  History of Present Illness  Yesi Benson returns to clinic for follow-up of her right knee.  She started cyclobenzaprine 10 mg 3 times a day tolerating well has noted significant improvement regards to range of motion and activity tolerance.  She is noted reduction of swelling has been able to ambulate without significant difficulty does continue with quad strengthening hamstring strengthening exercises, riding bike, wall slides and resistance band exercises tolerating very well.  Denies of the knee is feeling as if is going to give way denies significant stiffness or tightness along the anterior aspect of the knee.  Denies any other concerns present.     Social History     Occupational History   • Not on file   Tobacco Use   • Smoking status: Never Smoker   • Smokeless tobacco: Never Used   Vaping Use   • Vaping Use: Never used   Substance and Sexual Activity   • Alcohol use: Never   • Drug use: Never   • Sexual activity: Defer      Past Medical History:   Diagnosis Date   • Colon polyp    • Disease of thyroid gland    • Knee swelling    • Tear of meniscus of knee      Past Surgical History:   Procedure Laterality Date   • COLONOSCOPY N/A 03/27/2019    Procedure: COLONOSCOPY POLYPECTOMY;  Surgeon: Jaime Alaniz MD;  Location: [facility] OR;  Service: Gastroenterology   • DENTAL PROCEDURE Left 2011,2014   • JOINT MANIPULATION Right 12/21/2020    Procedure: KNEE MANIPULATION;  Surgeon: Elmer Fuentes MD;  Location: [facility] OR;  Service: Orthopedics;  Laterality: Right;   • JOINT REPLACEMENT     • SALPINGO OOPHORECTOMY Left     as an infant    • TOTAL KNEE ARTHROPLASTY Right 10/14/2020    Procedure: TOTAL KNEE ARTHROPLASTY AND ALL ASSOCIATED PROCEDURES;  Surgeon: Elmer Fuentes MD;  Location: [facility] OR;  Service:  "Orthopedics;  Laterality: Right;       Family History   Problem Relation Age of Onset   • Cancer Father         Penile Cancer   • Cancer Maternal Grandmother         Breast Cancer   • Cancer Sister         Breast Cancer   • Malig Hyperthermia Neg Hx                Objective:  Physical Exam    General: No acute distress.  Eyes: conjunctiva clear; pupils equally round and reactive  ENT: external ears and nose atraumatic; oropharynx clear  CV: no peripheral edema  Resp: normal respiratory effort  Skin: no rashes or wounds; normal turgor  Psych: mood and affect appropriate; recent and remote memory intact    Vitals:    09/27/22 0807   Weight: 66.2 kg (146 lb)   Height: 157 cm (61.81\")         09/27/22  0807   Weight: 66.2 kg (146 lb)     Body mass index is 26.87 kg/m².      Right Knee Exam     Range of Motion   Extension: 0   Flexion: 120     Tests   Varus: negative Valgus: negative    Other   Erythema: absent  Sensation: normal  Pulse: present  Swelling: mild  Effusion: no effusion present    Comments:  Incision clean dry intact well-healed  Negative straight leg raise  Negative logroll exam, negative Stinchfield exam                Assessment:        1. Status post total right knee replacement    2. Arthrofibrosis of knee joint, right           Plan:  1. Discussed plan of care with patient.  Will refill cyclobenzaprine continue taking 3 times daily as needed.  Continue with strengthening exercises.  Does have follow-up with Dr. Fuentes next month encouraged to keep appointment plan to see him in clinic as needed.  All questions answered.  Orders:  No orders of the defined types were placed in this encounter.    New Medications Ordered This Visit   Medications   • cyclobenzaprine (FLEXERIL) 10 MG tablet     Sig: Take 1 tablet by mouth 3 (Three) Times a Day As Needed for Muscle Spasms.     Dispense:  60 tablet     Refill:  0           Dragon dictation utilized.     "

## 2022-10-17 RX ORDER — CYCLOBENZAPRINE HCL 10 MG
TABLET ORAL
Qty: 60 TABLET | Refills: 0 | Status: SHIPPED | OUTPATIENT
Start: 2022-10-17 | End: 2022-11-07

## 2022-10-17 NOTE — TELEPHONE ENCOUNTER
Rx Refill Note  Requested Prescriptions     Pending Prescriptions Disp Refills    cyclobenzaprine (FLEXERIL) 10 MG tablet [Pharmacy Med Name: CYCLOBENZAPRINE 10 MG TABLET] 60 tablet 0     Sig: TAKE 1 TABLET BY MOUTH 3 TIMES A DAY AS NEEDED FOR MUSCLE SPASMS.      Last office visit with prescribing clinician: 9/27/2022      Next office visit with prescribing clinician: Visit date not found   Last Filled:09.27.2022    DX:    1. Status post total right knee replacement    2. Arthrofibrosis of knee joint, right           Daphney Paris MA  10/17/22, 08:52 EDT    {TIP  Encounters:    {TIP  Please add Last Relevant Lab Date if appropriate:  {TIP  Is Refill Pharmacy correct?:

## 2022-10-24 ENCOUNTER — OFFICE VISIT (OUTPATIENT)
Dept: ORTHOPEDIC SURGERY | Facility: CLINIC | Age: 67
End: 2022-10-24

## 2022-10-24 VITALS — HEIGHT: 62 IN | WEIGHT: 146 LBS | BODY MASS INDEX: 26.87 KG/M2

## 2022-10-24 DIAGNOSIS — Z96.651 STATUS POST TOTAL RIGHT KNEE REPLACEMENT: Primary | ICD-10-CM

## 2022-10-24 PROCEDURE — 99213 OFFICE O/P EST LOW 20 MIN: CPT | Performed by: ORTHOPAEDIC SURGERY

## 2022-10-24 PROCEDURE — 73562 X-RAY EXAM OF KNEE 3: CPT | Performed by: ORTHOPAEDIC SURGERY

## 2022-10-24 NOTE — PROGRESS NOTES
Subjective:     Patient ID: Yesi Benson is a 67 y.o. female.    Chief Complaint:  Follow-up status post right total knee arthroplasty with subsequent manipulation, 10/14/2020    History of Present Illness  Yesi Benson returns to the clinic for evaluation of the right knee. She expresses she is still having some issues with some rebound stiffness and tightness in her right knee. The patient reveals her symptoms are intermittent and come and go. She can not identify any specific issues as far as any significant positions that causes her the biggest issues with pain. She describes her pain as stinging or burning in nature when it does occur at its worst. The patient denies any fevers, chills, sweats, any redness or warmth of the right knee. She rates her pain as a 3 to 4 out of 10.       Social History     Occupational History   • Not on file   Tobacco Use   • Smoking status: Never   • Smokeless tobacco: Never   Vaping Use   • Vaping Use: Never used   Substance and Sexual Activity   • Alcohol use: Never   • Drug use: Never   • Sexual activity: Defer      Past Medical History:   Diagnosis Date   • Colon polyp    • Disease of thyroid gland    • Knee swelling    • Tear of meniscus of knee      Past Surgical History:   Procedure Laterality Date   • COLONOSCOPY N/A 03/27/2019    Procedure: COLONOSCOPY POLYPECTOMY;  Surgeon: Jaime Alaniz MD;  Location: [facility] OR;  Service: Gastroenterology   • DENTAL PROCEDURE Left 2011,2014   • JOINT MANIPULATION Right 12/21/2020    Procedure: KNEE MANIPULATION;  Surgeon: Elmer Fuentes MD;  Location: [facility] OR;  Service: Orthopedics;  Laterality: Right;   • JOINT REPLACEMENT     • SALPINGO OOPHORECTOMY Left     as an infant    • TOTAL KNEE ARTHROPLASTY Right 10/14/2020    Procedure: TOTAL KNEE ARTHROPLASTY AND ALL ASSOCIATED PROCEDURES;  Surgeon: Elmer Fuentes MD;  Location: [facility] OR;  Service: Orthopedics;  Laterality: Right;       Family History   Problem  "Relation Age of Onset   • Cancer Father         Penile Cancer   • Cancer Maternal Grandmother         Breast Cancer   • Cancer Sister         Breast Cancer   • Malig Hyperthermia Neg Hx          Review of Systems        Objective:  Vitals:    10/24/22 1305   Weight: 66.2 kg (146 lb)   Height: 157 cm (61.81\")         10/24/22  1305   Weight: 66.2 kg (146 lb)     Body mass index is 26.87 kg/m².  General: No acute distress.  Resp: normal respiratory effort  Skin: no rashes or wounds; normal turgor  Psych: mood and affect appropriate; recent and remote memory intact          Ortho Exam       Right Knee    Midline incision well healed.   Active range of motion is to 125 degrees.   Flexion strength- 4+/5.   Extension strength- 4/5.   Effusion- Minimal.   Posterior drawer- Good endpoint at 90 degrees.   Stable opening on varus and valgus stress at 2 and 30 degrees.   Log roll test- No hip pain.     Imaging:  Right Knee X-Ray  Indication: s/p total knee     AP, Lateral, and Rowe views    Findings:  Total knee arthroplasty components are in stable position with acceptable overall alignment, no evidence of periprosthetic fracture, loosening, osteolysis.  Reactive bone formation noted in the distal quad  Compared to prior postoperative x-rays    Assessment:        1. Status post total right knee replacement           Plan:        1. I discussed treatment options at length with patient on 10/24/2022.   2. The patient overall is doing reasonably well in regards to her motion. Her x-rays look good, but she still is feeling persistent stiffness and is hindered by the feeling of stretching as well as a feeling of water in her right knee that does occur occasionally. At this point, I recommended getting back into physical therapy for a couple of sessions of additional focus on stretching and strengthening.  3. I will see the patient back in 1 year with repeat x-rays or sooner if needed.    Yesi Benson was in agreement with plan " and had all questions answered.     Orders:  Orders Placed This Encounter   Procedures   • XR Knee 3+ View With New Point Right   • Ambulatory Referral to Physical Therapy Evaluate and treat, Ortho       Medications:  No orders of the defined types were placed in this encounter.      Followup:  Return in about 1 year (around 10/24/2023) for xrays needed at follow up.    Diagnoses and all orders for this visit:    1. Status post total right knee replacement (Primary)  -     XR Knee 3+ View With New Point Right  -     Ambulatory Referral to Physical Therapy Evaluate and treat, Ortho        Transcribed from ambient dictation for Elmer Fuentes MD by Dana Anand.  10/24/22   14:01 EDT

## 2022-11-07 RX ORDER — CYCLOBENZAPRINE HCL 10 MG
TABLET ORAL
Qty: 60 TABLET | Refills: 0 | Status: SHIPPED | OUTPATIENT
Start: 2022-11-07 | End: 2022-11-28

## 2022-11-07 NOTE — TELEPHONE ENCOUNTER
Rx Refill Note  Requested Prescriptions     Pending Prescriptions Disp Refills    cyclobenzaprine (FLEXERIL) 10 MG tablet [Pharmacy Med Name: CYCLOBENZAPRINE 10 MG TABLET] 60 tablet 0     Sig: TAKE 1 TABLET BY MOUTH 3 TIMES A DAY AS NEEDED FOR MUSCLE SPASMS.      Last office visit with prescribing clinician: 9/27/2022      Next office visit with prescribing clinician: Visit date not found   Last Filled:10.17.2022    DX:Status post total right knee replacement       Daphney Paris MA  11/07/22, 11:16 EST    {TIP  Encounters:    {TIP  Please add Last Relevant Lab Date if appropriate:  {TIP  Is Refill Pharmacy correct?:

## 2022-11-10 ENCOUNTER — HOSPITAL ENCOUNTER (OUTPATIENT)
Dept: PHYSICAL THERAPY | Facility: HOSPITAL | Age: 67
Setting detail: THERAPIES SERIES
Discharge: HOME | End: 2022-11-10

## 2022-11-10 DIAGNOSIS — Z96.651 STATUS POST TOTAL RIGHT KNEE REPLACEMENT: Primary | ICD-10-CM

## 2022-11-10 PROCEDURE — 97161 PT EVAL LOW COMPLEX 20 MIN: CPT | Performed by: PHYSICAL THERAPIST

## 2022-11-10 NOTE — THERAPY TREATMENT NOTE
Outpatient Physical Therapy Ortho Treatment Note   Jenny Chávez     Patient Name: Yesi Benson  : 1955  MRN: [id number]  Today's Date: 11/10/2022      Visit Date: 11/10/2022    Visit Dx:    ICD-10-CM ICD-9-CM   1. Status post total right knee replacement  Z96.651 V43.65       Patient Active Problem List   Diagnosis   • Adenomatous polyp of colon   • Hyperlipidemia   • Hypothyroidism   • Status post total right knee replacement   • Arthrofibrosis of knee joint, right        Past Medical History:   Diagnosis Date   • Colon polyp    • Disease of thyroid gland    • Knee swelling    • Tear of meniscus of knee         Past Surgical History:   Procedure Laterality Date   • COLONOSCOPY N/A 2019    Procedure: COLONOSCOPY POLYPECTOMY;  Surgeon: Jaime Alaniz MD;  Location: [facility];  Service: Gastroenterology   • DENTAL PROCEDURE Left    • JOINT MANIPULATION Right 2020    Procedure: KNEE MANIPULATION;  Surgeon: Elmer Fuentes MD;  Location: [facility];  Service: Orthopedics;  Laterality: Right;   • JOINT REPLACEMENT     • SALPINGO OOPHORECTOMY Left     as an infant    • TOTAL KNEE ARTHROPLASTY Right 10/14/2020    Procedure: TOTAL KNEE ARTHROPLASTY AND ALL ASSOCIATED PROCEDURES;  Surgeon: Elmer Fuentes MD;  Location: [facility];  Service: Orthopedics;  Laterality: Right;        PT Ortho     Row Name 11/10/22 1030       Posture/Observations    Posture/Observations Comments Pt has mild edema right knee  -GC       Knee Palpation    Medial Joint Line Right:;Tender  -GC       Patellar Accessory Motions    Superior glide Right:;WNL  -GC    Inferior glide Right:;WNL  -GC    Medial glide Right:;WNL  -GC    Lateral glide Right:;WNL  -GC       Right Lower Ext    Rt Knee Extension/Flexion AROM 0- degrees  -GC       MMT (Manual Muscle Testing)    General MMT Comments TKE < 15 degrees  -GC       MMT Right Lower Ext    Rt Hip Flexion MMT, Gross Movement (4+/5) good plus  -GC    Rt  Hip Extension MMT, Gross Movement (4+/5) good plus  -GC    Rt Hip ABduction MMT, Gross Movement (5/5) normal  -GC    Rt Hip ADduction MMT, Gross Movement (4+/5) good plus  -GC    Rt Knee Extension MMT, Gross Movement (4/5) good  at TKE  -GC    Rt Knee Flexion MMT, Gross Movement (4+/5) good plus  -GC       Sensation    Light Touch No apparent deficits  -GC       Lower Extremity Flexibility    Hamstrings Right:;Moderately limited  -GC    Quadriceps Right:;Mildly limited  -GC    ITB Right:;WNL  -GC    Gastrocnemius Right:;WNL  -GC       Transfers    Comment, (Transfers) Pt is independent with all bed mobility and transfers  -GC       Gait/Stairs (Locomotion)    Comment, (Gait/Stairs) Pt ambulates with antalgic gait right LE  -GC          User Key  (r) = Recorded By, (t) = Taken By, (c) = Cosigned By    Initials Name Provider Type    GC Ryan Kong, PT Physical Therapist                             PT Assessment/Plan     Row Name 11/10/22 1030          PT Assessment    Functional Limitations Impaired gait;Limitation in home management;Limitations in community activities;Limitations in functional capacity and performance;Performance in leisure activities;Performance in self-care ADL  -GC     Impairments Range of motion;Pain;Muscle strength;Impaired flexibility;Gait  -GC     Assessment Comments Pt presents approximately 2 years s/p right TKA with residual c/o stiffness and pain with activities such as walking and squatting as well as in the morning when getting out of bed. She says her pain can be as high as a 10/10 with actvity. She has decreased right knee ROM, decreased right LE strength, decreased hamstring and quadricep flexibility, decreased ambulatory status, and decreased function secondary to the aobve.  -GC     Rehab Potential Good  -GC     Patient/caregiver participated in establishment of treatment plan and goals Yes  -GC     Patient would benefit from skilled therapy intervention Yes  -GC        PT Plan     PT Frequency 1x/week;2x/week  -GC     Predicted Duration of Therapy Intervention (PT) 4 weeks  -GC     Planned CPT's? PT EVAL LOW COMPLEXITY: 33757;PT THER PROC EA 15 MIN: 24018;PT MANUAL THERAPY EA 15 MIN: 69189;PT ELECTRICAL STIM UNATTEND: ;PT HOT OR COLD PACK TREAT MCARE  -GC     PT Plan Comments Pt is to continue her HEP 2x daily.  -GC           User Key  (r) = Recorded By, (t) = Taken By, (c) = Cosigned By    Initials Name Provider Type     Ryan Kong, PT Physical Therapist                 Modalities     Row Name 11/10/22 1030             Moist Heat    MH Applied Yes  -GC      Location right knee with pt supine  -GC      PT Moist Heat Minutes 10  -GC      MH Prior to Rx Yes  -GC            User Key  (r) = Recorded By, (t) = Taken By, (c) = Cosigned By    Initials Name Provider Type    GC Ryan Kong, PT Physical Therapist               OP Exercises     Row Name 11/10/22 1030             Exercise 1    Exercise Name 1 Hamstring stretch  -GC      Cueing 1 Verbal;Tactile  -GC      Reps 1 15  -GC      Time 1 10 secs  -GC         Exercise 2    Exercise Name 2 Passive knee EXT stretch  -GC      Cueing 2 Verbal;Tactile  -GC      Reps 2 10  -GC      Time 2 10 secs  -GC         Exercise 3    Exercise Name 3 QS with Russian stim  -GC      Cueing 3 Verbal;Tactile  -GC      Time 3 10 min 10/10  -GC         Exercise 4    Exercise Name 4 SAQ  -GC      Cueing 4 Verbal;Tactile  -GC      Reps 4 50  -GC         Exercise 5    Exercise Name 5 LAQ  -GC      Cueing 5 Verbal;Tactile;Demo  -GC      Reps 5 50  -GC         Exercise 6    Exercise Name 6 TKE vs theraband  -GC      Cueing 6 Verbal;Tactile;Demo  -GC      Reps 6 50  -GC      Time 6 gold  -GC            User Key  (r) = Recorded By, (t) = Taken By, (c) = Cosigned By    Initials Name Provider Type     Ryan Kong, PT Physical Therapist                              PT OP Goals     Row Name 11/10/22 1030          PT Short Term Goals    STG Date to Achieve  11/24/22  -     STG 1 Decrease right knee pain to 3-4/10 with activity.  -     STG 2 Increase right knee AROM to 0-5-125 degrees with testing.  -     STG 3 Increase right LE stength to at least 4+/5 all planes with testing.  -     STG 4 Pt will be independent with her HEP issued by this therapist.  -        Long Term Goals    LTG Date to Achieve 12/08/22  -     LTG 1 Decrease right knee pain to 0-1/10 with activity.  -     LTG 2 Increase right knee AROM to 0-125 degrees with testing.  -     LTG 3 Increase right LE stength to 5/5 all planes with testing.  -     LTG 4 Pt will ambulate normally on levels and stairs without assistive device.  -     LTG 5 Pt will be independent with all ADLs and have a LEFS score > 65.  -        Time Calculation    PT Goal Re-Cert Due Date 12/08/22  -           User Key  (r) = Recorded By, (t) = Taken By, (c) = Cosigned By    Initials Name Provider Type     Ryan Kong, PT Physical Therapist                Therapy Education  Given: HEP, Symptoms/condition management, Pain management  Program: New  How Provided: Verbal, Demonstration, Written  Provided to: Patient  Level of Understanding: Teach back education performed, Verbalized, Demonstrated    Outcome Measure Options: Lower Extremity Functional Scale (LEFS)  Lower Extremity Functional Index  Any of your usual work, housework or school activities: Moderate difficulty  Your usual hobbies, recreational or sporting activities: A little bit of difficulty  Getting into or out of the bath: A little bit of difficulty  Walking between rooms: A little bit of difficulty  Putting on your shoes or socks: No difficulty  Squatting: Moderate difficulty  Lifting an object, like a bag of groceries from the floor: No difficulty  Performing light activities around your home: A little bit of difficulty  Performing heavy activities around your home: Quite a bit of difficulty  Getting into or out of a car: No difficulty  Walking  2 blocks: A little bit of difficulty  Walking a mile: Moderate difficulty  Going up or down 10 stairs (about 1 flight of stairs): A little bit of difficulty  Standing for 1 hour: Moderate difficulty  Sitting for 1 hour: Moderate difficulty  Running on even ground: Quite a bit of difficulty  Running on uneven ground: Quite a bit of difficulty  Making sharp turns while running fast: Quite a bit of difficulty  Hopping: Quite a bit of difficulty  Rolling over in bed: No difficulty  Total: 49      Time Calculation:   Start Time: 1030  Stop Time: 1132  Time Calculation (min): 62 min  Untimed Charges  PT Moist Heat Minutes: 10  Total Minutes  Untimed Charges Total Minutes: 10   Total Minutes: 10  Therapy Charges for Today     Code Description Service Date Service Provider Modifiers Qty    74526513849 HC PT EVAL LOW COMPLEXITY 3 11/10/2022 Ryan Kong, PT GP 1          PT G-Codes  Outcome Measure Options: Lower Extremity Functional Scale (LEFS)  Total: 49         Ryan Kong PT  11/10/2022

## 2022-11-15 ENCOUNTER — HOSPITAL ENCOUNTER (OUTPATIENT)
Dept: PHYSICAL THERAPY | Facility: HOSPITAL | Age: 67
Setting detail: THERAPIES SERIES
Discharge: HOME | End: 2022-11-15

## 2022-11-15 DIAGNOSIS — Z96.651 STATUS POST TOTAL RIGHT KNEE REPLACEMENT: Primary | ICD-10-CM

## 2022-11-15 PROCEDURE — 97140 MANUAL THERAPY 1/> REGIONS: CPT

## 2022-11-15 PROCEDURE — 97110 THERAPEUTIC EXERCISES: CPT

## 2022-11-15 NOTE — THERAPY TREATMENT NOTE
Outpatient Physical Therapy Ortho Treatment Note   Jenny Chávez     Patient Name: Yesi Benson  : 1955  MRN: [id number]  Today's Date: 11/15/2022      Visit Date: 11/15/2022    Visit Dx:    ICD-10-CM ICD-9-CM   1. Status post total right knee replacement  Z96.651 V43.65       Patient Active Problem List   Diagnosis   • Adenomatous polyp of colon   • Hyperlipidemia   • Hypothyroidism   • Status post total right knee replacement   • Arthrofibrosis of knee joint, right        Past Medical History:   Diagnosis Date   • Colon polyp    • Disease of thyroid gland    • Knee swelling    • Tear of meniscus of knee         Past Surgical History:   Procedure Laterality Date   • COLONOSCOPY N/A 2019    Procedure: COLONOSCOPY POLYPECTOMY;  Surgeon: Jaime Alaniz MD;  Location: [facility];  Service: Gastroenterology   • DENTAL PROCEDURE Left    • JOINT MANIPULATION Right 2020    Procedure: KNEE MANIPULATION;  Surgeon: Elmer Fuentes MD;  Location: [facility] OR;  Service: Orthopedics;  Laterality: Right;   • JOINT REPLACEMENT     • SALPINGO OOPHORECTOMY Left     as an infant    • TOTAL KNEE ARTHROPLASTY Right 10/14/2020    Procedure: TOTAL KNEE ARTHROPLASTY AND ALL ASSOCIATED PROCEDURES;  Surgeon: Elmer Fuentes MD;  Location: [facility];  Service: Orthopedics;  Laterality: Right;        PT Ortho     Row Name 11/15/22 6858       Right Lower Ext    Rt Knee Extension/Flexion AROM 0-8-120 degrees pre stretch  0-4-120 degress post stretch  -KM          User Key  (r) = Recorded By, (t) = Taken By, (c) = Cosigned By    Initials Name Provider Type    Gladis Callahan, PTA Physical Therapist Assistant                             PT Assessment/Plan     Row Name 11/15/22 4483          PT Assessment    Assessment Comments Continue to push ROM; pt does measure improve active extension range post stretch.  -KM        PT Plan    PT Plan Comments Pt to continue with HEP. Progress as tolerated.   -KM           User Key  (r) = Recorded By, (t) = Taken By, (c) = Cosigned By    Initials Name Provider Type    Gladis Callahan PTA Physical Therapist Assistant                   OP Exercises     Row Name 11/15/22 0940             Subjective Comments    Subjective Comments Pt states she feels her knee is doing better. States she has been compliant with HEP  -KM         Exercise 1    Exercise Name 1 Hamstring stretch  -KM      Cueing 1 Verbal;Tactile  -KM      Reps 1 15  -KM      Time 1 10 secs  -KM      Additional Comments Heel Prop x 5 min  -KM         Exercise 2    Exercise Name 2 Passive knee EXT stretch  -KM      Cueing 2 Verbal;Tactile  -KM      Reps 2 10  -KM      Time 2 10 secs  -KM         Exercise 3    Exercise Name 3 QS with Russian stim  -KM      Cueing 3 Verbal;Tactile  -KM      Time 3 10 min 10/10  -KM         Exercise 4    Exercise Name 4 SAQ  -KM      Cueing 4 Verbal;Tactile  -KM      Reps 4 50  -KM         Exercise 5    Exercise Name 5 LAQ  -KM      Cueing 5 Verbal;Tactile;Demo  -KM      Reps 5 50  -KM         Exercise 6    Exercise Name 6 TKE vs theraband  -KM      Cueing 6 Verbal;Tactile;Demo  -KM      Reps 6 50  -KM      Time 6 gold  -KM            User Key  (r) = Recorded By, (t) = Taken By, (c) = Cosigned By    Initials Name Provider Type    Gladis Callahan PTA Physical Therapist Assistant                                                Time Calculation:   Start Time: 0940  Stop Time: 1020  Time Calculation (min): 40 min  Therapy Charges for Today     Code Description Service Date Service Provider Modifiers Qty    78020599500 HC PT MANUAL THERAPY EA 15 MIN 11/15/2022 Gladis Garzon PTA GP 1    99192532953 HC PT THER PROC EA 15 MIN 11/15/2022 Gladis Garzon PTA GP 1                    Gladis Garzon PTA  11/15/2022

## 2022-11-22 ENCOUNTER — HOSPITAL ENCOUNTER (OUTPATIENT)
Dept: PHYSICAL THERAPY | Facility: HOSPITAL | Age: 67
Setting detail: THERAPIES SERIES
Discharge: HOME | End: 2022-11-22

## 2022-11-22 DIAGNOSIS — Z96.651 STATUS POST TOTAL RIGHT KNEE REPLACEMENT: Primary | ICD-10-CM

## 2022-11-22 PROCEDURE — 97110 THERAPEUTIC EXERCISES: CPT

## 2022-11-22 NOTE — THERAPY TREATMENT NOTE
Outpatient Physical Therapy Ortho Treatment Note   Jenny Chávez     Patient Name: Yesi Benson  : 1955  MRN: [id number]  Today's Date: 2022      Visit Date: 2022    Visit Dx:    ICD-10-CM ICD-9-CM   1. Status post total right knee replacement  Z96.651 V43.65       Patient Active Problem List   Diagnosis   • Adenomatous polyp of colon   • Hyperlipidemia   • Hypothyroidism   • Status post total right knee replacement   • Arthrofibrosis of knee joint, right        Past Medical History:   Diagnosis Date   • Colon polyp    • Disease of thyroid gland    • Knee swelling    • Tear of meniscus of knee         Past Surgical History:   Procedure Laterality Date   • COLONOSCOPY N/A 2019    Procedure: COLONOSCOPY POLYPECTOMY;  Surgeon: Jaime Alaniz MD;  Location: [facility];  Service: Gastroenterology   • DENTAL PROCEDURE Left    • JOINT MANIPULATION Right 2020    Procedure: KNEE MANIPULATION;  Surgeon: Elmer Fuentes MD;  Location: [facility] OR;  Service: Orthopedics;  Laterality: Right;   • JOINT REPLACEMENT     • SALPINGO OOPHORECTOMY Left     as an infant    • TOTAL KNEE ARTHROPLASTY Right 10/14/2020    Procedure: TOTAL KNEE ARTHROPLASTY AND ALL ASSOCIATED PROCEDURES;  Surgeon: Elmer Fuentes MD;  Location: [facility];  Service: Orthopedics;  Laterality: Right;                        PT Assessment/Plan     Row Name 22 8960          PT Assessment    Assessment Comments Pt able to achieve full extension post stretch. Continue to progress quad strengthening.  -KM        PT Plan    PT Plan Comments Continue per POC  -KM           User Key  (r) = Recorded By, (t) = Taken By, (c) = Cosigned By    Initials Name Provider Type    Gladis Callahan PTA Physical Therapist Assistant                   OP Exercises     Row Name 22 4293             Subjective Comments    Subjective Comments Pt states her knee is doing better and has been compliant with HEP  -KM          Exercise 1    Exercise Name 1 Hamstring stretch  -KM      Cueing 1 Verbal;Tactile  -KM      Reps 1 15  -KM      Time 1 10 secs  -KM      Additional Comments Heel Prop x 5 min 3#  -KM         Exercise 2    Exercise Name 2 Passive knee EXT stretch  -KM      Cueing 2 Verbal;Tactile  -KM      Reps 2 10  -KM      Time 2 10 secs  -KM         Exercise 3    Exercise Name 3 QS with Russian stim  -KM      Cueing 3 Verbal;Tactile  -KM      Time 3 10 min 10/10  -KM         Exercise 4    Exercise Name 4 SAQ  -KM      Cueing 4 Verbal;Tactile  -KM      Reps 4 50  -KM      Time 4 1#  -KM         Exercise 5    Exercise Name 5 LAQ  -KM      Cueing 5 Verbal;Tactile;Demo  -KM      Reps 5 50  -KM      Time 5 1#  -KM         Exercise 6    Exercise Name 6 TKE vs theraband  -KM      Cueing 6 Verbal;Tactile;Demo  -KM      Reps 6 50  -KM      Time 6 gold  -KM            User Key  (r) = Recorded By, (t) = Taken By, (c) = Cosigned By    Initials Name Provider Type    Gladis Callahan PTA Physical Therapist Assistant                                                Time Calculation:   Start Time: 0955  Stop Time: 1033  Time Calculation (min): 38 min  Therapy Charges for Today     Code Description Service Date Service Provider Modifiers Qty    23128621550 HC PT THER PROC EA 15 MIN 11/22/2022 Gladis Garzon PTA GP 1                    Gladis Garzon PTA  11/22/2022

## 2022-11-28 RX ORDER — CYCLOBENZAPRINE HCL 10 MG
TABLET ORAL
Qty: 60 TABLET | Refills: 0 | Status: SHIPPED | OUTPATIENT
Start: 2022-11-28 | End: 2022-12-19

## 2022-11-28 NOTE — TELEPHONE ENCOUNTER
Rx Refill Note  Requested Prescriptions     Pending Prescriptions Disp Refills    cyclobenzaprine (FLEXERIL) 10 MG tablet [Pharmacy Med Name: CYCLOBENZAPRINE 10 MG TABLET] 60 tablet 0     Sig: TAKE 1 TABLET BY MOUTH 3 TIMES A DAY AS NEEDED FOR MUSCLE SPASMS.      Last office visit with prescribing clinician: 10/24/2022      Next office visit with prescribing clinician: 10/23/2023   Last Filled: 11.07.2022    DX:Status post total right knee replacement       Daphney Paris MA  11/28/22, 10:29 EST    {TIP  Encounters:    {TIP  Please add Last Relevant Lab Date if appropriate:  {TIP  Is Refill Pharmacy correct?:

## 2022-11-29 ENCOUNTER — TELEPHONE (OUTPATIENT)
Dept: ORTHOPEDIC SURGERY | Facility: CLINIC | Age: 67
End: 2022-11-29

## 2022-11-29 NOTE — TELEPHONE ENCOUNTER
Patient calling about pre dental abx- I did advise as per Dr. Fuentes pre dental abx only needed 2 years post op.    DX:status post right total knee arthroplasty with subsequent manipulation, 10/14/2020

## 2022-11-30 ENCOUNTER — HOSPITAL ENCOUNTER (OUTPATIENT)
Dept: PHYSICAL THERAPY | Facility: HOSPITAL | Age: 67
Setting detail: THERAPIES SERIES
Discharge: HOME | End: 2022-11-30

## 2022-11-30 DIAGNOSIS — Z96.651 STATUS POST TOTAL RIGHT KNEE REPLACEMENT: Primary | ICD-10-CM

## 2022-11-30 PROCEDURE — 97110 THERAPEUTIC EXERCISES: CPT

## 2022-12-08 ENCOUNTER — HOSPITAL ENCOUNTER (OUTPATIENT)
Dept: PHYSICAL THERAPY | Facility: HOSPITAL | Age: 67
Setting detail: THERAPIES SERIES
Discharge: HOME | End: 2022-12-08

## 2022-12-08 DIAGNOSIS — Z96.651 STATUS POST TOTAL RIGHT KNEE REPLACEMENT: Primary | ICD-10-CM

## 2022-12-08 PROCEDURE — 97110 THERAPEUTIC EXERCISES: CPT

## 2022-12-08 NOTE — THERAPY TREATMENT NOTE
Outpatient Physical Therapy Ortho Treatment Note   Jenny Chávez     Patient Name: Yesi Benson  : 1955  MRN: [id number]  Today's Date: 2022      Visit Date: 2022    Visit Dx:    ICD-10-CM ICD-9-CM   1. Status post total right knee replacement  Z96.651 V43.65       Patient Active Problem List   Diagnosis   • Adenomatous polyp of colon   • Hyperlipidemia   • Hypothyroidism   • Status post total right knee replacement   • Arthrofibrosis of knee joint, right        Past Medical History:   Diagnosis Date   • Colon polyp    • Disease of thyroid gland    • Knee swelling    • Tear of meniscus of knee         Past Surgical History:   Procedure Laterality Date   • COLONOSCOPY N/A 2019    Procedure: COLONOSCOPY POLYPECTOMY;  Surgeon: Jaime Alaniz MD;  Location: [facility];  Service: Gastroenterology   • DENTAL PROCEDURE Left    • JOINT MANIPULATION Right 2020    Procedure: KNEE MANIPULATION;  Surgeon: Elmer Fuentes MD;  Location: [facility] OR;  Service: Orthopedics;  Laterality: Right;   • JOINT REPLACEMENT     • SALPINGO OOPHORECTOMY Left     as an infant    • TOTAL KNEE ARTHROPLASTY Right 10/14/2020    Procedure: TOTAL KNEE ARTHROPLASTY AND ALL ASSOCIATED PROCEDURES;  Surgeon: Elmer Fuentes MD;  Location: [facility];  Service: Orthopedics;  Laterality: Right;        PT Ortho     Row Name 22 1000       Right Lower Ext    Rt Knee Extension/Flexion AROM 0-2-123 degrees post stretch  -KM          User Key  (r) = Recorded By, (t) = Taken By, (c) = Cosigned By    Initials Name Provider Type     Gladis Garzon PTA Physical Therapist Assistant                             PT Assessment/Plan     Row Name 22 1000          PT Assessment    Assessment Comments Continue to push ROM primarily extension; pt measures improved range post stretch.  -KM        PT Plan    PT Plan Comments Continue per POC  -KM           User Key  (r) = Recorded By, (t) = Taken By, (c)  = Cosigned By    Initials Name Provider Type    Gladis Callahan PTA Physical Therapist Assistant                   OP Exercises     Row Name 12/08/22 1000             Subjective Comments    Subjective Comments Pt states her knee feels a little stiff, but doing well.  -KM         Exercise 1    Exercise Name 1 Heel Slide  -KM      Time 1 5 min  -KM         Exercise 2    Exercise Name 2 Hamstring Stretch  -KM      Reps 2 10  -KM      Time 2 10 sec hold  -KM         Exercise 3    Exercise Name 3 Heel Prop  -KM      Time 3 5 min- 5#  -KM         Exercise 4    Exercise Name 4 PLH  -KM      Time 4 5 min - 3 #  -KM         Exercise 5    Exercise Name 5 QS with Russian Stim  -KM      Reps 5 10/10  -KM      Time 5 10 min  -KM         Exercise 6    Exercise Name 6 SAQ  -KM      Reps 6 50  -KM      Time 6 1#  -KM         Exercise 7    Exercise Name 7 LAQ  -KM      Reps 7 50  -KM      Time 7 1#  -KM         Exercise 8    Exercise Name 8 TKE  -KM      Reps 8 50  -KM      Time 8 Gold  -KM            User Key  (r) = Recorded By, (t) = Taken By, (c) = Cosigned By    Initials Name Provider Type    Gladis Callahan PTA Physical Therapist Assistant                                                Time Calculation:   Start Time: 1000  Stop Time: 1048  Time Calculation (min): 48 min  Therapy Charges for Today     Code Description Service Date Service Provider Modifiers Qty    37731136379 HC PT THER PROC EA 15 MIN 12/8/2022 Gladis Garzon PTA GP 2                    Gladis Garzon PTA  12/8/2022

## 2022-12-14 ENCOUNTER — HOSPITAL ENCOUNTER (OUTPATIENT)
Dept: PHYSICAL THERAPY | Facility: HOSPITAL | Age: 67
Setting detail: THERAPIES SERIES
Discharge: HOME | End: 2022-12-14

## 2022-12-14 DIAGNOSIS — Z96.651 STATUS POST TOTAL RIGHT KNEE REPLACEMENT: Primary | ICD-10-CM

## 2022-12-14 PROCEDURE — 97110 THERAPEUTIC EXERCISES: CPT

## 2022-12-14 NOTE — THERAPY TREATMENT NOTE
Outpatient Physical Therapy Ortho Treatment Note   Jenny Chávez     Patient Name: Yesi Benson  : 1955  MRN: [id number]  Today's Date: 2022      Visit Date: 2022    Visit Dx:    ICD-10-CM ICD-9-CM   1. Status post total right knee replacement  Z96.651 V43.65       Patient Active Problem List   Diagnosis   • Adenomatous polyp of colon   • Hyperlipidemia   • Hypothyroidism   • Status post total right knee replacement   • Arthrofibrosis of knee joint, right        Past Medical History:   Diagnosis Date   • Colon polyp    • Disease of thyroid gland    • Knee swelling    • Tear of meniscus of knee         Past Surgical History:   Procedure Laterality Date   • COLONOSCOPY N/A 2019    Procedure: COLONOSCOPY POLYPECTOMY;  Surgeon: Jaime Alaniz MD;  Location: [facility];  Service: Gastroenterology   • DENTAL PROCEDURE Left    • JOINT MANIPULATION Right 2020    Procedure: KNEE MANIPULATION;  Surgeon: Elmer Fuentes MD;  Location: [facility];  Service: Orthopedics;  Laterality: Right;   • JOINT REPLACEMENT     • SALPINGO OOPHORECTOMY Left     as an infant    • TOTAL KNEE ARTHROPLASTY Right 10/14/2020    Procedure: TOTAL KNEE ARTHROPLASTY AND ALL ASSOCIATED PROCEDURES;  Surgeon: Elmer Fuentes MD;  Location: [facility];  Service: Orthopedics;  Laterality: Right;                        PT Assessment/Plan     Row Name 22 2600          PT Assessment    Assessment Comments Pt demonstrates improved mobility and progressing with extension ROM.  -KM        PT Plan    PT Plan Comments With pts progress, plan to continue 1x/week  -KM           User Key  (r) = Recorded By, (t) = Taken By, (c) = Cosigned By    Initials Name Provider Type    Gladis Callahan PTA Physical Therapist Assistant                   OP Exercises     Row Name 22 3491             Subjective Comments    Subjective Comments Pt states she does experience mild stiffness but doing well  overall.  -KM         Exercise 1    Exercise Name 1 Heel Slide  -KM      Time 1 5 min  -KM         Exercise 2    Exercise Name 2 Hamstring Stretch  -KM      Reps 2 10  -KM      Time 2 10 sec hold  -KM         Exercise 3    Exercise Name 3 Heel Prop  -KM      Time 3 5 min- 5#  -KM         Exercise 4    Exercise Name 4 PLH  -KM      Time 4 5 min - 5 #  -KM         Exercise 5    Exercise Name 5 QS with Russian Stim  -KM      Reps 5 10/10  -KM      Time 5 10 min  -KM         Exercise 6    Exercise Name 6 SAQ  -KM      Reps 6 50  -KM      Time 6 1#  -KM         Exercise 7    Exercise Name 7 LAQ  -KM      Reps 7 50  -KM      Time 7 1#  -KM         Exercise 8    Exercise Name 8 TKE  -KM      Reps 8 50  -KM      Time 8 Gold  -KM            User Key  (r) = Recorded By, (t) = Taken By, (c) = Cosigned By    Initials Name Provider Type    Gladis Callahan PTA Physical Therapist Assistant                                                Time Calculation:   Start Time: 0940  Stop Time: 1032  Time Calculation (min): 52 min  Therapy Charges for Today     Code Description Service Date Service Provider Modifiers Qty    35737333018 HC PT THER PROC EA 15 MIN 12/14/2022 Gladis Garzon PTA GP 1                    Gladis Garzon PTA  12/14/2022

## 2022-12-19 RX ORDER — CYCLOBENZAPRINE HCL 10 MG
TABLET ORAL
Qty: 60 TABLET | Refills: 0 | Status: SHIPPED | OUTPATIENT
Start: 2022-12-19 | End: 2023-01-10

## 2022-12-19 NOTE — TELEPHONE ENCOUNTER
Rx Refill Note  Requested Prescriptions     Pending Prescriptions Disp Refills    cyclobenzaprine (FLEXERIL) 10 MG tablet [Pharmacy Med Name: CYCLOBENZAPRINE 10 MG TABLET] 60 tablet 0     Sig: TAKE 1 TABLET BY MOUTH 3 TIMES A DAY AS NEEDED FOR MUSCLE SPASMS.      Last office visit with prescribing clinician: 10/24/2022      Next office visit with prescribing clinician: 10/23/2023   Last Filled:11.28.2023    DX:S/p right total knee arthroplasty DOS 10/14/2020  Status post right total knee manipulation-12/21/2020      Daphney Paris MA  12/19/22, 09:15 EST    {TIP  Encounters:    {TIP  Please add Last Relevant Lab Date if appropriate:  {TIP  Is Refill Pharmacy correct?:

## 2022-12-22 ENCOUNTER — HOSPITAL ENCOUNTER (OUTPATIENT)
Dept: PHYSICAL THERAPY | Facility: HOSPITAL | Age: 67
Setting detail: THERAPIES SERIES
Discharge: HOME | End: 2022-12-22

## 2022-12-22 DIAGNOSIS — Z96.651 STATUS POST TOTAL RIGHT KNEE REPLACEMENT: Primary | ICD-10-CM

## 2022-12-22 PROCEDURE — 97110 THERAPEUTIC EXERCISES: CPT

## 2022-12-22 NOTE — THERAPY TREATMENT NOTE
Outpatient Physical Therapy Ortho Treatment Note   Jenny Chávez     Patient Name: Yesi Benson  : 1955  MRN: [id number]  Today's Date: 2022      Visit Date: 2022    Visit Dx:    ICD-10-CM ICD-9-CM   1. Status post total right knee replacement  Z96.651 V43.65       Patient Active Problem List   Diagnosis   • Adenomatous polyp of colon   • Hyperlipidemia   • Hypothyroidism   • Status post total right knee replacement   • Arthrofibrosis of knee joint, right        Past Medical History:   Diagnosis Date   • Colon polyp    • Disease of thyroid gland    • Knee swelling    • Tear of meniscus of knee         Past Surgical History:   Procedure Laterality Date   • COLONOSCOPY N/A 2019    Procedure: COLONOSCOPY POLYPECTOMY;  Surgeon: Jaime Alaniz MD;  Location: [facility];  Service: Gastroenterology   • DENTAL PROCEDURE Left    • JOINT MANIPULATION Right 2020    Procedure: KNEE MANIPULATION;  Surgeon: Elmer Fuentes MD;  Location: [facility];  Service: Orthopedics;  Laterality: Right;   • JOINT REPLACEMENT     • SALPINGO OOPHORECTOMY Left     as an infant    • TOTAL KNEE ARTHROPLASTY Right 10/14/2020    Procedure: TOTAL KNEE ARTHROPLASTY AND ALL ASSOCIATED PROCEDURES;  Surgeon: Elmer Fuentes MD;  Location: [facility];  Service: Orthopedics;  Laterality: Right;                        PT Assessment/Plan     Row Name 22 5289          PT Assessment    Assessment Comments Improved extension range post stretch. Pt is progressing well wit prescribed exercises  -KM        PT Plan    PT Plan Comments With pts improved status, plan to see pt in 2 weeks. Pt to continue with HEP  -KM           User Key  (r) = Recorded By, (t) = Taken By, (c) = Cosigned By    Initials Name Provider Type    Gladis Callahan PTA Physical Therapist Assistant                   OP Exercises     Row Name 22 5151             Subjective Comments    Subjective Comments Pt states she  feels her knee is doing well.  -KM         Exercise 1    Exercise Name 1 Heel Slide  -KM      Time 1 5 min  -KM         Exercise 2    Exercise Name 2 Hamstring Stretch  -KM      Reps 2 10  -KM      Time 2 10 sec hold  -KM         Exercise 3    Exercise Name 3 Heel Prop  -KM      Time 3 5 min- 6#  -KM         Exercise 4    Exercise Name 4 PLH  -KM      Time 4 5 min - 6 #  -KM         Exercise 5    Exercise Name 5 QS with Russian Stim  -KM      Reps 5 10/10  -KM      Time 5 10 min  -KM         Exercise 6    Exercise Name 6 SAQ  -KM      Reps 6 50  -KM      Time 6 1#  -KM         Exercise 7    Exercise Name 7 LAQ  -KM      Reps 7 50  -KM      Time 7 1#  -KM         Exercise 8    Exercise Name 8 TKE  -KM      Reps 8 50  -KM      Time 8 Gold  -KM            User Key  (r) = Recorded By, (t) = Taken By, (c) = Cosigned By    Initials Name Provider Type    Gladis Callahan PTA Physical Therapist Assistant                                                Time Calculation:   Start Time: 0953  Stop Time: 1040  Time Calculation (min): 47 min  Therapy Charges for Today     Code Description Service Date Service Provider Modifiers Qty    70344553312  PT THER PROC EA 15 MIN 12/22/2022 Gladis Garzon PTA GP 1                    Gladis Garzon PTA  12/22/2022

## 2023-01-02 ENCOUNTER — APPOINTMENT (OUTPATIENT)
Dept: PHYSICAL THERAPY | Facility: HOSPITAL | Age: 68
End: 2023-01-02
Payer: MEDICARE

## 2023-01-04 ENCOUNTER — HOSPITAL ENCOUNTER (OUTPATIENT)
Dept: PHYSICAL THERAPY | Facility: HOSPITAL | Age: 68
Setting detail: THERAPIES SERIES
Discharge: HOME | End: 2023-01-04
Payer: MEDICARE

## 2023-01-04 DIAGNOSIS — Z96.651 STATUS POST TOTAL RIGHT KNEE REPLACEMENT: Primary | ICD-10-CM

## 2023-01-04 PROCEDURE — 97110 THERAPEUTIC EXERCISES: CPT

## 2023-01-04 NOTE — THERAPY TREATMENT NOTE
Outpatient Physical Therapy Ortho Treatment Note   Jenny Chávez     Patient Name: Yesi Benson  : 1955  MRN: [id number]  Today's Date: 2023      Visit Date: 2023    Visit Dx:    ICD-10-CM ICD-9-CM   1. Status post total right knee replacement  Z96.651 V43.65       Patient Active Problem List   Diagnosis   • Adenomatous polyp of colon   • Hyperlipidemia   • Hypothyroidism   • Status post total right knee replacement   • Arthrofibrosis of knee joint, right        Past Medical History:   Diagnosis Date   • Colon polyp    • Disease of thyroid gland    • Knee swelling    • Tear of meniscus of knee         Past Surgical History:   Procedure Laterality Date   • COLONOSCOPY N/A 2019    Procedure: COLONOSCOPY POLYPECTOMY;  Surgeon: Jaime Alaniz MD;  Location: [facility];  Service: Gastroenterology   • DENTAL PROCEDURE Left    • JOINT MANIPULATION Right 2020    Procedure: KNEE MANIPULATION;  Surgeon: Elmer Fuentes MD;  [facility];  Service: Orthopedics;  Laterality: Right;   • JOINT REPLACEMENT     • SALPINGO OOPHORECTOMY Left     as an infant    • TOTAL KNEE ARTHROPLASTY Right 10/14/2020    Procedure: TOTAL KNEE ARTHROPLASTY AND ALL ASSOCIATED PROCEDURES;  Surgeon: Elmer Fuentes MD;  Location: [facility];  Service: Orthopedics;  Laterality: Right;        PT Ortho     Row Name 23 1015       Right Lower Ext    Rt Knee Extension/Flexion AROM lacking 1 degree extension post stretch  lacking 3 degrees extension pre stretch  -KM          User Key  (r) = Recorded By, (t) = Taken By, (c) = Cosigned By    Initials Name Provider Type    Gladis Callahan, PTA Physical Therapist Assistant                             PT Assessment/Plan     Row Name 23 1015          PT Assessment    Assessment Comments Pt measures improved extension ROM prior to stretching. Improved gait and function noted  -KM        PT Plan    PT Plan Comments With pts improved  status, plan to follow up in 2 weeks. Pt to continue with HEP  -KM           User Key  (r) = Recorded By, (t) = Taken By, (c) = Cosigned By    Initials Name Provider Type    Gladis Callahan PTA Physical Therapist Assistant                   OP Exercises     Row Name 01/04/23 1015             Subjective Comments    Subjective Comments Pt reports mild swelling with increased activity, but doing well overall.  -KM         Exercise 1    Exercise Name 1 Heel Slide  -KM      Time 1 5 min  -KM         Exercise 2    Exercise Name 2 Hamstring Stretch  -KM      Reps 2 10  -KM      Time 2 10 sec hold  -KM         Exercise 3    Exercise Name 3 Heel Prop  -KM      Time 3 5 min- 6#  -KM         Exercise 4    Exercise Name 4 PLH  -KM      Time 4 5 min - 6 #  -KM         Exercise 5    Exercise Name 5 QS with Russian Stim  -KM      Reps 5 10/10  -KM      Time 5 10 min  -KM         Exercise 6    Exercise Name 6 SAQ  -KM      Reps 6 50  -KM      Time 6 1#  -KM         Exercise 7    Exercise Name 7 LAQ  -KM      Reps 7 50  -KM      Time 7 1#  -KM         Exercise 8    Exercise Name 8 TKE  -KM      Reps 8 50  -KM      Time 8 Gold  -KM            User Key  (r) = Recorded By, (t) = Taken By, (c) = Cosigned By    Initials Name Provider Type    Gladis Callahan PTA Physical Therapist Assistant                                                Time Calculation:   Start Time: 1015  Stop Time: 1100  Time Calculation (min): 45 min  Therapy Charges for Today     Code Description Service Date Service Provider Modifiers Qty    53370604102 HC PT THER PROC EA 15 MIN 1/4/2023 Gladis Garzon PTA GP 1                    Gladis Garzon PTA  1/4/2023

## 2023-01-05 ENCOUNTER — TRANSCRIBE ORDERS (OUTPATIENT)
Dept: ADMINISTRATIVE | Facility: HOSPITAL | Age: 68
End: 2023-01-05
Payer: MEDICARE

## 2023-01-05 ENCOUNTER — TRANSCRIBE ORDERS (OUTPATIENT)
Dept: MRI IMAGING | Facility: HOSPITAL | Age: 68
End: 2023-01-05
Payer: MEDICARE

## 2023-01-05 DIAGNOSIS — R41.841 COGNITIVE COMMUNICATION DEFICIT: Primary | ICD-10-CM

## 2023-01-10 RX ORDER — CYCLOBENZAPRINE HCL 10 MG
TABLET ORAL
Qty: 60 TABLET | Refills: 0 | Status: SHIPPED | OUTPATIENT
Start: 2023-01-10 | End: 2023-02-07

## 2023-01-19 ENCOUNTER — HOSPITAL ENCOUNTER (OUTPATIENT)
Dept: PHYSICAL THERAPY | Facility: HOSPITAL | Age: 68
Setting detail: THERAPIES SERIES
Discharge: HOME | End: 2023-01-19
Payer: MEDICARE

## 2023-01-19 DIAGNOSIS — Z96.651 STATUS POST TOTAL RIGHT KNEE REPLACEMENT: Primary | ICD-10-CM

## 2023-01-19 NOTE — THERAPY TREATMENT NOTE
Outpatient Physical Therapy Ortho Treatment Note   Jenny Chávez     Patient Name: Yesi Benson  : 1955  MRN: [id number]  Today's Date: 2023      Visit Date: 2023    Visit Dx:    ICD-10-CM ICD-9-CM   1. Status post total right knee replacement  Z96.651 V43.65       Patient Active Problem List   Diagnosis   • Adenomatous polyp of colon   • Hyperlipidemia   • Hypothyroidism   • Status post total right knee replacement   • Arthrofibrosis of knee joint, right        Past Medical History:   Diagnosis Date   • Colon polyp    • Disease of thyroid gland    • Knee swelling    • Tear of meniscus of knee         Past Surgical History:   Procedure Laterality Date   • COLONOSCOPY N/A 2019    Procedure: COLONOSCOPY POLYPECTOMY;  Surgeon: Jaime Alaniz MD;  Location: [facility];  Service: Gastroenterology   • DENTAL PROCEDURE Left    • JOINT MANIPULATION Right 2020    Procedure: KNEE MANIPULATION;  Surgeon: Elmer Fuentes MD;  Location: [facility];  Service: Orthopedics;  Laterality: Right;   • JOINT REPLACEMENT     • SALPINGO OOPHORECTOMY Left     as an infant    • TOTAL KNEE ARTHROPLASTY Right 10/14/2020    Procedure: TOTAL KNEE ARTHROPLASTY AND ALL ASSOCIATED PROCEDURES;  Surgeon: Elmer Fuentes MD;  Location: [facility];  Service: Orthopedics;  Laterality: Right;                        PT Assessment/Plan     Row Name 23 5426          PT Assessment    Assessment Comments Tightness noted prior to treatment, but did improve post stretch. KT tape applied for edema.  -KM        PT Plan    PT Plan Comments Plan to see pt next week to assess ROM; Pt to continue with HEP as able.  -KM           User Key  (r) = Recorded By, (t) = Taken By, (c) = Cosigned By    Initials Name Provider Type    Gladis Callahan PTA Physical Therapist Assistant                   OP Exercises     Row Name 23 6316             Subjective Comments    Subjective Comments Pt states she  has experienced increased tightness and swelling (R) knee. States she has not been able to walk as much.  -KM         Exercise 1    Exercise Name 1 Heel Slide  -KM      Time 1 5 min  -KM         Exercise 2    Exercise Name 2 Hamstring Stretch  -KM      Reps 2 10  -KM      Time 2 10 sec hold  -KM         Exercise 3    Exercise Name 3 Heel Prop  -KM      Time 3 5 min- 6#  -KM         Exercise 4    Exercise Name 4 PLH  -KM      Time 4 5 min - 6 #  -KM         Exercise 5    Exercise Name 5 QS with Russian Stim  -KM      Reps 5 10/10  -KM      Time 5 10 min  -KM         Exercise 6    Exercise Name 6 SAQ  -KM      Reps 6 50  -KM      Time 6 1#  -KM         Exercise 7    Exercise Name 7 LAQ  -KM      Reps 7 50  -KM      Time 7 1#  -KM         Exercise 8    Exercise Name 8 TKE  -KM      Reps 8 50  -KM      Time 8 Gold  -KM            User Key  (r) = Recorded By, (t) = Taken By, (c) = Cosigned By    Initials Name Provider Type    Gladis Callahan PTA Physical Therapist Assistant                                                Time Calculation:   Start Time: 0950  Stop Time: 1044  Time Calculation (min): 54 min                Gladis Garzon PTA  1/19/2023

## 2023-01-25 ENCOUNTER — APPOINTMENT (OUTPATIENT)
Dept: PHYSICAL THERAPY | Facility: HOSPITAL | Age: 68
End: 2023-01-25
Payer: MEDICARE

## 2023-01-26 ENCOUNTER — HOSPITAL ENCOUNTER (OUTPATIENT)
Dept: MRI IMAGING | Facility: HOSPITAL | Age: 68
Discharge: HOME | End: 2023-01-26
Admitting: INTERNAL MEDICINE
Payer: MEDICARE

## 2023-01-26 DIAGNOSIS — R41.841 COGNITIVE COMMUNICATION DEFICIT: ICD-10-CM

## 2023-01-26 PROCEDURE — 70551 MRI BRAIN STEM W/O DYE: CPT

## 2023-02-02 ENCOUNTER — HOSPITAL ENCOUNTER (OUTPATIENT)
Dept: PHYSICAL THERAPY | Facility: HOSPITAL | Age: 68
Setting detail: THERAPIES SERIES
Discharge: HOME | End: 2023-02-02
Payer: MEDICARE

## 2023-02-02 DIAGNOSIS — Z96.651 STATUS POST TOTAL RIGHT KNEE REPLACEMENT: Primary | ICD-10-CM

## 2023-02-02 PROCEDURE — 97110 THERAPEUTIC EXERCISES: CPT

## 2023-02-02 NOTE — THERAPY TREATMENT NOTE
Outpatient Physical Therapy Ortho Treatment Note   Jenny Chávez     Patient Name: Yesi Benson  : 1955  MRN: [id number]  Today's Date: 2023      Visit Date: 2023    Visit Dx:    ICD-10-CM ICD-9-CM   1. Status post total right knee replacement  Z96.651 V43.65       Patient Active Problem List   Diagnosis   • Adenomatous polyp of colon   • Hyperlipidemia   • Hypothyroidism   • Status post total right knee replacement   • Arthrofibrosis of knee joint, right        Past Medical History:   Diagnosis Date   • Colon polyp    • Disease of thyroid gland    • Knee swelling    • Tear of meniscus of knee         Past Surgical History:   Procedure Laterality Date   • COLONOSCOPY N/A 2019    Procedure: COLONOSCOPY POLYPECTOMY;  Surgeon: Jaime Alaniz MD;  Location: [facility];  Service: Gastroenterology   • DENTAL PROCEDURE Left    • JOINT MANIPULATION Right 2020    Procedure: KNEE MANIPULATION;  Surgeon: Elmer Fuentes MD;  Location: [facility];  Service: Orthopedics;  Laterality: Right;   • JOINT REPLACEMENT     • SALPINGO OOPHORECTOMY Left     as an infant    • TOTAL KNEE ARTHROPLASTY Right 10/14/2020    Procedure: TOTAL KNEE ARTHROPLASTY AND ALL ASSOCIATED PROCEDURES;  Surgeon: Elmer Fuentes MD;  Location: [facility];  Service: Orthopedics;  Laterality: Right;        PT Ortho     Row Name 23       Right Lower Ext    Rt Knee Extension/Flexion AROM 0-1-121 degrees post stretch  0-3-115 degrees pre stretch  -KM          User Key  (r) = Recorded By, (t) = Taken By, (c) = Cosigned By    Initials Name Provider Type    Gladis Callahan PTA Physical Therapist Assistant                             PT Assessment/Plan     Row Name 23          PT Assessment    Assessment Comments Incorporated additional stretching and pt measures improved ROM.  -KM        PT Plan    PT Plan Comments Continue per POC  -KM           User Key  (r) = Recorded By, (t) =  Taken By, (c) = Cosigned By    Initials Name Provider Type    Gladis Callahan PTA Physical Therapist Assistant                   OP Exercises     Row Name 02/02/23 0925             Subjective Comments    Subjective Comments Pt states her knee feels tight and feels like it cataches in the front.  -KM         Exercise 1    Exercise Name 1 Heel Slide/Wall Slide  -KM      Time 1 5 min each  -KM      Additional Comments Passive flexion.10x10  -KM         Exercise 2    Exercise Name 2 Hamstring Stretch  -KM      Reps 2 10  -KM      Time 2 10 sec hold  -KM         Exercise 3    Exercise Name 3 Heel Prop  -KM      Time 3 5 min- 7#  -KM         Exercise 4    Exercise Name 4 PLH  -KM      Time 4 5 min - 7 #  -KM         Exercise 5    Exercise Name 5 QS with Russian Stim  -KM      Reps 5 10/10  -KM      Time 5 10 min  -KM         Exercise 6    Exercise Name 6 SAQ  -KM      Reps 6 50  -KM      Time 6 1#  -KM         Exercise 7    Exercise Name 7 LAQ  -KM      Reps 7 50  -KM      Time 7 1#  -KM         Exercise 8    Exercise Name 8 TKE  -KM      Reps 8 50  -KM      Time 8 Gold  -KM            User Key  (r) = Recorded By, (t) = Taken By, (c) = Cosigned By    Initials Name Provider Type    Gladis Callahan PTA Physical Therapist Assistant                                                Time Calculation:   Start Time: 0925  Stop Time: 1020  Time Calculation (min): 55 min  Therapy Charges for Today     Code Description Service Date Service Provider Modifiers Qty    64869482714  PT THER PROC EA 15 MIN 2/2/2023 Gladis Garzon PTA GP 2                    Gladis Garzon PTA  2/2/2023

## 2023-02-07 RX ORDER — CYCLOBENZAPRINE HCL 10 MG
TABLET ORAL
Qty: 60 TABLET | Refills: 0 | Status: SHIPPED | OUTPATIENT
Start: 2023-02-07 | End: 2023-03-08

## 2023-02-07 NOTE — TELEPHONE ENCOUNTER
Rx Refill Note  Requested Prescriptions     Pending Prescriptions Disp Refills    cyclobenzaprine (FLEXERIL) 10 MG tablet [Pharmacy Med Name: CYCLOBENZAPRINE 10 MG TABLET] 60 tablet 0     Sig: TAKE 1 TABLET BY MOUTH 3 TIMES A DAY AS NEEDED FOR MUSCLE SPASMS.      Last office visit with prescribing clinician: 9/27/2022      Next office visit with prescribing clinician: Visit date not found   Last Filled:01.10.2023    DX:  status post right total knee arthroplasty with subsequent manipulation, 10/14/2020    Daphney Paris MA  02/07/23, 09:22 EST    {TIP  Encounters:    {TIP  Please add Last Relevant Lab Date if appropriate:  {TIP  Is Refill Pharmacy correct?:

## 2023-02-08 ENCOUNTER — APPOINTMENT (OUTPATIENT)
Dept: PHYSICAL THERAPY | Facility: HOSPITAL | Age: 68
End: 2023-02-08
Payer: MEDICARE

## 2023-02-21 ENCOUNTER — HOSPITAL ENCOUNTER (OUTPATIENT)
Dept: PHYSICAL THERAPY | Facility: HOSPITAL | Age: 68
Setting detail: THERAPIES SERIES
Discharge: HOME | End: 2023-02-21
Payer: MEDICARE

## 2023-02-21 DIAGNOSIS — Z96.651 STATUS POST TOTAL RIGHT KNEE REPLACEMENT: Primary | ICD-10-CM

## 2023-02-21 PROCEDURE — 97164 PT RE-EVAL EST PLAN CARE: CPT | Performed by: PHYSICAL THERAPIST

## 2023-02-21 NOTE — THERAPY RE-EVALUATION
Outpatient Physical Therapy Ortho Re-Evaluation   Jenny Chávez     Patient Name: Yesi Benson  : 1955  MRN: [id number]  Today's Date: 2023      Visit Date: 2023    Patient Active Problem List   Diagnosis   • Adenomatous polyp of colon   • Hyperlipidemia   • Hypothyroidism   • Status post total right knee replacement   • Arthrofibrosis of knee joint, right        Past Medical History:   Diagnosis Date   • Colon polyp    • Disease of thyroid gland    • Knee swelling    • Tear of meniscus of knee         Past Surgical History:   Procedure Laterality Date   • COLONOSCOPY N/A 2019    Procedure: COLONOSCOPY POLYPECTOMY;  Surgeon: Jaime Alaniz MD;  Location: [facility] OR;  Service: Gastroenterology   • DENTAL PROCEDURE Left    • JOINT MANIPULATION Right 2020    Procedure: KNEE MANIPULATION;  Surgeon: Elmer Fuentes MD;  Location: [facility] OR;  Service: Orthopedics;  Laterality: Right;   • JOINT REPLACEMENT     • SALPINGO OOPHORECTOMY Left     as an infant    • TOTAL KNEE ARTHROPLASTY Right 10/14/2020    Procedure: TOTAL KNEE ARTHROPLASTY AND ALL ASSOCIATED PROCEDURES;  Surgeon: Elmer Fuentes MD;  Location: [facility] OR;  Service: Orthopedics;  Laterality: Right;       Visit Dx:     ICD-10-CM ICD-9-CM   1. Status post total right knee replacement  Z96.651 V43.65              PT Ortho     Row Name 23 0800       Subjective Comments    Subjective Comments Pt states her knee feels stiff and tight, but she denies any significant pain.  -GC       Patellar Accessory Motions    Superior glide Right:;WNL  -GC    Inferior glide Right:;WNL  -GC    Medial glide Right:;WNL  -GC    Lateral glide Right:;WNL  -GC       Right Lower Ext    Rt Knee Extension/Flexion AROM 0-6-108 degrees prior to stretching and 0-1-116 degrees after stretching  -GC       MMT (Manual Muscle Testing)    General MMT Comments TKE < 12 degrees  -GC       MMT Right Lower Ext    Rt Hip Flexion MMT,  Gross Movement (4+/5) good plus  -GC    Rt Hip Extension MMT, Gross Movement (4+/5) good plus  -GC    Rt Hip ABduction MMT, Gross Movement (5/5) normal  -GC    Rt Hip ADduction MMT, Gross Movement (4+/5) good plus  -GC    Rt Knee Extension MMT, Gross Movement (4+/5) good plus  -GC    Rt Knee Flexion MMT, Gross Movement (4+/5) good plus  -GC    Rt Ankle Plantarflexion MMT, Gross Movement (5/5) normal  -GC    Rt Ankle Dorsiflexion MMT, Gross Movement (5/5) normal  -GC       Lower Extremity Flexibility    Hamstrings Right:;Mildly limited  -GC    Quadriceps Right:;Mildly limited  -GC       Gait/Stairs (Locomotion)    Comment, (Gait/Stairs) Pt ambulates with antalgic gait right LE lacking TKE at heel strike  -          User Key  (r) = Recorded By, (t) = Taken By, (c) = Cosigned By    Initials Name Provider Type     Ryan Kong, PT Physical Therapist                                   PT OP Goals     Row Name 02/21/23 0800          PT Short Term Goals    STG Date to Achieve 11/24/22  -     STG 1 Decrease right knee pain to 3-4/10 with activity.  -     STG 1 Progress Met  -     STG 2 Increase right knee AROM to 0-5-125 degrees with testing.  -     STG 2 Progress Partially Met;Ongoing;Progressing  -     STG 3 Increase right LE stength to at least 4+/5 all planes with testing.  -     STG 3 Progress Met  -     STG 4 Pt will be independent with her HEP issued by this therapist.  -     STG 4 Progress Met  -        Long Term Goals    LTG Date to Achieve 12/08/22  -     LTG 1 Decrease right knee pain to 0-1/10 with activity.  -     LTG 1 Progress Partially Met  -     LTG 2 Increase right knee AROM to 0-125 degrees with testing.  -     LTG 3 Increase right LE stength to 5/5 all planes with testing.  -     LTG 3 Progress Partially Met;Ongoing;Progressing  -     LTG 4 Pt will ambulate normally on levels and stairs without assistive device.  -     LTG 4 Progress Partially Met;Ongoing;Progressing   -GC     LTG 5 Pt will be independent with all ADLs and have a LEFS score > 65.  -GC           User Key  (r) = Recorded By, (t) = Taken By, (c) = Cosigned By    Initials Name Provider Type    Ryan Catalan, PT Physical Therapist                 PT Assessment/Plan     Row Name 02/21/23 0800          PT Assessment    Assessment Comments Pt is still lacking some knee ROM, but she showed good progress from start of session to end of session. Feel she would still benefit from skilled therapy services.  -GC        PT Plan    PT Plan Comments Pt is to continue her HEP 2x daily. Will continue therapy 2x weekly.  -GC           User Key  (r) = Recorded By, (t) = Taken By, (c) = Cosigned By    Initials Name Provider Type    Ryan Catalan, PT Physical Therapist                   OP Exercises     Row Name 02/21/23 0800             Subjective Comments    Subjective Comments Pt states her knee feels stiff and tight, but she denies any significant pain.  -GC         Exercise 1    Exercise Name 1 Heel Slide/Wall Slide  -GC      Time 1 5 min each  -GC         Exercise 2    Exercise Name 2 Hamstring Stretch  -GC      Reps 2 10  -GC      Time 2 10 sec hold  -GC         Exercise 3    Exercise Name 3 Heel Prop  -GC      Time 3 5 min- 7#  -GC         Exercise 4    Exercise Name 4 PLH  -GC      Time 4 5 min - 7 #  -GC         Exercise 5    Exercise Name 5 QS with Russian Stim  -GC      Reps 5 10/10  -GC      Time 5 10 min  -GC         Exercise 6    Exercise Name 6 SAQ  -GC      Reps 6 30  -GC      Time 6 2#  -GC         Exercise 7    Exercise Name 7 LAQ  -GC      Reps 7 30  -GC      Time 7 2#  -GC         Exercise 8    Exercise Name 8 TKE  -GC      Reps 8 30  -GC      Time 8 Gold  -GC            User Key  (r) = Recorded By, (t) = Taken By, (c) = Cosigned By    Initials Name Provider Type    Ryan Catalan PT Physical Therapist                                        Time Calculation:     Start Time: 0800  Stop Time: 0857  Time  Calculation (min): 57 min     Therapy Charges for Today     Code Description Service Date Service Provider Modifiers Qty    96516005293 HC PT RE-EVAL ESTABLISHED PLAN 2 2/21/2023 Ryan Kong, PT GP 1                    Ryan Kong, PT  2/21/2023

## 2023-02-23 ENCOUNTER — HOSPITAL ENCOUNTER (OUTPATIENT)
Dept: PHYSICAL THERAPY | Facility: HOSPITAL | Age: 68
Setting detail: THERAPIES SERIES
Discharge: HOME | End: 2023-02-23
Payer: MEDICARE

## 2023-02-23 DIAGNOSIS — Z96.651 STATUS POST TOTAL RIGHT KNEE REPLACEMENT: Primary | ICD-10-CM

## 2023-02-23 PROCEDURE — 97110 THERAPEUTIC EXERCISES: CPT

## 2023-02-23 NOTE — THERAPY TREATMENT NOTE
Outpatient Physical Therapy Ortho Treatment Note   Jenny Chávez     Patient Name: Yesi Benson  : 1955  MRN: [id number]  Today's Date: 2023      Visit Date: 2023    Visit Dx:    ICD-10-CM ICD-9-CM   1. Status post total right knee replacement  Z96.651 V43.65       Patient Active Problem List   Diagnosis   • Adenomatous polyp of colon   • Hyperlipidemia   • Hypothyroidism   • Status post total right knee replacement   • Arthrofibrosis of knee joint, right        Past Medical History:   Diagnosis Date   • Colon polyp    • Disease of thyroid gland    • Knee swelling    • Tear of meniscus of knee         Past Surgical History:   Procedure Laterality Date   • COLONOSCOPY N/A 2019    Procedure: COLONOSCOPY POLYPECTOMY;  Surgeon: Jaime Alaniz MD;  Location: [facility];  Service: Gastroenterology   • DENTAL PROCEDURE Left    • JOINT MANIPULATION Right 2020    Procedure: KNEE MANIPULATION;  Surgeon: Elmer Fuentes MD;  Location: [facility];  Service: Orthopedics;  Laterality: Right;   • JOINT REPLACEMENT     • SALPINGO OOPHORECTOMY Left     as an infant    • TOTAL KNEE ARTHROPLASTY Right 10/14/2020    Procedure: TOTAL KNEE ARTHROPLASTY AND ALL ASSOCIATED PROCEDURES;  Surgeon: Elmer Fuentes MD;  Location: [facility];  Service: Orthopedics;  Laterality: Right;                        PT Assessment/Plan     Row Name 23 1100          PT Assessment    Assessment Comments Pt presents with decreased swelling and improved ROM post stretch.  -KM        PT Plan    PT Plan Comments Continue per POC  -KM           User Key  (r) = Recorded By, (t) = Taken By, (c) = Cosigned By    Initials Name Provider Type    Gladis Callahan PTA Physical Therapist Assistant                   OP Exercises     Row Name 23 1100             Subjective Comments    Subjective Comments Pt reports improved motion after previous session.  -KM         Exercise 1    Exercise Name 1 Heel  Slides  -KM      Time 1 5 min  -KM         Exercise 2    Exercise Name 2 Wall Slides  -KM      Time 2 5 min  -KM         Exercise 3    Exercise Name 3 Passive Knee Flexion  -KM      Reps 3 10x10 sec hold  -KM         Exercise 4    Exercise Name 4 Hamstring Stretch  -KM      Reps 4 10x10  -KM         Exercise 5    Exercise Name 5 Prone Leg Hang  -KM      Time 5 5 min 7#  -KM      Additional Comments Heel Prop 5 min 7#  -KM         Exercise 6    Exercise Name 6 QS with Russian Stim  -KM      Reps 6 10/10  -KM      Time 6 10 min  -KM         Exercise 7    Exercise Name 7 SAQ  -KM      Reps 7 40  -KM      Time 7 2#  -KM         Exercise 8    Exercise Name 8 LAQ  -KM      Reps 8 40  -KM      Time 8 2#  -KM         Exercise 9    Exercise Name 9 TKE  -KM      Reps 9 40  -KM      Time 9 Gold  -KM            User Key  (r) = Recorded By, (t) = Taken By, (c) = Cosigned By    Initials Name Provider Type    Gladis Callahan PTA Physical Therapist Assistant                                                Time Calculation:   Start Time: 1100  Stop Time: 1200  Time Calculation (min): 60 min  Therapy Charges for Today     Code Description Service Date Service Provider Modifiers Qty    76356147599 HC PT THER PROC EA 15 MIN 2/23/2023 Gladis Garzon PTA GP 1                    Gladis Garzon PTA  2/23/2023

## 2023-02-28 ENCOUNTER — HOSPITAL ENCOUNTER (OUTPATIENT)
Dept: PHYSICAL THERAPY | Facility: HOSPITAL | Age: 68
Setting detail: THERAPIES SERIES
Discharge: HOME | End: 2023-02-28
Payer: MEDICARE

## 2023-02-28 DIAGNOSIS — Z96.651 STATUS POST TOTAL RIGHT KNEE REPLACEMENT: Primary | ICD-10-CM

## 2023-02-28 PROCEDURE — 97110 THERAPEUTIC EXERCISES: CPT

## 2023-03-03 ENCOUNTER — HOSPITAL ENCOUNTER (OUTPATIENT)
Dept: PHYSICAL THERAPY | Facility: HOSPITAL | Age: 68
Setting detail: THERAPIES SERIES
Discharge: HOME | End: 2023-03-03
Payer: MEDICARE

## 2023-03-03 DIAGNOSIS — Z96.651 STATUS POST TOTAL RIGHT KNEE REPLACEMENT: Primary | ICD-10-CM

## 2023-03-03 PROCEDURE — 97110 THERAPEUTIC EXERCISES: CPT

## 2023-03-03 NOTE — THERAPY TREATMENT NOTE
Outpatient Physical Therapy Ortho Treatment Note   Jenny Chávez     Patient Name: Yesi Benson  : 1955  MRN: [id number]  Today's Date: 3/3/2023      Visit Date: 2023    Visit Dx:    ICD-10-CM ICD-9-CM   1. Status post total right knee replacement  Z96.651 V43.65       Patient Active Problem List   Diagnosis   • Adenomatous polyp of colon   • Hyperlipidemia   • Hypothyroidism   • Status post total right knee replacement   • Arthrofibrosis of knee joint, right        Past Medical History:   Diagnosis Date   • Colon polyp    • Disease of thyroid gland    • Knee swelling    • Tear of meniscus of knee         Past Surgical History:   Procedure Laterality Date   • COLONOSCOPY N/A 2019    Procedure: COLONOSCOPY POLYPECTOMY;  Surgeon: Jaime Alaniz MD;  Location: [facility];  Service: Gastroenterology   • DENTAL PROCEDURE Left    • JOINT MANIPULATION Right 2020    Procedure: KNEE MANIPULATION;  Surgeon: Elmer Fuentes MD;  Location: [facility];  Service: Orthopedics;  Laterality: Right;   • JOINT REPLACEMENT     • SALPINGO OOPHORECTOMY Left     as an infant    • TOTAL KNEE ARTHROPLASTY Right 10/14/2020    Procedure: TOTAL KNEE ARTHROPLASTY AND ALL ASSOCIATED PROCEDURES;  Surgeon: Elmer Fuentes MD;  Location: [facility];  Service: Orthopedics;  Laterality: Right;                        PT Assessment/Plan     Row Name 23          PT Assessment    Assessment Comments Pt maintains good AROM post stretch  -KM           User Key  (r) = Recorded By, (t) = Taken By, (c) = Cosigned By    Initials Name Provider Type    Gladis Callahan PTA Physical Therapist Assistant                   OP Exercises     Row Name 23             Subjective Comments    Subjective Comments Pts states her knee is doing fair.  -KM         Exercise 1    Exercise Name 1 Heel Slides  -KM      Time 1 5 min  -KM         Exercise 2    Exercise Name 2 Wall Slides  -KM      Time 2 5  min  -KM         Exercise 3    Exercise Name 3 Passive Knee Flexion  -KM      Reps 3 10x10 sec hold  -KM         Exercise 4    Exercise Name 4 Hamstring Stretch  -KM      Reps 4 10x10  -KM         Exercise 5    Exercise Name 5 Prone Leg Hang  -KM      Time 5 5 min 7#  -KM         Exercise 6    Exercise Name 6 QS with Russian Stim  -KM      Reps 6 10/10  -KM      Time 6 10 min  -KM         Exercise 7    Exercise Name 7 SAQ  -KM      Reps 7 40  -KM      Time 7 2#  -KM         Exercise 8    Exercise Name 8 LAQ  -KM      Reps 8 40  -KM      Time 8 2#  -KM         Exercise 9    Exercise Name 9 TKE  -KM      Reps 9 40  -KM      Time 9 Gold  -KM            User Key  (r) = Recorded By, (t) = Taken By, (c) = Cosigned By    Initials Name Provider Type    Gladis Callahan PTA Physical Therapist Assistant                                                Time Calculation:   Start Time: 0927  Stop Time: 1020  Time Calculation (min): 53 min  Therapy Charges for Today     Code Description Service Date Service Provider Modifiers Qty    29391102646 HC PT THER PROC EA 15 MIN 3/3/2023 Gladis Garzon PTA GP 2                    Gladis Garzon PTA  3/3/2023

## 2023-03-06 ENCOUNTER — HOSPITAL ENCOUNTER (OUTPATIENT)
Dept: PHYSICAL THERAPY | Facility: HOSPITAL | Age: 68
Setting detail: THERAPIES SERIES
Discharge: HOME | End: 2023-03-06
Payer: MEDICARE

## 2023-03-06 DIAGNOSIS — Z96.651 STATUS POST TOTAL RIGHT KNEE REPLACEMENT: Primary | ICD-10-CM

## 2023-03-06 PROCEDURE — 97110 THERAPEUTIC EXERCISES: CPT

## 2023-03-06 NOTE — THERAPY TREATMENT NOTE
Outpatient Physical Therapy Ortho Treatment Note   Jenny Chávez     Patient Name: Yesi Benson  : 1955  MRN: [id number]  Today's Date: 3/6/2023      Visit Date: 2023    Visit Dx:    ICD-10-CM ICD-9-CM   1. Status post total right knee replacement  Z96.651 V43.65       Patient Active Problem List   Diagnosis   • Adenomatous polyp of colon   • Hyperlipidemia   • Hypothyroidism   • Status post total right knee replacement   • Arthrofibrosis of knee joint, right        Past Medical History:   Diagnosis Date   • Colon polyp    • Disease of thyroid gland    • Knee swelling    • Tear of meniscus of knee         Past Surgical History:   Procedure Laterality Date   • COLONOSCOPY N/A 2019    Procedure: COLONOSCOPY POLYPECTOMY;  Surgeon: Jaime Alaniz MD;  Location: [facility];  Service: Gastroenterology   • DENTAL PROCEDURE Left    • JOINT MANIPULATION Right 2020    Procedure: KNEE MANIPULATION;  Surgeon: Elmer Fuentes MD;  Location: [facility];  Service: Orthopedics;  Laterality: Right;   • JOINT REPLACEMENT     • SALPINGO OOPHORECTOMY Left     as an infant    • TOTAL KNEE ARTHROPLASTY Right 10/14/2020    Procedure: TOTAL KNEE ARTHROPLASTY AND ALL ASSOCIATED PROCEDURES;  Surgeon: Elmer Fuentes MD;  Location: [facility];  Service: Orthopedics;  Laterality: Right;                        PT Assessment/Plan     Row Name 23 0973          PT Assessment    Assessment Comments Pt ambulates with improved gait and progressing well with ther ex.  -KM        PT Plan    PT Plan Comments Continue per POC  -KM           User Key  (r) = Recorded By, (t) = Taken By, (c) = Cosigned By    Initials Name Provider Type    Gladis Callahan PTA Physical Therapist Assistant                   OP Exercises     Row Name 23 0903             Subjective Comments    Subjective Comments Pt states she feels her knee is doing well.  -KM         Exercise 1    Exercise Name 1 Heel Slides   -KM      Time 1 5 min  -KM         Exercise 2    Exercise Name 2 Wall Slides  -KM      Time 2 5 min  -KM         Exercise 3    Exercise Name 3 Passive Knee Flexion  -KM      Reps 3 10x10 sec hold  -KM         Exercise 4    Exercise Name 4 Hamstring Stretch  -KM      Reps 4 10x10  -KM         Exercise 5    Exercise Name 5 Prone Leg Hang  -KM      Time 5 5 min 7#  -KM         Exercise 6    Exercise Name 6 QS with Russian Stim  -KM      Reps 6 10/10  -KM      Time 6 10 min  -KM         Exercise 7    Exercise Name 7 SAQ  -KM      Reps 7 40  -KM      Time 7 2#  -KM         Exercise 8    Exercise Name 8 LAQ  -KM      Reps 8 40  -KM      Time 8 2#  -KM         Exercise 9    Exercise Name 9 TKE  -KM      Reps 9 40  -KM      Time 9 Gold  -KM            User Key  (r) = Recorded By, (t) = Taken By, (c) = Cosigned By    Initials Name Provider Type    Gladis Callahan PTA Physical Therapist Assistant                                                Time Calculation:   Start Time: 0950  Stop Time: 1045  Time Calculation (min): 55 min  Therapy Charges for Today     Code Description Service Date Service Provider Modifiers Qty    59881966486 HC PT THER PROC EA 15 MIN 3/6/2023 Gladis Garzon PTA GP 1                    Gladis Garzon PTA  3/6/2023

## 2023-03-08 RX ORDER — CYCLOBENZAPRINE HCL 10 MG
TABLET ORAL
Qty: 60 TABLET | Refills: 0 | Status: SHIPPED | OUTPATIENT
Start: 2023-03-08 | End: 2023-03-29

## 2023-03-08 NOTE — TELEPHONE ENCOUNTER
Rx Refill Note  Requested Prescriptions     Pending Prescriptions Disp Refills    cyclobenzaprine (FLEXERIL) 10 MG tablet [Pharmacy Med Name: CYCLOBENZAPRINE 10 MG TABLET] 60 tablet 0     Sig: TAKE 1 TABLET BY MOUTH 3 TIMES A DAY AS NEEDED FOR MUSCLE SPASMS.      Last office visit with prescribing clinician: 10/24/2022      Next office visit with prescribing clinician: 10/23/2023   Last Filled:02.07.2023    DX: status post right total knee arthroplasty with subsequent manipulation, 10/14/2020    Daphney Paris MA  03/08/23, 09:10 EST    {TIP  Encounters:    {TIP  Please add Last Relevant Lab Date if appropriate:  {TIP  Is Refill Pharmacy correct?:

## 2023-03-10 ENCOUNTER — HOSPITAL ENCOUNTER (OUTPATIENT)
Dept: PHYSICAL THERAPY | Facility: HOSPITAL | Age: 68
Setting detail: THERAPIES SERIES
Discharge: HOME | End: 2023-03-10
Payer: MEDICARE

## 2023-03-10 DIAGNOSIS — Z96.651 STATUS POST TOTAL RIGHT KNEE REPLACEMENT: Primary | ICD-10-CM

## 2023-03-10 PROCEDURE — 97110 THERAPEUTIC EXERCISES: CPT

## 2023-03-10 NOTE — THERAPY TREATMENT NOTE
Outpatient Physical Therapy Ortho Treatment Note   Jenny Chávez     Patient Name: Yesi Benson  : 1955  MRN: [id number]  Today's Date: 3/10/2023      Visit Date: 03/10/2023    Visit Dx:    ICD-10-CM ICD-9-CM   1. Status post total right knee replacement  Z96.651 V43.65       Patient Active Problem List   Diagnosis   • Adenomatous polyp of colon   • Hyperlipidemia   • Hypothyroidism   • Status post total right knee replacement   • Arthrofibrosis of knee joint, right        Past Medical History:   Diagnosis Date   • Colon polyp    • Disease of thyroid gland    • Knee swelling    • Tear of meniscus of knee         Past Surgical History:   Procedure Laterality Date   • COLONOSCOPY N/A 2019    Procedure: COLONOSCOPY POLYPECTOMY;  Surgeon: Jaime Alaniz MD;  Location: [facility];  Service: Gastroenterology   • DENTAL PROCEDURE Left    • JOINT MANIPULATION Right 2020    Procedure: KNEE MANIPULATION;  Surgeon: Elmer Fuentes MD;  Location: [facility] OR;  Service: Orthopedics;  Laterality: Right;   • JOINT REPLACEMENT     • SALPINGO OOPHORECTOMY Left     as an infant    • TOTAL KNEE ARTHROPLASTY Right 10/14/2020    Procedure: TOTAL KNEE ARTHROPLASTY AND ALL ASSOCIATED PROCEDURES;  Surgeon: Elmer Fuentes MD;  Location: [facility] OR;  Service: Orthopedics;  Laterality: Right;        PT Ortho     Row Name 03/10/23 0912       Subjective Comments    Subjective Comments Pt states her knee seems to be doing better.  -KM       Right Lower Ext    Rt Knee Extension/Flexion AROM 120 degrees  flexion post stretch  0 degrees extension AA  -KM          User Key  (r) = Recorded By, (t) = Taken By, (c) = Cosigned By    Initials Name Provider Type    Gladis Callahan PTA Physical Therapist Assistant                             PT Assessment/Plan     Row Name 03/10/23 0955          PT Assessment    Assessment Comments Pt with improved AROM post stretch and continue to maintain well between  "Per order, patient to receive 1mL of Kenalog (triamcinolone acetonide) 40mg/mL. The area of injection was palpated using the medial fold and the iliac crest as anatomical landmarks. Patient was advised to relax the muscle. The area was cleaned with alcohol and allowed to dry. Using a 25g 1.5" needle, 1mL of Kenalog (triamcinolone acetonide) 40mg/mL was placed intramuscularly into the left upper outer gluteal quadrant. Patient experienced no complications and was discharged in stable condition. Kenalog Lot: [id number] Exp: [id number]    " sessions. Overall decreased swelling noted.  -KM        PT Plan    PT Plan Comments Continue per POC  -KM           User Key  (r) = Recorded By, (t) = Taken By, (c) = Cosigned By    Initials Name Provider Type    Gladis Callahan PTA Physical Therapist Assistant                   OP Exercises     Row Name 03/10/23 0955             Subjective Comments    Subjective Comments Pt states her knee seems to be doing better.  -KM         Exercise 1    Exercise Name 1 Heel Slides  -KM      Time 1 5 min  -KM         Exercise 2    Exercise Name 2 Wall Slides  -KM      Time 2 5 min  -KM         Exercise 3    Exercise Name 3 Passive Knee Flexion  -KM      Reps 3 10x10 sec hold  -KM         Exercise 4    Exercise Name 4 Hamstring Stretch  -KM      Reps 4 10x10  -KM         Exercise 5    Exercise Name 5 Prone Leg Hang  -KM      Time 5 5 min 7#  -KM      Additional Comments Heel Prop 5 min 7#  -KM         Exercise 6    Exercise Name 6 QS with Russian Stim  -KM      Reps 6 10/10  -KM      Time 6 10 min  -KM         Exercise 7    Exercise Name 7 SAQ  -KM      Reps 7 40  -KM      Time 7 2#  -KM         Exercise 8    Exercise Name 8 LAQ  -KM      Reps 8 40  -KM      Time 8 2#  -KM         Exercise 9    Exercise Name 9 TKE  -KM      Reps 9 40  -KM      Time 9 Gold  -KM            User Key  (r) = Recorded By, (t) = Taken By, (c) = Cosigned By    Initials Name Provider Type    Gladis Callahan PTA Physical Therapist Assistant                                                Time Calculation:   Start Time: 0955  Stop Time: 1050  Time Calculation (min): 55 min  Therapy Charges for Today     Code Description Service Date Service Provider Modifiers Qty    73521108765 HC PT THER PROC EA 15 MIN 3/10/2023 Gladis Garzon PTA GP 1                    Gladis Garzon PTA  3/10/2023

## 2023-03-14 ENCOUNTER — HOSPITAL ENCOUNTER (OUTPATIENT)
Dept: PHYSICAL THERAPY | Facility: HOSPITAL | Age: 68
Setting detail: THERAPIES SERIES
Discharge: HOME | End: 2023-03-14
Payer: MEDICARE

## 2023-03-14 DIAGNOSIS — Z96.651 STATUS POST TOTAL RIGHT KNEE REPLACEMENT: Primary | ICD-10-CM

## 2023-03-14 PROCEDURE — 97110 THERAPEUTIC EXERCISES: CPT

## 2023-03-14 NOTE — THERAPY TREATMENT NOTE
Outpatient Physical Therapy Ortho Treatment Note   Jenny Chávez     Patient Name: Yesi Benson  : 1955  MRN: [id number]  Today's Date: 3/14/2023      Visit Date: 2023    Visit Dx:    ICD-10-CM ICD-9-CM   1. Status post total right knee replacement  Z96.651 V43.65       Patient Active Problem List   Diagnosis   • Adenomatous polyp of colon   • Hyperlipidemia   • Hypothyroidism   • Status post total right knee replacement   • Arthrofibrosis of knee joint, right        Past Medical History:   Diagnosis Date   • Colon polyp    • Disease of thyroid gland    • Knee swelling    • Tear of meniscus of knee         Past Surgical History:   Procedure Laterality Date   • COLONOSCOPY N/A 2019    Procedure: COLONOSCOPY POLYPECTOMY;  Surgeon: Jaime Alaniz MD;  Location: [facility];  Service: Gastroenterology   • DENTAL PROCEDURE Left    • JOINT MANIPULATION Right 2020    Procedure: KNEE MANIPULATION;  Surgeon: Elmer Fuentes MD;  Location: [facility];  Service: Orthopedics;  Laterality: Right;   • JOINT REPLACEMENT     • SALPINGO OOPHORECTOMY Left     as an infant    • TOTAL KNEE ARTHROPLASTY Right 10/14/2020    Procedure: TOTAL KNEE ARTHROPLASTY AND ALL ASSOCIATED PROCEDURES;  Surgeon: Elmer Fuentes MD;  Location: [facility];  Service: Orthopedics;  Laterality: Right;                        PT Assessment/Plan     Row Name 23 5451          PT Assessment    Assessment Comments Pt continues to progress well with prescribed exercises and maintains good AROM between visits.  -KM        PT Plan    PT Plan Comments Continue per POC  -KM           User Key  (r) = Recorded By, (t) = Taken By, (c) = Cosigned By    Initials Name Provider Type    Gladis Callahan PTA Physical Therapist Assistant                   OP Exercises     Row Name 23 5621             Subjective Comments    Subjective Comments Pt states her knee is doing well.  -KM         Exercise 1    Exercise  Name 1 Heel Slides  -KM      Time 1 5 min  -KM         Exercise 2    Exercise Name 2 Wall Slides  -KM      Time 2 5 min  -KM         Exercise 3    Exercise Name 3 Passive Knee Flexion  -KM      Reps 3 10x10 sec hold  -KM         Exercise 4    Exercise Name 4 Hamstring Stretch  -KM      Reps 4 10x10  -KM         Exercise 5    Exercise Name 5 Prone Leg Hang  -KM      Time 5 5 min 7#  -KM         Exercise 6    Exercise Name 6 QS with Russian Stim  -KM      Reps 6 10/10  -KM      Time 6 10 min  -KM         Exercise 7    Exercise Name 7 SAQ  -KM      Reps 7 40  -KM      Time 7 3#  -KM         Exercise 8    Exercise Name 8 LAQ  -KM      Reps 8 40  -KM      Time 8 3#  -KM         Exercise 9    Exercise Name 9 TKE  -KM      Reps 9 40  -KM      Time 9 Gold  -KM            User Key  (r) = Recorded By, (t) = Taken By, (c) = Cosigned By    Initials Name Provider Type    Gladis Callahan PTA Physical Therapist Assistant                                                Time Calculation:   Start Time: 0950  Stop Time: 1046  Time Calculation (min): 56 min  Therapy Charges for Today     Code Description Service Date Service Provider Modifiers Qty    84829819283  PT THER PROC EA 15 MIN 3/14/2023 Gladis Garzon PTA GP 1                    Gladis Garzon PTA  3/14/2023

## 2023-03-17 ENCOUNTER — HOSPITAL ENCOUNTER (OUTPATIENT)
Dept: PHYSICAL THERAPY | Facility: HOSPITAL | Age: 68
Setting detail: THERAPIES SERIES
Discharge: HOME | End: 2023-03-17
Payer: MEDICARE

## 2023-03-17 DIAGNOSIS — Z96.651 STATUS POST TOTAL RIGHT KNEE REPLACEMENT: Primary | ICD-10-CM

## 2023-03-17 PROCEDURE — 97110 THERAPEUTIC EXERCISES: CPT

## 2023-03-17 NOTE — THERAPY TREATMENT NOTE
Outpatient Physical Therapy Ortho Treatment Note   Jenny Chávez     Patient Name: Yesi Benson  : 1955  MRN: [id number]  Today's Date: 3/17/2023      Visit Date: 2023    Visit Dx:    ICD-10-CM ICD-9-CM   1. Status post total right knee replacement  Z96.651 V43.65       Patient Active Problem List   Diagnosis   • Adenomatous polyp of colon   • Hyperlipidemia   • Hypothyroidism   • Status post total right knee replacement   • Arthrofibrosis of knee joint, right        Past Medical History:   Diagnosis Date   • Colon polyp    • Disease of thyroid gland    • Knee swelling    • Tear of meniscus of knee         Past Surgical History:   Procedure Laterality Date   • COLONOSCOPY N/A 2019    Procedure: COLONOSCOPY POLYPECTOMY;  Surgeon: Jaime Alaniz MD;  Location: [facility];  Service: Gastroenterology   • DENTAL PROCEDURE Left    • JOINT MANIPULATION Right 2020    Procedure: KNEE MANIPULATION;  Surgeon: Elmer Fuentes MD;  Location: [facility] OR;  Service: Orthopedics;  Laterality: Right;   • JOINT REPLACEMENT     • SALPINGO OOPHORECTOMY Left     as an infant    • TOTAL KNEE ARTHROPLASTY Right 10/14/2020    Procedure: TOTAL KNEE ARTHROPLASTY AND ALL ASSOCIATED PROCEDURES;  Surgeon: Elmer Fuentes MD;  Location: [facility];  Service: Orthopedics;  Laterality: Right;                        PT Assessment/Plan     Row Name 23 9171          PT Assessment    Assessment Comments Pt has made good progress toward goals. Pt maintains good AROM and shows improved functional mobility.  -KM        PT Plan    PT Plan Comments With pts improved status, plan to decrease frequency to 1x/week.  -KM           User Key  (r) = Recorded By, (t) = Taken By, (c) = Cosigned By    Initials Name Provider Type    Gladis Callahan, PTA Physical Therapist Assistant                   OP Exercises     Row Name 23 5526             Subjective Comments    Subjective Comments Pt states her  knee continues to do well.  -KM         Exercise 1    Exercise Name 1 Heel Slides  -KM      Time 1 5 min  -KM         Exercise 2    Exercise Name 2 Wall Slides  -KM      Time 2 5 min  -KM         Exercise 3    Exercise Name 3 Passive Knee Flexion  -KM      Reps 3 10x10 sec hold  -KM         Exercise 4    Exercise Name 4 Hamstring Stretch  -KM      Reps 4 10x10  -KM         Exercise 5    Exercise Name 5 Prone Leg Hang  -KM      Time 5 5 min 7#  -KM         Exercise 6    Exercise Name 6 QS with Russian Stim  -KM      Reps 6 10/10  -KM      Time 6 10 min  -KM         Exercise 7    Exercise Name 7 SAQ  -KM      Reps 7 40  -KM      Time 7 3#  -KM         Exercise 8    Exercise Name 8 LAQ  -KM      Reps 8 40  -KM      Time 8 3#  -KM         Exercise 9    Exercise Name 9 TKE  -KM      Reps 9 40  -KM      Time 9 Gold  -KM            User Key  (r) = Recorded By, (t) = Taken By, (c) = Cosigned By    Initials Name Provider Type    Gladis Callahan PTA Physical Therapist Assistant                                                Time Calculation:   Start Time: 0950  Stop Time: 1030  Time Calculation (min): 40 min  Therapy Charges for Today     Code Description Service Date Service Provider Modifiers Qty    [id number] HC PT THER PROC EA 15 MIN 3/17/2023 Gladis Garzon PTA GP 1                    Gladis Garzon PTA  3/17/2023

## 2023-03-22 ENCOUNTER — HOSPITAL ENCOUNTER (OUTPATIENT)
Dept: PHYSICAL THERAPY | Facility: HOSPITAL | Age: 68
Setting detail: THERAPIES SERIES
Discharge: HOME | End: 2023-03-22
Payer: MEDICARE

## 2023-03-22 DIAGNOSIS — Z96.651 STATUS POST TOTAL RIGHT KNEE REPLACEMENT: Primary | ICD-10-CM

## 2023-03-22 NOTE — THERAPY TREATMENT NOTE
Outpatient Physical Therapy Ortho Treatment Note   Jenny Chávez     Patient Name: Yesi Benson  : 1955  MRN: [id number]  Today's Date: 3/22/2023      Visit Date: 2023    Visit Dx:    ICD-10-CM ICD-9-CM   1. Status post total right knee replacement  Z96.651 V43.65       Patient Active Problem List   Diagnosis   • Adenomatous polyp of colon   • Hyperlipidemia   • Hypothyroidism   • Status post total right knee replacement   • Arthrofibrosis of knee joint, right        Past Medical History:   Diagnosis Date   • Colon polyp    • Disease of thyroid gland    • Knee swelling    • Tear of meniscus of knee         Past Surgical History:   Procedure Laterality Date   • COLONOSCOPY N/A 2019    Procedure: COLONOSCOPY POLYPECTOMY;  Surgeon: Jaime Alaniz MD;  Location: [facility];  Service: Gastroenterology   • DENTAL PROCEDURE Left    • JOINT MANIPULATION Right 2020    Procedure: KNEE MANIPULATION;  Surgeon: Elmer Fuentes MD;  Location: [facility] OR;  Service: Orthopedics;  Laterality: Right;   • JOINT REPLACEMENT     • SALPINGO OOPHORECTOMY Left     as an infant    • TOTAL KNEE ARTHROPLASTY Right 10/14/2020    Procedure: TOTAL KNEE ARTHROPLASTY AND ALL ASSOCIATED PROCEDURES;  Surgeon: Elmer Fuentes MD;  Location: [facility];  Service: Orthopedics;  Laterality: Right;        PT Ortho     Row Name 23 0955       Right Lower Ext    Rt Knee Extension/Flexion AROM 0-120 degrees post stretch  0-2-110 degrees pre-stretch  -KM          User Key  (r) = Recorded By, (t) = Taken By, (c) = Cosigned By    Initials Name Provider Type    Gladis Callahan PTA Physical Therapist Assistant                             PT Assessment/Plan     Row Name 23 0955          PT Assessment    Assessment Comments Pt measures improved AROM pre-stretch and shows improved functional mobility.  -KM        PT Plan    PT Plan Comments Continue per POC  -KM           User Key  (r) = Recorded  By, (t) = Taken By, (c) = Cosigned By    Initials Name Provider Type    Gladis Callahan PTA Physical Therapist Assistant                   OP Exercises     Row Name 03/22/23 0955             Subjective Comments    Subjective Comments Pt states her knee is doing well.  -KM         Exercise 1    Exercise Name 1 Heel Slides  -KM      Time 1 5 min  -KM         Exercise 2    Exercise Name 2 Wall Slides  -KM      Time 2 5 min  -KM         Exercise 3    Exercise Name 3 Passive Knee Flexion  -KM      Reps 3 10x10 sec hold  -KM         Exercise 4    Exercise Name 4 Hamstring Stretch  -KM      Reps 4 10x10  -KM         Exercise 5    Exercise Name 5 Prone Leg Hang  -KM      Time 5 5 min 7#  -KM         Exercise 6    Exercise Name 6 QS with Russian Stim  -KM      Reps 6 10/10  -KM      Time 6 10 min  -KM         Exercise 7    Exercise Name 7 SAQ  -KM      Reps 7 40  -KM      Time 7 3#  -KM         Exercise 8    Exercise Name 8 LAQ  -KM      Reps 8 40  -KM      Time 8 3#  -KM         Exercise 9    Exercise Name 9 TKE  -KM      Reps 9 40  -KM      Time 9 Gold  -KM            User Key  (r) = Recorded By, (t) = Taken By, (c) = Cosigned By    Initials Name Provider Type    Gladis Callahan PTA Physical Therapist Assistant                                                Time Calculation:   Start Time: 0955  Stop Time: 1054  Time Calculation (min): 59 min                Gladis Garzon PTA  3/22/2023

## 2023-03-29 ENCOUNTER — HOSPITAL ENCOUNTER (OUTPATIENT)
Dept: PHYSICAL THERAPY | Facility: HOSPITAL | Age: 68
Setting detail: THERAPIES SERIES
Discharge: HOME | End: 2023-03-29
Payer: MEDICARE

## 2023-03-29 DIAGNOSIS — Z96.651 STATUS POST TOTAL RIGHT KNEE REPLACEMENT: Primary | ICD-10-CM

## 2023-03-29 RX ORDER — CYCLOBENZAPRINE HCL 10 MG
TABLET ORAL
Qty: 60 TABLET | Refills: 0 | Status: SHIPPED | OUTPATIENT
Start: 2023-03-29

## 2023-03-29 NOTE — THERAPY TREATMENT NOTE
Outpatient Physical Therapy Ortho Treatment Note   Jenny Chávez     Patient Name: Yesi Benson  : 1955  MRN: [id number]  Today's Date: 3/29/2023      Visit Date: 2023    Visit Dx:    ICD-10-CM ICD-9-CM   1. Status post total right knee replacement  Z96.651 V43.65       Patient Active Problem List   Diagnosis   • Adenomatous polyp of colon   • Hyperlipidemia   • Hypothyroidism   • Status post total right knee replacement   • Arthrofibrosis of knee joint, right        Past Medical History:   Diagnosis Date   • Colon polyp    • Disease of thyroid gland    • Knee swelling    • Tear of meniscus of knee         Past Surgical History:   Procedure Laterality Date   • COLONOSCOPY N/A 2019    Procedure: COLONOSCOPY POLYPECTOMY;  Surgeon: Jaime Alaniz MD;  Location: [facility];  Service: Gastroenterology   • DENTAL PROCEDURE Left    • JOINT MANIPULATION Right 2020    Procedure: KNEE MANIPULATION;  Surgeon: Elmer Fuentes MD;  Location: [facility];  Service: Orthopedics;  Laterality: Right;   • JOINT REPLACEMENT     • SALPINGO OOPHORECTOMY Left     as an infant    • TOTAL KNEE ARTHROPLASTY Right 10/14/2020    Procedure: TOTAL KNEE ARTHROPLASTY AND ALL ASSOCIATED PROCEDURES;  Surgeon: Elmer Fuentes MD;  Location: [facility];  Service: Orthopedics;  Laterality: Right;                        PT Assessment/Plan     Row Name 23          PT Assessment    Assessment Comments Pt ambulates with improved gait and continues to maintain good AROM.  -KM        PT Plan    PT Plan Comments Continue per POC  -KM           User Key  (r) = Recorded By, (t) = Taken By, (c) = Cosigned By    Initials Name Provider Type    Gladis Callahan PTA Physical Therapist Assistant                   OP Exercises     Row Name 23 6742             Subjective Comments    Subjective Comments Pt states her knee is a little stiff, but doing well overall.  -KM         Exercise 1     Exercise Name 1 Heel Slides  -KM      Time 1 5 min  -KM         Exercise 2    Exercise Name 2 Wall Slides  -KM      Time 2 5 min  -KM         Exercise 3    Exercise Name 3 Passive Knee Flexion  -KM      Reps 3 10x10 sec hold  -KM         Exercise 4    Exercise Name 4 Hamstring Stretch  -KM      Reps 4 10x10  -KM         Exercise 5    Exercise Name 5 Prone Leg Hang  -KM      Time 5 5 min 7#  -KM         Exercise 6    Exercise Name 6 QS with Russian Stim  -KM      Reps 6 10/10  -KM      Time 6 10 min  -KM         Exercise 7    Exercise Name 7 SAQ  -KM      Reps 7 40  -KM      Time 7 3#  -KM         Exercise 8    Exercise Name 8 LAQ  -KM      Reps 8 40  -KM      Time 8 3#  -KM         Exercise 9    Exercise Name 9 TKE  -KM      Reps 9 40  -KM      Time 9 Gold  -KM            User Key  (r) = Recorded By, (t) = Taken By, (c) = Cosigned By    Initials Name Provider Type    Gladis Callahan PTA Physical Therapist Assistant                                                Time Calculation:   Start Time: 0943  Stop Time: 1040  Time Calculation (min): 57 min                Gladis Garzon PTA  3/29/2023

## 2023-04-03 ENCOUNTER — APPOINTMENT (OUTPATIENT)
Dept: WOMENS IMAGING | Facility: HOSPITAL | Age: 68
End: 2023-04-03
Payer: MEDICARE

## 2023-04-03 PROCEDURE — 77067 SCR MAMMO BI INCL CAD: CPT | Performed by: RADIOLOGY

## 2023-04-03 PROCEDURE — 77063 BREAST TOMOSYNTHESIS BI: CPT | Performed by: RADIOLOGY

## 2023-04-12 ENCOUNTER — HOSPITAL ENCOUNTER (OUTPATIENT)
Dept: PHYSICAL THERAPY | Facility: HOSPITAL | Age: 68
Setting detail: THERAPIES SERIES
Discharge: HOME | End: 2023-04-12
Payer: MEDICARE

## 2023-04-12 DIAGNOSIS — Z96.651 STATUS POST TOTAL RIGHT KNEE REPLACEMENT: Primary | ICD-10-CM

## 2023-04-12 NOTE — THERAPY TREATMENT NOTE
Outpatient Physical Therapy Ortho Treatment Note   Jenny Chávez     Patient Name: Yesi Benson  : 1955  MRN: [id number]  Today's Date: 2023      Visit Date: 2023    Visit Dx:    ICD-10-CM ICD-9-CM   1. Status post total right knee replacement  Z96.651 V43.65       Patient Active Problem List   Diagnosis   • Adenomatous polyp of colon   • Hyperlipidemia   • Hypothyroidism   • Status post total right knee replacement   • Arthrofibrosis of knee joint, right        Past Medical History:   Diagnosis Date   • Colon polyp    • Disease of thyroid gland    • Knee swelling    • Tear of meniscus of knee         Past Surgical History:   Procedure Laterality Date   • COLONOSCOPY N/A 2019    Procedure: COLONOSCOPY POLYPECTOMY;  Surgeon: Jaime Alaniz MD;  Location: [facility];  Service: Gastroenterology   • DENTAL PROCEDURE Left    • JOINT MANIPULATION Right 2020    Procedure: KNEE MANIPULATION;  Surgeon: Elmer Fuentes MD;  Location: [facility];  Service: Orthopedics;  Laterality: Right;   • JOINT REPLACEMENT     • SALPINGO OOPHORECTOMY Left     as an infant    • TOTAL KNEE ARTHROPLASTY Right 10/14/2020    Procedure: TOTAL KNEE ARTHROPLASTY AND ALL ASSOCIATED PROCEDURES;  Surgeon: Elmer Fuentes MD;  Location: [facility];  Service: Orthopedics;  Laterality: Right;                        PT Assessment/Plan     Row Name 23 0094          PT Assessment    Assessment Comments Pt continues to progress well with ROM and shows improved functional mobility  -KM        PT Plan    PT Plan Comments Will f/u in 2 weeks. Pt to continue with HEP  -KM           User Key  (r) = Recorded By, (t) = Taken By, (c) = Cosigned By    Initials Name Provider Type    Gladis Callahan PTA Physical Therapist Assistant                   OP Exercises     Row Name 23 9560             Subjective Comments    Subjective Comments Pt states her knee is doing well and has noticed decreased  swelling; she does experience occassional tightness.  -KM         Exercise 1    Exercise Name 1 Heel Slides  -KM      Time 1 5 min  -KM         Exercise 2    Exercise Name 2 Wall Slides  -KM      Time 2 5 min  -KM         Exercise 3    Exercise Name 3 Passive Knee Flexion  -KM      Reps 3 10x10 sec hold  -KM         Exercise 4    Exercise Name 4 Hamstring Stretch  -KM      Reps 4 10x10  -KM         Exercise 5    Exercise Name 5 Prone Leg Hang  -KM      Time 5 5 min 7#  -KM         Exercise 6    Exercise Name 6 QS with Russian Stim  -KM      Reps 6 10/10  -KM      Time 6 10 min  -KM         Exercise 7    Exercise Name 7 SAQ  -KM      Reps 7 40  -KM      Time 7 4#  -KM         Exercise 8    Exercise Name 8 LAQ  -KM      Reps 8 40  -KM      Time 8 4#  -KM         Exercise 9    Exercise Name 9 TKE  -KM      Reps 9 40  -KM      Time 9 Gold  -KM            User Key  (r) = Recorded By, (t) = Taken By, (c) = Cosigned By    Initials Name Provider Type    Gladis Callahan PTA Physical Therapist Assistant                                                Time Calculation:   Start Time: 0945  Stop Time: 1035  Time Calculation (min): 50 min                Gladis Garzon PTA  4/12/2023

## 2023-04-25 ENCOUNTER — HOSPITAL ENCOUNTER (OUTPATIENT)
Dept: PHYSICAL THERAPY | Facility: HOSPITAL | Age: 68
Setting detail: THERAPIES SERIES
Discharge: HOME | End: 2023-04-25
Payer: MEDICARE

## 2023-04-25 DIAGNOSIS — Z96.651 STATUS POST TOTAL RIGHT KNEE REPLACEMENT: Primary | ICD-10-CM

## 2023-04-25 RX ORDER — CYCLOBENZAPRINE HCL 10 MG
TABLET ORAL
Qty: 60 TABLET | Refills: 0 | Status: SHIPPED | OUTPATIENT
Start: 2023-04-25

## 2023-04-25 NOTE — TELEPHONE ENCOUNTER
Rx Refill Note  Requested Prescriptions     Pending Prescriptions Disp Refills    cyclobenzaprine (FLEXERIL) 10 MG tablet [Pharmacy Med Name: CYCLOBENZAPRINE 10 MG TABLET] 60 tablet 0     Sig: TAKE 1 TABLET BY MOUTH 3 TIMES A DAY AS NEEDED FOR MUSCLE SPASMS.      Last office visit with prescribing clinician: 9/27/2022      Next office visit with prescribing clinician: Visit date not found   Last Filled: 03.29.2023    DX:status post right total knee arthroplasty with subsequent manipulation, 10/14/2020    Daphney Paris MA  04/25/23, 11:37 EDT    {TIP  Encounters:    {TIP  Please add Last Relevant Lab Date if appropriate:  {TIP  Is Refill Pharmacy correct?:  c

## 2023-04-26 NOTE — THERAPY TREATMENT NOTE
Outpatient Physical Therapy Ortho Treatment Note   Jenny Chávez     Patient Name: Yesi Benson  : 1955  MRN: [id number]  Today's Date: 2023      Visit Date: 2023    Visit Dx:    ICD-10-CM ICD-9-CM   1. Status post total right knee replacement  Z96.651 V43.65       Patient Active Problem List   Diagnosis   • Adenomatous polyp of colon   • Hyperlipidemia   • Hypothyroidism   • Status post total right knee replacement   • Arthrofibrosis of knee joint, right        Past Medical History:   Diagnosis Date   • Colon polyp    • Disease of thyroid gland    • Knee swelling    • Tear of meniscus of knee         Past Surgical History:   Procedure Laterality Date   • COLONOSCOPY N/A 2019    Procedure: COLONOSCOPY POLYPECTOMY;  Surgeon: Jaime Alaniz MD;  Location: [facility];  Service: Gastroenterology   • DENTAL PROCEDURE Left    • JOINT MANIPULATION Right 2020    Procedure: KNEE MANIPULATION;  Surgeon: Elmer Fuentes MD;  Location: [facility];  Service: Orthopedics;  Laterality: Right;   • JOINT REPLACEMENT     • SALPINGO OOPHORECTOMY Left     as an infant    • TOTAL KNEE ARTHROPLASTY Right 10/14/2020    Procedure: TOTAL KNEE ARTHROPLASTY AND ALL ASSOCIATED PROCEDURES;  Surgeon: Elmer Fuentes MD;  Location: [facility];  Service: Orthopedics;  Laterality: Right;                        PT Assessment/Plan     Row Name 23 2298          PT Assessment    Assessment Comments Pt with improved ROM post stretch and tolerates ther ex well. Advised pt to try compression sleeve with increased activity to manage swelling.  -KM        PT Plan    PT Plan Comments Pt to continue with HEP. Continue per POC  -KM           User Key  (r) = Recorded By, (t) = Taken By, (c) = Cosigned By    Initials Name Provider Type    Gladis Callahan PTA Physical Therapist Assistant                   OP Exercises     Row Name 23 0575             Subjective Comments    Subjective  Comments Pt states her knee feels tight; states she will experience occassional swelling with no known cause.  -KM         Exercise 1    Exercise Name 1 Heel Slides  -KM      Time 1 5 min  -KM         Exercise 2    Exercise Name 2 Wall Slides  -KM      Time 2 5 min  -KM         Exercise 3    Exercise Name 3 Passive Knee Flexion  -KM      Reps 3 10x10 sec hold  -KM         Exercise 4    Exercise Name 4 Hamstring Stretch  -KM      Reps 4 10x10  -KM         Exercise 5    Exercise Name 5 Prone Leg Hang  -KM      Time 5 5 min 7#  -KM         Exercise 6    Exercise Name 6 QS with Russian Stim  -KM      Reps 6 10/10  -KM      Time 6 10 min  -KM         Exercise 7    Exercise Name 7 SAQ  -KM      Reps 7 40  -KM      Time 7 4#  -KM         Exercise 8    Exercise Name 8 LAQ  -KM      Reps 8 40  -KM      Time 8 4#  -KM         Exercise 9    Exercise Name 9 TKE  -KM      Reps 9 40  -KM      Time 9 Gold  -KM            User Key  (r) = Recorded By, (t) = Taken By, (c) = Cosigned By    Initials Name Provider Type    Gladis Callahan PTA Physical Therapist Assistant                                                Time Calculation:   Start Time: 0950  Stop Time: 1040  Time Calculation (min): 50 min                Gladis Garzon PTA  4/26/2023

## 2023-05-09 ENCOUNTER — HOSPITAL ENCOUNTER (OUTPATIENT)
Dept: PHYSICAL THERAPY | Facility: HOSPITAL | Age: 68
Setting detail: THERAPIES SERIES
Discharge: HOME | End: 2023-05-09
Payer: MEDICARE

## 2023-05-09 DIAGNOSIS — Z96.651 STATUS POST TOTAL RIGHT KNEE REPLACEMENT: Primary | ICD-10-CM

## 2023-05-09 NOTE — THERAPY TREATMENT NOTE
Outpatient Physical Therapy Ortho Treatment Note   Jenny Chávez     Patient Name: Yesi Benson  : 1955  MRN: [id number]  Today's Date: 2023      Visit Date: 2023    Visit Dx:    ICD-10-CM ICD-9-CM   1. Status post total right knee replacement  Z96.651 V43.65       Patient Active Problem List   Diagnosis   • Adenomatous polyp of colon   • Hyperlipidemia   • Hypothyroidism   • Status post total right knee replacement   • Arthrofibrosis of knee joint, right        Past Medical History:   Diagnosis Date   • Colon polyp    • Disease of thyroid gland    • Knee swelling    • Tear of meniscus of knee         Past Surgical History:   Procedure Laterality Date   • COLONOSCOPY N/A 2019    Procedure: COLONOSCOPY POLYPECTOMY;  Surgeon: Jaime Alaniz MD;  Location: [facility];  Service: Gastroenterology   • DENTAL PROCEDURE Left    • JOINT MANIPULATION Right 2020    Procedure: KNEE MANIPULATION;  Surgeon: Elmer Fuentes MD;  Location: [facility] OR;  Service: Orthopedics;  Laterality: Right;   • JOINT REPLACEMENT     • SALPINGO OOPHORECTOMY Left     as an infant    • TOTAL KNEE ARTHROPLASTY Right 10/14/2020    Procedure: TOTAL KNEE ARTHROPLASTY AND ALL ASSOCIATED PROCEDURES;  Surgeon: Elmer Fuentes MD;  Location: [facility];  Service: Orthopedics;  Laterality: Right;        PT Ortho     Row Name 23 1000       Right Lower Ext    Rt Knee Extension/Flexion AROM 0-120 degrees post stretch  -KM    Rt Knee Extension/Flexion PROM 0-125 degrees  -KM          User Key  (r) = Recorded By, (t) = Taken By, (c) = Cosigned By    Initials Name Provider Type    Gladis Callahan, PTA Physical Therapist Assistant                             PT Assessment/Plan     Row Name 23 1000          PT Assessment    Assessment Comments Pt ambulates with improved gait and continues to maintain good AROM. Decreased swelling noted and continue to progress ther ex.  -KM        PT Plan    PT  Plan Comments Plan to see pt one additional visit if ROM maintains well. Pt to continue with HEP  -KM           User Key  (r) = Recorded By, (t) = Taken By, (c) = Cosigned By    Initials Name Provider Type    Gladis Callahan PTA Physical Therapist Assistant                   OP Exercises     Row Name 05/09/23 1000             Subjective Comments    Subjective Comments Pt states she feels her knee is doing better. States she will experience swelling on and off.  -KM         Exercise 1    Exercise Name 1 Heel Slides  -KM      Time 1 5 min  -KM         Exercise 2    Exercise Name 2 Wall Slides  -KM      Time 2 5 min  -KM         Exercise 3    Exercise Name 3 Passive Knee Flexion  -KM      Reps 3 10x10 sec hold  -KM         Exercise 4    Exercise Name 4 Hamstring Stretch  -KM      Reps 4 10x10  -KM         Exercise 5    Exercise Name 5 Prone Leg Hang  -KM      Time 5 5 min 7#  -KM         Exercise 6    Exercise Name 6 QS with Russian Stim  -KM      Reps 6 10/10  -KM      Time 6 10 min  -KM         Exercise 7    Exercise Name 7 SAQ  -KM      Reps 7 40  -KM      Time 7 4#  -KM         Exercise 8    Exercise Name 8 LAQ  -KM      Reps 8 40  -KM      Time 8 4#  -KM         Exercise 9    Exercise Name 9 TKE  -KM      Reps 9 40  -KM      Time 9 Gold  -KM            User Key  (r) = Recorded By, (t) = Taken By, (c) = Cosigned By    Initials Name Provider Type    Gladis Callahan PTA Physical Therapist Assistant                                                Time Calculation:   Start Time: 1000  Stop Time: 1055  Time Calculation (min): 55 min                Gladis Garzon PTA  5/9/2023

## 2023-05-16 RX ORDER — CYCLOBENZAPRINE HCL 10 MG
TABLET ORAL
Qty: 60 TABLET | Refills: 0 | Status: SHIPPED | OUTPATIENT
Start: 2023-05-16

## 2023-05-16 NOTE — TELEPHONE ENCOUNTER
Rx Refill Note  Requested Prescriptions     Pending Prescriptions Disp Refills    cyclobenzaprine (FLEXERIL) 10 MG tablet [Pharmacy Med Name: CYCLOBENZAPRINE 10 MG TABLET] 60 tablet 0     Sig: TAKE 1 TABLET BY MOUTH 3 TIMES A DAY AS NEEDED FOR MUSCLE SPASMS.      Last office visit with prescribing clinician: 9/27/2022      Next office visit with prescribing clinician: Visit date not found   Last Filled: 04.25.2023    DX: status post right total knee arthroplasty with subsequent manipulation, 10/14/2020    Daphney Paris MA  05/16/23, 12:20 EDT    {TIP  Encounters:    {TIP  Please add Last Relevant Lab Date if appropriate:  {TIP  Is Refill Pharmacy correct?:

## 2023-05-24 ENCOUNTER — HOSPITAL ENCOUNTER (OUTPATIENT)
Dept: PHYSICAL THERAPY | Facility: HOSPITAL | Age: 68
Setting detail: THERAPIES SERIES
Discharge: HOME | End: 2023-05-24
Payer: MEDICARE

## 2023-05-24 DIAGNOSIS — Z96.651 STATUS POST TOTAL RIGHT KNEE REPLACEMENT: Primary | ICD-10-CM

## 2023-05-24 NOTE — THERAPY TREATMENT NOTE
Outpatient Physical Therapy Ortho Treatment Note   Jenny Chávez     Patient Name: Yesi Benson  : 1955  MRN: [id number]  Today's Date: 2023      Visit Date: 2023    Visit Dx:    ICD-10-CM ICD-9-CM   1. Status post total right knee replacement  Z96.651 V43.65       Patient Active Problem List   Diagnosis   • Adenomatous polyp of colon   • Hyperlipidemia   • Hypothyroidism   • Status post total right knee replacement   • Arthrofibrosis of knee joint, right        Past Medical History:   Diagnosis Date   • Colon polyp    • Disease of thyroid gland    • Knee swelling    • Tear of meniscus of knee         Past Surgical History:   Procedure Laterality Date   • COLONOSCOPY N/A 2019    Procedure: COLONOSCOPY POLYPECTOMY;  Surgeon: Jaime Alaniz MD;  Location: [facility];  Service: Gastroenterology   • DENTAL PROCEDURE Left    • JOINT MANIPULATION Right 2020    Procedure: KNEE MANIPULATION;  Surgeon: Elmer Fuentes MD;  Location: [facility] OR;  Service: Orthopedics;  Laterality: Right;   • JOINT REPLACEMENT     • SALPINGO OOPHORECTOMY Left     as an infant    • TOTAL KNEE ARTHROPLASTY Right 10/14/2020    Procedure: TOTAL KNEE ARTHROPLASTY AND ALL ASSOCIATED PROCEDURES;  Surgeon: Elmer Fuentes MD;  Location: [facility];  Service: Orthopedics;  Laterality: Right;        PT Ortho     Row Name 23 0942       Right Lower Ext    Rt Knee Extension/Flexion AROM 0-120 degrees post stretch  -KM          User Key  (r) = Recorded By, (t) = Taken By, (c) = Cosigned By    Initials Name Provider Type     Gladis Garzon PTA Physical Therapist Assistant                             PT Assessment/Plan     Row Name 23 0942          PT Assessment    Assessment Comments Pt has maintained good ROM and measures 0-120 degrees post stretch. Pt does shows improved strength and function.  -KM        PT Plan    PT Plan Comments With pts improved status, plan to transition to  HEP.  -KM           User Key  (r) = Recorded By, (t) = Taken By, (c) = Cosigned By    Initials Name Provider Type    Gladis Callahan PTA Physical Therapist Assistant                   OP Exercises     Row Name 05/24/23 0942             Subjective Comments    Subjective Comments Pt states she will have her good and bad moments, but overall doing well.  -KM         Exercise 1    Exercise Name 1 Heel Slides  -KM      Time 1 5 min  -KM         Exercise 2    Exercise Name 2 Wall Slides  -KM      Time 2 5 min  -KM         Exercise 3    Exercise Name 3 Passive Knee Flexion  -KM      Reps 3 10x10 sec hold  -KM         Exercise 4    Exercise Name 4 Hamstring Stretch  -KM      Reps 4 10x10  -KM         Exercise 5    Exercise Name 5 Prone Leg Hang  -KM      Time 5 5 min 7#  -KM         Exercise 6    Exercise Name 6 QS with Russian Stim  -KM      Reps 6 10/10  -KM      Time 6 10 min  -KM         Exercise 7    Exercise Name 7 SAQ  -KM      Reps 7 40  -KM      Time 7 4#  -KM         Exercise 8    Exercise Name 8 LAQ  -KM      Reps 8 40  -KM      Time 8 4#  -KM         Exercise 9    Exercise Name 9 TKE  -KM      Reps 9 40  -KM      Time 9 Gold  -KM            User Key  (r) = Recorded By, (t) = Taken By, (c) = Cosigned By    Initials Name Provider Type    Gladis Callahan PTA Physical Therapist Assistant                                                Time Calculation:   Start Time: 0942  Stop Time: 1040  Time Calculation (min): 58 min                Gladis Garzon PTA  5/24/2023

## 2023-06-08 RX ORDER — CYCLOBENZAPRINE HCL 10 MG
TABLET ORAL
Qty: 60 TABLET | Refills: 0 | Status: SHIPPED | OUTPATIENT
Start: 2023-06-08

## 2023-06-08 NOTE — TELEPHONE ENCOUNTER
Rx Refill Note  Requested Prescriptions     Pending Prescriptions Disp Refills    cyclobenzaprine (FLEXERIL) 10 MG tablet [Pharmacy Med Name: CYCLOBENZAPRINE 10 MG TABLET] 60 tablet 0     Sig: TAKE 1 TABLET BY MOUTH 3 TIMES A DAY AS NEEDED FOR MUSCLE SPASMS.      Last office visit with prescribing clinician: 9/27/2022      Next office visit with prescribing clinician: Visit date not found   Last Filled: 05.16.2023    DX: status post right total knee arthroplasty with subsequent manipulation, 10/14/2020       Daphney Paris MA  06/08/23, 10:11 EDT    {TIP  Encounters:    {TIP  Please add Last Relevant Lab Date if appropriate:  {TIP  Is Refill Pharmacy correct?:

## 2023-07-26 NOTE — TELEPHONE ENCOUNTER
Rx Refill Note  Requested Prescriptions     Pending Prescriptions Disp Refills    cyclobenzaprine (FLEXERIL) 10 MG tablet [Pharmacy Med Name: CYCLOBENZAPRINE 10 MG TABLET] 60 tablet 0     Sig: TAKE 1 TABLET BY MOUTH 3 TIMES A DAY AS NEEDED FOR MUSCLE SPASMS.      Last office visit with prescribing clinician: 10/24/2022      Next office visit with prescribing clinician: 10/26/2023   Last Filled:7.3.2023    Status post total right knee replacement   Date of Surgery:10.14.2020      Nicole Chase MA  07/26/23, 13:53 EDT    Previous RX pended for your approval, change or denial.     {TIP  Encounters:    {TIP  Please add Last Relevant Lab Date if appropriate:  {TIP  Is Refill Pharmacy correct?:

## 2023-07-27 RX ORDER — CYCLOBENZAPRINE HCL 10 MG
TABLET ORAL
Qty: 60 TABLET | Refills: 0 | Status: SHIPPED | OUTPATIENT
Start: 2023-07-27

## 2023-08-07 ENCOUNTER — OFFICE VISIT (OUTPATIENT)
Dept: NEUROLOGY | Facility: CLINIC | Age: 68
End: 2023-08-07
Payer: MEDICARE

## 2023-08-07 ENCOUNTER — LAB (OUTPATIENT)
Dept: LAB | Facility: HOSPITAL | Age: 68
End: 2023-08-07
Payer: MEDICARE

## 2023-08-07 VITALS
HEART RATE: 85 BPM | OXYGEN SATURATION: 98 % | DIASTOLIC BLOOD PRESSURE: 88 MMHG | WEIGHT: 151 LBS | SYSTOLIC BLOOD PRESSURE: 140 MMHG | BODY MASS INDEX: 27.79 KG/M2 | HEIGHT: 62 IN

## 2023-08-07 DIAGNOSIS — G31.84 MCI (MILD COGNITIVE IMPAIRMENT): Primary | ICD-10-CM

## 2023-08-07 DIAGNOSIS — G31.84 MCI (MILD COGNITIVE IMPAIRMENT): ICD-10-CM

## 2023-08-07 LAB
ALBUMIN SERPL-MCNC: 4.4 G/DL (ref 3.5–5.2)
ALBUMIN/GLOB SERPL: 1.8 G/DL
ALP SERPL-CCNC: 67 U/L (ref 39–117)
ALT SERPL W P-5'-P-CCNC: 13 U/L (ref 1–33)
ANION GAP SERPL CALCULATED.3IONS-SCNC: 13.4 MMOL/L (ref 5–15)
AST SERPL-CCNC: 27 U/L (ref 1–32)
BILIRUB SERPL-MCNC: 0.2 MG/DL (ref 0–1.2)
BUN SERPL-MCNC: 17 MG/DL (ref 8–23)
BUN/CREAT SERPL: 13.9 (ref 7–25)
CALCIUM SPEC-SCNC: 9.7 MG/DL (ref 8.6–10.5)
CHLORIDE SERPL-SCNC: 101 MMOL/L (ref 98–107)
CO2 SERPL-SCNC: 31.6 MMOL/L (ref 22–29)
CREAT SERPL-MCNC: 1.22 MG/DL (ref 0.57–1)
EGFRCR SERPLBLD CKD-EPI 2021: 48.1 ML/MIN/1.73
GLOBULIN UR ELPH-MCNC: 2.4 GM/DL
GLUCOSE SERPL-MCNC: 111 MG/DL (ref 65–99)
POTASSIUM SERPL-SCNC: 3.5 MMOL/L (ref 3.5–5.2)
PROT SERPL-MCNC: 6.8 G/DL (ref 6–8.5)
SODIUM SERPL-SCNC: 146 MMOL/L (ref 136–145)
VIT B12 BLD-MCNC: 594 PG/ML (ref 211–946)

## 2023-08-07 PROCEDURE — 82607 VITAMIN B-12: CPT

## 2023-08-07 PROCEDURE — 80053 COMPREHEN METABOLIC PANEL: CPT

## 2023-08-07 PROCEDURE — 83921 ORGANIC ACID SINGLE QUANT: CPT

## 2023-08-07 PROCEDURE — 36415 COLL VENOUS BLD VENIPUNCTURE: CPT

## 2023-08-07 RX ORDER — FUROSEMIDE 20 MG/1
20 TABLET ORAL DAILY PRN
COMMUNITY

## 2023-08-07 RX ORDER — DIPHENOXYLATE HYDROCHLORIDE AND ATROPINE SULFATE 2.5; .025 MG/1; MG/1
TABLET ORAL DAILY
COMMUNITY

## 2023-08-07 RX ORDER — ESCITALOPRAM OXALATE 10 MG/1
10 TABLET ORAL DAILY
COMMUNITY
Start: 2023-07-11

## 2023-08-07 NOTE — PROGRESS NOTES
Notes by MA:  Patient has been referred to our office for MCI. Patient presents today with their sister and has given consent to give health information to them. Patient's sister reports noticing a change in memory for the last few years.         Subjective:     Dear Dr. Gorman, thank you for referring Mrs. Benson for neurological consultation.  As you know, she is a pleasant 68-year-old woman sent for evaluation of cognitive decline over the last several years.  She is accompanied by a sister to whom she is given permission to provide additional history.  They have seen her worsen over the last several years with respect to jumbled words and difficulty with word finding, repeating conversations, etc.  However, she remains reportedly functionally independent and lives alone.  They are all concerned as her mother was diagnosed in her early 50s with Alzheimer disease.  She has a sister with some cognitive dysfunction as well, who was younger.  She was recently started on Lexapro for depression.  Patient ID: Yesi Benson is a 68 y.o. female.    History of Present Illness  The following portions of the patient's history were reviewed and updated as appropriate: allergies, current medications, past family history, past medical history, past social history, past surgical history, and problem list.    Review of Systems   Constitutional:  Negative for activity change, appetite change and fatigue.   HENT:  Negative for facial swelling and trouble swallowing.    Eyes:  Negative for photophobia, pain and visual disturbance.   Respiratory:  Negative for chest tightness, shortness of breath and wheezing.    Cardiovascular:  Negative for chest pain, palpitations and leg swelling.   Gastrointestinal:  Negative for abdominal pain, nausea and vomiting.   Musculoskeletal:  Negative for arthralgias, back pain, gait problem, joint swelling, myalgias, neck pain and neck stiffness.   Neurological:  Positive for speech difficulty (word  finding). Negative for dizziness, tremors, seizures, syncope, facial asymmetry, weakness, light-headedness, numbness and headaches.   Hematological:  Does not bruise/bleed easily.   Psychiatric/Behavioral:  Negative for agitation, behavioral problems, confusion, decreased concentration, dysphoric mood, hallucinations, self-injury, sleep disturbance and suicidal ideas. The patient is not nervous/anxious and is not hyperactive.       Objective:    Neurologic Exam  She is awake alert pleasant cooperative with reasonably fluent speech and reasonable speech comprehension.  Multiple cognitive screens were performed.  She scored 27 on the Folstein with deficits in recall, language, and registration.  She scored 9 on animal fluency, 3 out of 3 on clock drawing and 20 out of 30 on the Wells.    Cranial nerves II through XII normal and symmetric.    Motor exam reveals age-appropriate tone bulk and power without lateralization and without drift.    Sensory exam reveals reasonably preserved and symmetric sensation to light touch, temperature, and vibration.    Coordination testing reveals smooth and accurate finger-nose-finger normal rapid alternating movements.    Tendon reflexes are 1+ and symmetric.  Toes are downgoing.  Physical Exam  Neck is supple.  No cervical bruits.  Cardiac rhythm is regular.  Abdomen is soft.  No extremity edema or cyanosis.  Assessment/Plan:     Diagnoses and all orders for this visit:    1. MCI (mild cognitive impairment) (Primary)  -     Vitamin B12; Future  -     Methylmalonic Acid, Serum; Future  -     Comprehensive Metabolic Panel; Future       68-year-old woman with mild cognitive impairment and a strong family history of dementia at a young age.  Certainly, this is concerning for early Alzheimer pathology.  I reviewed her recent brain MRI with her.  I have taken the liberty of expanding her laboratory work-up to exclude reversible causes.  I will make a follow-up visit with her in several  weeks time to discuss these results and to discuss possible monoclonal antibody therapy with her at that time.  We briefly went over the latest FDA approvals and the risks and benefits of these medications and she is somewhat interested in talking about this on return visit.  Thank you very much for the opportunity to participate in her care.    60 minutes total patient care time including record review, examination and extended discussion,  and documentation.

## 2023-08-09 LAB — METHYLMALONATE SERPL-SCNC: 191 NMOL/L (ref 0–378)

## 2023-08-16 ENCOUNTER — PATIENT ROUNDING (BHMG ONLY) (OUTPATIENT)
Dept: NEUROLOGY | Facility: CLINIC | Age: 68
End: 2023-08-16
Payer: MEDICARE

## 2023-08-22 RX ORDER — CYCLOBENZAPRINE HCL 10 MG
TABLET ORAL
Qty: 60 TABLET | Refills: 0 | Status: SHIPPED | OUTPATIENT
Start: 2023-08-22

## 2023-08-22 NOTE — TELEPHONE ENCOUNTER
Rx Refill Note  Requested Prescriptions     Pending Prescriptions Disp Refills    cyclobenzaprine (FLEXERIL) 10 MG tablet [Pharmacy Med Name: CYCLOBENZAPRINE 10 MG TABLET] 60 tablet 0     Sig: TAKE 1 TABLET BY MOUTH 3 TIMES A DAY AS NEEDED FOR MUSCLE SPASMS.      Last office visit with prescribing clinician: 10/24/2022      Next office visit with prescribing clinician: 10/26/2023   Last Filled: 07.27.2023    DX:   Status post total right knee replacement   Date of Surgery:10.14.2020       Daphney Paris MA  08/22/23, 09:26 EDT    {TIP  Encounters:    {TIP  Please add Last Relevant Lab Date if appropriate:  {TIP  Is Refill Pharmacy correct?:

## 2023-09-05 ENCOUNTER — OFFICE VISIT (OUTPATIENT)
Dept: NEUROLOGY | Facility: CLINIC | Age: 68
End: 2023-09-05
Payer: MEDICARE

## 2023-09-05 VITALS
BODY MASS INDEX: 28.16 KG/M2 | HEART RATE: 92 BPM | OXYGEN SATURATION: 97 % | SYSTOLIC BLOOD PRESSURE: 140 MMHG | DIASTOLIC BLOOD PRESSURE: 90 MMHG | HEIGHT: 62 IN | WEIGHT: 153 LBS

## 2023-09-05 DIAGNOSIS — G31.84 MCI (MILD COGNITIVE IMPAIRMENT): Primary | ICD-10-CM

## 2023-09-05 NOTE — PROGRESS NOTES
Notes by LPN:  Patient presents for follow up for MCI.               Subjective:     Patient ID: Yesi Benson is a 68 y.o. female.    History of Present Illness  The following portions of the patient's history were reviewed and updated as appropriate: allergies, current medications, past family history, past medical history, past social history, past surgical history, and problem list.    Review of Systems   Constitutional:  Negative for activity change, appetite change and fatigue.   HENT:  Negative for facial swelling, trouble swallowing and voice change.    Eyes:  Negative for photophobia, pain and visual disturbance.   Respiratory:  Negative for chest tightness, shortness of breath and wheezing.    Cardiovascular:  Negative for chest pain, palpitations and leg swelling.   Gastrointestinal:  Negative for abdominal pain, nausea and vomiting.   Endocrine: Negative for cold intolerance and heat intolerance.   Musculoskeletal:  Negative for arthralgias, back pain, gait problem, joint swelling, myalgias, neck pain and neck stiffness.   Neurological:  Positive for speech difficulty (Word finding). Negative for dizziness, tremors, seizures, syncope, facial asymmetry, weakness, light-headedness, numbness and headaches.   Hematological:  Does not bruise/bleed easily.   Psychiatric/Behavioral:  Negative for agitation, behavioral problems, confusion, decreased concentration, dysphoric mood, hallucinations, self-injury, sleep disturbance and suicidal ideas. The patient is not nervous/anxious and is not hyperactive.       Objective:    Neurologic Exam  She is awake alert pleasant cooperative with reasonably fluent speech and reasonable speech comprehension.  She asked the same question multiple times today.    Cranial nerves II through XII normal and symmetric.     Motor exam reveals age-appropriate tone bulk and power without lateralization and without drift.     Sensory exam reveals reasonably preserved and symmetric  sensation to light touch, temperature, and vibration.     Coordination testing reveals smooth and accurate finger-nose-finger normal rapid alternating movements.     Tendon reflexes are 1+ and symmetric.  Toes are downgoing.  Physical Exam    Assessment/Plan:     Diagnoses and all orders for this visit:    1. MCI (mild cognitive impairment) (Primary)       As noted previously, given her history, progression, and family history, I am concerned that this represents early Alzheimer pathology.  I reviewed her negative work-up for reversible causes with her and her family.  We discussed Leqembi therapy and she and her family are interested in proceeding towards this possible treatment option.  We discussed the risks and benefits as well as the work-up required, as well as the regular MRI follow-up required for Aria.  The neck step in the work-up will be spinal fluid analysis for beta amyloid and we will arrange for Apo E testing simultaneously.  Once we have the results of that we will discuss whether or not we will be moving ahead.    45 minutes total patient care time including record review, examination, discussion, counseling, , and documentation.

## 2023-09-15 NOTE — TELEPHONE ENCOUNTER
Caller: Yesi Benson    Relationship: Self    Best call back number:      What was the call regarding: CHECKING THE STATUS OF THE RX REFILL. THE PATIENT WOULD LIKE A CALL BACK WHEN IT HAS BEEN SENT TO THE PHARMACY    Is it okay if the provider responds through MyChart: CALL

## 2023-09-16 RX ORDER — CYCLOBENZAPRINE HCL 10 MG
TABLET ORAL
Qty: 60 TABLET | Refills: 0 | Status: SHIPPED | OUTPATIENT
Start: 2023-09-16

## 2023-09-25 ENCOUNTER — TELEPHONE (OUTPATIENT)
Dept: NEUROLOGY | Facility: CLINIC | Age: 68
End: 2023-09-25

## 2023-09-25 NOTE — TELEPHONE ENCOUNTER
Patient called regarding [employer] contract. Patient reports she called [facility] and spoke with April, reference number [id number], and was told she could continue to see Dr. Ham, that her plan would cover in and out of network providers. I asked pt if they mentioned she could pay more if out of network, but pt sts April told her they would pay the same, either way.     Pt reports she would like to proceed with lumbar puncture.

## 2023-10-16 RX ORDER — CYCLOBENZAPRINE HCL 10 MG
TABLET ORAL
Qty: 60 TABLET | Refills: 0 | Status: SHIPPED | OUTPATIENT
Start: 2023-10-16

## 2023-10-16 NOTE — TELEPHONE ENCOUNTER
Rx Refill Note  Requested Prescriptions     Pending Prescriptions Disp Refills    cyclobenzaprine (FLEXERIL) 10 MG tablet [Pharmacy Med Name: CYCLOBENZAPRINE 10 MG TABLET] 60 tablet 0     Sig: TAKE 1 TABLET BY MOUTH 3 TIMES A DAY AS NEEDED FOR MUSCLE SPASMS.      Last office visit with prescribing clinician: 10/24/2022      Next office visit with prescribing clinician: 10/26/2023   Last Filled: 09.16.2023    DX: Status post total right knee replacement   Date of Surgery:10.14.2020      Daphney Paris MA  10/16/23, 13:07 EDT    {TIP  Encounters:    {TIP  Please add Last Relevant Lab Date if appropriate:  {TIP  Is Refill Pharmacy correct?:

## 2023-10-17 ENCOUNTER — TELEPHONE (OUTPATIENT)
Dept: NEUROLOGY | Facility: CLINIC | Age: 68
End: 2023-10-17
Payer: MEDICARE

## 2023-10-17 DIAGNOSIS — F03.90 DEMENTIA WITHOUT BEHAVIORAL DISTURBANCE: ICD-10-CM

## 2023-10-17 DIAGNOSIS — G31.84 MCI (MILD COGNITIVE IMPAIRMENT): Primary | ICD-10-CM

## 2023-10-17 NOTE — TELEPHONE ENCOUNTER
Called [employer]. Confirmed patient does have in and out of network benefits that pay at the same rate.     PA for lumbar puncture not needed, CPT 88858.    Spoke with Leticia, reference #: [id number]

## 2023-10-26 ENCOUNTER — OFFICE VISIT (OUTPATIENT)
Dept: ORTHOPEDIC SURGERY | Facility: CLINIC | Age: 68
End: 2023-10-26
Payer: MEDICARE

## 2023-10-26 VITALS — HEIGHT: 62 IN | WEIGHT: 153 LBS | BODY MASS INDEX: 28.16 KG/M2

## 2023-10-26 DIAGNOSIS — Z96.651 STATUS POST TOTAL RIGHT KNEE REPLACEMENT: Primary | ICD-10-CM

## 2023-10-26 DIAGNOSIS — M24.661 ARTHROFIBROSIS OF KNEE JOINT, RIGHT: ICD-10-CM

## 2023-10-26 NOTE — PROGRESS NOTES
Subjective:     Patient ID: Yesi Benson is a 68 y.o. female.    Chief Complaint:  Follow-up status post right total knee arthroplasty with subsequent manipulation-10/14/2020    History of Present Illness  Yesi Benson returns to clinic today for evaluation of right knee.     Overall, she has done reasonably well, but she did fall a couple of months ago. At that time, she had some bruising. She was concerned about loosening her knee. She did not notice any significant changes in regards to her motion or function, but has noticed some increased pain, particularly with a tingling and burning pain, particularly over the anterior aspect of the knee. She denies any fever, chills, or sweats. She denies any issues with her incision. She denies any significant increased pain with weightbearing, walking, or other activities.     Social History     Occupational History    Not on file   Tobacco Use    Smoking status: Never     Passive exposure: Never    Smokeless tobacco: Never   Vaping Use    Vaping Use: Never used   Substance and Sexual Activity    Alcohol use: Never    Drug use: Never    Sexual activity: Not Currently      Past Medical History:   Diagnosis Date    Colon polyp     Dementia had problems with memory                   during last year    have not been diagnosed    Disease of thyroid gland     Hyperlipidemia approx. 20 years ago    Knee swelling     Memory loss within the last year    Tear of meniscus of knee      Past Surgical History:   Procedure Laterality Date    COLONOSCOPY N/A 03/27/2019    Procedure: COLONOSCOPY POLYPECTOMY;  Surgeon: Jaime Alaniz MD;  Location: [facility] OR;  Service: Gastroenterology    DENTAL PROCEDURE Left 2011,2014    JOINT MANIPULATION Right 12/21/2020    Procedure: KNEE MANIPULATION;  Surgeon: Elmer Fuentes MD;  Location: [facility] OR;  Service: Orthopedics;  Laterality: Right;    JOINT REPLACEMENT      SALPINGO OOPHORECTOMY Left     as an infant     TOTAL KNEE  "ARTHROPLASTY Right 10/14/2020    Procedure: TOTAL KNEE ARTHROPLASTY AND ALL ASSOCIATED PROCEDURES;  Surgeon: Elmer Fuentes MD;  Location: [facility];  Service: Orthopedics;  Laterality: Right;       Family History   Problem Relation Age of Onset    Dementia Mother         ill at least 10 years    Migraines Mother     Seizures Mother     Stroke Mother     Cancer Father         Penile Cancer    Cancer Sister         Breast Cancer    Cancer Maternal Grandmother         Breast Cancer    Malig Hyperthermia Neg Hx          Review of Systems        Objective:  Vitals:    10/26/23 0847   Weight: 69.4 kg (153 lb)   Height: 157 cm (61.81\")         10/26/23  0847   Weight: 69.4 kg (153 lb)     Body mass index is 28.16 kg/m².  General: No acute distress.  Resp: normal respiratory effort  Skin: no rashes or wounds; normal turgor  Psych: mood and affect appropriate; recent and remote memory intact        Ortho Exam     Right Knee-  Midline incision well healed  ROM 2 to 120 degrees  4/5 on flexion  4/5 on extension  Moderate tenderness to light touch directly over the anterior knee with some regions of residual bruising over proximal medial and proximal lateral aspect of the right lower extremity below the level of the joint line  Effusion- Minimal  Posterior drawer- Good endpoint at 90 degrees  Stable to varus and valgus stress at 2 and 30 degrees  Imaging:  Right Knee X-Ray  Indication: s/p total knee     AP, Lateral, and [place] views    Findings:  Total knee arthroplasty components are in stable position with acceptable overall alignment, no evidence of periprosthetic fracture, loosening, osteolysis, or reactive bone formation.    Compared to prior postoperative x-rays    Assessment:        1. Status post total right knee replacement    2. Arthrofibrosis of knee joint, right           Plan:        1. I discussed treatment options at length with patient at today's visit. Reviewed with patient that her x-rays do look " stable at this point in time. Her knee is moving reasonably well and fairly consistent with what she previously had been experiencing. Her tingling and burning, I think is more of an acute on chronic neuropathy directly over the right knee. She is to continue to use topical anti-inflammatory cream. We did discuss other options. She would like to proceed with physical therapy to consider dry needling.  2. I will plan on seeing her back on an as needed basis if she has recurrent issues or failure of improvement.      Yesi Benson was in agreement with plan and had all questions answered.     Orders:  Orders Placed This Encounter   Procedures    XR Knee 3+ View With Mimbres Right    Ambulatory Referral to Physical Therapy Evaluate and treat, Ortho; (dry needling, stim)       Medications:  No orders of the defined types were placed in this encounter.      Followup:  Return if symptoms worsen or fail to improve.    Diagnoses and all orders for this visit:    1. Status post total right knee replacement (Primary)  -     XR Knee 3+ View With Mimbres Right  -     Ambulatory Referral to Physical Therapy Evaluate and treat, Ortho; (dry needling, stim)    2. Arthrofibrosis of knee joint, right  -     Ambulatory Referral to Physical Therapy Evaluate and treat, Ortho; (dry needling, stim)      Body mass index is 28.16 kg/m².      Dictated utilizing Dragon dictation     Transcribed from ambient dictation for Elmer Fuentes MD by Shivani Paris.  10/26/23   11:06 EDT    Patient or patient representative verbalized consent to the visit recording.  I have personally performed the services described in this document as transcribed by the above individual, and it is both accurate and complete.

## 2023-11-09 ENCOUNTER — HOSPITAL ENCOUNTER (OUTPATIENT)
Dept: PHYSICAL THERAPY | Facility: HOSPITAL | Age: 68
Setting detail: THERAPIES SERIES
Discharge: HOME | End: 2023-11-09

## 2023-11-13 ENCOUNTER — HOSPITAL ENCOUNTER (OUTPATIENT)
Dept: PHYSICAL THERAPY | Facility: HOSPITAL | Age: 68
Setting detail: THERAPIES SERIES
Discharge: HOME | End: 2023-11-13

## 2023-11-13 ENCOUNTER — TELEPHONE (OUTPATIENT)
Dept: NEUROLOGY | Facility: CLINIC | Age: 68
End: 2023-11-13
Payer: MEDICARE

## 2023-11-13 DIAGNOSIS — M24.661 FIBROSIS OF RIGHT KNEE JOINT: ICD-10-CM

## 2023-11-13 DIAGNOSIS — Z96.651 STATUS POST TOTAL RIGHT KNEE REPLACEMENT: Primary | ICD-10-CM

## 2023-11-13 RX ORDER — CYCLOBENZAPRINE HCL 10 MG
TABLET ORAL
Qty: 60 TABLET | Refills: 0 | Status: SHIPPED | OUTPATIENT
Start: 2023-11-13

## 2023-11-13 NOTE — TELEPHONE ENCOUNTER
Rx Refill Note  Requested Prescriptions     Pending Prescriptions Disp Refills    cyclobenzaprine (FLEXERIL) 10 MG tablet [Pharmacy Med Name: CYCLOBENZAPRINE 10 MG TABLET] 60 tablet 0     Sig: TAKE 1 TABLET BY MOUTH 3 TIMES A DAY AS NEEDED FOR MUSCLE SPASMS.      Last office visit with prescribing clinician: 10/26/2023      Next office visit with prescribing clinician: Visit date not found   Last Filled: 10.16.2023    DX: status post right total knee arthroplasty with subsequent manipulation-10/14/2020       Daphney Paris MA  11/13/23, 09:54 EST    {TIP  Encounters:    {TIP  Please add Last Relevant Lab Date if appropriate:  {TIP  Is Refill Pharmacy correct?:

## 2023-11-13 NOTE — THERAPY EVALUATION
Outpatient Physical Therapy Ortho Initial Evaluation/ dry needling    ROD Mann     Patient Name: Yesi Benson  : 1955  MRN: [id number]  Today's Date: 2023      Visit Date: 2023    Patient Active Problem List   Diagnosis    Adenomatous polyp of colon    Hyperlipidemia    Hypothyroidism    Status post total right knee replacement    Arthrofibrosis of knee joint, right    MCI (mild cognitive impairment)        Past Medical History:   Diagnosis Date    Colon polyp     Dementia had problems with memory                   during last year    have not been diagnosed    Disease of thyroid gland     Hyperlipidemia approx. 20 years ago    Knee swelling     Memory loss within the last year    Tear of meniscus of knee         Past Surgical History:   Procedure Laterality Date    COLONOSCOPY N/A 2019    Procedure: COLONOSCOPY POLYPECTOMY;  Surgeon: Jaime Alaniz MD;  Location: [facility] OR;  Service: Gastroenterology    DENTAL PROCEDURE Left ,    JOINT MANIPULATION Right 2020    Procedure: KNEE MANIPULATION;  Surgeon: Elmer Fuentes MD;  Location: [facility] OR;  Service: Orthopedics;  Laterality: Right;    JOINT REPLACEMENT      SALPINGO OOPHORECTOMY Left     as an infant     TOTAL KNEE ARTHROPLASTY Right 10/14/2020    Procedure: TOTAL KNEE ARTHROPLASTY AND ALL ASSOCIATED PROCEDURES;  Surgeon: Elmer Fuentes MD;  Location: [facility] OR;  Service: Orthopedics;  Laterality: Right;       Visit Dx:     ICD-10-CM ICD-9-CM   1. Status post total right knee replacement  Z96.651 V43.65   2. Fibrosis of right knee joint  M24.661 718.56          Patient History       Row Name 23 1500             History    Chief Complaint Falls/history of falls;Joint stiffness;Pain;Tinglings  -SP      Type of Pain Knee pain;Lower Extremity / Leg  -SP      Date Current Problem(s) Began 08/15/23  -SP      Brief Description of Current Complaint Patient with history of right total knee  [id number]-, and subsequent manipulation 12-.  Patient participated with therapy but continued to have right knee pain, tightness and limited ROM.  Patient now referred to PT for dry needling to address ongoing symptoms consistent with arthrofibrosis of right knee  -SP      Patient/Caregiver Goals Relieve pain;Return to prior level of function;Improve mobility;Know what to do to help the symptoms  -SP      Hand Dominance right-handed  -SP      Occupation/sports/leisure activities exercise, bicycle  -SP      What clinical tests have you had for this problem? X-ray  -SP         Pain     Pain Location Knee  -SP      Pain at Present 2  -SP      Pain at Worst 6  -SP      Pain Frequency Intermittent  -SP      Pain Description Tightness;Tingling  -SP      What Performance Factors Make the Current Problem(s) WORSE? transfers, weight bearing  -SP      What Performance Factors Make the Current Problem(s) BETTER? nothing  -SP      Tolerance Time- Standing limited  -SP      Tolerance Time- Sitting limited  -SP      Tolerance Time- Walking limited  -SP      Is your sleep disturbed? Yes  -SP      Difficulties with ADL's? difficulty tolerating home and community ADLs  -SP         Fall Risk Assessment    Any falls in the past year: Yes  -SP      Factors that contributed to the fall: Lost balance  -SP         Daily Activities    Primary Language English  -SP      How does patient learn best? Listening;Reading;Demonstration;Pictures/Video  -SP      Teaching needs identified Home Exercise Program;Management of Condition  -SP      Does patient have problems with the following? None  -SP      Barriers to learning None  -SP      Pt Participated in POC and Goals Yes  -SP         Safety    Are you being hurt, hit, or frightened by anyone at home or in your life? No  -SP      Are you being neglected by a caregiver No  -SP                User Key  (r) = Recorded By, (t) = Taken By, (c) = Cosigned By      Initials Name  Provider Type    Hetal Webb, PT Physical Therapist                     PT Ortho       Row Name 11/13/23 1500       Posture/Observations    Observations Incision healing  -SP    Posture/Observations Comments Incision well healed:  mild edema presents surrounding knee and lower leg.  QST 9/16  -SP       Right Lower Ext    Rt Hip ABduction AROM WFL  -SP    Rt Hip ADduction AROM WFL  -SP    Rt Hip Flexion AROM WFL  -SP    Rt Knee Extension/Flexion AROM 12 to 105 degrees prior to treatment  5 to 110 degrees after treatment  -SP       Transfers    Bed-Chair Glenn (Transfers) independent  -SP    Chair-Bed Glenn (Transfers) independent  -SP    Sit-Stand Glenn (Transfers) independent  -SP    Stand-Sit Glenn (Transfers) independent  -SP       Gait/Stairs (Locomotion)    Glenn Level (Gait) independent  -SP              User Key  (r) = Recorded By, (t) = Taken By, (c) = Cosigned By      Initials Name Provider Type    Hetal Webb, PT Physical Therapist                                Therapy Education  Education Details: Patient educated regarding dry needling and advised to increase her intake of water over the course of the day.      PT OP Goals       Row Name 11/13/23 1600          PT Short Term Goals    STG Date to Achieve 11/28/23  -SP     STG 1 Patient demonstrates increased right knee extension to 0-5 degrees to improve heel strike with gait  -SP     STG 2 Patient reports decreased pain to allow sleep through night without waking due to right LE pain  -SP        Long Term Goals    LTG Date to Achieve 12/13/23  -SP     LTG 1 Patient demonstrates right LE AROM 0  to 120 degrees to improve functional mobility  -SP     LTG 2 Patient tolerates home and community mobility without increased pain or compensation  -SP        Time Calculation    PT Goal Re-Cert Due Date 12/13/23  -SP               User Key  (r) = Recorded By, (t) = Taken By, (c) = Cosigned By       Initials Name Provider Type    Hetal Webb, PT Physical Therapist                     PT Assessment/Plan       Row Name 11/13/23 1610          PT Assessment    Assessment Comments Patient with chronic history of right knee pain s/p right TKR and manipulation in 2020.  Patient with subsequent arthrofibrosis of right knee limited her ROM and functional mobility.  Patient with ongoing pain and altered gait and transfers.  Patient presents for dry needling and modalities to address these ongoing issues.  -SP        PT Plan    PT Frequency 1x/week  -SP     Predicted Duration of Therapy Intervention (PT) 4-6 weeks  -SP     Planned CPT's? PT EVAL LOW COMPLEXITY: 38999;PT HOT OR COLD PACK TREAT MCARE  dry needling  -SP               User Key  (r) = Recorded By, (t) = Taken By, (c) = Cosigned By      Initials Name Provider Type    Hetal Webb, PT Physical Therapist                     Modalities       Row Name 11/13/23 1500             Moist Heat    MH Applied Yes  -SP      Location anterior and posterior aspect of right knee  -SP      PT Moist Heat Minutes 10  -SP      MH Prior to Rx Yes  -SP      MH S/P Rx Yes  -SP                User Key  (r) = Recorded By, (t) = Taken By, (c) = Cosigned By      Initials Name Provider Type    Hetal Webb, PT Physical Therapist                    Manual Rx (last 36 hours)       Manual Treatments       Row Name 11/13/23 1500             Manual Rx 1    Manual Rx 1 Location right LE  -SP      Manual Rx 1 Type Dry needling to right LE HNTrp/ TRp as follows:  common fibular, saphenous, tender medial central and lateral mid to distal quadricep area; 17 needles moderate doseage  -SP                User Key  (r) = Recorded By, (t) = Taken By, (c) = Cosigned By      Initials Name Provider Type    Hetal Webb, PT Physical Therapist                                Outcome Measure Options: Lower Extremity Functional Scale (LEFS)  Lower  Extremity Functional Index  Any of your usual work, housework or school activities: A little bit of difficulty  Your usual hobbies, recreational or sporting activities: A little bit of difficulty  Getting into or out of the bath: Moderate difficulty  Walking between rooms: No difficulty  Putting on your shoes or socks: A little bit of difficulty  Squatting: Quite a bit of difficulty  Lifting an object, like a bag of groceries from the floor: A little bit of difficulty  Performing light activities around your home: A little bit of difficulty  Performing heavy activities around your home: Quite a bit of difficulty  Getting into or out of a car: No difficulty  Walking 2 blocks: No difficulty  Walking a mile: A little bit of difficulty  Going up or down 10 stairs (about 1 flight of stairs): Moderate difficulty  Standing for 1 hour: Moderate difficulty  Sitting for 1 hour: Moderate difficulty  Running on even ground: Quite a bit of difficulty  Running on uneven ground: Quite a bit of difficulty  Making sharp turns while running fast: Quite a bit of difficulty  Hopping: Quite a bit of difficulty  Rolling over in bed: No difficulty  Total: 48  Lower Extremity Functional Index  Any of your usual work, housework or school activities: A little bit of difficulty  Your usual hobbies, recreational or sporting activities: A little bit of difficulty  Getting into or out of the bath: Moderate difficulty  Walking between rooms: No difficulty  Putting on your shoes or socks: A little bit of difficulty  Squatting: Quite a bit of difficulty  Lifting an object, like a bag of groceries from the floor: A little bit of difficulty  Performing light activities around your home: A little bit of difficulty  Performing heavy activities around your home: Quite a bit of difficulty  Getting into or out of a car: No difficulty  Walking 2 blocks: No difficulty  Walking a mile: A little bit of difficulty  Going up or down 10 stairs (about 1 flight of  stairs): Moderate difficulty  Standing for 1 hour: Moderate difficulty  Sitting for 1 hour: Moderate difficulty  Running on even ground: Quite a bit of difficulty  Running on uneven ground: Quite a bit of difficulty  Making sharp turns while running fast: Quite a bit of difficulty  Hopping: Quite a bit of difficulty  Rolling over in bed: No difficulty  Total: 48      Time Calculation:     Start Time: 1310  Stop Time: 1410  Time Calculation (min): 60 min  Untimed Charges  PT Moist Heat Minutes: 10  Total Minutes  Untimed Charges Total Minutes: 10   Total Minutes: 10     Therapy Charges for Today       Code Description Service Date Service Provider Modifiers Qty    36676761523 HC PT SELF PAY DRY NEEDLING SESSION 11/13/2023 Hetal Caceres, PT  1            PT G-Codes  Outcome Measure Options: Lower Extremity Functional Scale (LEFS)  Total: 48         Hetal Caceres, PT  11/13/2023

## 2023-11-20 ENCOUNTER — HOSPITAL ENCOUNTER (OUTPATIENT)
Dept: PHYSICAL THERAPY | Facility: HOSPITAL | Age: 68
Setting detail: THERAPIES SERIES
Discharge: HOME | End: 2023-11-20

## 2023-11-20 DIAGNOSIS — M24.661 FIBROSIS OF RIGHT KNEE JOINT: ICD-10-CM

## 2023-11-20 DIAGNOSIS — Z96.651 STATUS POST TOTAL RIGHT KNEE REPLACEMENT: Primary | ICD-10-CM

## 2023-11-20 NOTE — THERAPY TREATMENT NOTE
Outpatient Physical Therapy Ortho Treatment Note   Jenny Chávez     Patient Name: Yesi Benson  : 1955  MRN: [id number]  Today's Date: 2023      Visit Date: 2023    Visit Dx:    ICD-10-CM ICD-9-CM   1. Status post total right knee replacement  Z96.651 V43.65   2. Fibrosis of right knee joint  M24.661 718.56       Patient Active Problem List   Diagnosis    Adenomatous polyp of colon    Hyperlipidemia    Hypothyroidism    Status post total right knee replacement    Arthrofibrosis of knee joint, right    MCI (mild cognitive impairment)        Past Medical History:   Diagnosis Date    Colon polyp     Dementia had problems with memory                   during last year    have not been diagnosed    Disease of thyroid gland     Hyperlipidemia approx. 20 years ago    Knee swelling     Memory loss within the last year    Tear of meniscus of knee         Past Surgical History:   Procedure Laterality Date    COLONOSCOPY N/A 2019    Procedure: COLONOSCOPY POLYPECTOMY;  Surgeon: aJime Alaniz MD;  Location: [facility] OR;  Service: Gastroenterology    DENTAL PROCEDURE Left     JOINT MANIPULATION Right 2020    Procedure: KNEE MANIPULATION;  Surgeon: Elmer Fuentes MD;  Location: [facility] OR;  Service: Orthopedics;  Laterality: Right;    JOINT REPLACEMENT      SALPINGO OOPHORECTOMY Left     as an infant     TOTAL KNEE ARTHROPLASTY Right 10/14/2020    Procedure: TOTAL KNEE ARTHROPLASTY AND ALL ASSOCIATED PROCEDURES;  Surgeon: Elmer Fuentes MD;  Location: [facility];  Service: Orthopedics;  Laterality: Right;        PT Ortho       Row Name 23 1500       Subjective    Subjective Comments Patient reports that she did have decreased tightness and discomfort in right knee for 4 days following last visit.  Today her knee feels stiff.  -SP              User Key  (r) = Recorded By, (t) = Taken By, (c) = Cosigned By      Initials Name Provider Type    Hetal Webb  Xochitl, PT Physical Therapist                                 PT Assessment/Plan       Row Name 11/20/23 1533          PT Assessment    Assessment Comments Patient with good improvement in symptoms with dry needling.  -SP        PT Plan    PT Plan Comments Continue to progress.  -SP               User Key  (r) = Recorded By, (t) = Taken By, (c) = Cosigned By      Initials Name Provider Type    Hetal Webb, PT Physical Therapist                     Modalities       Row Name 11/20/23 1500             Moist Heat    MH Applied Yes  -SP      Location anterior and posterior aspect of right knee  -SP      PT Moist Heat Minutes 10  -SP      MH Prior to Rx Yes  -SP      MH S/P Rx Yes  -SP                User Key  (r) = Recorded By, (t) = Taken By, (c) = Cosigned By      Initials Name Provider Type    Hetal Webb, PT Physical Therapist                   OP Exercises       Row Name 11/20/23 1500             Subjective    Subjective Comments Patient reports that she did have decreased tightness and discomfort in right knee for 4 days following last visit.  Today her knee feels stiff.  -SP         Exercise 1    Exercise Name 1 recumbent bike seat 4  -SP      Time 1 5 min  -SP                User Key  (r) = Recorded By, (t) = Taken By, (c) = Cosigned By      Initials Name Provider Type    Hetal Webb, PT Physical Therapist                             Manual Rx (last 36 hours)       Manual Treatments       Row Name 11/20/23 1500             Manual Rx 1    Manual Rx 1 Location right LE  -SP      Manual Rx 1 Type Dry needling to right LE HNTrp/ TRp as follows:  common fibular, saphenous, tender medial central and lateral mid to distal quadricep area; 17 needles moderate doseage  -SP                User Key  (r) = Recorded By, (t) = Taken By, (c) = Cosigned By      Initials Name Provider Type    Hetal Webb, PT Physical Therapist                        Therapy  Education  Given: Symptoms/condition management              Time Calculation:   Start Time: 1130  Stop Time: 1210  Time Calculation (min): 40 min  Untimed Charges  PT Moist Heat Minutes: 10  Total Minutes  Untimed Charges Total Minutes: 10   Total Minutes: 10  Therapy Charges for Today       Code Description Service Date Service Provider Modifiers Qty    51082533968 HC PT SELF PAY DRY NEEDLING SESSION 11/20/2023 Hetal Caceres, PT  1                      Hetal Caceres, PT  11/20/2023

## 2023-11-28 ENCOUNTER — HOSPITAL ENCOUNTER (OUTPATIENT)
Dept: PHYSICAL THERAPY | Facility: HOSPITAL | Age: 68
Setting detail: THERAPIES SERIES
Discharge: HOME | End: 2023-11-28

## 2023-11-28 DIAGNOSIS — Z96.651 STATUS POST TOTAL RIGHT KNEE REPLACEMENT: Primary | ICD-10-CM

## 2023-11-28 DIAGNOSIS — M24.661 FIBROSIS OF RIGHT KNEE JOINT: ICD-10-CM

## 2023-11-28 NOTE — THERAPY TREATMENT NOTE
"  Outpatient Physical Therapy Ortho Treatment Note   Jenny Chávez     Patient Name: Yesi Benson  : 1955  MRN: [id number]  Today's Date: 2023      Visit Date: 2023    Visit Dx:    ICD-10-CM ICD-9-CM   1. Status post total right knee replacement  Z96.651 V43.65   2. Fibrosis of right knee joint  M24.661 718.56       Patient Active Problem List   Diagnosis    Adenomatous polyp of colon    Hyperlipidemia    Hypothyroidism    Status post total right knee replacement    Arthrofibrosis of knee joint, right    MCI (mild cognitive impairment)        Past Medical History:   Diagnosis Date    Colon polyp     Dementia had problems with memory                   during last year    have not been diagnosed    Disease of thyroid gland     Hyperlipidemia approx. 20 years ago    Knee swelling     Memory loss within the last year    Tear of meniscus of knee         Past Surgical History:   Procedure Laterality Date    COLONOSCOPY N/A 2019    Procedure: COLONOSCOPY POLYPECTOMY;  Surgeon: Jaime Alaniz MD;  Location: [facility];  Service: Gastroenterology    DENTAL PROCEDURE Left     JOINT MANIPULATION Right 2020    Procedure: KNEE MANIPULATION;  Surgeon: Elmer Fuentes MD;  Location: [facility] OR;  Service: Orthopedics;  Laterality: Right;    JOINT REPLACEMENT      SALPINGO OOPHORECTOMY Left     as an infant     TOTAL KNEE ARTHROPLASTY Right 10/14/2020    Procedure: TOTAL KNEE ARTHROPLASTY AND ALL ASSOCIATED PROCEDURES;  Surgeon: Elmer Fuentes MD;  Location: [facility] OR;  Service: Orthopedics;  Laterality: Right;        PT Ortho       Row Name 23 1200       Subjective    Subjective Comments Patient reports that she thinks she may \"be just a little bit better\"  She continues to have stiffness in her knee  -SP              User Key  (r) = Recorded By, (t) = Taken By, (c) = Cosigned By      Initials Name Provider Type    Hetal Webb, PT Physical Therapist " "                                PT Assessment/Plan       Row Name 11/28/23 1252          PT Assessment    Assessment Comments Patient reports some improvement in mobility following moderate doseage dry needling  -SP        PT Plan    PT Plan Comments Continue to progress as tolerable  -SP               User Key  (r) = Recorded By, (t) = Taken By, (c) = Cosigned By      Initials Name Provider Type    Hetal Webb, PT Physical Therapist                     Modalities       Row Name 11/28/23 1200             Moist Heat    MH Applied Yes  -SP      Location anterior and posterior aspect of right knee  -SP      PT Moist Heat Minutes 10  -SP      MH Prior to Rx Yes  -SP      MH S/P Rx Yes  -SP                User Key  (r) = Recorded By, (t) = Taken By, (c) = Cosigned By      Initials Name Provider Type    Hetal Webb, CHAY Physical Therapist                   OP Exercises       Row Name 11/28/23 1200             Subjective    Subjective Comments Patient reports that she thinks she may \"be just a little bit better\"  She continues to have stiffness in her knee  -SP         Exercise 1    Exercise Name 1 recumbent bike seat 4  -SP      Time 1 5 min  -SP                User Key  (r) = Recorded By, (t) = Taken By, (c) = Cosigned By      Initials Name Provider Type    Hetal Webb, PT Physical Therapist                             Manual Rx (last 36 hours)       Manual Treatments       Row Name 11/28/23 1100             Manual Rx 1    Manual Rx 1 Location right LE  -SP      Manual Rx 1 Type Dry needling to right LE HNTrp/ TRp as follows:  common fibular, saphenous, tender medial central and lateral mid to distal quadricep area; 17 needles moderate doseage  -SP                User Key  (r) = Recorded By, (t) = Taken By, (c) = Cosigned By      Initials Name Provider Type    Hetal Webb, PT Physical Therapist                        Therapy Education  Given: " Symptoms/condition management  Program: Reinforced  How Provided: Verbal  Provided to: Patient  Level of Understanding: Verbalized, Demonstrated              Time Calculation:   Start Time: 0900  Stop Time: 0930  Time Calculation (min): 30 min  Untimed Charges  PT Moist Heat Minutes: 10  Total Minutes  Untimed Charges Total Minutes: 10   Total Minutes: 10  Therapy Charges for Today       Code Description Service Date Service Provider Modifiers Qty    17601244793 HC PT SELF PAY DRY NEEDLING SESSION 11/28/2023 Hetal Caceres, PT  1                      Hetal Caceres, PT  11/28/2023

## 2023-11-29 ENCOUNTER — HOSPITAL ENCOUNTER (OUTPATIENT)
Dept: GENERAL RADIOLOGY | Facility: HOSPITAL | Age: 68
Discharge: HOME | End: 2023-11-29
Admitting: PSYCHIATRY & NEUROLOGY
Payer: MEDICARE

## 2023-11-29 VITALS
OXYGEN SATURATION: 97 % | SYSTOLIC BLOOD PRESSURE: 162 MMHG | HEART RATE: 80 BPM | TEMPERATURE: 98.4 F | DIASTOLIC BLOOD PRESSURE: 86 MMHG | RESPIRATION RATE: 16 BRPM

## 2023-11-29 DIAGNOSIS — G31.84 MCI (MILD COGNITIVE IMPAIRMENT): Primary | ICD-10-CM

## 2023-11-29 LAB
APPEARANCE CSF: CLEAR
APPEARANCE CSF: CLEAR
APTT PPP: 25.1 SECONDS (ref 24.3–38.1)
COLOR CSF: COLORLESS
COLOR CSF: COLORLESS
DEPRECATED RDW RBC AUTO: 44.1 FL (ref 37–54)
ERYTHROCYTE [DISTWIDTH] IN BLOOD BY AUTOMATED COUNT: 13.2 % (ref 12.3–15.4)
GLUCOSE CSF-MCNC: 67 MG/DL (ref 40–70)
HCT VFR BLD AUTO: 38.1 % (ref 34–46.6)
HGB BLD-MCNC: 12.4 G/DL (ref 12–15.9)
INR PPP: 0.97 (ref 0.9–1.1)
MCH RBC QN AUTO: 29.4 PG (ref 26.6–33)
MCHC RBC AUTO-ENTMCNC: 32.5 G/DL (ref 31.5–35.7)
MCV RBC AUTO: 90.3 FL (ref 79–97)
PLATELET # BLD AUTO: 250 10*3/MM3 (ref 140–450)
PMV BLD AUTO: 9.9 FL (ref 6–12)
PROT CSF-MCNC: 35 MG/DL (ref 15–45)
PROTHROMBIN TIME: 12.9 SECONDS (ref 12.1–15)
RBC # BLD AUTO: 4.22 10*6/MM3 (ref 3.77–5.28)
TUBE # CSF: 1
TUBE # CSF: 1
WBC # CSF MANUAL: 1 /MM3 (ref 0–5)
WBC # CSF MANUAL: 1 /MM3 (ref 0–5)
WBC NRBC COR # BLD AUTO: 5.41 10*3/MM3 (ref 3.4–10.8)

## 2023-11-29 PROCEDURE — 85027 COMPLETE CBC AUTOMATED: CPT | Performed by: NURSE PRACTITIONER

## 2023-11-29 PROCEDURE — 85610 PROTHROMBIN TIME: CPT | Performed by: NURSE PRACTITIONER

## 2023-11-29 PROCEDURE — 82945 GLUCOSE OTHER FLUID: CPT | Performed by: PSYCHIATRY & NEUROLOGY

## 2023-11-29 PROCEDURE — 84157 ASSAY OF PROTEIN OTHER: CPT | Performed by: PSYCHIATRY & NEUROLOGY

## 2023-11-29 PROCEDURE — 25010000002 LIDOCAINE 1 % SOLUTION: Performed by: PSYCHIATRY & NEUROLOGY

## 2023-11-29 PROCEDURE — 86592 SYPHILIS TEST NON-TREP QUAL: CPT | Performed by: PSYCHIATRY & NEUROLOGY

## 2023-11-29 PROCEDURE — 89050 BODY FLUID CELL COUNT: CPT | Performed by: PSYCHIATRY & NEUROLOGY

## 2023-11-29 PROCEDURE — 85730 THROMBOPLASTIN TIME PARTIAL: CPT | Performed by: NURSE PRACTITIONER

## 2023-11-29 RX ORDER — LIDOCAINE HYDROCHLORIDE 10 MG/ML
5 INJECTION, SOLUTION INFILTRATION; PERINEURAL ONCE
Status: COMPLETED | OUTPATIENT
Start: 2023-11-29 | End: 2023-11-29

## 2023-11-29 RX ADMIN — LIDOCAINE HYDROCHLORIDE 5 ML: 10 INJECTION, SOLUTION INFILTRATION; PERINEURAL at 11:40

## 2023-11-29 NOTE — NURSING NOTE
Patient arrived to [facility] at 0900.  History and medications reviewed with patient and patient's sister.  Procedure with radiology completed.  Dressing to back clean, dry and intact.  Patient discharged from [facility] at 1450 in stable condition without complaint.

## 2023-11-29 NOTE — DISCHARGE INSTRUCTIONS
"Call  Neurology [phone]   if you have any problems or concerns.    We know you have a Choice in healthcare and appreciate you using [facility].  Our purpose is to provide you \"Excellent Care\".  We hope that you will always choose us in the future and continue to recommend us to your family and friends.     "

## 2023-12-01 LAB — REAGIN AB CSF QL: NON REACTIVE

## 2023-12-05 ENCOUNTER — HOSPITAL ENCOUNTER (OUTPATIENT)
Dept: PHYSICAL THERAPY | Facility: HOSPITAL | Age: 68
Setting detail: THERAPIES SERIES
Discharge: HOME | End: 2023-12-05

## 2023-12-05 DIAGNOSIS — M24.661 FIBROSIS OF RIGHT KNEE JOINT: ICD-10-CM

## 2023-12-05 DIAGNOSIS — Z96.651 STATUS POST TOTAL RIGHT KNEE REPLACEMENT: Primary | ICD-10-CM

## 2023-12-05 LAB
APOE GENE MUT ANL BLD/T: NORMAL
LABORATORY COMMENT REPORT: NORMAL
NARRATIVE DIAGNOSTIC REPORT-IMP: NORMAL
REF LAB TEST METHOD: NORMAL

## 2023-12-05 NOTE — THERAPY TREATMENT NOTE
Outpatient Physical Therapy Ortho Treatment Note   Jenny Chávez     Patient Name: Yesi Benson  : 1955  MRN: [id number]  Today's Date: 2023      Visit Date: 2023    Visit Dx:    ICD-10-CM ICD-9-CM   1. Status post total right knee replacement  Z96.651 V43.65   2. Fibrosis of right knee joint  M24.661 718.56       Patient Active Problem List   Diagnosis    Adenomatous polyp of colon    Hyperlipidemia    Hypothyroidism    Status post total right knee replacement    Arthrofibrosis of knee joint, right    MCI (mild cognitive impairment)        Past Medical History:   Diagnosis Date    Colon polyp     Dementia had problems with memory                   during last year    have not been diagnosed    Disease of thyroid gland     Hyperlipidemia approx. 20 years ago    Knee swelling     Memory loss within the last year    Tear of meniscus of knee         Past Surgical History:   Procedure Laterality Date    COLONOSCOPY N/A 2019    Procedure: COLONOSCOPY POLYPECTOMY;  Surgeon: Jaime Alaniz MD;  Location: [facility] OR;  Service: Gastroenterology    DENTAL PROCEDURE Left     JOINT MANIPULATION Right 2020    Procedure: KNEE MANIPULATION;  Surgeon: Elmer Fuentes MD;  Location: [facility] OR;  Service: Orthopedics;  Laterality: Right;    JOINT REPLACEMENT      SALPINGO OOPHORECTOMY Left     as an infant     TOTAL KNEE ARTHROPLASTY Right 10/14/2020    Procedure: TOTAL KNEE ARTHROPLASTY AND ALL ASSOCIATED PROCEDURES;  Surgeon: Elmer Fuentes MD;  Location: [facility];  Service: Orthopedics;  Laterality: Right;        PT Ortho       Row Name 23 1200       Subjective    Subjective Comments Patient reports that she continues to have tightness in right knee, but at times it seems a little better.  -SP       Right Lower Ext    Rt Knee Extension/Flexion AROM 109 degrees  flexion  -SP              User Key  (r) = Recorded By, (t) = Taken By, (c) = Cosigned By      Initials  Name Provider Type    Hetal Webb, PT Physical Therapist                                 PT Assessment/Plan       Row Name 12/05/23 1238          PT Assessment    Assessment Comments Patient with some improvement in right knee fleixon, but ongoing stiffness remains.  -SP        PT Plan    PT Plan Comments Continue dry needling as needed  -SP               User Key  (r) = Recorded By, (t) = Taken By, (c) = Cosigned By      Initials Name Provider Type    Hetal Webb, PT Physical Therapist                     Modalities       Row Name 12/05/23 1200             Moist Heat    MH Applied Yes  -SP      Location anterior and posterior aspect of right knee  -SP      PT Moist Heat Minutes 15  -SP      MH Prior to Rx Yes  -SP      MH S/P Rx Yes  -SP         ELECTRICAL STIMULATION    Attended/Unattended Unattended  -SP      Stimulation Type IFC  -SP      Location/Electrode Placement/Other right knee  -SP      PT E-Stim Unattended Minutes 15  -SP                User Key  (r) = Recorded By, (t) = Taken By, (c) = Cosigned By      Initials Name Provider Type    Hetal Webb, PT Physical Therapist                   OP Exercises       Row Name 12/05/23 1200             Subjective    Subjective Comments Patient reports that she continues to have tightness in right knee, but at times it seems a little better.  -SP                User Key  (r) = Recorded By, (t) = Taken By, (c) = Cosigned By      Initials Name Provider Type    Hetal Webb, PT Physical Therapist                             Manual Rx (last 36 hours)       Manual Treatments       Row Name 12/05/23 1200             Manual Rx 1    Manual Rx 1 Location right LE  -SP      Manual Rx 1 Type Dry needling to right LE HNTrp/ TRp as follows:  common fibular, saphenous, tender medial central and lateral mid to distal quadricep area; 17 needles moderate doseage  -SP                User Key  (r) = Recorded By, (t) = Taken  By, (c) = Cosigned By      Initials Name Provider Type    Hetal Webb, PT Physical Therapist                        Therapy Education  Given: HEP  Program: Reinforced  How Provided: Verbal  Provided to: Patient  Level of Understanding: Verbalized, Demonstrated              Time Calculation:   Start Time: 1020  Stop Time: 1110  Time Calculation (min): 50 min  Untimed Charges  PT E-Stim Unattended Minutes: 15  PT Moist Heat Minutes: 15  Total Minutes  Untimed Charges Total Minutes: 30   Total Minutes: 30  Therapy Charges for Today       Code Description Service Date Service Provider Modifiers Qty    87514426914 HC PT SELF PAY DRY NEEDLING SESSION 12/5/2023 Hetal Caceres, PT  1                      Hetal Caceres, PT  12/5/2023

## 2023-12-07 LAB
AL BETA40 CSF-MCNC: 8995 PG/ML
AL BETA42 CSF-MCNC: 433 PG/ML
AL BETA42/ABETA 40 CSF: 0.05
INTERPRETATION: ABNORMAL
Lab: ABNORMAL

## 2023-12-08 ENCOUNTER — TELEPHONE (OUTPATIENT)
Dept: NEUROLOGY | Facility: CLINIC | Age: 68
End: 2023-12-08
Payer: MEDICARE

## 2023-12-08 NOTE — TELEPHONE ENCOUNTER
----- Message from Salomon Ham MD sent at 12/8/2023  1:33 PM EST -----  Spinal fluid is positive for Alzheimer pathology.  Recommend clinic visit to discuss results and make therapeutic plans.

## 2023-12-11 ENCOUNTER — OFFICE VISIT (OUTPATIENT)
Dept: NEUROLOGY | Facility: CLINIC | Age: 68
End: 2023-12-11
Payer: MEDICARE

## 2023-12-11 VITALS
HEIGHT: 62 IN | SYSTOLIC BLOOD PRESSURE: 162 MMHG | HEART RATE: 88 BPM | BODY MASS INDEX: 28.37 KG/M2 | DIASTOLIC BLOOD PRESSURE: 88 MMHG | OXYGEN SATURATION: 98 % | WEIGHT: 154.2 LBS

## 2023-12-11 DIAGNOSIS — G31.84 MCI (MILD COGNITIVE IMPAIRMENT): Primary | ICD-10-CM

## 2023-12-11 PROCEDURE — 1160F RVW MEDS BY RX/DR IN RCRD: CPT | Performed by: PSYCHIATRY & NEUROLOGY

## 2023-12-11 PROCEDURE — 1159F MED LIST DOCD IN RCRD: CPT | Performed by: PSYCHIATRY & NEUROLOGY

## 2023-12-11 PROCEDURE — 99215 OFFICE O/P EST HI 40 MIN: CPT | Performed by: PSYCHIATRY & NEUROLOGY

## 2023-12-11 RX ORDER — CYCLOBENZAPRINE HCL 10 MG
TABLET ORAL
Qty: 60 TABLET | Refills: 0 | Status: SHIPPED | OUTPATIENT
Start: 2023-12-11

## 2023-12-11 NOTE — PROGRESS NOTES
Notes by MA:  Pt is here today for follow up from lumbar puncture. Her sister, Isreal, is with her today.      Subjective:     Patient ID: Yesi Benson is a 68 y.o. female.    Dementia  Pertinent negatives include no headaches, numbness or weakness.     The following portions of the patient's history were reviewed and updated as appropriate: allergies, current medications, past family history, past medical history, past social history, past surgical history, and problem list.    Review of Systems   Musculoskeletal:  Negative for gait problem.   Neurological:  Positive for speech difficulty (word finding). Negative for dizziness, tremors, seizures, syncope, facial asymmetry, weakness, light-headedness, numbness and headaches.   Psychiatric/Behavioral:  Positive for confusion. Negative for agitation, behavioral problems, decreased concentration, dysphoric mood, hallucinations, self-injury, sleep disturbance and suicidal ideas. The patient is not nervous/anxious and is not hyperactive.         Objective:    Neurologic Exam  She is awake alert pleasant cooperative with reasonably fluent speech and reasonable speech comprehension.  She asked the same question multiple times today.     Cranial nerves II through XII normal and symmetric.     Motor exam reveals age-appropriate tone bulk and power without lateralization and without drift.     Sensory exam reveals reasonably preserved and symmetric sensation to light touch, temperature, and vibration.     Coordination testing reveals smooth and accurate finger-nose-finger normal rapid alternating movements.     Tendon reflexes are 1+ and symmetric.  Toes are downgoing.  Physical Exam    Assessment/Plan:     Diagnoses and all orders for this visit:    1. MCI (mild cognitive impairment) (Primary)  -     MRI Brain Without Contrast; Future       68-year-old woman with mild cognitive impairment, now confirmed to have underlying Alzheimer pathology by spinal fluid amyloid testing.   Her APO E testing also revealed only 1 copy of the APO E four variant.  I discussed all of this with them in the context of starting her on antiamyloid therapy.  We once again discussed all of the safety monitoring, infusion scheduling, reasonable expectations, possible complications once again.  She is very interested in proceeding with Leqembi and we will begin that process for her.  It has been nearly a year since her last brain MRI and we will arrange for a new baseline MRI prior to her first infusion.    45 total patient care time including record review, extensive examination and discussion, , and documentation.

## 2023-12-11 NOTE — TELEPHONE ENCOUNTER
Rx Refill Note  Requested Prescriptions     Pending Prescriptions Disp Refills    cyclobenzaprine (FLEXERIL) 10 MG tablet [Pharmacy Med Name: CYCLOBENZAPRINE 10 MG TABLET] 60 tablet 0     Sig: TAKE 1 TABLET BY MOUTH 3 TIMES A DAY AS NEEDED FOR MUSCLE SPASMS.      Last office visit with prescribing clinician: 10/26/2023      Next office visit with prescribing clinician: Visit date not found   Last Filled: 11.13.2023    DX: status post right total knee arthroplasty with subsequent manipulation-10/14/2020        Daphney Paris MA  12/11/23, 09:38 EST    {TIP  Encounters:    {TIP  Please add Last Relevant Lab Date if appropriate:  {TIP  Is Refill Pharmacy correct?:

## 2023-12-14 ENCOUNTER — TRANSCRIBE ORDERS (OUTPATIENT)
Dept: ADMINISTRATIVE | Facility: HOSPITAL | Age: 68
End: 2023-12-14
Payer: MEDICARE

## 2023-12-14 ENCOUNTER — HOSPITAL ENCOUNTER (OUTPATIENT)
Dept: PHYSICAL THERAPY | Facility: HOSPITAL | Age: 68
Setting detail: THERAPIES SERIES
Discharge: HOME | End: 2023-12-14

## 2023-12-14 DIAGNOSIS — M24.661 FIBROSIS OF RIGHT KNEE JOINT: ICD-10-CM

## 2023-12-14 DIAGNOSIS — E78.5 HYPERLIPIDEMIA, UNSPECIFIED HYPERLIPIDEMIA TYPE: Primary | ICD-10-CM

## 2023-12-14 DIAGNOSIS — E03.9 MYXEDEMA HEART DISEASE: ICD-10-CM

## 2023-12-14 DIAGNOSIS — I51.9 MYXEDEMA HEART DISEASE: ICD-10-CM

## 2023-12-14 DIAGNOSIS — Z96.651 STATUS POST TOTAL RIGHT KNEE REPLACEMENT: Primary | ICD-10-CM

## 2023-12-14 NOTE — THERAPY TREATMENT NOTE
Outpatient Physical Therapy Ortho Treatment Note   Jenny Chávez     Patient Name: Yesi Benson  : 1955  MRN: [id number]  Today's Date: 2023      Visit Date: 2023    Visit Dx:    ICD-10-CM ICD-9-CM   1. Status post total right knee replacement  Z96.651 V43.65   2. Fibrosis of right knee joint  M24.661 718.56       Patient Active Problem List   Diagnosis    Adenomatous polyp of colon    Hyperlipidemia    Hypothyroidism    Status post total right knee replacement    Arthrofibrosis of knee joint, right    MCI (mild cognitive impairment)        Past Medical History:   Diagnosis Date    Colon polyp     Dementia had problems with memory                   during last year    have not been diagnosed    Disease of thyroid gland     Hyperlipidemia approx. 20 years ago    Knee swelling     Memory loss within the last year    Tear of meniscus of knee         Past Surgical History:   Procedure Laterality Date    COLONOSCOPY N/A 2019    Procedure: COLONOSCOPY POLYPECTOMY;  Surgeon: Jaime Alaniz MD;  Location: [facility] OR;  Service: Gastroenterology    DENTAL PROCEDURE Left     JOINT MANIPULATION Right 2020    Procedure: KNEE MANIPULATION;  Surgeon: Elmer Fuentes MD;  Location: [facility] OR;  Service: Orthopedics;  Laterality: Right;    JOINT REPLACEMENT      SALPINGO OOPHORECTOMY Left     as an infant     TOTAL KNEE ARTHROPLASTY Right 10/14/2020    Procedure: TOTAL KNEE ARTHROPLASTY AND ALL ASSOCIATED PROCEDURES;  Surgeon: Elmer Fuentes MD;  Location: [facility];  Service: Orthopedics;  Laterality: Right;        PT Ortho       Row Name 23 1600       Subjective    Subjective Comments Patient reports that she does think that dry needling has helped some but she continues to have tighness in right knee  -SP              User Key  (r) = Recorded By, (t) = Taken By, (c) = Cosigned By      Initials Name Provider Type    Hetal Webb, PT Physical Therapist                                  PT Assessment/Plan       Row Name 12/14/23 1620          PT Assessment    Assessment Comments Patient tolerates moderate dosage dry needling to right knee.  She is consistently reporting some decreased tightness in right knee, but she continues to feel overall tightness in right knee/leg.  -SP        PT Plan    PT Plan Comments Continue dry needling and modalities  -SP               User Key  (r) = Recorded By, (t) = Taken By, (c) = Cosigned By      Initials Name Provider Type    Hetal Webb, PT Physical Therapist                     Modalities       Row Name 12/14/23 1600             Moist Heat    MH Applied Yes  -SP      Location anterior and posterior aspect of right knee  -SP      PT Moist Heat Minutes 15  -SP      MH Prior to Rx Yes  -SP      MH S/P Rx Yes  -SP         ELECTRICAL STIMULATION    Attended/Unattended Unattended  -SP      Stimulation Type IFC  -SP      Location/Electrode Placement/Other right knee  -SP                User Key  (r) = Recorded By, (t) = Taken By, (c) = Cosigned By      Initials Name Provider Type    Hetal Webb, PT Physical Therapist                   OP Exercises       Row Name 12/14/23 1600             Subjective    Subjective Comments Patient reports that she does think that dry needling has helped some but she continues to have tighness in right knee  -SP                User Key  (r) = Recorded By, (t) = Taken By, (c) = Cosigned By      Initials Name Provider Type    Hetal Webb, PT Physical Therapist                             Manual Rx (last 36 hours)       Manual Treatments       Row Name 12/14/23 1600             Manual Rx 1    Manual Rx 1 Location right LE  -SP      Manual Rx 1 Type Dry needling to right LE HNTrp/ TRp as follows:  common fibular, saphenous, tender medial central and lateral mid to distal quadricep area; 17 needles moderate doseage  -SP                User Key  (r) = Recorded By,  (t) = Taken By, (c) = Cosigned By      Initials Name Provider Type    Hetal Webb, PT Physical Therapist                                       Time Calculation:   Start Time: 1100  Stop Time: 1145  Time Calculation (min): 45 min  Untimed Charges  PT Moist Heat Minutes: 15  Total Minutes  Untimed Charges Total Minutes: 15   Total Minutes: 15  Therapy Charges for Today       Code Description Service Date Service Provider Modifiers Qty    96085026196 HC PT SELF PAY DRY NEEDLING SESSION 12/14/2023 Hetal Caceres, PT  1                      Hetal Caceres, PT  12/14/2023

## 2023-12-15 ENCOUNTER — TELEPHONE (OUTPATIENT)
Dept: NEUROLOGY | Facility: CLINIC | Age: 68
End: 2023-12-15
Payer: MEDICARE

## 2023-12-15 ENCOUNTER — LAB (OUTPATIENT)
Dept: LAB | Facility: HOSPITAL | Age: 68
End: 2023-12-15
Payer: MEDICARE

## 2023-12-15 DIAGNOSIS — E78.5 HYPERLIPIDEMIA, UNSPECIFIED HYPERLIPIDEMIA TYPE: ICD-10-CM

## 2023-12-15 DIAGNOSIS — I51.9 MYXEDEMA HEART DISEASE: ICD-10-CM

## 2023-12-15 DIAGNOSIS — E03.9 MYXEDEMA HEART DISEASE: ICD-10-CM

## 2023-12-15 LAB
ALBUMIN SERPL-MCNC: 4.4 G/DL (ref 3.5–5.2)
ALBUMIN/GLOB SERPL: 2 G/DL
ALP SERPL-CCNC: 68 U/L (ref 39–117)
ALT SERPL W P-5'-P-CCNC: 18 U/L (ref 1–33)
ANION GAP SERPL CALCULATED.3IONS-SCNC: 11 MMOL/L (ref 5–15)
AST SERPL-CCNC: 26 U/L (ref 1–32)
BASOPHILS # BLD AUTO: 0.04 10*3/MM3 (ref 0–0.2)
BASOPHILS NFR BLD AUTO: 0.8 % (ref 0–1.5)
BILIRUB SERPL-MCNC: 0.5 MG/DL (ref 0–1.2)
BUN SERPL-MCNC: 13 MG/DL (ref 8–23)
BUN/CREAT SERPL: 11.8 (ref 7–25)
CALCIUM SPEC-SCNC: 10.6 MG/DL (ref 8.6–10.5)
CHLORIDE SERPL-SCNC: 101 MMOL/L (ref 98–107)
CHOLEST SERPL-MCNC: 284 MG/DL (ref 0–200)
CO2 SERPL-SCNC: 33 MMOL/L (ref 22–29)
CREAT SERPL-MCNC: 1.1 MG/DL (ref 0.57–1)
DEPRECATED RDW RBC AUTO: 40.5 FL (ref 37–54)
EGFRCR SERPLBLD CKD-EPI 2021: 54.5 ML/MIN/1.73
EOSINOPHIL # BLD AUTO: 0.2 10*3/MM3 (ref 0–0.4)
EOSINOPHIL NFR BLD AUTO: 3.8 % (ref 0.3–6.2)
ERYTHROCYTE [DISTWIDTH] IN BLOOD BY AUTOMATED COUNT: 12.7 % (ref 12.3–15.4)
GLOBULIN UR ELPH-MCNC: 2.2 GM/DL
GLUCOSE SERPL-MCNC: 111 MG/DL (ref 65–99)
HCT VFR BLD AUTO: 37.9 % (ref 34–46.6)
HDLC SERPL-MCNC: 80 MG/DL (ref 40–60)
HGB BLD-MCNC: 12.8 G/DL (ref 12–15.9)
IMM GRANULOCYTES # BLD AUTO: 0.01 10*3/MM3 (ref 0–0.05)
IMM GRANULOCYTES NFR BLD AUTO: 0.2 % (ref 0–0.5)
LDLC SERPL CALC-MCNC: 191 MG/DL (ref 0–100)
LDLC/HDLC SERPL: 2.35 {RATIO}
LYMPHOCYTES # BLD AUTO: 1.7 10*3/MM3 (ref 0.7–3.1)
LYMPHOCYTES NFR BLD AUTO: 32.4 % (ref 19.6–45.3)
MCH RBC QN AUTO: 29.7 PG (ref 26.6–33)
MCHC RBC AUTO-ENTMCNC: 33.8 G/DL (ref 31.5–35.7)
MCV RBC AUTO: 87.9 FL (ref 79–97)
MONOCYTES # BLD AUTO: 0.43 10*3/MM3 (ref 0.1–0.9)
MONOCYTES NFR BLD AUTO: 8.2 % (ref 5–12)
NEUTROPHILS NFR BLD AUTO: 2.86 10*3/MM3 (ref 1.7–7)
NEUTROPHILS NFR BLD AUTO: 54.6 % (ref 42.7–76)
NRBC BLD AUTO-RTO: 0 /100 WBC (ref 0–0.2)
PLATELET # BLD AUTO: 256 10*3/MM3 (ref 140–450)
PMV BLD AUTO: 10.5 FL (ref 6–12)
POTASSIUM SERPL-SCNC: 3 MMOL/L (ref 3.5–5.2)
PROT SERPL-MCNC: 6.6 G/DL (ref 6–8.5)
RBC # BLD AUTO: 4.31 10*6/MM3 (ref 3.77–5.28)
SODIUM SERPL-SCNC: 145 MMOL/L (ref 136–145)
T4 FREE SERPL-MCNC: 1.87 NG/DL (ref 0.93–1.7)
TRIGL SERPL-MCNC: 81 MG/DL (ref 0–150)
TSH SERPL DL<=0.05 MIU/L-ACNC: 0.85 UIU/ML (ref 0.27–4.2)
VLDLC SERPL-MCNC: 13 MG/DL (ref 5–40)
WBC NRBC COR # BLD AUTO: 5.24 10*3/MM3 (ref 3.4–10.8)

## 2023-12-15 PROCEDURE — 85025 COMPLETE CBC W/AUTO DIFF WBC: CPT

## 2023-12-15 PROCEDURE — 84439 ASSAY OF FREE THYROXINE: CPT

## 2023-12-15 PROCEDURE — 84443 ASSAY THYROID STIM HORMONE: CPT

## 2023-12-15 PROCEDURE — 36415 COLL VENOUS BLD VENIPUNCTURE: CPT

## 2023-12-15 PROCEDURE — 80061 LIPID PANEL: CPT

## 2023-12-15 PROCEDURE — 80053 COMPREHEN METABOLIC PANEL: CPT

## 2023-12-15 NOTE — TELEPHONE ENCOUNTER
Provider: DR ASHBY    Caller: LEONARDO WITH [facility]    Phone Number: [phone] ext [phone]    Reason for Call: CALLED REGARDING BENEFITS FOR LEQEMBI. PATIENT WILL NEED PRIOR AUTH. PLEASE REVIEW, THANK YOU.

## 2023-12-15 NOTE — TELEPHONE ENCOUNTER
Caller: LEONARDO WITH [employer]    Relationship:     Best call back number: [phone]     What is the best time to reach you: ANY    Who are you requesting to speak with (clinical staff, provider,  specific staff member): PROVIDER/STAFF    What was the call regarding: LEONARDO W/ [facility] TELEPHONED TO ADVISE THEY HAVE VERIFIED PATIENT BENEFITS, HOWEVER [employer] WILL NOT GIVE THEM INFO DUE TO 3RD PARTY. OFFICE NEEDS TO CONTACT [employer] TO DISCUSS BENEFITS FOR MEDICATION LEQEMBI.    THANK YOU

## 2023-12-18 ENCOUNTER — TELEPHONE (OUTPATIENT)
Dept: NEUROLOGY | Facility: CLINIC | Age: 68
End: 2023-12-18
Payer: MEDICARE

## 2023-12-18 ENCOUNTER — HOSPITAL ENCOUNTER (OUTPATIENT)
Dept: MRI IMAGING | Facility: HOSPITAL | Age: 68
Discharge: HOME | End: 2023-12-18
Admitting: PSYCHIATRY & NEUROLOGY
Payer: MEDICARE

## 2023-12-18 DIAGNOSIS — G31.84 MCI (MILD COGNITIVE IMPAIRMENT): ICD-10-CM

## 2023-12-18 PROCEDURE — 70551 MRI BRAIN STEM W/O DYE: CPT

## 2023-12-18 NOTE — TELEPHONE ENCOUNTER
Called [email or web address]. Initiated PA for Leqembi, , under medical and B&B. Human will fax documents to be completed and return for review.   EOC# [id number]

## 2023-12-20 ENCOUNTER — TELEPHONE (OUTPATIENT)
Dept: NEUROLOGY | Facility: CLINIC | Age: 68
End: 2023-12-20
Payer: MEDICARE

## 2023-12-20 DIAGNOSIS — G31.84 MCI (MILD COGNITIVE IMPAIRMENT): Primary | ICD-10-CM

## 2023-12-20 NOTE — TELEPHONE ENCOUNTER
Received PA approval. Called pt and reviewed. Pt v/u and agreement to proceed.     Order for Leqembi infusions placed.

## 2023-12-20 NOTE — TELEPHONE ENCOUNTER
----- Message from Salomon Ham MD sent at 12/20/2023  1:21 PM EST -----  Brain MRI looks fine.  Continue with treatment plans as outlined in clinic.

## 2023-12-21 ENCOUNTER — HOSPITAL ENCOUNTER (OUTPATIENT)
Dept: PHYSICAL THERAPY | Facility: HOSPITAL | Age: 68
Setting detail: THERAPIES SERIES
Discharge: HOME | End: 2023-12-21

## 2023-12-21 DIAGNOSIS — M24.661 FIBROSIS OF RIGHT KNEE JOINT: ICD-10-CM

## 2023-12-21 DIAGNOSIS — Z96.651 STATUS POST TOTAL RIGHT KNEE REPLACEMENT: Primary | ICD-10-CM

## 2023-12-21 NOTE — THERAPY TREATMENT NOTE
"  Outpatient Physical Therapy Ortho Treatment Note   Jenny Chávez     Patient Name: Yesi Benson  : 1955  MRN: [id number]  Today's Date: 2023      Visit Date: 2023    Visit Dx:    ICD-10-CM ICD-9-CM   1. Status post total right knee replacement  Z96.651 V43.65   2. Fibrosis of right knee joint  M24.661 718.56       Patient Active Problem List   Diagnosis    Adenomatous polyp of colon    Hyperlipidemia    Hypothyroidism    Status post total right knee replacement    Arthrofibrosis of knee joint, right    MCI (mild cognitive impairment)        Past Medical History:   Diagnosis Date    Colon polyp     Dementia had problems with memory                   during last year    have not been diagnosed    Disease of thyroid gland     Hyperlipidemia approx. 20 years ago    Knee swelling     Memory loss within the last year    Tear of meniscus of knee         Past Surgical History:   Procedure Laterality Date    COLONOSCOPY N/A 2019    Procedure: COLONOSCOPY POLYPECTOMY;  Surgeon: Jaime Alaniz MD;  Location: [facility] OR;  Service: Gastroenterology    DENTAL PROCEDURE Left     JOINT MANIPULATION Right 2020    Procedure: KNEE MANIPULATION;  Surgeon: Elmer Fuentes MD;  Location: [facility] OR;  Service: Orthopedics;  Laterality: Right;    JOINT REPLACEMENT      SALPINGO OOPHORECTOMY Left     as an infant     TOTAL KNEE ARTHROPLASTY Right 10/14/2020    Procedure: TOTAL KNEE ARTHROPLASTY AND ALL ASSOCIATED PROCEDURES;  Surgeon: Elmer Fuentes MD;  Location: [facility];  Service: Orthopedics;  Laterality: Right;        PT Ortho       Row Name 23 1600       Subjective    Subjective Comments \"I think it (knee) seems a little looser\"  -SP       Right Lower Ext    Rt Knee Extension/Flexion AROM 110 AROM right knee flexion prior to and after treatment.  PROM to 115 degrees  -SP              User Key  (r) = Recorded By, (t) = Taken By, (c) = Cosigned By      Initials Name " "Provider Type    Hetal Webb, PT Physical Therapist                                 PT Assessment/Plan       Row Name 12/21/23 1646          PT Assessment    Assessment Comments Patient tolerates moderate doseage dryn needling to right knee, thigh, and posterior aspect of leg.  Patient exhibit minimal change in right knee ROM, however she does feel like her knee is not as stiff  -SP        PT Plan    PT Plan Comments Conintinue as appropriate  -SP               User Key  (r) = Recorded By, (t) = Taken By, (c) = Cosigned By      Initials Name Provider Type    Hetal Webb, PT Physical Therapist                     Modalities       Row Name 12/21/23 1600             Moist Heat    MH Applied Yes  -SP      Location anterior and posterior aspect of right knee  -SP      PT Moist Heat Minutes 15  -SP      MH Prior to Rx Yes  -SP      MH S/P Rx Yes  -SP                User Key  (r) = Recorded By, (t) = Taken By, (c) = Cosigned By      Initials Name Provider Type    Hetal Webb, PT Physical Therapist                   OP Exercises       Row Name 12/21/23 1600             Subjective    Subjective Comments \"I think it (knee) seems a little looser\"  -SP                User Key  (r) = Recorded By, (t) = Taken By, (c) = Cosigned By      Initials Name Provider Type    Hetal Webb, PT Physical Therapist                             Manual Rx (last 36 hours)       Manual Treatments       Row Name 12/21/23 1500             Manual Rx 1    Manual Rx 1 Location right LE  -SP      Manual Rx 1 Type Dry needling to right LE HNTrp/ TRp as follows:  common fibular, saphenous, tender medial central and lateral mid to distal quadricep area; 17 needles moderate doseage  -SP                User Key  (r) = Recorded By, (t) = Taken By, (c) = Cosigned By      Initials Name Provider Type    Hetal Webb, PT Physical Therapist                        Therapy Education  Given: " Symptoms/condition management  Program: Reinforced  How Provided: Verbal  Provided to: Patient  Level of Understanding: Verbalized              Time Calculation:   Start Time: 1020  Stop Time: 1105  Time Calculation (min): 45 min  Untimed Charges  PT Moist Heat Minutes: 15  Total Minutes  Untimed Charges Total Minutes: 15   Total Minutes: 15  Therapy Charges for Today       Code Description Service Date Service Provider Modifiers Qty    19422784576 HC PT SELF PAY DRY NEEDLING SESSION 12/21/2023 Hetal Caceres, PT  1                      Hetal Caceres, PT  12/21/2023

## 2024-01-03 ENCOUNTER — HOSPITAL ENCOUNTER (OUTPATIENT)
Dept: PHYSICAL THERAPY | Facility: HOSPITAL | Age: 69
Setting detail: THERAPIES SERIES
Discharge: HOME | End: 2024-01-03

## 2024-01-03 DIAGNOSIS — Z96.651 STATUS POST TOTAL RIGHT KNEE REPLACEMENT: Primary | ICD-10-CM

## 2024-01-03 DIAGNOSIS — M24.661 FIBROSIS OF RIGHT KNEE JOINT: ICD-10-CM

## 2024-01-04 ENCOUNTER — HOSPITAL ENCOUNTER (OUTPATIENT)
Dept: INFUSION THERAPY | Facility: HOSPITAL | Age: 69
Discharge: HOME | End: 2024-01-04
Admitting: PSYCHIATRY & NEUROLOGY
Payer: MEDICARE

## 2024-01-04 VITALS
DIASTOLIC BLOOD PRESSURE: 78 MMHG | SYSTOLIC BLOOD PRESSURE: 148 MMHG | OXYGEN SATURATION: 97 % | TEMPERATURE: 97.1 F | HEART RATE: 76 BPM | RESPIRATION RATE: 16 BRPM

## 2024-01-04 DIAGNOSIS — G31.84 MCI (MILD COGNITIVE IMPAIRMENT): ICD-10-CM

## 2024-01-04 DIAGNOSIS — G31.84 MILD COGNITIVE IMPAIRMENT: Primary | ICD-10-CM

## 2024-01-04 PROCEDURE — 25010000002 DIPHENHYDRAMINE PER 50 MG: Performed by: PSYCHIATRY & NEUROLOGY

## 2024-01-04 PROCEDURE — 25010000002 LECANEMAB-IRMB 500 MG/5ML SOLUTION 5 ML VIAL: Performed by: PSYCHIATRY & NEUROLOGY

## 2024-01-04 PROCEDURE — 25810000003 SODIUM CHLORIDE 0.9 % SOLUTION 250 ML FLEX CONT: Performed by: PSYCHIATRY & NEUROLOGY

## 2024-01-04 PROCEDURE — 25010000002 LECANEMAB-IRMB 200 MG/2ML SOLUTION 2 ML VIAL: Performed by: PSYCHIATRY & NEUROLOGY

## 2024-01-04 PROCEDURE — 96365 THER/PROPH/DIAG IV INF INIT: CPT

## 2024-01-04 PROCEDURE — 96375 TX/PRO/DX INJ NEW DRUG ADDON: CPT

## 2024-01-04 PROCEDURE — 25010000002 METHYLPREDNISOLONE PER 125 MG: Performed by: PSYCHIATRY & NEUROLOGY

## 2024-01-04 RX ORDER — METHYLPREDNISOLONE SODIUM SUCCINATE 125 MG/2ML
125 INJECTION, POWDER, LYOPHILIZED, FOR SOLUTION INTRAMUSCULAR; INTRAVENOUS ONCE
Status: COMPLETED | OUTPATIENT
Start: 2024-01-04 | End: 2024-01-04

## 2024-01-04 RX ORDER — FAMOTIDINE 10 MG/ML
20 INJECTION, SOLUTION INTRAVENOUS AS NEEDED
OUTPATIENT
Start: 2024-01-18

## 2024-01-04 RX ORDER — METHYLPREDNISOLONE SODIUM SUCCINATE 125 MG/2ML
125 INJECTION, POWDER, LYOPHILIZED, FOR SOLUTION INTRAMUSCULAR; INTRAVENOUS ONCE
Status: CANCELLED | OUTPATIENT
Start: 2024-01-18

## 2024-01-04 RX ORDER — SODIUM CHLORIDE 9 MG/ML
20 INJECTION, SOLUTION INTRAVENOUS ONCE
OUTPATIENT
Start: 2024-01-18

## 2024-01-04 RX ORDER — ACETAMINOPHEN 325 MG/1
650 TABLET ORAL ONCE
Status: CANCELLED | OUTPATIENT
Start: 2024-01-18

## 2024-01-04 RX ORDER — DIPHENHYDRAMINE HYDROCHLORIDE 50 MG/ML
50 INJECTION INTRAMUSCULAR; INTRAVENOUS AS NEEDED
OUTPATIENT
Start: 2024-01-18

## 2024-01-04 RX ORDER — ACETAMINOPHEN 325 MG/1
650 TABLET ORAL ONCE
Status: COMPLETED | OUTPATIENT
Start: 2024-01-04 | End: 2024-01-04

## 2024-01-04 RX ORDER — DIPHENHYDRAMINE HYDROCHLORIDE 50 MG/ML
25 INJECTION INTRAMUSCULAR; INTRAVENOUS ONCE
Status: COMPLETED | OUTPATIENT
Start: 2024-01-04 | End: 2024-01-04

## 2024-01-04 RX ADMIN — METHYLPREDNISOLONE SODIUM SUCCINATE 125 MG: 125 INJECTION, POWDER, FOR SOLUTION INTRAMUSCULAR; INTRAVENOUS at 09:14

## 2024-01-04 RX ADMIN — LECANEMAB 700 MG: 100 INJECTION, SOLUTION INTRAVENOUS at 09:20

## 2024-01-04 RX ADMIN — ACETAMINOPHEN 650 MG: 325 TABLET ORAL at 09:14

## 2024-01-04 RX ADMIN — DIPHENHYDRAMINE HYDROCHLORIDE 25 MG: 50 INJECTION, SOLUTION INTRAMUSCULAR; INTRAVENOUS at 09:14

## 2024-01-04 NOTE — NURSING NOTE
PT ARRIVED TO [facility] FOR APPT. VSS, NO COMPLAINTS AT THIS TIME. MEDICATION ADMINISTERED PER MD ORDER. PT TOLERATED WELL. VSS POST INFUSION. PT UNABLE TO STAY FOR 3 HOURS POST INFUSION. SCHEDULED PT FOR NEXT 2 VISITS. AVS PRINTED OUT, COPY GIVEN TO PT. PT DISCHARGED FROM [facility] AT 11:30 AM IN STABLE CONDITION, WITHOUT COMPLAINTS.

## 2024-01-04 NOTE — PATIENT INSTRUCTIONS
"  Call  Dr. Salomon Ham @ [phone]  if you have any problems or concerns.    We know you have a Choice in healthcare and appreciate you using [facility].  Our purpose is to provide you \"Excellent Care\".  We hope that you will always choose us in the future and continue to recommend us to your family and friends.              "

## 2024-01-05 ENCOUNTER — OFFICE VISIT (OUTPATIENT)
Dept: NEUROLOGY | Facility: CLINIC | Age: 69
End: 2024-01-05
Payer: MEDICARE

## 2024-01-05 VITALS
BODY MASS INDEX: 28.45 KG/M2 | HEIGHT: 62 IN | SYSTOLIC BLOOD PRESSURE: 140 MMHG | HEART RATE: 91 BPM | OXYGEN SATURATION: 97 % | DIASTOLIC BLOOD PRESSURE: 98 MMHG | WEIGHT: 154.6 LBS

## 2024-01-05 DIAGNOSIS — G31.84 MCI (MILD COGNITIVE IMPAIRMENT): Primary | ICD-10-CM

## 2024-01-05 PROCEDURE — 99213 OFFICE O/P EST LOW 20 MIN: CPT | Performed by: PSYCHIATRY & NEUROLOGY

## 2024-01-05 PROCEDURE — 1160F RVW MEDS BY RX/DR IN RCRD: CPT | Performed by: PSYCHIATRY & NEUROLOGY

## 2024-01-05 PROCEDURE — 1159F MED LIST DOCD IN RCRD: CPT | Performed by: PSYCHIATRY & NEUROLOGY

## 2024-01-05 NOTE — PROGRESS NOTES
Notes by MA  Pt is here today for a follow up for memory loss. Her sister, Isreal, is with her today.      Subjective:     Patient ID: Yesi Benson is a 68 y.o. female.    History of Present Illness  The following portions of the patient's history were reviewed and updated as appropriate: allergies, current medications, past family history, past medical history, past social history, past surgical history, and problem list.    Review of Systems   Musculoskeletal:  Negative for gait problem.   Neurological:  Negative for dizziness, tremors, seizures, syncope, facial asymmetry, speech difficulty, weakness, light-headedness, numbness and headaches.   Psychiatric/Behavioral:  Negative for agitation, behavioral problems, confusion, decreased concentration, dysphoric mood, hallucinations, self-injury, sleep disturbance and suicidal ideas. The patient is not nervous/anxious and is not hyperactive.         Objective:    Neurologic Exam  She is awake alert pleasant cooperative with reasonably fluent speech and reasonable speech comprehension.  She asked the same question multiple times today.     Cranial nerves II through XII normal and symmetric.     Motor exam reveals age-appropriate tone bulk and power without lateralization and without drift.     Sensory exam reveals reasonably preserved and symmetric sensation to light touch, temperature, and vibration.     Coordination testing reveals smooth and accurate finger-nose-finger normal rapid alternating movements.     Tendon reflexes are 1+ and symmetric.  Toes are downgoing.  Physical Exam    Assessment/Plan:     Diagnoses and all orders for this visit:    1. MCI (mild cognitive impairment) (Primary)     She has completed her first Leqembi infusion yesterday.  She had no problematic side effects.  She did well with the infusion and follow-up.  I discussed the infusion schedule with her once again and answered her and her sister's questions.  Will see her back in about 6 or  7 weeks time which will be about time for her first MRI surveillance for Aria.  No other changes today.

## 2024-01-08 ENCOUNTER — TELEPHONE (OUTPATIENT)
Dept: NEUROLOGY | Facility: CLINIC | Age: 69
End: 2024-01-08
Payer: MEDICARE

## 2024-01-08 NOTE — TELEPHONE ENCOUNTER
Scheduled pt for 3 MRI for ARIA monitoring while on Leqembi.    02/19/24 @ 1 pm [facility]    03/18/24 @ 1 pm [facility]    06/24/24 @ 1 pm [facility]      Called pt with scheduling details. Pt v/u and noted appt dates and times.

## 2024-01-09 ENCOUNTER — HOSPITAL ENCOUNTER (OUTPATIENT)
Dept: PHYSICAL THERAPY | Facility: HOSPITAL | Age: 69
Setting detail: THERAPIES SERIES
Discharge: HOME | End: 2024-01-09

## 2024-01-09 DIAGNOSIS — M24.661 FIBROSIS OF RIGHT KNEE JOINT: Primary | ICD-10-CM

## 2024-01-09 DIAGNOSIS — Z96.651 STATUS POST TOTAL RIGHT KNEE REPLACEMENT: ICD-10-CM

## 2024-01-09 NOTE — THERAPY TREATMENT NOTE
Outpatient Physical Therapy Ortho Treatment Note   Jenny Chávez     Patient Name: Yesi Benson  : 1955  MRN: [id number]  Today's Date: 2024      Visit Date: 2024    Visit Dx:    ICD-10-CM ICD-9-CM   1. Fibrosis of right knee joint  M24.661 718.56   2. Status post total right knee replacement  Z96.651 V43.65       Patient Active Problem List   Diagnosis    Adenomatous polyp of colon    Hyperlipidemia    Hypothyroidism    Status post total right knee replacement    Arthrofibrosis of knee joint, right    MCI (mild cognitive impairment)        Past Medical History:   Diagnosis Date    Colon polyp     Dementia had problems with memory                   during last year    have not been diagnosed    Disease of thyroid gland     Hyperlipidemia approx. 20 years ago    Knee swelling     Memory loss within the last year    Tear of meniscus of knee         Past Surgical History:   Procedure Laterality Date    COLONOSCOPY N/A 2019    Procedure: COLONOSCOPY POLYPECTOMY;  Surgeon: Jaime Alaniz MD;  Location: [facility] OR;  Service: Gastroenterology    DENTAL PROCEDURE Left     JOINT MANIPULATION Right 2020    Procedure: KNEE MANIPULATION;  Surgeon: Elmer Fuentes MD;  Location: [facility] OR;  Service: Orthopedics;  Laterality: Right;    JOINT REPLACEMENT      SALPINGO OOPHORECTOMY Left     as an infant     TOTAL KNEE ARTHROPLASTY Right 10/14/2020    Procedure: TOTAL KNEE ARTHROPLASTY AND ALL ASSOCIATED PROCEDURES;  Surgeon: Elmer Fuentes MD;  Location: [facility];  Service: Orthopedics;  Laterality: Right;        PT Ortho       Row Name 24 1100       Subjective    Subjective Comments Patient reports that she has had increased symptoms over the last couple of days.  She reports that her right knee continues to be tight and it affects her walking.  Patient relates  that she feels a little looser after treatment but this does not last  -SP       Posture/Observations     Posture/Observations Comments patient with moderate edema present right knee to ankle  -SP       Right Lower Ext    Rt Knee Extension/Flexion AROM 8 to 100 degrees AROM prior to treatment: 8 to 105 degrees after treatment  -SP              User Key  (r) = Recorded By, (t) = Taken By, (c) = Cosigned By      Initials Name Provider Type    Hetal Webb, PT Physical Therapist                                 PT Assessment/Plan       Row Name 01/09/24 1600          PT Assessment    Assessment Comments Patient continues to have right knee stiffness and tightness.  She exhibits minimal overall change in right knee mobility with dry needling.  -SP        PT Plan    PT Plan Comments Patient wishes to continue with one additional visit  -SP               User Key  (r) = Recorded By, (t) = Taken By, (c) = Cosigned By      Initials Name Provider Type    Hetal Webb, PT Physical Therapist                     Modalities       Row Name 01/09/24 1100             Moist Heat    MH Applied Yes  -SP      Location anterior and posterior right thigh  -SP      PT Moist Heat Minutes 15  -SP      MH Prior to Rx Yes  -SP      MH S/P Rx Yes  -SP         ELECTRICAL STIMULATION    Attended/Unattended Unattended  -SP      Stimulation Type IFC  -SP      Location/Electrode Placement/Other right knee  -SP                User Key  (r) = Recorded By, (t) = Taken By, (c) = Cosigned By      Initials Name Provider Type    Hetal Webb, PT Physical Therapist                   OP Exercises       Row Name 01/09/24 1100             Subjective    Subjective Comments Patient reports that she has had increased symptoms over the last couple of days.  She reports that her right knee continues to be tight and it affects her walking.  Patient relates  that she feels a little looser after treatment but this does not last  -SP                User Key  (r) = Recorded By, (t) = Taken By, (c) = Cosigned By      Initials Name  Provider Type    Hetal Webb, PT Physical Therapist                             Manual Rx (last 36 hours)       Manual Treatments       Row Name 01/09/24 1500             Manual Rx 1    Manual Rx 1 Location right LE  -SP      Manual Rx 1 Type Dry needling to right LE HNTrp/ TRp as follows:  common fibular, saphenous, tender medial central and lateral mid to distal quadricep area; 17 needles moderate doseage  -SP                User Key  (r) = Recorded By, (t) = Taken By, (c) = Cosigned By      Initials Name Provider Type    Hetal Webb, PT Physical Therapist                        Therapy Education  Education Details: Patient advised to ues elevation and continue with ROM exercises for mobility  Given: Edema management              Time Calculation:   Start Time: 1000  Stop Time: 1045  Time Calculation (min): 45 min  Untimed Charges  PT Moist Heat Minutes: 15  Total Minutes  Untimed Charges Total Minutes: 15   Total Minutes: 15  Therapy Charges for Today       Code Description Service Date Service Provider Modifiers Qty    26880762805 HC PT SELF PAY DRY NEEDLING SESSION 1/9/2024 Hetal Caceres, PT  1                      Hetal Caceres, PT  1/9/2024

## 2024-01-10 RX ORDER — CYCLOBENZAPRINE HCL 10 MG
TABLET ORAL
Qty: 60 TABLET | Refills: 0 | Status: SHIPPED | OUTPATIENT
Start: 2024-01-10

## 2024-01-10 NOTE — TELEPHONE ENCOUNTER
Rx Refill Note  Requested Prescriptions     Pending Prescriptions Disp Refills    cyclobenzaprine (FLEXERIL) 10 MG tablet [Pharmacy Med Name: CYCLOBENZAPRINE 10 MG TABLET] 60 tablet 0     Sig: TAKE 1 TABLET BY MOUTH 3 TIMES A DAY AS NEEDED FOR MUSCLE SPASMS.      Last office visit with prescribing clinician: 10/26/2023      Next office visit with prescribing clinician: Visit date not found   Last Filled: 12.11.2023    DX: status post right total knee arthroplasty with subsequent manipulation-10/14/2020       Daphney Paris MA  01/10/24, 11:26 EST    {TIP  Encounters:    {TIP  Please add Last Relevant Lab Date if appropriate:  {TIP  Is Refill Pharmacy correct?:

## 2024-01-18 ENCOUNTER — HOSPITAL ENCOUNTER (OUTPATIENT)
Dept: INFUSION THERAPY | Facility: HOSPITAL | Age: 69
Discharge: HOME | End: 2024-01-18
Admitting: PSYCHIATRY & NEUROLOGY
Payer: MEDICARE

## 2024-01-18 VITALS
TEMPERATURE: 97.6 F | RESPIRATION RATE: 16 BRPM | DIASTOLIC BLOOD PRESSURE: 87 MMHG | OXYGEN SATURATION: 98 % | SYSTOLIC BLOOD PRESSURE: 159 MMHG | HEART RATE: 80 BPM

## 2024-01-18 DIAGNOSIS — G31.84 MCI (MILD COGNITIVE IMPAIRMENT): Primary | ICD-10-CM

## 2024-01-18 PROCEDURE — 96365 THER/PROPH/DIAG IV INF INIT: CPT

## 2024-01-18 PROCEDURE — 25010000002 LECANEMAB-IRMB 500 MG/5ML SOLUTION 5 ML VIAL: Performed by: PSYCHIATRY & NEUROLOGY

## 2024-01-18 PROCEDURE — 25010000002 LECANEMAB-IRMB 200 MG/2ML SOLUTION 2 ML VIAL: Performed by: PSYCHIATRY & NEUROLOGY

## 2024-01-18 PROCEDURE — 25810000003 SODIUM CHLORIDE 0.9 % SOLUTION 250 ML FLEX CONT: Performed by: PSYCHIATRY & NEUROLOGY

## 2024-01-18 RX ORDER — FAMOTIDINE 10 MG/ML
20 INJECTION, SOLUTION INTRAVENOUS AS NEEDED
OUTPATIENT
Start: 2024-02-01

## 2024-01-18 RX ORDER — DIPHENHYDRAMINE HYDROCHLORIDE 50 MG/ML
50 INJECTION INTRAMUSCULAR; INTRAVENOUS AS NEEDED
OUTPATIENT
Start: 2024-02-01

## 2024-01-18 RX ORDER — SODIUM CHLORIDE 9 MG/ML
20 INJECTION, SOLUTION INTRAVENOUS ONCE
OUTPATIENT
Start: 2024-02-01

## 2024-01-18 RX ADMIN — LECANEMAB 700 MG: 100 INJECTION, SOLUTION INTRAVENOUS at 09:47

## 2024-01-18 NOTE — PATIENT INSTRUCTIONS
"Call     Salomon Ham MD at [phone] if you have any problems or concerns.    We know you have a Choice in healthcare and appreciate you using [facility].  Our purpose is to provide you \"Excellent Care\".  We hope that you will always choose us in the future and continue to recommend us to your family and friends.     "

## 2024-01-18 NOTE — NURSING NOTE
0938  Pt here ambulatory with son for IV Leqembi.  Pt to ACC to recliner for infusion.  1158  Pt discharged ambulatory with son; pt is leaving 1 hour 8 mins post infusion.  Pt warned of possible reactions and will return to hospital if any probs.  Pt to bathroom to void without any problems with soa or dizziness; VSS; Pt given next appts and AVS discharge paperwork.  Pt denies any adverse reactions to infusion given today.

## 2024-01-19 ENCOUNTER — APPOINTMENT (OUTPATIENT)
Dept: GENERAL RADIOLOGY | Facility: HOSPITAL | Age: 69
End: 2024-01-19
Payer: MEDICARE

## 2024-01-19 ENCOUNTER — APPOINTMENT (OUTPATIENT)
Dept: CT IMAGING | Facility: HOSPITAL | Age: 69
End: 2024-01-19
Payer: MEDICARE

## 2024-01-19 ENCOUNTER — TELEPHONE (OUTPATIENT)
Dept: NEUROLOGY | Facility: CLINIC | Age: 69
End: 2024-01-19
Payer: MEDICARE

## 2024-01-19 ENCOUNTER — HOSPITAL ENCOUNTER (OUTPATIENT)
Facility: HOSPITAL | Age: 69
Setting detail: OBSERVATION
Discharge: HOME | End: 2024-01-20
Attending: EMERGENCY MEDICINE | Admitting: INTERNAL MEDICINE
Payer: MEDICARE

## 2024-01-19 DIAGNOSIS — E87.6 HYPOKALEMIA: Primary | ICD-10-CM

## 2024-01-19 DIAGNOSIS — G45.9 TIA (TRANSIENT ISCHEMIC ATTACK): ICD-10-CM

## 2024-01-19 DIAGNOSIS — D64.9 ANEMIA, UNSPECIFIED TYPE: ICD-10-CM

## 2024-01-19 LAB
ABO GROUP BLD: NORMAL
ALBUMIN SERPL-MCNC: 3.6 G/DL (ref 3.5–5.2)
ALBUMIN/GLOB SERPL: 1.7 G/DL
ALP SERPL-CCNC: 72 U/L (ref 39–117)
ALT SERPL W P-5'-P-CCNC: 12 U/L (ref 1–33)
ANION GAP SERPL CALCULATED.3IONS-SCNC: 5.8 MMOL/L (ref 5–15)
APTT PPP: 28.5 SECONDS (ref 24.3–38.1)
AST SERPL-CCNC: 19 U/L (ref 1–32)
BACTERIA UR QL AUTO: ABNORMAL /HPF
BASOPHILS # BLD AUTO: 0.02 10*3/MM3 (ref 0–0.2)
BASOPHILS NFR BLD AUTO: 0.2 % (ref 0–1.5)
BILIRUB SERPL-MCNC: 0.5 MG/DL (ref 0–1.2)
BILIRUB UR QL STRIP: NEGATIVE
BLD GP AB SCN SERPL QL: NEGATIVE
BUN SERPL-MCNC: 10 MG/DL (ref 8–23)
BUN/CREAT SERPL: 9.3 (ref 7–25)
CALCIUM SPEC-SCNC: 9 MG/DL (ref 8.6–10.5)
CHLORIDE SERPL-SCNC: 100 MMOL/L (ref 98–107)
CLARITY UR: CLEAR
CO2 SERPL-SCNC: 33.2 MMOL/L (ref 22–29)
COLOR UR: YELLOW
CREAT SERPL-MCNC: 1.07 MG/DL (ref 0.57–1)
DEPRECATED RDW RBC AUTO: 44.9 FL (ref 37–54)
EGFRCR SERPLBLD CKD-EPI 2021: 56.3 ML/MIN/1.73
EOSINOPHIL # BLD AUTO: 0.04 10*3/MM3 (ref 0–0.4)
EOSINOPHIL NFR BLD AUTO: 0.4 % (ref 0.3–6.2)
ERYTHROCYTE [DISTWIDTH] IN BLOOD BY AUTOMATED COUNT: 14.2 % (ref 12.3–15.4)
GLOBULIN UR ELPH-MCNC: 2.1 GM/DL
GLUCOSE BLDC GLUCOMTR-MCNC: 118 MG/DL (ref 70–130)
GLUCOSE BLDC GLUCOMTR-MCNC: 137 MG/DL (ref 70–130)
GLUCOSE SERPL-MCNC: 125 MG/DL (ref 65–99)
GLUCOSE UR STRIP-MCNC: NEGATIVE MG/DL
HCT VFR BLD AUTO: 32.6 % (ref 34–46.6)
HGB BLD-MCNC: 10.9 G/DL (ref 12–15.9)
HGB UR QL STRIP.AUTO: NEGATIVE
HOLD SPECIMEN: NORMAL
HYALINE CASTS UR QL AUTO: ABNORMAL /LPF
IMM GRANULOCYTES # BLD AUTO: 0.03 10*3/MM3 (ref 0–0.05)
IMM GRANULOCYTES NFR BLD AUTO: 0.3 % (ref 0–0.5)
INR PPP: 0.98 (ref 0.9–1.1)
KETONES UR QL STRIP: NEGATIVE
LEUKOCYTE ESTERASE UR QL STRIP.AUTO: ABNORMAL
LYMPHOCYTES # BLD AUTO: 0.95 10*3/MM3 (ref 0.7–3.1)
LYMPHOCYTES NFR BLD AUTO: 10 % (ref 19.6–45.3)
MAGNESIUM SERPL-MCNC: 1.6 MG/DL (ref 1.6–2.4)
MCH RBC QN AUTO: 29.6 PG (ref 26.6–33)
MCHC RBC AUTO-ENTMCNC: 33.4 G/DL (ref 31.5–35.7)
MCV RBC AUTO: 88.6 FL (ref 79–97)
MONOCYTES # BLD AUTO: 0.21 10*3/MM3 (ref 0.1–0.9)
MONOCYTES NFR BLD AUTO: 2.2 % (ref 5–12)
NEUTROPHILS NFR BLD AUTO: 8.27 10*3/MM3 (ref 1.7–7)
NEUTROPHILS NFR BLD AUTO: 86.9 % (ref 42.7–76)
NITRITE UR QL STRIP: NEGATIVE
NRBC BLD AUTO-RTO: 0 /100 WBC (ref 0–0.2)
PH UR STRIP.AUTO: 7 [PH] (ref 4.5–8)
PLATELET # BLD AUTO: 250 10*3/MM3 (ref 140–450)
PMV BLD AUTO: 9.2 FL (ref 6–12)
POTASSIUM SERPL-SCNC: 2.7 MMOL/L (ref 3.5–5.2)
PROT SERPL-MCNC: 5.7 G/DL (ref 6–8.5)
PROT UR QL STRIP: NEGATIVE
PROTHROMBIN TIME: 13 SECONDS (ref 12.1–15)
RBC # BLD AUTO: 3.68 10*6/MM3 (ref 3.77–5.28)
RBC # UR STRIP: ABNORMAL /HPF
REF LAB TEST METHOD: ABNORMAL
RH BLD: POSITIVE
SODIUM SERPL-SCNC: 139 MMOL/L (ref 136–145)
SP GR UR STRIP: 1.01 (ref 1–1.03)
SQUAMOUS #/AREA URNS HPF: ABNORMAL /HPF
T&S EXPIRATION DATE: NORMAL
TROPONIN T SERPL HS-MCNC: 18 NG/L
TSH SERPL DL<=0.05 MIU/L-ACNC: 1.93 UIU/ML (ref 0.27–4.2)
UROBILINOGEN UR QL STRIP: ABNORMAL
WBC # UR STRIP: ABNORMAL /HPF
WBC NRBC COR # BLD AUTO: 9.52 10*3/MM3 (ref 3.4–10.8)

## 2024-01-19 PROCEDURE — 36415 COLL VENOUS BLD VENIPUNCTURE: CPT

## 2024-01-19 PROCEDURE — 99285 EMERGENCY DEPT VISIT HI MDM: CPT

## 2024-01-19 PROCEDURE — 70450 CT HEAD/BRAIN W/O DYE: CPT

## 2024-01-19 PROCEDURE — G0378 HOSPITAL OBSERVATION PER HR: HCPCS

## 2024-01-19 PROCEDURE — 0042T HC CT CEREBRAL PERFUSION W/WO CONTRAST: CPT

## 2024-01-19 PROCEDURE — 86850 RBC ANTIBODY SCREEN: CPT | Performed by: EMERGENCY MEDICINE

## 2024-01-19 PROCEDURE — 99214 OFFICE O/P EST MOD 30 MIN: CPT | Performed by: STUDENT IN AN ORGANIZED HEALTH CARE EDUCATION/TRAINING PROGRAM

## 2024-01-19 PROCEDURE — 84443 ASSAY THYROID STIM HORMONE: CPT | Performed by: EMERGENCY MEDICINE

## 2024-01-19 PROCEDURE — 71045 X-RAY EXAM CHEST 1 VIEW: CPT

## 2024-01-19 PROCEDURE — 93005 ELECTROCARDIOGRAM TRACING: CPT | Performed by: EMERGENCY MEDICINE

## 2024-01-19 PROCEDURE — 82948 REAGENT STRIP/BLOOD GLUCOSE: CPT

## 2024-01-19 PROCEDURE — 85025 COMPLETE CBC W/AUTO DIFF WBC: CPT | Performed by: EMERGENCY MEDICINE

## 2024-01-19 PROCEDURE — 86900 BLOOD TYPING SEROLOGIC ABO: CPT | Performed by: EMERGENCY MEDICINE

## 2024-01-19 PROCEDURE — 70496 CT ANGIOGRAPHY HEAD: CPT

## 2024-01-19 PROCEDURE — 99223 1ST HOSP IP/OBS HIGH 75: CPT | Performed by: INTERNAL MEDICINE

## 2024-01-19 PROCEDURE — 25510000001 IOPAMIDOL PER 1 ML: Performed by: INTERNAL MEDICINE

## 2024-01-19 PROCEDURE — 81001 URINALYSIS AUTO W/SCOPE: CPT | Performed by: EMERGENCY MEDICINE

## 2024-01-19 PROCEDURE — 70498 CT ANGIOGRAPHY NECK: CPT

## 2024-01-19 PROCEDURE — 85610 PROTHROMBIN TIME: CPT | Performed by: EMERGENCY MEDICINE

## 2024-01-19 PROCEDURE — 84484 ASSAY OF TROPONIN QUANT: CPT | Performed by: EMERGENCY MEDICINE

## 2024-01-19 PROCEDURE — 85730 THROMBOPLASTIN TIME PARTIAL: CPT | Performed by: EMERGENCY MEDICINE

## 2024-01-19 PROCEDURE — 93010 ELECTROCARDIOGRAM REPORT: CPT | Performed by: STUDENT IN AN ORGANIZED HEALTH CARE EDUCATION/TRAINING PROGRAM

## 2024-01-19 PROCEDURE — 94761 N-INVAS EAR/PLS OXIMETRY MLT: CPT

## 2024-01-19 PROCEDURE — 83735 ASSAY OF MAGNESIUM: CPT | Performed by: EMERGENCY MEDICINE

## 2024-01-19 PROCEDURE — 86901 BLOOD TYPING SEROLOGIC RH(D): CPT | Performed by: EMERGENCY MEDICINE

## 2024-01-19 PROCEDURE — 80053 COMPREHEN METABOLIC PANEL: CPT | Performed by: EMERGENCY MEDICINE

## 2024-01-19 RX ORDER — ASPIRIN 81 MG/1
81 TABLET, CHEWABLE ORAL DAILY
Status: DISCONTINUED | OUTPATIENT
Start: 2024-01-19 | End: 2024-01-20

## 2024-01-19 RX ORDER — SODIUM CHLORIDE 0.9 % (FLUSH) 0.9 %
10 SYRINGE (ML) INJECTION AS NEEDED
Status: DISCONTINUED | OUTPATIENT
Start: 2024-01-19 | End: 2024-01-20 | Stop reason: HOSPADM

## 2024-01-19 RX ORDER — ESCITALOPRAM OXALATE 10 MG/1
10 TABLET ORAL DAILY
Status: DISCONTINUED | OUTPATIENT
Start: 2024-01-19 | End: 2024-01-20 | Stop reason: HOSPADM

## 2024-01-19 RX ORDER — POTASSIUM CHLORIDE 20 MEQ/1
40 TABLET, EXTENDED RELEASE ORAL ONCE
Status: COMPLETED | OUTPATIENT
Start: 2024-01-19 | End: 2024-01-19

## 2024-01-19 RX ORDER — LISINOPRIL 10 MG/1
10 TABLET ORAL
Status: DISCONTINUED | OUTPATIENT
Start: 2024-01-19 | End: 2024-01-20

## 2024-01-19 RX ORDER — ATORVASTATIN CALCIUM 40 MG/1
40 TABLET, FILM COATED ORAL NIGHTLY
Status: DISCONTINUED | OUTPATIENT
Start: 2024-01-19 | End: 2024-01-20 | Stop reason: HOSPADM

## 2024-01-19 RX ORDER — ASPIRIN 300 MG/1
300 SUPPOSITORY RECTAL DAILY
Status: DISCONTINUED | OUTPATIENT
Start: 2024-01-19 | End: 2024-01-20

## 2024-01-19 RX ORDER — LEVOTHYROXINE SODIUM 88 UG/1
88 TABLET ORAL
Status: DISCONTINUED | OUTPATIENT
Start: 2024-01-20 | End: 2024-01-20 | Stop reason: HOSPADM

## 2024-01-19 RX ORDER — SODIUM CHLORIDE 0.9 % (FLUSH) 0.9 %
10 SYRINGE (ML) INJECTION EVERY 12 HOURS SCHEDULED
Status: DISCONTINUED | OUTPATIENT
Start: 2024-01-19 | End: 2024-01-20 | Stop reason: HOSPADM

## 2024-01-19 RX ORDER — SODIUM CHLORIDE 9 MG/ML
40 INJECTION, SOLUTION INTRAVENOUS AS NEEDED
Status: DISCONTINUED | OUTPATIENT
Start: 2024-01-19 | End: 2024-01-20 | Stop reason: HOSPADM

## 2024-01-19 RX ADMIN — IOPAMIDOL 100 ML: 755 INJECTION, SOLUTION INTRAVENOUS at 19:53

## 2024-01-19 RX ADMIN — IOPAMIDOL 100 ML: 755 INJECTION, SOLUTION INTRAVENOUS at 19:56

## 2024-01-19 RX ADMIN — ASPIRIN 81 MG CHEWABLE TABLET 81 MG: 81 TABLET CHEWABLE at 18:25

## 2024-01-19 RX ADMIN — IOPAMIDOL 50 ML: 755 INJECTION, SOLUTION INTRAVENOUS at 19:57

## 2024-01-19 RX ADMIN — POTASSIUM CHLORIDE 40 MEQ: 1500 TABLET, EXTENDED RELEASE ORAL at 17:41

## 2024-01-19 RX ADMIN — ATORVASTATIN CALCIUM 40 MG: 40 TABLET, FILM COATED ORAL at 20:17

## 2024-01-19 RX ADMIN — LISINOPRIL 10 MG: 10 TABLET ORAL at 20:17

## 2024-01-19 RX ADMIN — Medication 10 ML: at 20:20

## 2024-01-19 NOTE — CONSULTS
[facility]   Teleneurology Note    Patient Name: Yesi Benson  : 1955  MRN: [id number]  Primary Care Physician: Mani Gorman MD  Referring Site: [place]  Location of Neurologist: [place]    Subjective   Teleneurology Initial Data     Arrival Date Telestroke Site: 24 Arrival Time Telestroke Site:    Neurologist Evaluation Date: 24 Neurologist Evaluation Time:    Date Last Known Well: 24 Time Last Known Well: 1142     History     Chief Complaint: speech difficulty  HPI: 68 year old female with h/o HLD, hypothyrodism, who was at her usual state of health had 2 episodes of speech difficulty. one before asleep while talking on phone with the family, and another after she woke up after a small nap. She take lacenumab for memory impairement, it was second infusion. NO facial droop or any weakness elsewhere. she is under lot of stress lately.    Stroke Risk Factors/ Pertinent Data     Stroke risk factors: none  Anticoagulants prior to arrival: not applicable  Antiplatelets prior to arrival: not applicable  Statins prior to arrival: not applicable     Scoring Scales     Modified Derek Scale  Pre-Stroke Modified Neche Scale: 0 - No Symptoms at all.  Intracerebral Hemmorhage (ICH) Score  Frankie Coma Score: 13-15  Age>=80: no  Breeding Coma Scale  Best Eye Response: Spontaneous  Best Verbal Response: Oriented  Best Motor Response: Follows commands  Frankie Coma Scale Score: 15    NIH Stroke Scale     NIHSS Performed Date: 24 NIHSS Performed Time:    Interval: baseline  1a. Level of Consciousness: 0-->Alert, keenly responsive  1b. LOC Questions: 0-->Answers both questions correctly  1c. LOC Commands: 0-->Performs both tasks correctly  2. Best Gaze: 0-->Normal  3. Visual: 0-->No visual loss  4. Facial Palsy: 0-->Normal symmetrical movements  5a. Motor Arm, Left: 0-->No drift, limb holds 90 (or 45) degrees for full 10 secs  5b. Motor Arm, Right: 0-->No drift, limb holds 90  (or 45) degrees for full 10 secs  6a. Motor Leg, Left: 0-->No drift, leg holds 30 degree position for full 5 secs  6b. Motor Leg, Right: 0-->No drift, leg holds 30 degree position for full 5 secs  7. Limb Ataxia: 0-->Absent  8. Sensory: 0-->Normal, no sensory loss  9. Best Language: 0-->No aphasia, normal  10. Dysarthria: 0-->Normal  11. Extinction and Inattention (formerly Neglect): 0-->No abnormality  Total (NIH Stroke Scale): 0     Review of Systems     Review of Systems  Constitutional: No fatigue  HENT: Negative for nosebleeds and rhinorrhea.    Eyes: Negative for redness.   Respiratory: Negative for cough.    Gastrointestinal: Negative for anal bleeding.   Endocrine: Negative for polydipsia.   Genitourinary: Negative for enuresis and urgency.   Musculoskeletal: Negative for joint swelling.   Neurological: Negative for tremors.   Psychiatric/Behavioral: Negative for hallucinations.    Objective   Exam     Exam performed with the help of support staff from the referring site  Neurological Exam  NEURO( Exam is performed with help of hospital staff at bed side and observed by me via audio-video interface)    MENTAL STATUS: AAOx3, memory intact, fund of knowledge appropriate    LANG/SPEECH: Naming and repetition intact, fluent, follows 3-step commands    CRANIAL NERVES:    Pupils equal and reactive, EOMI intact, no gaze deviation, no nystagmus  No facial droop, cough and gag intact, shoulder shrug intact, tongue midline     MOTOR:  Moves all extremities equally    SENSORY: Normal to light touch all throughout     COORDINATION: Normal finger to nose and heel to shin, no tremor, no dysmetria    STATION: Not assessed due to patient condition    GAIT: Not assessed due to patient condition   Result Review    Results          Personal review of CNS imaging:(Official report by radiologist pending)  Imaging  CT Imaging Review: CT Imaging reviewed, NO acute infarct/ hemorrhage seen  CTA Imaging Review: CTA Imaging reviewed,  NO large vessel occlusion or severe stenosis seen  CT Perfusion Review: CT perfusion reviewed, NORMAL  ASPECTS Involved Locations: none  ASPECTS Score: 10    Thrombolytic   Thrombolytics: not applicable     Assessment & Plan   Assessment/ Plan     Assessment:  Acute Stroke Evaluation: Stroke diagnosis uncertain- the risks of IV thrombolytic outweigh the benefits of treatment     Encephalopathy- unclear if this is related to lacenamab  infusion or stress/anxiety related     Plan:  Admit for MRI brain W WO tomorrow   Aspirin 81 for now, Lipitor 40 ( recent lipid panel in Dec,no need to repeat)  1 lit saline fluid bolus   Echo, TTE, A1C  PT/OT/Speech can work with the patient  Normal blood pressure goals.     Tele-stroke will continue to follow the patient.     Likely discharge tomorrow after MRI brain results.          Disposition     Disposition: The patient will remain at the referring institution for further evaluation and management    Medical Decision Making  Medical Data Reviewed: Data reviewed including: clinical labs, radiology and/or medical tests, Independent review of CNS images    Moustapha WARREN MD, saw the patient on 01/19/24 at 1754 for an initial in-patient or emergency room telememedicine face to face consult using interactive technology for  . The location of the patient was [place]. I was located at [place].    I have proceeded with this evaluation at the request of the referring practitioner as it is felt to be an emergency setting and no appropriate specialist is available to perform this evaluation. The originating hospital has reported that this is the correct patient and has obtained consent from the patient/surrogate to perform this telemedicine evaluation(including obtaining history, performing examination and reviewing data provided by the patient an/or originating site of care provider)    I have introduced myself to the patient, provided my credentials, disclosed my location, and  determined that, based on review of the patient's chart and discussion with the patient's primary team, telemedicine via a HIPAA compliant, real-time, face-to-face two-way, interactive audio and video platform is an appropriate and effective means of providing the service.    The patient/surrogate has a right to refuse this evaluation as they have been explained risks including potential loss of confidentiality, benefits, alternatives, and the potential need for subsequent face-to-face care. In this evaluation, we will be providing recommendations only.  The ultimate decision to follow or not to follow these recommendations will be left to the bedside treating/requesting practitioner.    The patient/surrogate has been notified that other healthcare professionals including technical person may be involved in this A/V evaluation.  All laws concerning confidentiality and patient access to medical records and copies of medical records apply to telemedicine.  The patient/surrogate has received the originating site's Health Notice of Privacy Practices.    Moustapha Sanchez MD

## 2024-01-19 NOTE — ED PROVIDER NOTES
Subjective   History of Present Illness    Chief complaint: Difficulty forming words    Location: [place]    Quality/Severity: Resolved    Timing/Onset/Duration: Started around noon    Modifying Factors: Better with time    Associated Symptoms: No headache.  No fever chills or cough.  No sore throat earache or nasal congestion.  No chest pain or shortness of breath.  No abdominal pain.  No diarrhea or burning when she urinates.  No change in bladder or bowel function.  No numbness, tingling, or weakness.    Narrative: This 68-year-old presents with difficulty speaking around 1230 this afternoon.  The symptoms lasted at least 23 minutes according to a daughter who was on the phone.  The patient has a history of mild cognitive impairment.  She is receiving Leqembi infusion.  Her last infusion was yesterday.    PCP:Mani Gorman MD      Review of Systems   Constitutional:  Negative for chills and fever.   HENT:  Negative for congestion, ear pain and sore throat.    Respiratory:  Negative for cough and shortness of breath.    Cardiovascular:  Negative for chest pain.   Gastrointestinal:  Negative for abdominal pain, diarrhea, nausea and vomiting.   Genitourinary:  Negative for difficulty urinating.   Musculoskeletal:  Negative for back pain and neck pain.   Skin:  Negative for rash.   Neurological:  Negative for weakness, numbness and headaches.   Psychiatric/Behavioral:  Positive for confusion.          Past Medical History:   Diagnosis Date   • Colon polyp    • Dementia had problems with memory                   during last year    have not been diagnosed   • Disease of thyroid gland    • Hyperlipidemia approx. 20 years ago   • Knee swelling    • Memory loss within the last year   • Tear of meniscus of knee        No Known Allergies    Past Surgical History:   Procedure Laterality Date   • COLONOSCOPY N/A 03/27/2019    Procedure: COLONOSCOPY POLYPECTOMY;  Surgeon: Jaime Alaniz MD;  Location: [facility]  OR;  Service: Gastroenterology   • DENTAL PROCEDURE Left 2011,2014   • JOINT MANIPULATION Right 12/21/2020    Procedure: KNEE MANIPULATION;  Surgeon: Elmer Fuentes MD;  Location: [facility] OR;  Service: Orthopedics;  Laterality: Right;   • JOINT REPLACEMENT     • SALPINGO OOPHORECTOMY Left     as an infant    • TOTAL KNEE ARTHROPLASTY Right 10/14/2020    Procedure: TOTAL KNEE ARTHROPLASTY AND ALL ASSOCIATED PROCEDURES;  Surgeon: Elmer Fuentes MD;  Location: [facility] OR;  Service: Orthopedics;  Laterality: Right;       Family History   Problem Relation Age of Onset   • Dementia Mother         ill at least 10 years   • Migraines Mother    • Seizures Mother    • Stroke Mother    • Cancer Father         Penile Cancer   • Cancer Sister         Breast Cancer   • Cancer Maternal Grandmother         Breast Cancer   • Malig Hyperthermia Neg Hx        Social History     Socioeconomic History   • Marital status:    Tobacco Use   • Smoking status: Never     Passive exposure: Never   • Smokeless tobacco: Never   Vaping Use   • Vaping Use: Never used   Substance and Sexual Activity   • Alcohol use: Never   • Drug use: Never   • Sexual activity: Not Currently           Objective   Physical Exam  Vitals (The temperature is 98.7 °F, pulse 92, respirations 16, /88, room air pulse ox 94%.) and nursing note reviewed.   Constitutional:       Appearance: She is well-developed.   HENT:      Head: Normocephalic and atraumatic.      Mouth/Throat:      Mouth: Mucous membranes are moist.   Eyes:      General: No visual field deficit.     Extraocular Movements: Extraocular movements intact.      Pupils: Pupils are equal, round, and reactive to light.   Cardiovascular:      Rate and Rhythm: Normal rate and regular rhythm.      Heart sounds: Normal heart sounds. No murmur heard.     No friction rub. No gallop.   Pulmonary:      Effort: Pulmonary effort is normal.      Breath sounds: Normal breath sounds.   Abdominal:       General: Bowel sounds are normal. There is no distension.      Palpations: Abdomen is soft. There is no mass.      Tenderness: There is no abdominal tenderness. There is no guarding.   Musculoskeletal:         General: No swelling or tenderness. Normal range of motion.      Cervical back: Normal range of motion and neck supple.   Skin:     General: Skin is warm and dry.      Findings: No rash.   Neurological:      Mental Status: She is alert and oriented to person, place, and time.      Cranial Nerves: No cranial nerve deficit, dysarthria or facial asymmetry.      Sensory: No sensory deficit.      Motor: No weakness.   Psychiatric:         Mood and Affect: Mood normal.       Procedures           ED Course  ED Course as of 01/19/24 1803   Fri Jan 19, 2024   1625 The hemoglobin is 10.9 and hematocrit is 32, the neutrophil percent 76%.  The absolute neutrophil count is 8.2.  The CBC is otherwise unremarkable. [RC]   1647 The high-sensitivity troponin is 18 and elevated. [RC]   1647 The glucose is 125, creatinine 1.07, potassium 2.7, CO2 33, total protein of 5.7, GFR 56.  The CMP is otherwise unremarkable. [RC]   1648 On 12/15/2023 the potassium was 3.0. [RC]   1648 On 1215 and 23 the hemoglobin was 12.8. [RC]   1722 The urine microscopic shows no RBCs, 6-10 WBCs, trace bacteria, trace leukocytes, nitrate negative, otherwise unremarkable. [RC]   1722 The magnesium is 1.6 and normal. [RC]   1747 TSH is 1.930 and normal. [RC]      ED Course User Index  [RC] Juno Vasquez MD      15:57 EST, 01/19/24:  EKG was obtained at 1553 and read by me at 1553.  EKG shows a normal sinus rhythm with a rate of 93.  There is a left axis deviation with left ventricular hypertrophy.  The LA, QRS intervals are unremarkable.  There is a PAC.  There is left atrial enlargement.  There is borderline prolonged QT at 488 ms.  There is no acute ST elevation or depression.  There is a wandering baseline in V5.                           16:57  EST, 01/19/24:  I spoke with Dr. Colon, on-call for stroke neurology, he will see the patient.    18:11 EST, 01/19/24:  Dr. Colon has evaluated the patient.  The plan will be to the patient, prescribe statins and a baby aspirin an MRI tomorrow.    18:11 EST, 01/19/24:  I spoke with Dr. Melo, on-call for the hospitalist, he will admit the patient.    18:12 EST, 01/19/24:  The patient's diagnosis of TIA rule out stroke was discussed with the patient and family.  The patient will be admitted for further workup and evaluation.  All the questions were answered.    18:12 EST, 01/19/24:  The patient was reassessed.  Neurological exam: Alert and oriented x 4 with no focal deficits noted.  Her vital signs are stable.            Medical Decision Making  Problems Addressed:  Anemia, unspecified type: complicated acute illness or injury  Hypokalemia: complicated acute illness or injury    Amount and/or Complexity of Data Reviewed  Labs: ordered.  Radiology: ordered.  ECG/medicine tests: ordered.    Risk  Prescription drug management.        Final diagnoses:   None       ED Disposition  ED Disposition       None            No follow-up provider specified.       Medication List      No changes were made to your prescriptions during this visit.            Juno Vasquez MD  01/19/24 2237

## 2024-01-19 NOTE — TELEPHONE ENCOUNTER
Called family and pt and advised pt should present to closest ED for evaluation. Family and pt all v/u and agreement.

## 2024-01-19 NOTE — TELEPHONE ENCOUNTER
Caller:     JoeyJessica () [phone] (Home)       Relationship to Patient: SISTER    Reason for Call: SHE STATES AROUND 1 PM THE PT HAD CALLED HER DAUGHTER AND SON AND NEITHER ONE OF THEM COULD UNDERSTAND WHAT THE PT WAS WAS SAYING, SHE STATES THE PT WAS SPEAKING GIBBERISH AND SLURRED SPEECH. THE PT'S CHILDREN CALLED HER THEIR AUNT AND SHE WENT TO PT'S RESIDENCE PT WAS SLEEPING UPON ARRIVAL AND CONTINUED SLURRED SPEECH FOR ABOUT 10-15 MORE MIN'S AFTER WAKING UP.     SHE STATES A FAMILY FRIEND STOPPED BY WHOM IS AN EMT AND HE DID STROKE LIKE SYMPTOMS TESTING AND PASSED ALL OF THOSE TEST AND SPEECH HAD CLEARED U BY THEN. .     COULD THIS BE A SIDE EFFECT OF HER INFUSION YESTERDAY ON 1-18-24. PLEASE REVIEW AND ADVISE

## 2024-01-20 ENCOUNTER — APPOINTMENT (OUTPATIENT)
Dept: MRI IMAGING | Facility: HOSPITAL | Age: 69
End: 2024-01-20
Payer: MEDICARE

## 2024-01-20 VITALS
WEIGHT: 147.1 LBS | TEMPERATURE: 97.3 F | OXYGEN SATURATION: 96 % | HEIGHT: 62 IN | DIASTOLIC BLOOD PRESSURE: 58 MMHG | RESPIRATION RATE: 17 BRPM | SYSTOLIC BLOOD PRESSURE: 124 MMHG | HEART RATE: 94 BPM | BODY MASS INDEX: 27.07 KG/M2

## 2024-01-20 PROBLEM — G45.9 TIA (TRANSIENT ISCHEMIC ATTACK): Status: RESOLVED | Noted: 2024-01-19 | Resolved: 2024-01-20

## 2024-01-20 LAB
GLUCOSE BLDC GLUCOMTR-MCNC: 122 MG/DL (ref 70–130)
HBA1C MFR BLD: 4.9 % (ref 4.8–5.6)
POTASSIUM SERPL-SCNC: 3.1 MMOL/L (ref 3.5–5.2)
QT INTERVAL: 399 MS
QTC INTERVAL: 488 MS

## 2024-01-20 PROCEDURE — G0378 HOSPITAL OBSERVATION PER HR: HCPCS

## 2024-01-20 PROCEDURE — 83036 HEMOGLOBIN GLYCOSYLATED A1C: CPT | Performed by: INTERNAL MEDICINE

## 2024-01-20 PROCEDURE — 97165 OT EVAL LOW COMPLEX 30 MIN: CPT

## 2024-01-20 PROCEDURE — 84132 ASSAY OF SERUM POTASSIUM: CPT | Performed by: INTERNAL MEDICINE

## 2024-01-20 PROCEDURE — 0 GADOBENATE DIMEGLUMINE 529 MG/ML SOLUTION: Performed by: INTERNAL MEDICINE

## 2024-01-20 PROCEDURE — A9577 INJ MULTIHANCE: HCPCS | Performed by: INTERNAL MEDICINE

## 2024-01-20 PROCEDURE — 99239 HOSP IP/OBS DSCHRG MGMT >30: CPT | Performed by: INTERNAL MEDICINE

## 2024-01-20 PROCEDURE — 97161 PT EVAL LOW COMPLEX 20 MIN: CPT

## 2024-01-20 PROCEDURE — 99214 OFFICE O/P EST MOD 30 MIN: CPT | Performed by: PSYCHIATRY & NEUROLOGY

## 2024-01-20 PROCEDURE — 96105 ASSESSMENT OF APHASIA: CPT

## 2024-01-20 PROCEDURE — 82948 REAGENT STRIP/BLOOD GLUCOSE: CPT

## 2024-01-20 PROCEDURE — 70553 MRI BRAIN STEM W/O & W/DYE: CPT

## 2024-01-20 RX ORDER — POTASSIUM CHLORIDE 20 MEQ/1
40 TABLET, EXTENDED RELEASE ORAL EVERY 4 HOURS
Status: COMPLETED | OUTPATIENT
Start: 2024-01-20 | End: 2024-01-20

## 2024-01-20 RX ORDER — ATORVASTATIN CALCIUM 40 MG/1
40 TABLET, FILM COATED ORAL NIGHTLY
Qty: 90 TABLET | Refills: 0 | Status: SHIPPED | OUTPATIENT
Start: 2024-01-20 | End: 2024-01-20 | Stop reason: SDUPTHER

## 2024-01-20 RX ORDER — ATORVASTATIN CALCIUM 40 MG/1
40 TABLET, FILM COATED ORAL NIGHTLY
Qty: 90 TABLET | Refills: 0 | Status: SHIPPED | OUTPATIENT
Start: 2024-01-20

## 2024-01-20 RX ADMIN — POTASSIUM CHLORIDE 40 MEQ: 1500 TABLET, EXTENDED RELEASE ORAL at 10:35

## 2024-01-20 RX ADMIN — LEVOTHYROXINE SODIUM 88 MCG: 0.09 TABLET ORAL at 05:31

## 2024-01-20 RX ADMIN — Medication 10 ML: at 08:53

## 2024-01-20 RX ADMIN — ASPIRIN 81 MG CHEWABLE TABLET 81 MG: 81 TABLET CHEWABLE at 08:24

## 2024-01-20 RX ADMIN — LISINOPRIL 10 MG: 10 TABLET ORAL at 08:24

## 2024-01-20 RX ADMIN — POTASSIUM CHLORIDE 40 MEQ: 1500 TABLET, EXTENDED RELEASE ORAL at 06:38

## 2024-01-20 RX ADMIN — ESCITALOPRAM OXALATE 10 MG: 10 TABLET, FILM COATED ORAL at 08:24

## 2024-01-20 RX ADMIN — GADOBENATE DIMEGLUMINE 14 ML: 529 INJECTION, SOLUTION INTRAVENOUS at 10:05

## 2024-01-20 RX ADMIN — POTASSIUM CHLORIDE 40 MEQ: 1500 TABLET, EXTENDED RELEASE ORAL at 02:17

## 2024-01-20 NOTE — PLAN OF CARE
Goal Outcome Evaluation:  Plan of Care Reviewed With: patient        Progress: no change  Outcome Evaluation: r/a. SR per telemetry. Pt on electrolyte replacement protocol with K+ initiated for K+ of 3.1.   NIH 2 this shift. She has problems identifying pictures and some word finding problems. Unsure is this is baseline. Up to restroom and voiding.. BM 1/18. Family at bedside all night.

## 2024-01-20 NOTE — CASE MANAGEMENT/SOCIAL WORK
Case Management Discharge Note      Final Note: home         Selected Continued Care - Admitted Since 1/19/2024       Destination    No services have been selected for the patient.                Durable Medical Equipment    No services have been selected for the patient.                Dialysis/Infusion    No services have been selected for the patient.                Home Medical Care    No services have been selected for the patient.                Therapy    No services have been selected for the patient.                Community Resources    No services have been selected for the patient.                Community & DME    No services have been selected for the patient.                         Final Discharge Disposition Code: 01 - home or self-care

## 2024-01-20 NOTE — THERAPY DISCHARGE NOTE
Acute Care - Occupational Therapy Initial Evaluation/Discharge   Jenny Chávez     Patient Name: Yesi Benson  : 1955  MRN: [id number]  Today's Date: 2024  Onset of Illness/Injury or Date of Surgery: 24  Date of Referral to OT: 24  Referring Physician: Dr Melo      Admit Date: 2024       ICD-10-CM ICD-9-CM   1. Hypokalemia  E87.6 276.8   2. Anemia, unspecified type  D64.9 285.9   3. TIA (transient ischemic attack)  G45.9 435.9     Patient Active Problem List   Diagnosis    Adenomatous polyp of colon    Hyperlipidemia    Hypothyroidism    Status post total right knee replacement    Arthrofibrosis of knee joint, right    MCI (mild cognitive impairment)     Past Medical History:   Diagnosis Date    Colon polyp     Dementia had problems with memory                   during last year    have not been diagnosed    Disease of thyroid gland     Elevated cholesterol     Hyperlipidemia approx. 20 years ago    Knee swelling     Memory loss within the last year    Tear of meniscus of knee     TIA (transient ischemic attack) 2024     Past Surgical History:   Procedure Laterality Date    COLONOSCOPY N/A 2019    Procedure: COLONOSCOPY POLYPECTOMY;  Surgeon: Jaime Alaniz MD;  Location: [facility];  Service: Gastroenterology    DENTAL PROCEDURE Left     HERNIA REPAIR      JOINT MANIPULATION Right 2020    Procedure: KNEE MANIPULATION;  Surgeon: Elmer Fuentes MD;  Location: [facility];  Service: Orthopedics;  Laterality: Right;    JOINT REPLACEMENT      SALPINGO OOPHORECTOMY Left     as an infant     TOTAL KNEE ARTHROPLASTY Right 10/14/2020    Procedure: TOTAL KNEE ARTHROPLASTY AND ALL ASSOCIATED PROCEDURES;  Surgeon: Elmer Fuentes MD;  Location: [facility];  Service: Orthopedics;  Laterality: Right;       OT ASSESSMENT FLOWSHEET (last 12 hours)       OT Evaluation and Treatment       Row Name 24 1030                   OT Time and Intention     Subjective Information no complaints  -JJ        Document Type discharge evaluation/summary  -JJ        Mode of Treatment occupational therapy  -JJ        Patient Effort good  -JJ           General Information    Patient Profile Reviewed yes  -JJ        Onset of Illness/Injury or Date of Surgery 01/19/24  -J        Referring Physician Dr Melo  -        General Observations of Patient pt supine in bed, family in room, agreed to evaluation  -JJ        Prior Level of Function independent:;all household mobility;transfer;bed mobility;ADL's  -JJ        Equipment Currently Used at Home cane, straight;walker, rolling;shower chair  -JJ        Pertinent History of Current Functional Problem slurred speech CVA rule out  -JJ        Existing Precautions/Restrictions fall  -JJ        Risks Reviewed patient:;LOB  -JJ        Benefits Reviewed patient:;improve function;increase independence  -JJ        Barriers to Rehab none identified  -JJ           Living Environment    Current Living Arrangements home  -JJ        Home Accessibility --  stairs in home, bilevel home, pt and family state pt can stay on main floor  -JJ        People in Home spouse  -JJ           Pain Assessment    Pretreatment Pain Rating 0/10 - no pain  -JJ        Posttreatment Pain Rating 0/10 - no pain  -JJ           Cognition    Orientation Status (Cognition) oriented x 3  -JJ        Personal Safety Interventions gait belt;nonskid shoes/slippers when out of bed  -JJ           Range of Motion (ROM)    Range of Motion bilateral upper extremities;ROM is WFL  -JJ           Strength (Manual Muscle Testing)    Strength (Manual Muscle Testing) bilateral upper extremities;strength is WFL  -JJ           Activities of Daily Living    BADL Assessment/Intervention lower body dressing  -JJ           Lower Body Dressing Assessment/Training    Comment, (Lower Body Dressing) anticipate pt will require supervision for lb adls  -JJ           Bed Mobility    All Activities,  [place] (Bed Mobility) modified independence  -        Assistive Device (Bed Mobility) bed rails;head of bed elevated  -           Functional Mobility    Functional Mobility- Ind. Level conditional independence  -        Functional Mobility- Device --  no assistive device  -        Functional Mobility-Distance (Feet) 200  -           Sit-Stand Transfer    Sit-Stand [place] (Transfers) modified independence  -        Assistive Device (Sit-Stand Transfers) --  no assistive device  -           Plan of Care Review    Plan of Care Reviewed With patient  -        Outcome Evaluation OT evaluation completed. pt modified independent for bed mobility, functional transfers and mobility x 200 feet without assistive device. anticipate pt will perform adls with independence. plan for pt to discharge home with family assist today. no co pain or numbness and tingling. no further OT concerns/recommendations at this time.  -           Positioning and Restraints    Pre-Treatment Position in bed  -J        Post Treatment Position bed  -        In Bed supine;call light within reach;encouraged to call for assist;with family/caregiver  -           Therapy Assessment/Plan (OT)    Date of Referral to OT 01/19/24  -        Patient/Family Therapy Goal Statement (OT) go home  -        OT Diagnosis CVA rule out  -        Criteria for Skilled Therapeutic Interventions Met (OT) no;no problems identified which require skilled intervention  -           Evaluation Complexity (OT)    Overall Complexity of Evaluation (OT) low complexity  -           Therapy Plan Review/Discharge Plan (OT)    Anticipated Discharge Disposition (OT) home with assist  -                  User Key  (r) = Recorded By, (t) = Taken By, (c) = Cosigned By      Initials Name Effective Dates    Jesika Sanchez, OTR 06/16/21 -                     Occupational Therapy Education       Title: PT OT SLP Therapies (Resolved)        Topic: Occupational Therapy (Resolved)       Point: ADL training (Resolved)       Description:   Instruct learner(s) on proper safety adaptation and remediation techniques during self care or transfers.   Instruct in proper use of assistive devices.                  Learning Progress Summary             Patient Acceptance, E,TB, VU by  at 1/20/2024 1200    Comment: pt educated on adls and safety with functional transfers and mobility                                         User Key       Initials Effective Dates Name Provider Type Discipline     06/16/21 -  Jesika Hallman, OTR Occupational Therapist OT                    OT Recommendation and Plan     Plan of Care Review  Plan of Care Reviewed With: patient  Outcome Evaluation: OT evaluation completed. pt modified independent for bed mobility, functional transfers and mobility x 200 feet without assistive device. anticipate pt will perform adls with independence. plan for pt to discharge home with family assist today. no co pain or numbness and tingling. no further OT concerns/recommendations at this time.  Plan of Care Reviewed With: patient  Outcome Evaluation: OT evaluation completed. pt modified independent for bed mobility, functional transfers and mobility x 200 feet without assistive device. anticipate pt will perform adls with independence. plan for pt to discharge home with family assist today. no co pain or numbness and tingling. no further OT concerns/recommendations at this time.          Outcome Measures       Row Name 01/20/24 1030             How much help from another is currently needed...    Putting on and taking off regular lower body clothing? 4  -JJ      Bathing (including washing, rinsing, and drying) 4  -JJ      Toileting (which includes using toilet bed pan or urinal) 4  -JJ      Putting on and taking off regular upper body clothing 4  -JJ      Taking care of personal grooming (such as brushing teeth) 4  -JJ      Eating meals 4   -      AM-PAC 6 Clicks Score (OT) 24  -         Modified [place] Scale    Pre-Stroke Modified [place] Scale 0 - No Symptoms at all.  -      Modified [place] Scale 1 - No significant disability despite symptoms.  Able to carry out all usual duties and activities.  -         Functional Assessment    Outcome Measure Options AM-PAC 6 Clicks Daily Activity (OT)  -                User Key  (r) = Recorded By, (t) = Taken By, (c) = Cosigned By      Initials Name Provider Type    Jesika Sanchez OTR Occupational Therapist                    Time Calculation:    Time Calculation- OT       Row Name 01/20/24 1201             Time Calculation- OT    OT Start Time 1030  -      OT Stop Time 1045  -      OT Time Calculation (min) 15 min  -                User Key  (r) = Recorded By, (t) = Taken By, (c) = Cosigned By      Initials Name Provider Type    Jesika Sanchez OTR Occupational Therapist                    Therapy Charges for Today       Code Description Service Date Service Provider Modifiers Qty    [id number]  OT EVAL LOW COMPLEXITY 1 1/20/2024 Jesika Hallman OTR GO 1                 OT Discharge Summary  Anticipated Discharge Disposition (OT): home with assist    TRAVIS Bagley  1/20/2024

## 2024-01-20 NOTE — PLAN OF CARE
Goal Outcome Evaluation:  Plan of Care Reviewed With: patient           Outcome Evaluation: OT evaluation completed. pt modified independent for bed mobility, functional transfers and mobility x 200 feet without assistive device. anticipate pt will perform adls with independence. plan for pt to discharge home with family assist today. no co pain or numbness and tingling. no further OT concerns/recommendations at this time.      Anticipated Discharge Disposition (OT): home with assist

## 2024-01-20 NOTE — H&P
[facility] HOSPITALIST     PCP: Mani Gorman MD    CODE status: Full    CHIEF COMPLAINT: Slurred speech    HISTORY OF PRESENT ILLNESS:    Patient is a 68-year-old female with past medical history significant for dementia with suspected Alzheimer's disease-followed outpatient by Dr. Maguire of neurology-had recent monoclonal antibody infusion.  Her daughter called neurology office today reporting that her and her brother were on the phone with her mother earlier today around 1230 and neither of them could understand what she was saying due to slurred speech.  Her son works with EMS and performed a stroke evaluation she had no deficits at that time as her symptoms had resolved.  In total her slurring of speech lasted 23 minutes.  Patient did not have bowel or bladder incontinence, no numbness or tingling.  She denies any weakness.     Workup in the ED revealed CT head to be negative, stroke neuro recommended MRI brain in the morning with echo and usual stroke protocol. They have provided aspirin 81 mg patient.       Past Medical History:   Diagnosis Date    Colon polyp     Dementia had problems with memory                   during last year    have not been diagnosed    Disease of thyroid gland     Hyperlipidemia approx. 20 years ago    Knee swelling     Memory loss within the last year    Tear of meniscus of knee     TIA (transient ischemic attack) 1/19/2024     Past Surgical History:   Procedure Laterality Date    COLONOSCOPY N/A 03/27/2019    Procedure: COLONOSCOPY POLYPECTOMY;  Surgeon: Jaime Alaniz MD;  Location: [facility] OR;  Service: Gastroenterology    DENTAL PROCEDURE Left 2011,2014    JOINT MANIPULATION Right 12/21/2020    Procedure: KNEE MANIPULATION;  Surgeon: Elmer Fuentes MD;  Location:  [facility] OR;  Service: Orthopedics;  Laterality: Right;    JOINT REPLACEMENT      SALPINGO OOPHORECTOMY Left     as an infant     TOTAL KNEE ARTHROPLASTY Right 10/14/2020     Procedure: TOTAL KNEE ARTHROPLASTY AND ALL ASSOCIATED PROCEDURES;  Surgeon: Elmer Fuentes MD;  Location: [facility];  Service: Orthopedics;  Laterality: Right;     Family History   Problem Relation Age of Onset    Dementia Mother         ill at least 10 years    Migraines Mother     Seizures Mother     Stroke Mother     Cancer Father         Penile Cancer    Cancer Sister         Breast Cancer    Cancer Maternal Grandmother         Breast Cancer    Malig Hyperthermia Neg Hx      Social History     Tobacco Use    Smoking status: Never     Passive exposure: Never    Smokeless tobacco: Never   Vaping Use    Vaping Use: Never used   Substance Use Topics    Alcohol use: Never    Drug use: Never     Medications Prior to Admission   Medication Sig Dispense Refill Last Dose    calcium carbonate (OS-ARMANDO) 600 MG tablet Take 1 tablet by mouth Daily.   1/19/2024    cyclobenzaprine (FLEXERIL) 10 MG tablet TAKE 1 TABLET BY MOUTH 3 TIMES A DAY AS NEEDED FOR MUSCLE SPASMS. 60 tablet 0 1/19/2024    escitalopram (LEXAPRO) 10 MG tablet Take 1 tablet by mouth Daily.   1/19/2024    levothyroxine (SYNTHROID, LEVOTHROID) 88 MCG tablet levothyroxine 88 mcg tablet   1/19/2024    multivitamin (MULTI VITAMIN PO) Take  by mouth Daily.   1/19/2024    Omega-3 Fatty Acids (FISH OIL) 1000 MG capsule capsule Take 1 capsule by mouth Daily With Breakfast.   1/19/2024    terbinafine (lamiSIL) 250 MG tablet terbinafine HCl 250 mg tablet   Take 1 tablet every day by oral route for 90 days.   1/19/2024     Allergies:  Patient has no known allergies.    Immunization History   Administered Date(s) Administered    COVID-19 ([employer]) 06/11/2021       REVIEW OF SYSTEMS:  Please see the above history of present illness for pertinent positives and negatives.  The remainder of the patient's systems have been reviewed and are negative.     Vital Signs  Temp:  [98.7 °F (37.1 °C)] 98.7 °F (37.1 °C)  Heart Rate:  [82-92] 87  Resp:  [16-20] 18  BP:  "(155-183)/(78-88) 183/86  Flowsheet Rows      Flowsheet Row First Filed Value   Admission Height 157.5 cm (62\") Documented at 01/19/2024 1650   Admission Weight 66.7 kg (147 lb 1.6 oz) Documented at 01/19/2024 1553               Physical Exam:  General: Patient is awake and alert. Well developed and well nourished. No acute distress noted.   HENT: Head is atraumatic, normocephalic. Hearing is grossly intact. Nose is without obvious congestion and appears patent. Neck is supple and trachea is midline.   Eyes: Vision is grossly intact. Pupils appear equal and round.   Cardiovascular: Heart has regular rate and rhythm with no murmurs, rubs or gallops noted.   Respiratory: Lungs are clear to ausculation without wheezes, rhonchi or rales.   Abdominal/GI: Soft, nontender, bowel sounds present. No rebound or guarding present.   Extremities: No peripheral edema noted.   Musculoskeletal: Spontaneous movement of bilateral upper and lower extremities against gravity noted. No signs of injury or deformity noted.   Skin: Warm and dry.   Psych: Mood and affect are appropriate. Cooperative with exam.  Dementia appears at baseline per chart review.   Neuro: No facial asymmetry noted. No focal deficits noted, hearing and vision are grossly intact.    Emotional Behavior: all of the following were found to be normal   Judgment and Insight   Mental Status   Memory   Mood and Affect: neither of the following found acutely        Depression                 Anxiety     Debilities: no signs of the following found    Physical Weakness     Handicaps     Disabilities     Agitation         Results Review:    I reviewed the patient's new clinical results.  Lab Results (most recent)       Procedure Component Value Units Date/Time    Protime-INR [[id number]] Collected: 01/19/24 1650    Specimen: Blood Updated: 01/19/24 1856    aPTT [[id number]] Collected: 01/19/24 1650    Specimen: Blood Updated: 01/19/24 1856    [place] Urine Culture Tube - Urine, " Clean Catch [[id number]] Collected: 01/19/24 1702    Specimen: Urine, Clean Catch Updated: 01/19/24 1733     Extra Tube Hold for add-ons.     Comment: Auto resulted.       Urinalysis, Microscopic Only - Urine, Clean Catch [[id number]]  (Abnormal) Collected: 01/19/24 1702    Specimen: Urine, Clean Catch Updated: 01/19/24 1718     RBC, UA None Seen /HPF      WBC, UA 6-10 /HPF      Bacteria, UA Trace /HPF      Squamous Epithelial Cells, UA 0-2 /HPF      Hyaline Casts, UA None Seen /LPF      Methodology Manual Light Microscopy    Urinalysis With Microscopic If Indicated (No Culture) - Urine, Clean Catch [[id number]]  (Abnormal) Collected: 01/19/24 1702    Specimen: Urine, Clean Catch Updated: 01/19/24 1710     Color, UA Yellow     Appearance, UA Clear     pH, UA 7.0     Specific Gravity, UA 1.010     Glucose, UA Negative     Ketones, UA Negative     Bilirubin, UA Negative     Blood, UA Negative     Protein, UA Negative     Leuk Esterase, UA Trace     Nitrite, UA Negative     Urobilinogen, UA 0.2 E.U./dL    Magnesium [[id number]]  (Normal) Collected: 01/19/24 1606    Specimen: Blood Updated: 01/19/24 1703     Magnesium 1.6 mg/dL     TSH [[id number]]  (Normal) Collected: 01/19/24 1606    Specimen: Blood Updated: 01/19/24 1645     TSH 1.930 uIU/mL     Single High Sensitivity Troponin T [[id number]]  (Abnormal) Collected: 01/19/24 1606    Specimen: Blood Updated: 01/19/24 1640     HS Troponin T 18 ng/L     Narrative:      High Sensitive Troponin T Reference Range:  <14.0 ng/L- Negative Female for AMI  <22.0 ng/L- Negative Male for AMI  >=14 - Abnormal Female indicating possible myocardial injury.  >=22 - Abnormal Male indicating possible myocardial injury.   Clinicians would have to utilize clinical acumen, EKG, Troponin, and serial changes to determine if it is an Acute Myocardial Infarction or myocardial injury due to an underlying chronic condition.         Comprehensive Metabolic Panel [[id number]]  (Abnormal) Collected:  01/19/24 1606    Specimen: Blood Updated: 01/19/24 1633     Glucose 125 mg/dL      BUN 10 mg/dL      Creatinine 1.07 mg/dL      Sodium 139 mmol/L      Potassium 2.7 mmol/L      Chloride 100 mmol/L      CO2 33.2 mmol/L      Calcium 9.0 mg/dL      Total Protein 5.7 g/dL      Albumin 3.6 g/dL      ALT (SGPT) 12 U/L      AST (SGOT) 19 U/L      Alkaline Phosphatase 72 U/L      Total Bilirubin 0.5 mg/dL      Globulin 2.1 gm/dL      A/G Ratio 1.7 g/dL      BUN/Creatinine Ratio 9.3     Anion Gap 5.8 mmol/L      eGFR 56.3 mL/min/1.73     Narrative:      GFR Normal >60  Chronic Kidney Disease <60  Kidney Failure <15      CBC & Differential [[id number]]  (Abnormal) Collected: 01/19/24 1606    Specimen: Blood Updated: 01/19/24 1615    Narrative:      The following orders were created for panel order CBC & Differential.  Procedure                               Abnormality         Status                     ---------                               -----------         ------                     CBC Auto Differential[[id number]]        Abnormal            Final result                 Please view results for these tests on the individual orders.    CBC Auto Differential [[id number]]  (Abnormal) Collected: 01/19/24 1606    Specimen: Blood Updated: 01/19/24 1615     WBC 9.52 10*3/mm3      RBC 3.68 10*6/mm3      Hemoglobin 10.9 g/dL      Hematocrit 32.6 %      MCV 88.6 fL      MCH 29.6 pg      MCHC 33.4 g/dL      RDW 14.2 %      RDW-SD 44.9 fl      MPV 9.2 fL      Platelets 250 10*3/mm3      Neutrophil % 86.9 %      Lymphocyte % 10.0 %      Monocyte % 2.2 %      Eosinophil % 0.4 %      Basophil % 0.2 %      Immature Grans % 0.3 %      Neutrophils, Absolute 8.27 10*3/mm3      Lymphocytes, Absolute 0.95 10*3/mm3      Monocytes, Absolute 0.21 10*3/mm3      Eosinophils, Absolute 0.04 10*3/mm3      Basophils, Absolute 0.02 10*3/mm3      Immature Grans, Absolute 0.03 10*3/mm3      nRBC 0.0 /100 WBC     POC Glucose Once [[id number]]  (Normal)  Collected: 01/19/24 1607    Specimen: Blood Updated: 01/19/24 1613     Glucose 118 mg/dL             Imaging Results (Most Recent)       Procedure Component Value Units Date/Time    CT Head Without Contrast Stroke Protocol [[id number]] Collected: 01/19/24 1621     Updated: 01/19/24 1652    Narrative:         CT Head WO Code Stroke     Indication: Altered mental status. History of early dementia.    Comparison: None    CT head without contrast. Radiation dose reduction techniques included automated exposure control or exposure modulation based on body size. Radiation audit for number of CT and nuclear cardiology exams performed in the last year: 0.      Findings:  The ventricles are mildly generous in size. Cortical sulci are correspondingly prominent. No extraaxial fluid collections are seen.    No parenchymal or subarachnoid hemorrhage is present. Periventricular hypodensities are demonstrated which are nonspecific but likely represent white matter microvascular change in a patient of this age. No CT evidence of mass or acute infarct.    The mastoid air cells are clear. The visualized paranasal sinuses are clear.  Orbital structures demonstrate no acute findings.      Impression:      Impression:  1. No clearly acute intracranial pathology demonstrated.  2. Mild cerebral atrophy and nonspecific periventricular hypodensities which are thought to represent white matter microvascular change.    Signer Name: Dileep Garcia MD   Signed: 1/19/2024 4:40 PM EST  [facility]    XR Chest 1 View [[id number]] Collected: 01/19/24 1639     Updated: 01/19/24 1650    Narrative:      XR CHEST 1 VW-: 1/19/2024 4:31 PM     INDICATION:   Stroke protocol.     COMPARISON:    None available.     FINDINGS:  Single Portable AP view(s) of the chest. Unremarkable cardiac  silhouette. The vascularity is normal. Shallow lung expansion with  bronchovascular crowding and probable combination of atelectasis and  scarring in  the lower lobes. No pneumothorax. Minimal blunting left CP  angle may reflect trace effusion or pleural reaction.       Impression:         1. Probable combination of atelectasis and scarring in the lung bases.  Nonspecific blunting left CP angle. No pneumothorax.     This report was finalized on 1/19/2024 4:41 PM by Dr. Arnoldo Olivo MD.             Reviewed    ECG/EMG Results (most recent)       Procedure Component Value Units Date/Time    ECG 12 Lead Altered Mental Status [[id number]] Collected: 01/19/24 1553     Updated: 01/19/24 1554     QT Interval 399 ms      QTC Interval 488 ms     Narrative:      HEART RATE= 93  bpm  RR Interval= 668  ms  NH Interval= 151  ms  P Horizontal Axis= -1  deg  P Front Axis= 56  deg  QRSD Interval= 96  ms  QT Interval= 399  ms  QTcB= 488  ms  QRS Axis= -28  deg  T Wave Axis= 16  deg  - ABNORMAL ECG -  Sinus rhythm  Atrial premature complex  Probable left atrial enlargement  Left ventricular hypertrophy  Borderline prolonged QT interval  Electronically Signed By:   Date and Time of Study: 2024-01-19 15:53:01          Reviewed    Assessment & Plan     TIA versus drug-induced encephalopathy  -Patient with recent with Lacenamab infusion  -Patient with slurred speech prior to arrival, this is fully resolved and patient has no focal neurodeficits  -CT head was negative, CTA reads still pending   -Stroke neuro consulted from ED and has evaluated patient, they have already ordered MRI brain with and without for tomorrow, provided aspirin 81 mg, Lipitor 40 nightly, given 1 L saline bolus, also plan echo, checking A1c  -PT OT speech consulted for assistance  -Recommended normal blood pressure goals  -Patient's suspected Alzheimer's dementia also complicating picture    Hypertension  -No home medications, start lisinopril 10 mg daily  -Monitor    Dementia-suspected Alzheimer's, followed outpatient by neurology, recent Mab infusion as per above.  May be consider addition of donepezil.        Chronic stable conditions to be managed with home medications include the following conditions listed below. Also please note: Every effort was made to accurately obtain patient's home medications. This was done utilizing extensive chart review, ED documentation, recent pharmacy records, and where possible thorough discussion with the patient if medications were known by the patient. I have reviewed and edited the patient's home medications as per my efforts above and current med list can be found within home medications portion of this electronic medical record and is accurate as possible as of 01/19/24     Depression-continue Lexapro    Hypothyroidism-continue levothyroxine    Vitamin deficiencies-resume supplements on discharge        DVT PPX: SCDs        I discussed the patient's findings and my recommendations with patient and several family members including son and daughter     Jerome Blake, DO  01/19/24  19:02 EST    Time: 38 mins     At [facility], we believe that sharing information builds trust and better relationships. You are receiving this note because you recently visited [facility]. It is possible you will see health information before a provider has talked with you about it. This kind of information can be easy to misunderstand. To help you fully understand what it means for your health, we urge you to discuss this note with your provider.

## 2024-01-20 NOTE — PLAN OF CARE
Goal Outcome Evaluation:  Plan of Care Reviewed With: patient, grandchild(mckay), son, sibling (Results reviewed w/RN and hospitalist as well. All verbalize understanding.)           Outcome Evaluation: SLP: Pt presents with a mild to moderate anomic aphasia bordering on moderate deficits based on clinical observation. Primary deficits in naming/word finding and repetition. Pt with relatively intact auditory comprehension with ability to respond to yes/no questions, recognize words and follow commands. Deficits in providing information, describing a picture and repetition due to word retrieval and may be some memory component as well. Speech demonstrates normal syntactic patterns, but significant word finding difficulty and hesitations when describing or speaking at the conversational level. Pt demonstrates loss of words and greater on low frequency targets and did not make attempts to describe or talk around the words. She did respond to phonemic cues to elicit some targets. Tactile cues and semantic cues did not improve word retrieval significantly. 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 Recommend: No further therapy in the acute setting as pt appears to be at her baseline status per report of patient and family. May consider outpatient therapy for short term to improve use of strategies and pt/family education. Referral can be discussed and obtained with neurologist/PCP.      Anticipated Discharge Disposition (SLP): home with assist, other (see comments) (consider outpatient services per PCP/Neuro)    SLP Diagnosis: moderate, fluent, aphasia, baseline, communication disorder (01/20/24 0815)  SLP Diagnosis Comments: Per report from pt and family present, she appears to be at her baseline functioning. Deficits in word retrieval with relatively intact auditory comprehension. Pt with prior noted memory  deficits as well in both immediate and delayed memory. No formal cognitive assessment completed this date. (01/20/24 3447)

## 2024-01-20 NOTE — CASE MANAGEMENT/SOCIAL WORK
Continued Stay Note  ROD Mann     Patient Name: Yesi Benson  MRN: [id number]  Today's Date: 1/20/2024    Admit Date: 1/19/2024    Plan: DC Home, no needs   Discharge Plan       Row Name 01/20/24 1236       Plan    Plan DC Home, no needs    Plan Comments CCP spoke to pt introduced self, role and discussed dc plans.  Pt lives alone and if she goes in the back door there are no steps.  She is IADL's and is she needs DME she has plenty at home.  Her pharmacy is "[facility] in [place] and she has no issues with medication copays.  Her plan is to return home, her family will transport and assist her as needed. .  CCP will follow. ThanksDianne    Final Discharge Disposition Code 01 - home or self-care    Final Note home                   Discharge Codes    No documentation.                 Expected Discharge Date and Time       Expected Discharge Date Expected Discharge Time    Jan 20, 2024               Dianne Florez

## 2024-01-20 NOTE — THERAPY DISCHARGE NOTE
Patient Name: Yesi Benson  : 1955    MRN: [id number]                              Today's Date: 2024       Admit Date: 2024    Visit Dx:     ICD-10-CM ICD-9-CM   1. Hypokalemia  E87.6 276.8   2. Anemia, unspecified type  D64.9 285.9   3. TIA (transient ischemic attack)  G45.9 435.9     Patient Active Problem List   Diagnosis    Adenomatous polyp of colon    Hyperlipidemia    Hypothyroidism    Status post total right knee replacement    Arthrofibrosis of knee joint, right    MCI (mild cognitive impairment)     Past Medical History:   Diagnosis Date    Colon polyp     Dementia had problems with memory                   during last year    have not been diagnosed    Disease of thyroid gland     Elevated cholesterol     Hyperlipidemia approx. 20 years ago    Knee swelling     Memory loss within the last year    Tear of meniscus of knee     TIA (transient ischemic attack) 2024     Past Surgical History:   Procedure Laterality Date    COLONOSCOPY N/A 2019    Procedure: COLONOSCOPY POLYPECTOMY;  Surgeon: Jaime Alaniz MD;  Location: [facility];  Service: Gastroenterology    DENTAL PROCEDURE Left     HERNIA REPAIR      JOINT MANIPULATION Right 2020    Procedure: KNEE MANIPULATION;  Surgeon: Elmer Fuentes MD;  Location: [facility] OR;  Service: Orthopedics;  Laterality: Right;    JOINT REPLACEMENT      SALPINGO OOPHORECTOMY Left     as an infant     TOTAL KNEE ARTHROPLASTY Right 10/14/2020    Procedure: TOTAL KNEE ARTHROPLASTY AND ALL ASSOCIATED PROCEDURES;  Surgeon: Elmer Fuentes MD;  Location: [facility];  Service: Orthopedics;  Laterality: Right;      General Information       Row Name 24 1042          Physical Therapy Time and Intention    Document Type discharge evaluation/summary  -     Mode of Treatment physical therapy  -       Row Name 24 1042          General Information    Patient Profile Reviewed yes  pt with episode of slurred  speech, admitted for CVA work up  -     Prior Level of Function independent:;all household mobility;community mobility  -     Existing Precautions/Restrictions fall  -     Barriers to Rehab none identified  -       Row Name 01/20/24 1042          Living Environment    People in Home spouse  -       Row Name 01/20/24 1042          Home Main Entrance    Number of Stairs, Main Entrance other (see comments)  pt reports she can enter home without use of steps  -     Stair Railings, Main Entrance none  -       Row Name 01/20/24 1042          Stairs Within Home, Primary    Stairs, Within Home, Primary pt reports she lives in a bi level house, can stay on first floor  -       Row Name 01/20/24 1042          Cognition    Orientation Status (Cognition) oriented x 3  -               User Key  (r) = Recorded By, (t) = Taken By, (c) = Cosigned By      Initials Name Provider Type    Hayley Small, PT Physical Therapist                   Mobility       Row Name 01/20/24 1042          Bed Mobility    Bed Mobility bed mobility (all) activities  -     All Activities, [place] (Bed Mobility) modified independence  -     Assistive Device (Bed Mobility) bed rails;head of bed elevated  -       Row Name 01/20/24 1042          Sit-Stand Transfer    Sit-Stand [place] (Transfers) modified independence  -     Assistive Device (Sit-Stand Transfers) other (see comments)  no device used  -       Row Name 01/20/24 1042          Gait/Stairs (Locomotion)    [place] Level (Gait) modified independence  -     Assistive Device (Gait) other (see comments)  no device used  -     Distance in Feet (Gait) 200  -     Bilateral Gait Deviations forward flexed posture  -     Comment, (Gait/Stairs) pt manages direction changes without difficulty or balance loss  -               User Key  (r) = Recorded By, (t) = Taken By, (c) = Cosigned By      Initials Name Provider Type    Hayley Small, PT Physical  "Therapist                   Obj/Interventions       Row Name 01/20/24 1042          Range of Motion Comprehensive    Comment, General Range of Motion LE ROM functional bilaterally  -       Row Name 01/20/24 1042          Strength Comprehensive (MMT)    Comment, General Manual Muscle Testing (MMT) Assessment LE strength 4/5 bilaterally  -       Row Name 01/20/24 1042          Balance    Comment, Balance modified independence without use of device  -       Row Name 01/20/24 1042          Sensory Assessment (Somatosensory)    Sensory Assessment (Somatosensory) other (see comments)  pt reports no numbness/tingling  -               User Key  (r) = Recorded By, (t) = Taken By, (c) = Cosigned By      Initials Name Provider Type    Hayley Small, PT Physical Therapist                   Goals/Plan    No documentation.                  Clinical Impression       [facility] Name 01/20/24 1042          Pain    Pretreatment Pain Rating 0/10 - no pain  -     Posttreatment Pain Rating 0/10 - no pain  -[facility] Name 01/20/24 1042          Plan of Care Review    Plan of Care Reviewed With patient  -     Outcome Evaluation Physical therapy evaluation complete.  patient performs supine to sit and sit to stand with modified independence.  Patient performs gait without device x200 feet with modified independence.  Patient with no balance loss and able to manage direction changes without difficulty.  Patient reports current mobilty is at baseline and reports no concerns regarding return home.  -       Row Name 01/20/24 1042          Therapy Assessment/Plan (PT)    Patient/Family Therapy Goals Statement (PT) \"go home\"  -     Criteria for Skilled Interventions Met (PT) no problems identified which require skilled intervention  -     Therapy Frequency (PT) evaluation only  -       Row Name 01/20/24 1042          Positioning and Restraints    Pre-Treatment Position in bed  -     Post Treatment Position bed  -     In " Bed supine;call light within reach;encouraged to call for assist;with family/caregiver  -               User Key  (r) = Recorded By, (t) = Taken By, (c) = Cosigned By      Initials Name Provider Type    Hayley Small PT Physical Therapist                   Outcome Measures       Row Name 01/20/24 1042          How much help from another person do you currently need...    Turning from your back to your side while in flat bed without using bedrails? 4  -JW     Moving from lying on back to sitting on the side of a flat bed without bedrails? 4  -JW     Moving to and from a bed to a chair (including a wheelchair)? 4  -JW     Standing up from a chair using your arms (e.g., wheelchair, bedside chair)? 4  -JW     Climbing 3-5 steps with a railing? 3  -JW     To walk in hospital room? 4  -JW     AM-PAC 6 Clicks Score (PT) 23  -     Highest Level of Mobility Goal 7 --> Walk 25 feet or more  -       Row Name 01/20/24 1042          Modified Derek Scale    Pre-Stroke Modified [place] Scale 0 - No Symptoms at all.  -     Modified [place] Scale 1 - No significant disability despite symptoms.  Able to carry out all usual duties and activities.  -       Row Name 01/20/24 1042          Functional Assessment    Outcome Measure Options AM-PAC 6 Clicks Basic Mobility (PT);Modified [place]  -               User Key  (r) = Recorded By, (t) = Taken By, (c) = Cosigned By      Initials Name Provider Type    Hayley Small PT Physical Therapist                  Physical Therapy Education       Title: PT OT SLP Therapies (Resolved)       Topic: Physical Therapy (Resolved)       Point: Mobility training (Resolved)       Learning Progress Summary             Patient Acceptance, E,TB, VU by  at 1/20/2024 1139                                         User Key       Initials Effective Dates Name Provider Type Discipline     06/16/21 -  Hayley Rebolledo PT Physical Therapist PT                  PT Recommendation and Plan      Plan of Care Reviewed With: patient  Outcome Evaluation: Physical therapy evaluation complete.  patient performs supine to sit and sit to stand with modified independence.  Patient performs gait without device x200 feet with modified independence.  Patient with no balance loss and able to manage direction changes without difficulty.  Patient reports current mobilty is at baseline and reports no concerns regarding return home.     Time Calculation:   PT Evaluation Complexity  History, PT Evaluation Complexity: 1-2 personal factors and/or comorbidities  Examination of Body Systems (PT Eval Complexity): 1-2 elements  Clinical Presentation (PT Evaluation Complexity): stable  Clinical Decision Making (PT Evaluation Complexity): low complexity  Overall Complexity (PT Evaluation Complexity): low complexity     PT Charges       Row Name 01/20/24 1139             Time Calculation    Start Time 1042  -      Stop Time 1058  -      Time Calculation (min) 16 min  -      PT Received On 01/20/24  -                User Key  (r) = Recorded By, (t) = Taken By, (c) = Cosigned By      Initials Name Provider Type    Hayley Small, PT Physical Therapist                  Therapy Charges for Today       Code Description Service Date Service Provider Modifiers Qty    [id number]  PT EVAL LOW COMPLEXITY 1 1/20/2024 Hayley Rebolledo, PT GP 1            PT G-Codes  Outcome Measure Options: AM-PAC 6 Clicks Basic Mobility (PT), Modified [place]  AM-PAC 6 Clicks Score (PT): 23  Modified [place] Scale: 1 - No significant disability despite symptoms.  Able to carry out all usual duties and activities.    PT Discharge Summary  Anticipated Discharge Disposition (PT): home (pt reports she plans to resume outpatient PT for right knee pain)    Hayley Rebolledo PT  1/20/2024

## 2024-01-20 NOTE — PROGRESS NOTES
"DOS: 2024  NAME: Yesi Benson   : 1955  PCP: Mani Gorman MD  Chief Complaint   Patient presents with    Altered Mental Status     HA/dizzy last night, now resolved, has hx of early alzheimers ds, had episode of \" gibberish\" on the phone today at approx 1145, had an infusion at neurology office yesterday, daughter states pt is at baseline       Chief complaint: Feels better and wants to go home.  Subjective: Currently on an infusion known as Lacenumab to help improve memory which is being prescribed by Dr. Cerna.  Patient has not had any further speech impediment and her blood pressure is better controlled at this point.  Reviewed the MRI of the brain with and without contrast on the PACS with the patient and family members and showed there is no change from the 1 performed in December of last year.  Reassured the patient and family members that she did not have any stroke.  Objective:  Vital signs: /58 (BP Location: Left arm, Patient Position: Lying)   Pulse 94   Temp 97.3 °F (36.3 °C) (Oral)   Resp 17   Ht 157.5 cm (62\")   Wt 66.7 kg (147 lb 1.6 oz)   SpO2 96%   BMI 26.90 kg/m²    Gen: NAD, vitals reviewed  MS: Back to baseline.  CN: Cranials 2-12: No focal deficits noted.  Motor: Muscles of both upper and lower extremities show good bulk power and tone.  Sensory: No sensory loss noted.  Coordination: Normal finger-to-nose coordination noted.  Gait: She was able to get up and ambulate independently and the Romberg is negative.    ROS:  General: Pleasant lady currently feeling a lot better wants to go home.  Neurological: No focal neurological deficits.    Laboratory results:  Lab Results   Component Value Date    GLUCOSE 125 (H) 2024    CALCIUM 9.0 2024     2024    K 3.1 (L) 2024    CO2 33.2 (H) 2024     2024    BUN 10 2024    CREATININE 1.07 (H) 2024    EGFRIFNONA 61 10/15/2020    BCR 9.3 2024    ANIONGAP 5.8 " 01/19/2024     Lab Results   Component Value Date    WBC 9.52 01/19/2024    HGB 10.9 (L) 01/19/2024    HCT 32.6 (L) 01/19/2024    MCV 88.6 01/19/2024     01/19/2024     Lab Results   Component Value Date     (H) 12/15/2023         Lab 01/20/24  0015   HEMOGLOBIN A1C 4.90        Review of labs: Low potassium of 3.1 noted.  The hemoglobin is low at 10.9 and the hematocrit is low at 32.6.  The EGFR is borderline at 56.3.  The total cholesterol has been elevated at 284 and the LDL was elevated at 191 from the lipid profile studies on December 15, 2023.     CMP:        Lab 01/20/24  0015 01/19/24  1606   SODIUM  --  139   POTASSIUM 3.1* 2.7*   CHLORIDE  --  100   CO2  --  33.2*   ANION GAP  --  5.8   BUN  --  10   CREATININE  --  1.07*   EGFR  --  56.3*   GLUCOSE  --  125*   CALCIUM  --  9.0   MAGNESIUM  --  1.6   TOTAL PROTEIN  --  5.7*   ALBUMIN  --  3.6   GLOBULIN  --  2.1   ALT (SGPT)  --  12   AST (SGOT)  --  19   BILIRUBIN  --  0.5   ALK PHOS  --  72        TSH          12/15/2023    08:18 1/19/2024    16:06   TSH   TSH 0.852  1.930         Lipid Panel          12/15/2023    08:18   Lipid Panel   Total Cholesterol 284    Triglycerides 81    HDL Cholesterol 80    VLDL Cholesterol 13    LDL Cholesterol  191    LDL/HDL Ratio 2.35         Lab Results   Component Value Date    [id number] 594 08/07/2023           Lab Results   Component Value Date    HGBA1C 4.90 01/20/2024           Review and interpretation of imaging: The MRI of the brain without contrast performed on December 18, 2023 is as follows:    FINDINGS:  There is no abnormally restricted diffusion. No recent infarct is suspected. There is no MRI evidence for intracranial hemorrhage. No extra-axial fluid collection. The ventricles are normal in size and configuration and the basilar cisterns are patent.  The major arterial intracranial flow voids are maintained. The mastoid air cells are clear. The visualized paranasal sinuses are clear. There  has been cataract surgery bilaterally. Prominent perivascular spaces and/or chronic lacunar noted in the  bilateral basal ganglia. There is mild white matter signal abnormality that is nonspecific but likely due to small vessel disease. There is age-appropriate volume loss without particular pattern of volume loss. No Chiari I malformation. No intracranial  mass effect.     IMPRESSION:     1. Probable sequelae of small vessel disease. No acute intracranial abnormality is suspected.    CT Head Without Contrast Stroke Protocol    Result Date: 1/19/2024   CT Head WO Code Stroke Indication: Altered mental status. History of early dementia. Comparison: None CT head without contrast. Radiation dose reduction techniques included automated exposure control or exposure modulation based on body size. Radiation audit for number of CT and nuclear cardiology exams performed in the last year: 0.  Findings: The ventricles are mildly generous in size. Cortical sulci are correspondingly prominent. No extraaxial fluid collections are seen. No parenchymal or subarachnoid hemorrhage is present. Periventricular hypodensities are demonstrated which are nonspecific but likely represent white matter microvascular change in a patient of this age. No CT evidence of mass or acute infarct. The mastoid air cells are clear. The visualized paranasal sinuses are clear. Orbital structures demonstrate no acute findings.     Impression: Impression: 1. No clearly acute intracranial pathology demonstrated. 2. Mild cerebral atrophy and nonspecific periventricular hypodensities which are thought to represent white matter microvascular change. Signer Name: Dileep Garcia MD Signed: 1/19/2024 4:40 PM EST [facility]          MRI Brain With & Without Contrast    Result Date: 1/20/2024  MRI Brain WO W INDICATION:  Hypokalemia. Transient cerebral ischemic attack. Difficulty finding words noticed by family 1/19/2023. History of dementia.  TECHNIQUE: Multiplanar multiecho imaging performed brain to include axial FLAIR and diffusion-weighted imaging. Postcontrast imaging following 14 cc MultiHance. COMPARISON:  Noncontrast head CT 1/19/2024 and MRI of the brain 12/18/2023 FINDINGS: Mild proximal of ventricles, sulci and basilar cisterns compatible with generalized atrophy but in keeping with advanced age. Mild increased T2 and FLAIR signal within the periventricular white matter with several small foci of T2 and FLAIR hyperintensity within the centrum semiovale likely the sequela of chronic microvascular disease. No large vessel infarct. No restricted diffusion. No mass, mass effect or midline shift. Normal intracranial flow voids. Bony calvarium, skull base, mastoids and sinuses unremarkable. No abnormal intracranial enhancement postcontrast.     Impression: No acute intracranial abnormality and no findings to support acute infarct. Mild atrophy in keeping with advanced age with chronic periventricular white matter changes likely reflecting chronic microvascular disease. No change from 12/18/2023. Signer Name: Jaxon Ohara MD Signed: 1/20/2024 10:40 AM EST [facility]      Workup to date: CSF study performed on November 29, 2023 is as follows:    TECHNIQUE:   Informed consent was obtained. The lower back was prepped with betadine  and draped in the usual sterile fashion. Pre-cutaneous handwashing and  sterile gloves were worn. 1% Lidocaine solution was used for local  anesthesia. Using fluoroscopic guidance, a 22-gauge spinal needle was  advanced into the thecal sac at level L3.      5 mL of clear CSF fluid was drained by gravity into the special tube for  amyloid beta testing. An additional 12 mL of CSF fluid was removed and  sent for routine laboratory evaluation. The needle was removed. There  were no complications.     FINDINGS:   Hard copy fluoroscopic image shows good position of the needle in the  thecal sac.      IMPRESSION:  Successful fluoroscopically guided diagnostic lumbar puncture.       Diagnoses: Patient with transient ischemic attack secondary to hypertensive episode.  There is no evidence of any acute ischemia and no side effects from the current infusion.      Comment: The CSF study performed on November 29, 2023 is as follows:     Latest Reference Range & Units 11/29/23 11:20   Appearance, CSF Clear   Clear  Clear  Clear   Color, CSF Colorless   Colorless  Colorless  Colorless   Tube Number, CSF  1  1   WBC, CSF 0 - 5 /mm3  0 - 5 /mm3 1  1   Glucose, CSF 40 - 70 mg/dL 67   Protein, Total (CSF) 15.0 - 45.0 mg/dL 35.0   VDRL, Quantitative, CSF Non [place]:<1:1  Non Reactive     The Apo E genotyping result is as follows:    Component 1 mo ago   Method: Comment   Comment: Patient DNA is assayed for the APOE genotype by PCR  amplification of a specific region in exon 4 of the APOE  gene followed by digestion with restriction enzyme Hha I  and separation of fragments by polyacrylamide gel  electrophoresis. This approach allows the APOE E2, E3, and  E4 alleles to be distinguished. Analytical sensitivity and  specificity are >99.5%. Individuals are interpreted as  having one of the following genotypes: E2/E2, E3/E3, E4/E4,  E2/E3, E2/E4, E3/E4.   APO E Genotyping Result Comment   Comment: E3/E4 (one copy of the APOE4 variant)   Interpretation Comment   Comment: Positive for one copy of the APOE4 variant that is  associated with increased risk for late onset Alzheimer's  disease (AD). Therefore the lifetime risk for AD is  increased in this individual. However, most individuals  with one copy of the APOE4 variant do not develop AD.  RECOMMENDATIONS  Genetic counseling is recommended.  Due to the lack of measures to prevent the development of  AD, the ACMG/NSGC guidelines do not recommend  presymptomatic testing, but if it is performed, guidelines  are provided (Fadumo DE PAZ et al. 2011). The APOE Genotyping:  Alzheimer's Risk  test is not recommended for children.  NOTE: This is not a diagnostic test. Results should be  interpreted along with clinical findings and other data.  This test evaluates only for the APOE genotype and cannot  detect genetic abnormalities elsewhere in the genome. It  should be realized that there are possible sources of error  including sample misidentification, rare technical errors,  trace contamination of PCR reactions, and rare genetic  variants that may interfere with analysis.  For inquiries or genetic consultation, please call [facility]  at [phone].   Comment Comment   Comment: INFORMATION ABOUT THE APOE GENOTYPE AND ALZHEIMER'S DISEASE  Alzheimer's disease (AD) is the most common form of  dementia in the elderly and currently affects more than 5  million Americans. It is a progressive neurodegenerative  disorder with brain findings of plaques and neurofibrillary  tangles containing beta-amyloid and tau protein  respectively.  The predominant form of AD is late onset (age > 60-65),  which can be familial (15-20%) or sporadic. The APOE4  variant increases the risk for late onset AD and may  contribute to the pathology of the disease. This risk is  increased by approximately 2 to 3-fold for individuals with  one copy of the APOE4 variant and by approximately 10  [id number] for individuals with two copies of this variant  (E4/E4 genotype). The APOE2 variant has some protective  effect against development of late onset AD. The lifetime  risk for late onset AD is approximately 10-12% in the  general population, though it is higher in women than men  and doubles when there is a first degree relative with this  disorder. The lifetime risk is approximately 9% for  individuals negative for APOE4, and for individuals with  E4/E4 may be as high as 25% for males and 45% for females.  Among patients with late onset AD, the presence of APOE4  may lead to earlier development of symptoms.  However, APOE4 is neither  necessary nor sufficient for the  development of AD. Approximately 30-50% of patients with  late onset AD do not have an APOE4 allele.  APOE4 is common, with 25% of the general population having  one copy and 1% having two copies of this variant. Among  patients with late onset AD, 50-70% are positive for APOE4.  The development of late onset AD is influenced by many  factors other than APOE4 including age, gender, family  history, level of education and history of head trauma.  Midlife cardiovascular risk factors in individuals with  APOE4 also increase risk for cognitive decline. A number of  genetic influences in addition to APOE4 have also been  reported and are under investigation.  This test was developed and its performance characteristics  determined by [employer]. It has not been cleared or approved  by the Food and Drug Administration. The FDA has determined  that such clearance or approval is not necessary.  REFERENCES  Clarissa SOLIS et al. Sex modifies the APOE-related risk of  developing Alzheimer disease. Annal Neurol  2014;75(4):563-573  Conner ABARCA. Alzheimer Disease Overview. Into The Gloss  (internet). Tash MARQUEZ et al., editors. [facility]:  [facility], [place], [place]. Last revised 2014.  Fadumo JS et al. Genetic counseling and testing for  Alzheimer disease: Joint practice guidelines of the  American College of Medical Genetics and the National  Society of Genetic Counselors. [place] in Med  2011;13(6597-601.  Jesus SÁNCHEZ. Apolipoprotein E: Implications for AD  neurobiology, epidemiology and risk assessment.     Plan:  1.  Patient with most probably transient ischemic attack currently back to baseline secondary to hypertensive episode.  2.  Discussed about keeping the systolic blood pressure between 120 and 140 and the diastolic between 70 and 80.  3.  She cannot have any antiplatelet agent because she is currently on Lacenumab for prevention of progression of dementia.  4.  Discussed about the lipid  profile being extremely elevated and recommended higher dose of/statin like 80 mg of atorvastatin or 40 mg of rosuvastatin as higher amount of lipid profile specially the LDL-C is associated with further progression of Alzheimer's as well as strokes.    Discussed with the patient and family members and at this point I do not have any further recommendations and if medically stable she can be discharged home.    Spent a total of 30 minutes in face-to-face evaluation and management of the patient using the dedicated telemedicine device without any interruption with the help of the rounding nurse with the patient located at the [facility] and myself at a remote location.    Electronically signed by Nam Hinojosa MD, 01/20/24, 11:42 AM EST.

## 2024-01-20 NOTE — PLAN OF CARE
Goal Outcome Evaluation:  Plan of Care Reviewed With: patient           Outcome Evaluation: Physical therapy evaluation complete.  patient performs supine to sit and sit to stand with modified independence.  Patient performs gait without device x200 feet with modified independence.  Patient with no balance loss and able to manage direction changes without difficulty.  Patient reports current mobilty is at baseline and reports no concerns regarding return home.      Anticipated Discharge Disposition (PT): home (pt reports she plans to resume outpatient PT for right knee pain)

## 2024-01-20 NOTE — DISCHARGE SUMMARY
[facility] Medicine Services  DISCHARGE SUMMARY        Date of Admission: 1/19/2024    Date of Discharge:  1/20/2024    Length of stay:  LOS: 0 days     Presenting Problem:   TIA (transient ischemic attack) [G45.9]  Hypokalemia [E87.6]  Anemia, unspecified type [D64.9]    Hospital Course     Active Diagnosis During Hospital Stay/Discharge Diagnoses/Course by Diagnoses:    Transient encephalopathy  -Stroke ruled out  -TIA vs drug induced vs anxiety stress mediated   -Patient with recent with Lacenamab infusion, would d/w her established neruolgist about this outpt  -CT head was negative, CTA head/neck showed no large vessel occulsion  -MRI brain no acute abnormality but mild atrophy and chronic white matter changes similar to 12/18/23  -PT OT speech  followed   -BP stable no need for antihypertensives  -continue statin  -stop ASA given risk of intracranial bleeding on Lacenamab but would discuss risk benefits of this with her established neurologist outp  -Patient's suspected Alzheimer's dementia also complicating picture     Hypertension  -No home medications, was stable  -BP stable no need for antihypertensives but can be started on an outpt basis     Dementia-suspected Alzheimer's, followed outpatient by neurology, recent Mab infusion as per above.  continue to follow with neuro outpt     Depression-continue Lexapro     Hypothyroidism-continue levothyroxine     Vitamin deficiencies-resume supplements on discharge          Active Hospital Problems   No active problems to display.      Resolved Hospital Problems    Diagnosis Date Resolved POA    **TIA (transient ischemic attack) [G45.9] 01/20/2024 Yes         Hospital Course  Patient is a 68 y.o. female presented with episode of transient confusion. Was admitted and had extensive workup as noted above. Returned to her baseline. Was felt stable to be d/c home with further workup as outlined above. D/w pt and several family member and were in agreement  with plan.         Procedures Performed:as noted          Consults:   Consults       Date and Time Order Name Status Description    1/19/2024  6:10 PM Inpatient Neurology Consult Stroke      1/19/2024  4:08 PM Inpatient Neurology Consult Stroke      1/19/2024  4:08 PM Inpatient Neurology Consult Stroke                Pertinent  and/or Most Recent Results       Pertinent Test Results:     Results from last 7 days   Lab Units 01/19/24  1606   WBC 10*3/mm3 9.52   HEMOGLOBIN g/dL 10.9*   HEMATOCRIT % 32.6*   PLATELETS 10*3/mm3 250     Results from last 7 days   Lab Units 01/20/24  0015 01/19/24  1606   SODIUM mmol/L  --  139   POTASSIUM mmol/L 3.1* 2.7*   CHLORIDE mmol/L  --  100   CO2 mmol/L  --  33.2*   BUN mg/dL  --  10   CREATININE mg/dL  --  1.07*   CALCIUM mg/dL  --  9.0   BILIRUBIN mg/dL  --  0.5   ALK PHOS U/L  --  72   ALT (SGPT) U/L  --  12   AST (SGOT) U/L  --  19   GLUCOSE mg/dL  --  125*       Microbiology Results (last 10 days)       ** No results found for the last 240 hours. **                Imaging Results (All)       Procedure Component Value Units Date/Time    MRI Brain With & Without Contrast [[id number]] Collected: 01/20/24 1023     Updated: 01/20/24 1043    Narrative:      MRI Brain WO W     INDICATION:    Hypokalemia. Transient cerebral ischemic attack. Difficulty finding words noticed by family 1/19/2023. History of dementia.    TECHNIQUE:   Multiplanar multiecho imaging performed brain to include axial FLAIR and diffusion-weighted imaging. Postcontrast imaging following 14 cc MultiHance.    COMPARISON:    Noncontrast head CT 1/19/2024 and MRI of the brain 12/18/2023    FINDINGS:  Mild proximal of ventricles, sulci and basilar cisterns compatible with generalized atrophy but in keeping with advanced age. Mild increased T2 and FLAIR signal within the periventricular white matter with several small foci of T2 and FLAIR  hyperintensity within the centrum semiovale likely the sequela of chronic  microvascular disease. No large vessel infarct. No restricted diffusion. No mass, mass effect or midline shift. Normal intracranial flow voids.    Bony calvarium, skull base, mastoids and sinuses unremarkable. No abnormal intracranial enhancement postcontrast.      Impression:      No acute intracranial abnormality and no findings to support acute infarct.    Mild atrophy in keeping with advanced age with chronic periventricular white matter changes likely reflecting chronic microvascular disease. No change from 12/18/2023.    Signer Name: Jaxon Ohara MD   Signed: 1/20/2024 10:40 AM EST  [facility]    CT Angiogram Head w AI Analysis of LVO [[id number]] Collected: 01/1955     Updated: 01/19/24 2022    Narrative:         CTA Neck, CTA  HEAD W AI ANALYSIS FOR LVO     Comparison: None    Clinical Information: Acute neuro deficit    TECHNIQUE:  CT angiogram of the head and neck with IV contrast. 3-D reconstructions were obtained and reviewed. Evaluation for a significant carotid arterial stenosis is based on NASCET criteria. Radiation dose reduction techniques included automated exposure  control. Radiation audit for known CT and nuclear cardiology exams in the last 12 months: 0.    Findings: CTA NECK:    Normal aortic arch branching pattern with no proximal great vessel stenosis. The vertebral arteries are widely patent and codominant. Cervical carotid bifurcations demonstrate no evidence of hemodynamically significant stenoses by NASCET criteria.    CTA HEAD:    Intracranially, there is symmetric distal vascular contrast distribution in the anterior, middle and posterior cerebral artery territories. No evidence of intracranial arterial flow limiting stenosis. No intracranial aneurysm or vascular malformation is  identified. The dural venous sinuses appear normal. No intracranial mass or abnormal contrast enhancement.      Impression:      Impression: Negative CT angiogram of the head and  neck. No intracranial or extracranial arterial flow limiting stenosis or aneurysm.     Signer Name: Dileep Garcia MD   Signed: 1/19/2024 8:20 PM EST  [facility]    CT Angiogram Neck [[id number]] Collected: 01/19/24 1956     Updated: 01/19/24 2022    Narrative:         CTA Neck, CTA  HEAD W AI ANALYSIS FOR LVO     Comparison: None    Clinical Information: Acute neuro deficit    TECHNIQUE:  CT angiogram of the head and neck with IV contrast. 3-D reconstructions were obtained and reviewed. Evaluation for a significant carotid arterial stenosis is based on NASCET criteria. Radiation dose reduction techniques included automated exposure  control. Radiation audit for known CT and nuclear cardiology exams in the last 12 months: 0.    Findings: CTA NECK:    Normal aortic arch branching pattern with no proximal great vessel stenosis. The vertebral arteries are widely patent and codominant. Cervical carotid bifurcations demonstrate no evidence of hemodynamically significant stenoses by NASCET criteria.    CTA HEAD:    Intracranially, there is symmetric distal vascular contrast distribution in the anterior, middle and posterior cerebral artery territories. No evidence of intracranial arterial flow limiting stenosis. No intracranial aneurysm or vascular malformation is  identified. The dural venous sinuses appear normal. No intracranial mass or abnormal contrast enhancement.      Impression:      Impression: Negative CT angiogram of the head and neck. No intracranial or extracranial arterial flow limiting stenosis or aneurysm.     Signer Name: Dileep Garcia MD   Signed: 1/19/2024 8:20 PM EST  [facility]    CT CEREBRAL PERFUSION WITH & WITHOUT CONTRAST [[id number]] Collected: 01/19/24 1958     Updated: 01/19/24 2017    Narrative:      CT PERFUSION, WITH AND WITHOUT CONTRAST, 1/19/2024       HISTORY:Acute neuro deficit       TECHNIQUE:    CT imaging of the brain with and without  contrast using cerebral perfusion protocol. Post-processing parametric maps were created using RAPID software and reviewed. Radiation dose reduction techniques included automated exposure control. High radiation  dose exams here in the last 12 months: 0.     FINDINGS:    Perfusion maps are symmetric without evidence of cerebral ischemia or core infarction         Impression:      Negative brain perfusion CT. No evidences of cerebral ischemia or core infarction.    Signer Name: Dileep Garcia MD   Signed: 1/19/2024 8:14 PM EST  [facility]    CT Head Without Contrast Stroke Protocol [[id number]] Collected: 01/19/24 1621     Updated: 01/19/24 1652    Narrative:         CT Head WO Code Stroke     Indication: Altered mental status. History of early dementia.    Comparison: None    CT head without contrast. Radiation dose reduction techniques included automated exposure control or exposure modulation based on body size. Radiation audit for number of CT and nuclear cardiology exams performed in the last year: 0.      Findings:  The ventricles are mildly generous in size. Cortical sulci are correspondingly prominent. No extraaxial fluid collections are seen.    No parenchymal or subarachnoid hemorrhage is present. Periventricular hypodensities are demonstrated which are nonspecific but likely represent white matter microvascular change in a patient of this age. No CT evidence of mass or acute infarct.    The mastoid air cells are clear. The visualized paranasal sinuses are clear.  Orbital structures demonstrate no acute findings.      Impression:      Impression:  1. No clearly acute intracranial pathology demonstrated.  2. Mild cerebral atrophy and nonspecific periventricular hypodensities which are thought to represent white matter microvascular change.    Signer Name: Dileep Garcia MD   Signed: 1/19/2024 4:40 PM EST  [facility]    XR Chest 1 View [[id number]] Collected:  01/19/24 1639     Updated: 01/19/24 1650    Narrative:      XR CHEST 1 VW-: 1/19/2024 4:31 PM     INDICATION:   Stroke protocol.     COMPARISON:    None available.     FINDINGS:  Single Portable AP view(s) of the chest. Unremarkable cardiac  silhouette. The vascularity is normal. Shallow lung expansion with  bronchovascular crowding and probable combination of atelectasis and  scarring in the lower lobes. No pneumothorax. Minimal blunting left CP  angle may reflect trace effusion or pleural reaction.       Impression:         1. Probable combination of atelectasis and scarring in the lung bases.  Nonspecific blunting left CP angle. No pneumothorax.     This report was finalized on 1/19/2024 4:41 PM by Dr. Arnoldo Olivo MD.                 Day of Discharge       Condition on Discharge:  stable     Vital Signs  Temp:  [97.8 °F (36.6 °C)-99.2 °F (37.3 °C)] 97.8 °F (36.6 °C)  Heart Rate:  [82-94] 94  Resp:  [16-20] 16  BP: (112-183)/(53-88) 112/53    Physical Exam:  Physical Exam  Vitals reviewed.   Cardiovascular:      Rate and Rhythm: Normal rate.   Pulmonary:      Effort: No respiratory distress.   Abdominal:      General: There is no distension.   Skin:     General: Skin is warm.   Neurological:      Mental Status: She is alert. Mental status is at baseline.         Emotional Behavior:           Depression  none               Anxiety  none    Debilities:     Agitation  none      Discharge Disposition  Home or Self Care    Discharge Medications     Discharge Medications        New Medications        Instructions Start Date   atorvastatin 40 MG tablet  Commonly known as: LIPITOR   40 mg, Oral, Nightly             Continue These Medications        Instructions Start Date   calcium carbonate 600 MG tablet  Commonly known as: OS-ARMANDO   600 mg, Oral, Daily      cyclobenzaprine 10 MG tablet  Commonly known as: FLEXERIL   TAKE 1 TABLET BY MOUTH 3 TIMES A DAY AS NEEDED FOR MUSCLE SPASMS.      escitalopram 10 MG  tablet  Commonly known as: LEXAPRO   10 mg, Oral, Daily      fish oil 1000 MG capsule capsule   1,000 mg, Oral, Daily With Breakfast      levothyroxine 88 MCG tablet  Commonly known as: SYNTHROID, LEVOTHROID   levothyroxine 88 mcg tablet      multivitamin tablet tablet   Oral, Daily      terbinafine 250 MG tablet  Commonly known as: lamiSIL   terbinafine HCl 250 mg tablet   Take 1 tablet every day by oral route for 90 days.               Discharge Diet:   Diet Instructions       Diet: Regular/House Diet; Thin (IDDSI 0)      Discharge Diet: Regular/House Diet    Fluid Consistency: Thin (IDDSI 0)            Activity at Discharge:   Activity Instructions       Activity as Tolerated              Follow-up Appointments  Future Appointments   Date Time Provider Department Center   1/23/2024  9:00 AM Hetal Caceres PT [facility] OPT L[facility]   2/1/2024 10:00 AM ROOM 2-B [facility] ACU [facility] ACU [facility]AG   2/15/2024 10:30 AM ROOM 3 LAG ACU [facility] ACU [facility]   2/19/2024  1:00 PM LAG MRI 1 BH [facility] MRI LAG   2/23/2024 10:20 AM Salomon Ham MD [facility] N [facility] L[facility]   2/29/2024 11:00 AM ROOM 3-B LAG ACU [facility] ACU [facility]AG   3/14/2024 10:00 AM ROOM 3-B LAG ACU BH [facility] ACU LAG   3/18/2024  1:00 PM LAG MRI 1 BH [facility] MRI LAG   6/24/2024  1:00 PM LAG MRI 1 BH LAG MRI LAG     Additional Instructions for the Follow-ups that You Need to Schedule       Discharge Follow-up with PCP   As directed       Currently Documented PCP:    Mani Gorman MD    PCP Phone Number:    [phone]     Follow Up Details: one week        Discharge Follow-up with Specified Provider: her neurologist   As directed      To: her neurologist   Follow Up Details: 1-2 weeks                Test Results Pending at Discharge       Risk for Readmission (LACE) Score: 2 (1/20/2024  6:00 AM)        Time: Discharge 34 min with face-to-face history exam, writing of prescriptions, and documenting discharge data including care coordination with the nursing staff.      Electronically  signed by Nilo Melo DO, 01/20/24, 11:15 EST.      Note disclaimer: At [facility], we believe that sharing information builds trust and better relationships. You are receiving this note because you recently visited [facility]. It is possible you will see health information before a provider has talked with you about it. This kind of information can be easy to misunderstand. To help you fully understand what it means for your health, we urge you to discuss this note with your provider.

## 2024-01-20 NOTE — THERAPY EVALUATION
Acute Care - Speech Language Pathology Initial Evaluation   [place]     Patient Name: Yesi Benson  : 1955  MRN: [id number]  Today's Date: 2024               Admit Date: 2024     Visit Dx:    ICD-10-CM ICD-9-CM   1. Hypokalemia  E87.6 276.8   2. Anemia, unspecified type  D64.9 285.9   3. TIA (transient ischemic attack)  G45.9 435.9     Patient Active Problem List   Diagnosis    Adenomatous polyp of colon    Hyperlipidemia    Hypothyroidism    Status post total right knee replacement    Arthrofibrosis of knee joint, right    MCI (mild cognitive impairment)    TIA (transient ischemic attack)     Past Medical History:   Diagnosis Date    Colon polyp     Dementia had problems with memory                   during last year    have not been diagnosed    Disease of thyroid gland     Elevated cholesterol     Hyperlipidemia approx. 20 years ago    Knee swelling     Memory loss within the last year    Tear of meniscus of knee     TIA (transient ischemic attack) 2024     Past Surgical History:   Procedure Laterality Date    COLONOSCOPY N/A 2019    Procedure: COLONOSCOPY POLYPECTOMY;  Surgeon: Jaime Alaniz MD;  Location: [facility];  Service: Gastroenterology    DENTAL PROCEDURE Left     HERNIA REPAIR      JOINT MANIPULATION Right 2020    Procedure: KNEE MANIPULATION;  Surgeon: Elmer Fuentes MD;  Location: [facility];  Service: Orthopedics;  Laterality: Right;    JOINT REPLACEMENT      SALPINGO OOPHORECTOMY Left     as an infant     TOTAL KNEE ARTHROPLASTY Right 10/14/2020    Procedure: TOTAL KNEE ARTHROPLASTY AND ALL ASSOCIATED PROCEDURES;  Surgeon: Elmer Fuentes MD;  Location: [facility];  Service: Orthopedics;  Laterality: Right;       SLP Recommendation and Plan  SLP Diagnosis: moderate, fluent, aphasia, baseline, communication disorder (24 0815)  SLP Diagnosis Comments: Per report from pt and family present, she appears to be at her baseline  functioning. Deficits in word retrieval with relatively intact auditory comprehension. Pt with prior noted memory deficits as well in both immediate and delayed memory. No formal cognitive assessment completed this date. (01/20/24 0815)        [facility] Criteria for Skilled Therapy Interventions Met: baseline status (No therapy recommended during acute stay. May consider short term therapy as outpatient per Neuro/PCP.) (01/20/24 0815)  Anticipated Discharge Disposition (SLP): home with assist, other (see comments) (consider outpatient services per PCP/Neuro) (01/20/24 0815)        Therapy Frequency (SLP SLC): evaluation only (Plan is to discharge home today.) (01/20/24 0815)                 Patient/Family Concerns, Anticipated Discharge Disposition (SLP): No current concerns reported per pt and family at this time. (01/20/24 0815)              Plan of Care Reviewed With: patient, grandchild(mckay), son, sibling (Results reviewed w/RN and hospitalist as well. All verbalize understanding.) (01/20/24 1021)  Outcome Evaluation: SLP: Pt presents with a mild to moderate anomic aphasia bordering on moderate deficits based on clinical observation. Primary deficits in naming/word finding and repetition. Pt with relatively intact auditory comprehension with ability to respond to yes/no questions, recognize words and follow commands. Deficits in providing information, describing a picture and repetition due to word retrieval and may be some memory component as well. Speech demonstrates normal syntactic patterns, but significant word finding difficulty and hesitations when describing or speaking at the conversational level. Pt demonstrates loss of words and greater on low frequency targets and did not make attempts to describe or talk around the words. She did respond to phonemic cues to elicit some targets. Tactile cues and semantic cues did not improve word retrieval significantly. 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 Recommend: No further therapy in the acute setting as pt appears to be at her baseline status per report of patient and family. May consider outpatient therapy for short term to improve use of strategies and pt/family education. Referral can be discussed and obtained with neurologist/PCP. (01/20/24 1021)      SLP EVALUATION (last 72 hours)       SLP SLC Evaluation       Row Name 01/20/24 2249                   Communication Assessment/Intervention    Document Type evaluation  -AD        Subjective Information no complaints  -AD        Patient Observations alert;cooperative  -AD        Patient/Family/Caregiver Comments/Observations Pt was seen in therapy in bed, head upright. Family present with patient's permission. Cooperative and pleasant.  -AD        Patient Effort good  -AD        Symptoms Noted During/After Treatment none  -AD           General Information    Patient Profile Reviewed yes  -AD        Pertinent History Of Current Problem Pt is a 69 y/o female with admission for possible TIA. Reports of difficulty talking on 1/19/24 and described by family as putting a 'string of words together that did not make any sense'. Son reports that her speech was clear (intelligible) but did not make any sense. Recent lacenabam infusion on 12/18 and pt reports this is the second one received. Pt with hx of mild cognitive impairment per neurology. Prior cognitive testing with [facility] on 12/19/23 with score of 20/30 and deficits in expressive language, attention and memory noted. Hx of thyroid diseas, hyperlipidemia, memory problems and knee swelling. Pt lives alone in her own home and has assistance periodically from family including but not limited to sister, children and grandchildren. Pt does drive and manage her own ADLs and home management per report.  -AD        Precautions/Limitations, Vision WFL;corrective lenses  needed for reading  can read large print w/o glasses  -AD        Precautions/Limitations, Hearing WFL  -AD        Patient Level of Education high school graduate with one year of college  -AD        Prior Level of Function-Communication expressive language impairment;cognitive-linguistic impairment  word finding difficulty and mild cognitive impairment noted  -AD        Plans/Goals Discussed with patient and family;agreed upon  -AD        Barriers to Rehab previous functional deficit  -AD        Patient's Goals for Discharge return to home;return to all previous roles/activities  -AD        Family Goals for Discharge patient able to return to previous activities/roles  -AD        Standardized Assessment Used Western Aphasia Battery  -AD           Pain    Additional Documentation --  No pain reported or indicated.  -AD           Oral Motor Structure and Function    Oral Motor Structure and Function WFL  -AD        Oral Lesions or Structural Abnormalities and/or variants none  -AD        Dentition Assessment natural, present and adequate  -AD        Mucosal Quality moist, healthy  -AD           Oral Musculature and Cranial Nerve Assessment    Oral Motor General Assessment WFL  -AD        Oral Motor, Comment No gross deficits of oral motor strength or ROM.  -AD           Motor Speech Assessment/Intervention    Motor Speech Function WFL  -AD        Motor Speech, Comment Precise articulation at the conversational level.  -AD           Standardized Tests    Formal Speech Language Tests WAB: Western Aphasia Battery  -AD           Spontaneous Speech    Information Content 7  -AD        Fluency 6  -AD        Spontaneous Speech Total 13  -AD           Comprehension    Yes/No Questions 57  -AD        Auditory Word Recongition 60  -AD        Sequential Commands 80  -AD        Comprehension Total 197  -AD           Repetition    Repetition Total 76  -AD           Naming    Object 54  -AD        Word Fluency 5  -AD         Sentence Completion 10  -AD        Responsive Speech 8  -AD        Naming Total 77  -AD           Aphasia    Aphasia Quotient 76.3  -AD        Aphasia Severity Moderate-Mild (76-89)  -AD        Type of Aphasia Anomic  -AD           Cortical    WAB Comments Pt presents with a mild to moderate anomic aphasia bordering on moderate deficits based on clinical observation. Primary deficits in naming/word finding and repetition. Pt with relatively intact auditory comprehension with ability to respond to yes/no questions, recognize words and follow commands. Deficits in providing information, describing a picture and repetition due to word retrieval and may be some memory component as well. Speech demonstrates normal syntactic patterns, but significant word finding difficulty and hesitations when describing or speaking at the conversational level. Pt demonstrates loss of words and greater on low frequency targets and did not make attempts to describe or talk around the words. She did respond to phonemic cues to elicit some targets. Tactile cues and semantic cues did not improve word retrieval significantly.  -AD           SLP Evaluation Clinical Impressions    SLP Diagnosis moderate;fluent;aphasia;baseline;communication disorder  -AD        SLP Diagnosis Comments Per report from pt and family present, she appears to be at her baseline functioning. Deficits in word retrieval with relatively intact auditory comprehension. Pt with prior noted memory deficits as well in both immediate and delayed memory. No formal cognitive assessment completed this date.  -AD        Rehab Potential/Prognosis fair;other (see comments)  secondary to progressive nature of the disease process  -AD        SL[facility] Criteria for Skilled Therapy Interventions Met baseline status  No therapy recommended during acute stay. May consider short term therapy as outpatient per Neuro/PCP.  -AD        Functional Impact functional impact in social situations;needs  occasional supervision;difficulty communicating wants, needs;difficulty communicating in an emergency;difficulty in expressing complex messages;restrictions in personal and social life;other (see comments)  May have some difficulty with home management tasks but not assessed during this evaluation.  -AD           Recommendations    Therapy Frequency (SLP SLC) evaluation only  Plan is to discharge home today.  -AD        Anticipated Discharge Disposition (SLP) home with assist;other (see comments)  consider outpatient services per PCP/Neuro  -AD        Patient/Family Concerns, Anticipated Discharge Disposition (SLP) No current concerns reported per pt and family at this time.  -AD                  User Key  (r) = Recorded By, (t) = Taken By, (c) = Cosigned By      Initials Name Effective Dates    Crys Fernandez, MS CCC-SLP 06/16/21 -                        EDUCATION  The patient has been educated in the following areas:     Cognitive Impairment Communication Impairment. Pt and family verbalize understanding of results and in agreement with diagnosis of anomic aphasia. Agree pt at baseline status and no further therapy in the acute setting. Results discussed with RNZelda and Dr. Melo.       Time Calculation:      Time Calculation- SLP       Row Name 01/20/24 1027             Time Calculation- SLP    SLP Start Time 0815  -AD      SLP Stop Time 0955  direct time, scoring/interpretation of results  -AD      SLP Time Calculation (min) 100 min  -AD      Total Timed Code Minutes-  minute(s)  -AD      SLP Non-Billable Time (min) 0 min  -AD      SLP Received On 01/20/24  -AD         Timed Charges    56755-[place]pv[place] of Aphasia 100  -AD         Total Minutes    Timed Charges Total Minutes 100  -AD       Total Minutes 100  -AD                User Key  (r) = Recorded By, (t) = Taken By, (c) = Cosigned By      Initials Name Provider Type    Crys Fernandez, MS CCC-SLP Speech and Language Pathologist                     Therapy Charges for Today       Code Description Service Date Service Provider Modifiers Qty    39565731171  ST ASSESSMENT OF APHASIA PER HOUR 1/20/2024 Crys Vidal, MS CCC-SLP GN 2                       Crys Vidal, MS CCC-SLP  1/20/2024

## 2024-01-21 ENCOUNTER — READMISSION MANAGEMENT (OUTPATIENT)
Dept: CALL CENTER | Facility: HOSPITAL | Age: 69
End: 2024-01-21
Payer: MEDICARE

## 2024-01-21 NOTE — OUTREACH NOTE
Prep Survey      Flowsheet Row Responses   [facility] facility patient discharged from? [place]   Is LACE score < 7 ? Yes   Eligibility Readm Mgmt   Discharge diagnosis TIA (transient ischemic attack)   Does the patient have one of the following disease processes/diagnoses(primary or secondary)? Stroke   Does the patient have Home health ordered? No   Is there a DME ordered? No   Prep survey completed? Yes            Zaria HURTADO - Registered Nurse

## 2024-01-22 ENCOUNTER — OFFICE VISIT (OUTPATIENT)
Dept: NEUROLOGY | Facility: CLINIC | Age: 69
End: 2024-01-22
Payer: MEDICARE

## 2024-01-22 ENCOUNTER — TELEPHONE (OUTPATIENT)
Dept: NEUROLOGY | Facility: CLINIC | Age: 69
End: 2024-01-22
Payer: MEDICARE

## 2024-01-22 VITALS
HEIGHT: 62 IN | BODY MASS INDEX: 28.85 KG/M2 | SYSTOLIC BLOOD PRESSURE: 128 MMHG | HEART RATE: 96 BPM | WEIGHT: 156.8 LBS | OXYGEN SATURATION: 95 % | DIASTOLIC BLOOD PRESSURE: 80 MMHG

## 2024-01-22 DIAGNOSIS — Z86.73 HISTORY OF TIA (TRANSIENT ISCHEMIC ATTACK): Primary | ICD-10-CM

## 2024-01-22 DIAGNOSIS — G31.84 MCI (MILD COGNITIVE IMPAIRMENT): ICD-10-CM

## 2024-01-22 PROCEDURE — 1160F RVW MEDS BY RX/DR IN RCRD: CPT | Performed by: PSYCHIATRY & NEUROLOGY

## 2024-01-22 PROCEDURE — 99214 OFFICE O/P EST MOD 30 MIN: CPT | Performed by: PSYCHIATRY & NEUROLOGY

## 2024-01-22 PROCEDURE — 1159F MED LIST DOCD IN RCRD: CPT | Performed by: PSYCHIATRY & NEUROLOGY

## 2024-01-22 NOTE — PROGRESS NOTES
Notes by LPN:  Patient presents for ER follow up. She had some garbled speech, like gibberish. Lasted between 40 mins to an hour. No issue since then.              Subjective:     Patient ID: Yesi Benson is a 68 y.o. female.    History of Present Illness  The following portions of the patient's history were reviewed and updated as appropriate: allergies, current medications, past family history, past medical history, past social history, past surgical history, and problem list.    Review of Systems   Constitutional:  Negative for activity change, appetite change and fatigue.   HENT:  Negative for facial swelling, trouble swallowing and voice change.    Eyes:  Negative for photophobia, pain and visual disturbance.   Respiratory:  Negative for chest tightness, shortness of breath and wheezing.    Cardiovascular:  Negative for chest pain, palpitations and leg swelling.   Gastrointestinal:  Negative for abdominal pain, nausea and vomiting.   Endocrine: Negative for cold intolerance and heat intolerance.   Musculoskeletal:  Negative for arthralgias, back pain, gait problem, joint swelling, myalgias, neck pain and neck stiffness.   Neurological:  Positive for speech difficulty. Negative for dizziness, tremors, seizures, syncope, facial asymmetry, weakness, light-headedness, numbness and headaches.   Hematological:  Does not bruise/bleed easily.   Psychiatric/Behavioral:  Negative for agitation, behavioral problems, confusion, decreased concentration, dysphoric mood, hallucinations, self-injury, sleep disturbance and suicidal ideas. The patient is not nervous/anxious and is not hyperactive.         Objective:    Neurologic Exam  She is awake alert pleasant cooperative with reasonably fluent speech and reasonable speech comprehension.       Cranial nerves II through XII normal and symmetric.     Motor exam reveals age-appropriate tone bulk and power without lateralization and without drift.     Sensory exam reveals  reasonably preserved and symmetric sensation to light touch, temperature, and vibration.     Coordination testing reveals smooth and accurate finger-nose-finger normal rapid alternating movements.     Tendon reflexes are 1+ and symmetric.  Toes are downgoing.  Physical Exam    Assessment/Plan:     Diagnoses and all orders for this visit:    1. History of TIA (transient ischemic attack) (Primary)    2. MCI (mild cognitive impairment)     Recent admission to the hospital for transient episode of garbled speech noted.  I reviewed her complete workup with her.  Her MRI revealed no change from prior.  No acute process and most importantly, no evidence of Aria.  This event appears to represent a transient ischemic attack.  I recommended that she restart her low-dose aspirin.  I agree with the addition of statin lipid-lowering therapy I have encouraged better nutrition and hydration practices.  Her family is helping her work on this.  We are continuing her antiamyloid therapy unchanged as there is no evidence of Aria on this scan I will see her next month for follow-up.

## 2024-01-23 ENCOUNTER — HOSPITAL ENCOUNTER (OUTPATIENT)
Dept: PHYSICAL THERAPY | Facility: HOSPITAL | Age: 69
Setting detail: THERAPIES SERIES
Discharge: HOME | End: 2024-01-23

## 2024-01-23 ENCOUNTER — READMISSION MANAGEMENT (OUTPATIENT)
Dept: CALL CENTER | Facility: HOSPITAL | Age: 69
End: 2024-01-23
Payer: MEDICARE

## 2024-01-23 DIAGNOSIS — M24.661 FIBROSIS OF RIGHT KNEE JOINT: Primary | ICD-10-CM

## 2024-01-23 NOTE — OUTREACH NOTE
Stroke Week 1 Survey      Flowsheet Row Responses   [facility] patient discharged from? [place]   Does the patient have one of the following disease processes/diagnoses(primary or secondary)? Stroke   Week 1 attempt successful? Yes   Call start time 1135   Call end time 1142   Discharge diagnosis TIA (transient ischemic attack)   Person spoke with today (if not patient) and relationship pt   Meds reviewed with patient/caregiver? Yes   Is the patient having any side effects they believe may be caused by any medication additions or changes? No   Does the patient have all medications ordered at discharge? Yes   Is the patient taking all medications as directed (includes completed medication regime)? Yes   Does the patient have a primary care provider?  Yes   Does the patient have an appointment with their PCP within 7 days of discharge? Yes   Has the patient kept scheduled appointments due by today? N/A   Psychosocial issues? No   Does the patient require any assistance with activities of daily living such as eating, bathing, dressing, walking, etc.? No   Does the patient have any residual symptoms from stroke/TIA? No   Does the patient understand the diet ordered at discharge? No   Did the patient receive a copy of their discharge instructions? Yes   Nursing interventions Reviewed instructions with patient   What is the patient's perception of their health status since discharge? Improving   Nursing interventions Nurse provided patient education   Is the patient/caregiver able to teach back the risk factors for a stroke? History of TIAs, High Cholesterol, Diabetes   Is the patient/caregiver able to teach back signs and symptoms related to disease process for when to call PCP? Yes   Is the patient/caregiver able to teach back signs and symptoms related to disease process for when to call 911? Yes   If the patient is a current smoker, are they able to teach back resources for cessation? Not a smoker   Is the  patient/caregiver able to teach back the hierarchy of who to call/visit for symptoms/problems? PCP, Specialist, Home health nurse, Urgent Care, ED, 911 Yes   Is the patient able to teach back FAST for Stroke? T sin: Call 9-1-1 right away, S peech: Listen for slurred speech, A rm: Check if one arm is weak, B alance: Watch for sudden loss of balance, E yes: Check for vision loss, F ace: Look for an uneven smile   Week 1 call completed? Yes   Is the patient interested in additional calls from an ambulatory ? No   Would this patient benefit from a Referral to [facility] Social Work? No   Wrap up additional comments Pt states she is doing ok. Reviewed AVS/meds with pt. Pt did have a hard time finding her words when speaking. It was explained that I was calling on behalf of Dr Gorman to see how she was doing. Pt will make sure she has a fu appt with PCP.   Call end time 1142            Gilma HURTADO - Registered Nurse

## 2024-01-23 NOTE — THERAPY TREATMENT NOTE
Outpatient Physical Therapy Ortho Treatment Note   Jenny Chávez     Patient Name: Yesi Benson  : 1955  MRN: [id number]  Today's Date: 2024      Visit Date: 2024    Visit Dx:    ICD-10-CM ICD-9-CM   1. Fibrosis of right knee joint  M24.661 718.56       Patient Active Problem List   Diagnosis    Adenomatous polyp of colon    Hyperlipidemia    Hypothyroidism    Status post total right knee replacement    Arthrofibrosis of knee joint, right    MCI (mild cognitive impairment)        Past Medical History:   Diagnosis Date    Colon polyp     Dementia had problems with memory                   during last year    have not been diagnosed    Disease of thyroid gland     Elevated cholesterol     Hyperlipidemia approx. 20 years ago    Knee swelling     Memory loss within the last year    Tear of meniscus of knee     TIA (transient ischemic attack) 2024        Past Surgical History:   Procedure Laterality Date    COLONOSCOPY N/A 2019    Procedure: COLONOSCOPY POLYPECTOMY;  Surgeon: Jaime Alaniz MD;  Location: [facility];  Service: Gastroenterology    DENTAL PROCEDURE Left ,    HERNIA REPAIR      JOINT MANIPULATION Right 2020    Procedure: KNEE MANIPULATION;  Surgeon: Elmer Fuentes MD;  Location: [facility];  Service: Orthopedics;  Laterality: Right;    JOINT REPLACEMENT      SALPINGO OOPHORECTOMY Left     as an infant     TOTAL KNEE ARTHROPLASTY Right 10/14/2020    Procedure: TOTAL KNEE ARTHROPLASTY AND ALL ASSOCIATED PROCEDURES;  Surgeon: Elmer Fuentes MD;  Location: [facility];  Service: Orthopedics;  Laterality: Right;        PT Ortho       Row Name 24 1100       Subjective    Subjective Comments Patient reports that she was in hospital over the weekend due to abnormal speech which did resolve.  She has not followed up with her PCP.  Patient reports that her right knee continues to feel tight.  -SP       Vital Signs    Vitals Comment Patient with  significant word searching today.  Patient with tight appearing shiny skin bilateral LE knee level to ankle.  Patient states she had also observed this.  -SP       Right Lower Ext    Rt Knee Extension/Flexion AROM 10 to 110 degree before and after treatment  -SP              User Key  (r) = Recorded By, (t) = Taken By, (c) = Cosigned By      Initials Name Provider Type    Hetal Webb, PT Physical Therapist                                 PT Assessment/Plan       Row Name 01/23/24 1117          PT Assessment    Assessment Comments Patient with some cognitive change observed with increased word searching noted today.  She also has tight shiny skin in bilateral LEs with mild swelling at bilateral ankles.  Patient does not exhibit any change in right knee mobiltiy with dry needling and this has been consistent.  -SP        PT Plan    PT Plan Comments Patient advised she need to follow up with her PCP regarding recent hospitalization ongoing cognitive changes and lower extremity symptoms.  Patient does not appear to be making substantial improvement in right knee symptoms with dry needling.  Recommend discontinue treatment at this time and patient advised she should consult her ortho in regard to ongoing right knee limitation.  -SP               User Key  (r) = Recorded By, (t) = Taken By, (c) = Cosigned By      Initials Name Provider Type    Hetal Webb, PT Physical Therapist                     Modalities       Row Name 01/23/24 1100             Moist Heat    MH Applied Yes  -SP      Location anterior and posterior right thigh  -SP      PT Moist Heat Minutes 15  -SP      MH Prior to Rx Yes  -SP      MH S/P Rx Yes  -SP         ELECTRICAL STIMULATION    Attended/Unattended Unattended  -SP      Stimulation Type IFC  -SP      Location/Electrode Placement/Other right knee  -SP                User Key  (r) = Recorded By, (t) = Taken By, (c) = Cosigned By      Initials Name Provider Type    MARI  Hetal Caceres, CHAY Physical Therapist                   OP Exercises       Row Name 01/23/24 1100             Subjective    Subjective Comments Patient reports that she was in hospital over the weekend due to abnormal speech which did resolve.  She has not followed up with her PCP.  Patient reports that her right knee continues to feel tight.  -SP                User Key  (r) = Recorded By, (t) = Taken By, (c) = Cosigned By      Initials Name Provider Type    Hetal Webb, PT Physical Therapist                             Manual Rx (last 36 hours)       Manual Treatments       Row Name 01/23/24 1100             Manual Rx 1    Manual Rx 1 Location right LE  -SP      Manual Rx 1 Type Dry needling to right LE HNTrp/ TRp as follows:  common fibular, saphenous, tender medial central and lateral mid to distal quadricep area; 17 needles moderate doseage  -SP                User Key  (r) = Recorded By, (t) = Taken By, (c) = Cosigned By      Initials Name Provider Type    Hetal Webb, CHAY Physical Therapist                                       Time Calculation:   Start Time: 0850  Stop Time: 0925  Time Calculation (min): 35 min  Untimed Charges  PT Moist Heat Minutes: 15  Total Minutes  Untimed Charges Total Minutes: 15   Total Minutes: 15  Therapy Charges for Today       Code Description Service Date Service Provider Modifiers Qty    94059335341 HC PT SELF PAY DRY NEEDLING SESSION 1/23/2024 Hetal Caceres, PT  1                      Hetal Caceres, PT  1/23/2024

## 2024-01-31 ENCOUNTER — READMISSION MANAGEMENT (OUTPATIENT)
Dept: CALL CENTER | Facility: HOSPITAL | Age: 69
End: 2024-01-31
Payer: MEDICARE

## 2024-01-31 NOTE — OUTREACH NOTE
Stroke Week 2 Survey      Flowsheet Row Responses   [facility] patient discharged from? [place]   Does the patient have one of the following disease processes/diagnoses(primary or secondary)? Stroke   Week 2 attempt successful? No   Unsuccessful attempts Attempt 1            Tanna BENEDICT - Licensed Nurse

## 2024-02-01 ENCOUNTER — HOSPITAL ENCOUNTER (OUTPATIENT)
Dept: INFUSION THERAPY | Facility: HOSPITAL | Age: 69
Discharge: HOME | End: 2024-02-01
Payer: MEDICARE

## 2024-02-01 VITALS
SYSTOLIC BLOOD PRESSURE: 171 MMHG | DIASTOLIC BLOOD PRESSURE: 77 MMHG | WEIGHT: 146.4 LBS | BODY MASS INDEX: 26.77 KG/M2 | HEART RATE: 76 BPM | OXYGEN SATURATION: 98 % | RESPIRATION RATE: 16 BRPM | TEMPERATURE: 98 F

## 2024-02-01 DIAGNOSIS — G31.84 MCI (MILD COGNITIVE IMPAIRMENT): Primary | ICD-10-CM

## 2024-02-01 PROCEDURE — 25010000002 LECANEMAB-IRMB 200 MG/2ML SOLUTION 2 ML VIAL: Performed by: PSYCHIATRY & NEUROLOGY

## 2024-02-01 PROCEDURE — 96365 THER/PROPH/DIAG IV INF INIT: CPT

## 2024-02-01 PROCEDURE — 25810000003 SODIUM CHLORIDE 0.9 % SOLUTION 250 ML FLEX CONT: Performed by: PSYCHIATRY & NEUROLOGY

## 2024-02-01 PROCEDURE — 25010000002 LECANEMAB-IRMB 500 MG/5ML SOLUTION 5 ML VIAL: Performed by: PSYCHIATRY & NEUROLOGY

## 2024-02-01 RX ORDER — SODIUM CHLORIDE 9 MG/ML
20 INJECTION, SOLUTION INTRAVENOUS ONCE
OUTPATIENT
Start: 2024-02-15

## 2024-02-01 RX ORDER — ASPIRIN 81 MG/1
81 TABLET ORAL DAILY
COMMUNITY

## 2024-02-01 RX ORDER — FAMOTIDINE 10 MG/ML
20 INJECTION, SOLUTION INTRAVENOUS AS NEEDED
OUTPATIENT
Start: 2024-02-15

## 2024-02-01 RX ORDER — DIPHENHYDRAMINE HYDROCHLORIDE 50 MG/ML
50 INJECTION INTRAMUSCULAR; INTRAVENOUS AS NEEDED
OUTPATIENT
Start: 2024-02-15

## 2024-02-01 RX ADMIN — LECANEMAB 700 MG: 100 INJECTION, SOLUTION INTRAVENOUS at 10:15

## 2024-02-01 NOTE — PATIENT INSTRUCTIONS
"  Call  Dr. Ham [phone]  if you have any problems or concerns.    We know you have a Choice in healthcare and appreciate you using [facility].  Our purpose is to provide you \"Excellent Care\".  We hope that you will always choose us in the future and continue to recommend us to your family and friends.             "

## 2024-02-01 NOTE — NURSING NOTE
Patient arrived to [facility] at 0940. History and medications reviewed with patient. Medication given as ordered without complications. AVS reviewed and sent home with patient. Patient discharged at 1201 in stable condition without complaints.

## 2024-02-02 ENCOUNTER — READMISSION MANAGEMENT (OUTPATIENT)
Dept: CALL CENTER | Facility: HOSPITAL | Age: 69
End: 2024-02-02
Payer: MEDICARE

## 2024-02-02 NOTE — OUTREACH NOTE
Stroke Week 2 Survey      Flowsheet Row Responses   [facility] patient discharged from? [place]   Does the patient have one of the following disease processes/diagnoses(primary or secondary)? Stroke   Week 2 attempt successful? Yes   Call start time 1346   Call end time 1354   Discharge diagnosis TIA (transient ischemic attack)   Person spoke with today (if not patient) and relationship pt   Meds reviewed with patient/caregiver? Yes   Is the patient having any side effects they believe may be caused by any medication additions or changes? No   Does the patient have all medications ordered at discharge? Yes   Is the patient taking all medications as directed (includes completed medication regime)? Yes   Does the patient have a primary care provider?  Yes   Does the patient have an appointment with their PCP within 7 days of discharge? Yes   Has the patient kept scheduled appointments due by today? Yes   Comments Neurology 2/23/24,  MRI 2/19/24   Psychosocial issues? No   Does the patient require any assistance with activities of daily living such as eating, bathing, dressing, walking, etc.? No   Does the patient have any residual symptoms from stroke/TIA? No   Does the patient understand the diet ordered at discharge? Yes   Did the patient receive a copy of their discharge instructions? Yes   Nursing interventions Reviewed instructions with patient   What is the patient's perception of their health status since discharge? Improving   Nursing interventions Nurse provided patient education   Is the patient able to teach back FAST for Stroke? B alance: Watch for sudden loss of balance, S peech: Listen for slurred speech, F ace: Look for an uneven smile, A rm: Check if one arm is weak   Is the patient/caregiver able to teach back the risk factors for a stroke? High blood pressure-goal below 120/80, High Cholesterol, Physical inactivity and obesity, History of TIAs   Is the patient/caregiver able to teach back  signs and symptoms related to disease process for when to call PCP? Yes   Is the patient/caregiver able to teach back signs and symptoms related to disease process for when to call 911? Yes   If the patient is a current smoker, are they able to teach back resources for cessation? Not a smoker   Is the patient/caregiver able to teach back the hierarchy of who to call/visit for symptoms/problems? PCP, Specialist, Home health nurse, Urgent Care, ED, 911 Yes   Week 2 call completed? Yes   Is the patient interested in additional calls from an ambulatory ? No   Would this patient benefit from a Referral to [facility] Social Work? No   Wrap up additional comments Pt states she is doing better, and had PCP fu appt. Pt verified Neurology/MRI appts.   Call end time 2224            Gilma HURTADO - Registered Nurse

## 2024-02-12 ENCOUNTER — READMISSION MANAGEMENT (OUTPATIENT)
Dept: CALL CENTER | Facility: HOSPITAL | Age: 69
End: 2024-02-12
Payer: MEDICARE

## 2024-02-15 ENCOUNTER — HOSPITAL ENCOUNTER (OUTPATIENT)
Dept: INFUSION THERAPY | Facility: HOSPITAL | Age: 69
Discharge: HOME | End: 2024-02-15
Payer: MEDICARE

## 2024-02-15 VITALS
OXYGEN SATURATION: 97 % | DIASTOLIC BLOOD PRESSURE: 76 MMHG | HEART RATE: 75 BPM | RESPIRATION RATE: 16 BRPM | BODY MASS INDEX: 26.33 KG/M2 | TEMPERATURE: 97.7 F | WEIGHT: 144 LBS | SYSTOLIC BLOOD PRESSURE: 170 MMHG

## 2024-02-15 DIAGNOSIS — G31.84 MCI (MILD COGNITIVE IMPAIRMENT): Primary | ICD-10-CM

## 2024-02-15 PROCEDURE — 25810000003 SODIUM CHLORIDE 0.9 % SOLUTION 250 ML FLEX CONT: Performed by: PSYCHIATRY & NEUROLOGY

## 2024-02-15 PROCEDURE — 25010000002 LECANEMAB-IRMB 200 MG/2ML SOLUTION 2 ML VIAL: Performed by: PSYCHIATRY & NEUROLOGY

## 2024-02-15 PROCEDURE — 96365 THER/PROPH/DIAG IV INF INIT: CPT

## 2024-02-15 PROCEDURE — 25010000002 LECANEMAB-IRMB 500 MG/5ML SOLUTION 5 ML VIAL: Performed by: PSYCHIATRY & NEUROLOGY

## 2024-02-15 RX ORDER — DIPHENHYDRAMINE HYDROCHLORIDE 50 MG/ML
50 INJECTION INTRAMUSCULAR; INTRAVENOUS AS NEEDED
OUTPATIENT
Start: 2024-02-29

## 2024-02-15 RX ORDER — SODIUM CHLORIDE 9 MG/ML
20 INJECTION, SOLUTION INTRAVENOUS ONCE
OUTPATIENT
Start: 2024-02-29

## 2024-02-15 RX ORDER — FAMOTIDINE 10 MG/ML
20 INJECTION, SOLUTION INTRAVENOUS AS NEEDED
OUTPATIENT
Start: 2024-02-29

## 2024-02-15 RX ADMIN — LECANEMAB 660 MG: 100 INJECTION, SOLUTION INTRAVENOUS at 10:26

## 2024-02-15 NOTE — NURSING NOTE
1000  Pt here ambulatory to [facility] for Leqembi infusion.  1150  Pt discharged ambulatory with AVS discharge paperwork and next appts.  Pt denies any adverse reactions to infusion today; VSS.

## 2024-02-15 NOTE — PATIENT INSTRUCTIONS
"Call  Salomon Ham MD at [phone]  if you have any problems or concerns.    We know you have a Choice in healthcare and appreciate you using [facility].  Our purpose is to provide you \"Excellent Care\".  We hope that you will always choose us in the future and continue to recommend us to your family and friends.     "

## 2024-02-19 ENCOUNTER — HOSPITAL ENCOUNTER (OUTPATIENT)
Dept: MRI IMAGING | Facility: HOSPITAL | Age: 69
Discharge: HOME | End: 2024-02-19
Admitting: PSYCHIATRY & NEUROLOGY
Payer: MEDICARE

## 2024-02-19 DIAGNOSIS — G31.84 MCI (MILD COGNITIVE IMPAIRMENT): ICD-10-CM

## 2024-02-19 PROCEDURE — 70551 MRI BRAIN STEM W/O DYE: CPT

## 2024-02-21 ENCOUNTER — OFFICE VISIT (OUTPATIENT)
Dept: NEUROLOGY | Facility: CLINIC | Age: 69
End: 2024-02-21
Payer: MEDICARE

## 2024-02-21 VITALS
SYSTOLIC BLOOD PRESSURE: 140 MMHG | HEART RATE: 81 BPM | WEIGHT: 148.2 LBS | HEIGHT: 62 IN | BODY MASS INDEX: 27.27 KG/M2 | OXYGEN SATURATION: 99 % | DIASTOLIC BLOOD PRESSURE: 90 MMHG

## 2024-02-21 DIAGNOSIS — G30.9 ALZHEIMER'S DISEASE: ICD-10-CM

## 2024-02-21 DIAGNOSIS — F02.80 ALZHEIMER'S DISEASE: ICD-10-CM

## 2024-02-21 DIAGNOSIS — G31.84 MCI (MILD COGNITIVE IMPAIRMENT): Primary | ICD-10-CM

## 2024-02-21 DIAGNOSIS — R90.89 ABNORMAL BRAIN MRI: ICD-10-CM

## 2024-02-21 NOTE — PROGRESS NOTES
Notes by MA:  Pt is here with her sister today for a follow up regarding Leqembi treatments.      Subjective:     Patient ID: Yesi Benson is a 68 y.o. female.    Memory Loss  Pertinent negatives include no headaches, numbness or weakness.     The following portions of the patient's history were reviewed and updated as appropriate: allergies, current medications, past family history, past medical history, past social history, past surgical history, and problem list.    Review of Systems   Musculoskeletal:  Negative for gait problem.   Neurological:  Negative for dizziness, tremors, seizures, syncope, facial asymmetry, speech difficulty, weakness, light-headedness, numbness and headaches.   Psychiatric/Behavioral:  Negative for agitation, behavioral problems, confusion, decreased concentration, dysphoric mood, hallucinations, self-injury, sleep disturbance and suicidal ideas. The patient is not nervous/anxious and is not hyperactive.         Objective:    Neurologic Exam  She is awake alert pleasant cooperative with reasonably fluent speech and reasonable speech comprehension.       Cranial nerves II through XII normal and symmetric.  I do not see any visual field limitation by confrontation today.     Motor exam reveals age-appropriate tone bulk and power without lateralization and without drift.     Sensory exam reveals reasonably preserved and symmetric sensation to light touch, temperature, and vibration.     Coordination testing reveals smooth and accurate finger-nose-finger normal rapid alternating movements.     Tendon reflexes are 1+ and symmetric.  Toes are downgoing.  Physical Exam    Assessment/Plan:     Diagnoses and all orders for this visit:    1. MCI (mild cognitive impairment) (Primary)  -     MRI Brain Without Contrast; Future    2. Alzheimer's disease    3. Abnormal brain MRI     Returns for follow-up today regarding her surveillance MRI performed 2 days ago.  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  I discussed that with her and showed her the images.  She has no apparent clinical consequences from this.  However, this will necessitate a drug vacation from [facility].  We will reimage her brain in 2 months to ensure that these abnormalities have resolved before considering restarting her infusions.  I discussed this at length with her and her sister and answered their questions today.    40 minutes total patient care time including record review, examination, discussion and counseling, , and documentation.

## 2024-02-21 NOTE — LETTER
February 21, 2024       No Recipients    Patient: Yesi Benson   YOB: 1955   Date of Visit: 2/21/2024     Dear Mani Gorman MD:       Thank you for referring Yesi Benson to me for evaluation. Below are the relevant portions of my assessment and plan of care.    If you have questions, please do not hesitate to call me. I look forward to following Yesi along with you.         Sincerely,        Salomon Ham MD        CC:   No Recipients    Salomon Ham MD  02/22/24 0804  Sign when Signing Visit  Notes by MA:  Pt is here with her sister today for a follow up regarding Leqembi treatments.      Subjective:     Patient ID: Yesi Benson is a 68 y.o. female.    Memory Loss  Pertinent negatives include no headaches, numbness or weakness.     The following portions of the patient's history were reviewed and updated as appropriate: allergies, current medications, past family history, past medical history, past social history, past surgical history, and problem list.    Review of Systems   Musculoskeletal:  Negative for gait problem.   Neurological:  Negative for dizziness, tremors, seizures, syncope, facial asymmetry, speech difficulty, weakness, light-headedness, numbness and headaches.   Psychiatric/Behavioral:  Negative for agitation, behavioral problems, confusion, decreased concentration, dysphoric mood, hallucinations, self-injury, sleep disturbance and suicidal ideas. The patient is not nervous/anxious and is not hyperactive.         Objective:    Neurologic Exam  She is awake alert pleasant cooperative with reasonably fluent speech and reasonable speech comprehension.       Cranial nerves II through XII normal and symmetric.  I do not see any visual field limitation by confrontation today.     Motor exam reveals age-appropriate tone bulk and power without lateralization and without drift.     Sensory exam reveals reasonably preserved and symmetric sensation to light touch,  temperature, and vibration.     Coordination testing reveals smooth and accurate finger-nose-finger normal rapid alternating movements.     Tendon reflexes are 1+ and symmetric.  Toes are downgoing.  Physical Exam    Assessment/Plan:     Diagnoses and all orders for this visit:    1. MCI (mild cognitive impairment) (Primary)  -     MRI Brain Without Contrast; Future    2. Alzheimer's disease    3. Abnormal brain MRI     Returns for follow-up today regarding her surveillance MRI performed 2 days ago.  The study reveals Aria E in the right occipital lobe.  I discussed that with her and showed her the images.  She has no apparent clinical consequences from this.  However, this will necessitate a drug vacation from [facility].  We will reimage her brain in 2 months to ensure that these abnormalities have resolved before considering restarting her infusions.  I discussed this at length with her and her sister and answered their questions today.    40 minutes total patient care time including record review, examination, discussion and counseling, , and documentation.

## 2024-03-06 RX ORDER — DIPHENHYDRAMINE HYDROCHLORIDE 50 MG/ML
25 INJECTION INTRAMUSCULAR; INTRAVENOUS ONCE
Start: 2024-03-06 | End: 2024-03-06

## 2024-03-06 RX ORDER — ACETAMINOPHEN 325 MG/1
650 TABLET ORAL ONCE
Start: 2024-03-06 | End: 2024-03-06

## 2024-03-06 RX ORDER — METHYLPREDNISOLONE SODIUM SUCCINATE 125 MG/2ML
125 INJECTION, POWDER, LYOPHILIZED, FOR SOLUTION INTRAMUSCULAR; INTRAVENOUS ONCE
Start: 2024-03-06 | End: 2024-03-06

## 2024-03-12 ENCOUNTER — TELEPHONE (OUTPATIENT)
Dept: NEUROLOGY | Facility: CLINIC | Age: 69
End: 2024-03-12
Payer: MEDICARE

## 2024-03-12 DIAGNOSIS — R94.02 ABNORMAL BRAIN SCAN: Primary | ICD-10-CM

## 2024-04-19 ENCOUNTER — HOSPITAL ENCOUNTER (OUTPATIENT)
Dept: MRI IMAGING | Facility: HOSPITAL | Age: 69
Discharge: HOME | End: 2024-04-19
Payer: MEDICARE

## 2024-04-19 DIAGNOSIS — R94.02 ABNORMAL BRAIN SCAN: ICD-10-CM

## 2024-04-19 PROCEDURE — 70551 MRI BRAIN STEM W/O DYE: CPT

## 2024-04-23 ENCOUNTER — APPOINTMENT (OUTPATIENT)
Dept: WOMENS IMAGING | Facility: HOSPITAL | Age: 69
End: 2024-04-23
Payer: MEDICARE

## 2024-04-23 ENCOUNTER — OFFICE VISIT (OUTPATIENT)
Dept: ORTHOPEDIC SURGERY | Facility: CLINIC | Age: 69
End: 2024-04-23
Payer: MEDICARE

## 2024-04-23 VITALS — HEIGHT: 62 IN | WEIGHT: 148 LBS | BODY MASS INDEX: 27.23 KG/M2

## 2024-04-23 DIAGNOSIS — M24.661 ARTHROFIBROSIS OF KNEE JOINT, RIGHT: ICD-10-CM

## 2024-04-23 DIAGNOSIS — Z96.651 STATUS POST TOTAL RIGHT KNEE REPLACEMENT: Primary | ICD-10-CM

## 2024-04-23 PROCEDURE — 77067 SCR MAMMO BI INCL CAD: CPT | Performed by: RADIOLOGY

## 2024-04-23 PROCEDURE — 77063 BREAST TOMOSYNTHESIS BI: CPT | Performed by: RADIOLOGY

## 2024-04-23 PROCEDURE — 99214 OFFICE O/P EST MOD 30 MIN: CPT | Performed by: NURSE PRACTITIONER

## 2024-04-23 RX ORDER — CYCLOBENZAPRINE HCL 10 MG
10 TABLET ORAL 3 TIMES DAILY PRN
Qty: 60 TABLET | Refills: 2 | Status: SHIPPED | OUTPATIENT
Start: 2024-04-23

## 2024-04-23 RX ORDER — METHYLPREDNISOLONE 4 MG/1
TABLET ORAL
Qty: 21 TABLET | Refills: 0 | Status: SHIPPED | OUTPATIENT
Start: 2024-04-23

## 2024-04-23 RX ORDER — CHLORTHALIDONE 25 MG/1
25 TABLET ORAL DAILY
COMMUNITY

## 2024-04-23 NOTE — PROGRESS NOTES
Subjective:     Patient ID: Yesi Benson is a 69 y.o. female.    Chief Complaint:  S/p right total knee arthroplasty DOS 10/14/2020  Status post right total knee manipulation-12/21/2020  History of Present Illness  Yesi Benson  The patient presents to clinic today with her sister for reevaluation of her right knee.    The patient reports experiencing some swelling, a decrease in activity tolerance, and a decreased range of motion in her right knee. She has continued with cycling, but is now completing activities with a pedal system for continued strength. Recent x-ray imaging was completed in 10/2023, which is available for review in the clinic. She is approximately 3.5 years post total knee arthroplasty. She has continued with cyclobenzaprine, which has provided significant symptom relief, reducing her dosage to 1 tablet. Apart from this, she is faring well, with the exception of acute swelling of the knee. She denies any other concerns at present.     Social History     Occupational History    Not on file   Tobacco Use    Smoking status: Never     Passive exposure: Never    Smokeless tobacco: Never   Vaping Use    Vaping status: Never Used   Substance and Sexual Activity    Alcohol use: Never    Drug use: Never    Sexual activity: Not Currently      Past Medical History:   Diagnosis Date    Colon polyp     Dementia had problems with memory                   during last year    have not been diagnosed    Disease of thyroid gland     Elevated cholesterol     Hyperlipidemia approx. 20 years ago    Knee swelling     Memory loss within the last year    Tear of meniscus of knee     TIA (transient ischemic attack) 01/19/2024    TIA (transient ischemic attack)      Past Surgical History:   Procedure Laterality Date    COLONOSCOPY N/A 03/27/2019    Procedure: COLONOSCOPY POLYPECTOMY;  Surgeon: Jaime Alaniz MD;  Location: [facility];  Service: Gastroenterology    DENTAL PROCEDURE Left 2011,2014    HERNIA  "REPAIR      JOINT MANIPULATION Right 12/21/2020    Procedure: KNEE MANIPULATION;  Surgeon: Elmer Fuentes MD;  Location:  [facility];  Service: Orthopedics;  Laterality: Right;    JOINT REPLACEMENT      SALPINGO OOPHORECTOMY Left     as an infant     TOTAL KNEE ARTHROPLASTY Right 10/14/2020    Procedure: TOTAL KNEE ARTHROPLASTY AND ALL ASSOCIATED PROCEDURES;  Surgeon: Elmer Fuentes MD;  Location:  [facility];  Service: Orthopedics;  Laterality: Right;       Family History   Problem Relation Age of Onset    Dementia Mother         ill at least 10 years    Migraines Mother     Seizures Mother     Stroke Mother     Cancer Father         Penile Cancer    Cancer Sister         Breast Cancer    Cancer Maternal Grandmother         Breast Cancer    Malig Hyperthermia Neg Hx                Objective:  Physical Exam  General: No acute distress.  Eyes: conjunctiva clear; pupils equally round and reactive  ENT: external ears and nose atraumatic; oropharynx clear  CV: no peripheral edema  Resp: normal respiratory effort  Skin: no rashes or wounds; normal turgor  Psych: mood and affect appropriate; recent and remote memory intact    Vitals:    04/23/24 0856   Weight: 67.1 kg (148 lb)   Height: 157.5 cm (62\")         04/23/24  0856   Weight: 67.1 kg (148 lb)     Body mass index is 27.07 kg/m².      Ortho Exam     The right knee was examined.   The knee has a range of motion from 0 to 100 degrees.   It is stable to varus and valgus stress at 0 degrees and 30 degrees.   The incision is clean, dry, and intact, and well healed.   There is no evidence of erythema.   There is moderate swelling.   Quad strength in the right knee is 4 out of 5.   Sensation to light touch in the right lower extremity is intact in all distributions.    Assessment:        1. Status post total right knee replacement    2. Arthrofibrosis of knee joint, right           Plan:   Discussed plan of care with the patient and family. We did discuss continued " quad strengthening exercises. The quad muscle helps control the knee and it helps keep the stability with the knee. I do recommend she continue working on the pedal motion and activity as tolerated. Quad strength is vital for her recovery and ongoing exercises. We also discussed starting oral steroid pack as this has provided her significant symptom improvement with the swelling in the past. We will also proceed with refill of the cyclobenzaprine. I will plan to see her back in the clinic as needed. Encouraged to call with any questions or concerns. All questions were answered.  Orders:  No orders of the defined types were placed in this encounter.    New Medications Ordered This Visit   Medications    cyclobenzaprine (FLEXERIL) 10 MG tablet     Sig: Take 1 tablet by mouth 3 (Three) Times a Day As Needed for Muscle Spasms.     Dispense:  60 tablet     Refill:  2    methylPREDNISolone (MEDROL) 4 MG dose pack     Sig: Use as directed by package instructions     Dispense:  21 tablet     Refill:  0       Dragon dictation utilized    Transcribed from ambient dictation for UNRULY Hernandez by Caitlyn Quiroga.  04/23/24   12:57 EDT    Patient or patient representative verbalized consent to the visit recording.  I have personally performed the services described in this document as transcribed by the above individual, and it is both accurate and complete.

## 2024-04-25 ENCOUNTER — OFFICE VISIT (OUTPATIENT)
Dept: NEUROLOGY | Facility: CLINIC | Age: 69
End: 2024-04-25
Payer: MEDICARE

## 2024-04-25 ENCOUNTER — TELEPHONE (OUTPATIENT)
Dept: NEUROLOGY | Facility: CLINIC | Age: 69
End: 2024-04-25
Payer: MEDICARE

## 2024-04-25 VITALS
WEIGHT: 139.4 LBS | HEART RATE: 95 BPM | DIASTOLIC BLOOD PRESSURE: 80 MMHG | BODY MASS INDEX: 25.5 KG/M2 | SYSTOLIC BLOOD PRESSURE: 130 MMHG | OXYGEN SATURATION: 96 %

## 2024-04-25 DIAGNOSIS — G30.9 ALZHEIMER'S DISEASE: Primary | ICD-10-CM

## 2024-04-25 DIAGNOSIS — F02.80 ALZHEIMER'S DISEASE: Primary | ICD-10-CM

## 2024-04-25 DIAGNOSIS — G31.84 MCI (MILD COGNITIVE IMPAIRMENT): ICD-10-CM

## 2024-04-25 NOTE — TELEPHONE ENCOUNTER
Called pt to provide infusion, MRI and f/u dates and times. Pt will return call when she gets home.

## 2024-04-25 NOTE — PROGRESS NOTES
Notes by MA:  Pt is here today with her sister for a follow up regarding her Leqembi treatment.      Subjective:     Patient ID: Yesi Benson is a 69 y.o. female.    Memory Loss  Pertinent negatives include no headaches, numbness or weakness.     The following portions of the patient's history were reviewed and updated as appropriate: allergies, current medications, past family history, past medical history, past social history, past surgical history, and problem list.    Review of Systems   Musculoskeletal:  Negative for gait problem.   Neurological:  Negative for dizziness, tremors, seizures, syncope, facial asymmetry, speech difficulty, weakness, light-headedness, numbness and headaches.   Psychiatric/Behavioral:  Negative for agitation, behavioral problems, confusion, decreased concentration, dysphoric mood, hallucinations, self-injury, sleep disturbance and suicidal ideas. The patient is not nervous/anxious and is not hyperactive.         Objective:    Neurologic Exam  She is awake alert pleasant cooperative with reasonably fluent speech and reasonable speech comprehension.       Cranial nerves II through XII normal and symmetric.  I do not see any visual field limitation by confrontation today.     Motor exam reveals age-appropriate tone bulk and power without lateralization and without drift.     Sensory exam reveals reasonably preserved and symmetric sensation to light touch, temperature, and vibration.     Coordination testing reveals smooth and accurate finger-nose-finger normal rapid alternating movements.     Tendon reflexes are 1+ and symmetric.  Toes are downgoing.  Physical Exam    Assessment/Plan:     Diagnoses and all orders for this visit:    1. Alzheimer's disease (Primary)    2. MCI (mild cognitive impairment)     MCI in the setting of Alzheimer disease.  She is now returning for 2-month follow-up after her case of Aria.  I reviewed her brain imaging with her.  The area of Aria has completely  cleared.  She remains asymptomatic and we are moving ahead with restarting her infusions at the fifth dose.  Her next surveillance MRI will be before her seventh dose and will get back together and review the results of that before continuing on.  I discussed all this with her and her sister today and they are encouraged to call with any problems or questions.

## 2024-04-30 ENCOUNTER — HOSPITAL ENCOUNTER (OUTPATIENT)
Dept: INFUSION THERAPY | Facility: HOSPITAL | Age: 69
Discharge: HOME | End: 2024-04-30
Admitting: PSYCHIATRY & NEUROLOGY
Payer: MEDICARE

## 2024-04-30 VITALS
HEART RATE: 78 BPM | RESPIRATION RATE: 18 BRPM | TEMPERATURE: 96.8 F | SYSTOLIC BLOOD PRESSURE: 127 MMHG | OXYGEN SATURATION: 99 % | DIASTOLIC BLOOD PRESSURE: 61 MMHG

## 2024-04-30 DIAGNOSIS — G31.84 MCI (MILD COGNITIVE IMPAIRMENT): Primary | ICD-10-CM

## 2024-04-30 PROCEDURE — 25010000002 LECANEMAB-IRMB 500 MG/5ML SOLUTION 5 ML VIAL: Performed by: PSYCHIATRY & NEUROLOGY

## 2024-04-30 PROCEDURE — 96365 THER/PROPH/DIAG IV INF INIT: CPT

## 2024-04-30 PROCEDURE — 25010000002 DIPHENHYDRAMINE PER 50 MG: Performed by: PSYCHIATRY & NEUROLOGY

## 2024-04-30 PROCEDURE — 25010000002 METHYLPREDNISOLONE PER 125 MG: Performed by: PSYCHIATRY & NEUROLOGY

## 2024-04-30 PROCEDURE — 25010000002 LECANEMAB-IRMB 200 MG/2ML SOLUTION 2 ML VIAL: Performed by: PSYCHIATRY & NEUROLOGY

## 2024-04-30 PROCEDURE — 25810000003 SODIUM CHLORIDE 0.9 % SOLUTION 250 ML FLEX CONT: Performed by: PSYCHIATRY & NEUROLOGY

## 2024-04-30 RX ORDER — ACETAMINOPHEN 325 MG/1
650 TABLET ORAL ONCE
Start: 2024-05-07 | End: 2024-05-07

## 2024-04-30 RX ORDER — DIPHENHYDRAMINE HYDROCHLORIDE 50 MG/ML
50 INJECTION INTRAMUSCULAR; INTRAVENOUS AS NEEDED
OUTPATIENT
Start: 2024-05-07

## 2024-04-30 RX ORDER — METHYLPREDNISOLONE SODIUM SUCCINATE 125 MG/2ML
125 INJECTION, POWDER, LYOPHILIZED, FOR SOLUTION INTRAMUSCULAR; INTRAVENOUS ONCE
Start: 2024-05-07 | End: 2024-05-07

## 2024-04-30 RX ORDER — SODIUM CHLORIDE 9 MG/ML
20 INJECTION, SOLUTION INTRAVENOUS ONCE
OUTPATIENT
Start: 2024-05-07

## 2024-04-30 RX ORDER — FAMOTIDINE 10 MG/ML
20 INJECTION, SOLUTION INTRAVENOUS AS NEEDED
OUTPATIENT
Start: 2024-05-07

## 2024-04-30 RX ORDER — METHYLPREDNISOLONE SODIUM SUCCINATE 125 MG/2ML
125 INJECTION, POWDER, LYOPHILIZED, FOR SOLUTION INTRAMUSCULAR; INTRAVENOUS ONCE
Status: COMPLETED | OUTPATIENT
Start: 2024-04-30 | End: 2024-04-30

## 2024-04-30 RX ORDER — DIPHENHYDRAMINE HYDROCHLORIDE 50 MG/ML
25 INJECTION INTRAMUSCULAR; INTRAVENOUS ONCE
Start: 2024-05-07 | End: 2024-05-07

## 2024-04-30 RX ORDER — DIPHENHYDRAMINE HYDROCHLORIDE 50 MG/ML
25 INJECTION INTRAMUSCULAR; INTRAVENOUS ONCE
Status: COMPLETED | OUTPATIENT
Start: 2024-04-30 | End: 2024-04-30

## 2024-04-30 RX ORDER — ACETAMINOPHEN 325 MG/1
650 TABLET ORAL ONCE
Status: COMPLETED | OUTPATIENT
Start: 2024-04-30 | End: 2024-04-30

## 2024-04-30 RX ADMIN — ACETAMINOPHEN 650 MG: 325 TABLET, FILM COATED ORAL at 08:47

## 2024-04-30 RX ADMIN — DIPHENHYDRAMINE HYDROCHLORIDE 25 MG: 50 INJECTION, SOLUTION INTRAMUSCULAR; INTRAVENOUS at 08:47

## 2024-04-30 RX ADMIN — LECANEMAB 630 MG: 100 INJECTION, SOLUTION INTRAVENOUS at 09:06

## 2024-04-30 RX ADMIN — METHYLPREDNISOLONE SODIUM SUCCINATE 125 MG: 125 INJECTION, POWDER, FOR SOLUTION INTRAMUSCULAR; INTRAVENOUS at 08:47

## 2024-04-30 NOTE — NURSING NOTE
Pt arrived to [facility] for appt. VSS, no complaints at this time. Medication administered per MD order. Pt tolerated well. VSS post infusion. Scheduled next 3 appts. AVS printed out, copy given to pt. Pt discharged from [facility] at 10:30 AM in stable condition, without complaints.

## 2024-05-01 ENCOUNTER — TELEPHONE (OUTPATIENT)
Dept: NEUROLOGY | Facility: CLINIC | Age: 69
End: 2024-05-01
Payer: MEDICARE

## 2024-05-01 NOTE — TELEPHONE ENCOUNTER
Called pt to evaluate re-initiation of Leqembi infusion yesterday. Pt reports no s/e and everything went well.

## 2024-05-14 ENCOUNTER — HOSPITAL ENCOUNTER (OUTPATIENT)
Dept: INFUSION THERAPY | Facility: HOSPITAL | Age: 69
Discharge: HOME | End: 2024-05-14
Admitting: PSYCHIATRY & NEUROLOGY
Payer: MEDICARE

## 2024-05-14 ENCOUNTER — TELEPHONE (OUTPATIENT)
Dept: GASTROENTEROLOGY | Facility: CLINIC | Age: 69
End: 2024-05-14
Payer: MEDICARE

## 2024-05-14 DIAGNOSIS — Z86.010 PERSONAL HISTORY OF COLONIC POLYPS: Primary | ICD-10-CM

## 2024-05-14 DIAGNOSIS — G31.84 MCI (MILD COGNITIVE IMPAIRMENT): Primary | ICD-10-CM

## 2024-05-14 PROCEDURE — 25010000002 LECANEMAB-IRMB 500 MG/5ML SOLUTION 5 ML VIAL: Performed by: PSYCHIATRY & NEUROLOGY

## 2024-05-14 PROCEDURE — 25010000002 LECANEMAB-IRMB 200 MG/2ML SOLUTION 2 ML VIAL: Performed by: PSYCHIATRY & NEUROLOGY

## 2024-05-14 PROCEDURE — 25010000002 DIPHENHYDRAMINE PER 50 MG: Performed by: PSYCHIATRY & NEUROLOGY

## 2024-05-14 PROCEDURE — 25810000003 SODIUM CHLORIDE 0.9 % SOLUTION 250 ML FLEX CONT: Performed by: PSYCHIATRY & NEUROLOGY

## 2024-05-14 PROCEDURE — 25010000002 METHYLPREDNISOLONE PER 125 MG: Performed by: PSYCHIATRY & NEUROLOGY

## 2024-05-14 PROCEDURE — 96375 TX/PRO/DX INJ NEW DRUG ADDON: CPT

## 2024-05-14 PROCEDURE — 96365 THER/PROPH/DIAG IV INF INIT: CPT

## 2024-05-14 RX ORDER — METHYLPREDNISOLONE SODIUM SUCCINATE 125 MG/2ML
125 INJECTION, POWDER, LYOPHILIZED, FOR SOLUTION INTRAMUSCULAR; INTRAVENOUS ONCE
Status: COMPLETED | OUTPATIENT
Start: 2024-05-14 | End: 2024-05-14

## 2024-05-14 RX ORDER — SODIUM CHLORIDE 9 MG/ML
20 INJECTION, SOLUTION INTRAVENOUS ONCE
OUTPATIENT
Start: 2024-05-21

## 2024-05-14 RX ORDER — DIPHENHYDRAMINE HYDROCHLORIDE 50 MG/ML
25 INJECTION INTRAMUSCULAR; INTRAVENOUS ONCE
Start: 2024-05-21 | End: 2024-05-21

## 2024-05-14 RX ORDER — DIPHENHYDRAMINE HYDROCHLORIDE 50 MG/ML
25 INJECTION INTRAMUSCULAR; INTRAVENOUS ONCE
Status: COMPLETED | OUTPATIENT
Start: 2024-05-14 | End: 2024-05-14

## 2024-05-14 RX ORDER — ACETAMINOPHEN 325 MG/1
650 TABLET ORAL ONCE
Start: 2024-05-21 | End: 2024-05-21

## 2024-05-14 RX ORDER — ACETAMINOPHEN 325 MG/1
650 TABLET ORAL ONCE
Status: COMPLETED | OUTPATIENT
Start: 2024-05-14 | End: 2024-05-14

## 2024-05-14 RX ORDER — DIPHENHYDRAMINE HYDROCHLORIDE 50 MG/ML
50 INJECTION INTRAMUSCULAR; INTRAVENOUS AS NEEDED
OUTPATIENT
Start: 2024-05-21

## 2024-05-14 RX ORDER — FAMOTIDINE 10 MG/ML
20 INJECTION, SOLUTION INTRAVENOUS AS NEEDED
OUTPATIENT
Start: 2024-05-21

## 2024-05-14 RX ORDER — METHYLPREDNISOLONE SODIUM SUCCINATE 125 MG/2ML
125 INJECTION, POWDER, LYOPHILIZED, FOR SOLUTION INTRAMUSCULAR; INTRAVENOUS ONCE
Start: 2024-05-21 | End: 2024-05-21

## 2024-05-14 RX ADMIN — LECANEMAB 630 MG: 100 INJECTION, SOLUTION INTRAVENOUS at 11:12

## 2024-05-14 RX ADMIN — ACETAMINOPHEN 650 MG: 325 TABLET ORAL at 10:56

## 2024-05-14 RX ADMIN — METHYLPREDNISOLONE SODIUM SUCCINATE 125 MG: 125 INJECTION, POWDER, FOR SOLUTION INTRAMUSCULAR; INTRAVENOUS at 10:56

## 2024-05-14 RX ADMIN — DIPHENHYDRAMINE HYDROCHLORIDE 25 MG: 50 INJECTION, SOLUTION INTRAMUSCULAR; INTRAVENOUS at 10:56

## 2024-05-14 NOTE — NURSING NOTE
Pt arrived to [facility] for appt. VSS, no complaints at this time. Medication administered per MD order. Pt tolerated well. VSS post infusion. Verified pt had next appt scheduled. AVS printed out, copy given to pt. Pt discharged from [facility] at 12:26 PM in stable condition, without complaints.

## 2024-05-14 NOTE — PATIENT INSTRUCTIONS
"  Call  DR SANDRA ASHBY @ [phone]  if you have any problems or concerns.    We know you have a Choice in healthcare and appreciate you using [facility].  Our purpose is to provide you \"Excellent Care\".  We hope that you will always choose us in the future and continue to recommend us to your family and friends.                "

## 2024-05-14 NOTE — TELEPHONE ENCOUNTER
FAST TRACK - 5 YEAR RECALL 03/2024  LAST COLONOSCOPY 03/27/2019  PERSONAL HISTORY POLYPS  FAMILY HISTORY POLYPS, FATHER AGE 70 & SISTER AGE 55  SCHEDULE AT [facility].

## 2024-05-15 PROBLEM — Z86.0100 PERSONAL HISTORY OF COLONIC POLYPS: Status: ACTIVE | Noted: 2024-05-14

## 2024-05-15 PROBLEM — Z86.010 PERSONAL HISTORY OF COLONIC POLYPS: Status: ACTIVE | Noted: 2024-05-14

## 2024-05-15 NOTE — TELEPHONE ENCOUNTER
Return call from patient.  Her sister will be her .    Scheduled at [facility] 07/17/2024 at 2pm - arrive 1pm.  Will mail prep instructions.

## 2024-05-20 ENCOUNTER — HOSPITAL ENCOUNTER (OUTPATIENT)
Dept: MRI IMAGING | Facility: HOSPITAL | Age: 69
Discharge: HOME | End: 2024-05-20
Admitting: PSYCHIATRY & NEUROLOGY
Payer: MEDICARE

## 2024-05-20 DIAGNOSIS — G31.84 MCI (MILD COGNITIVE IMPAIRMENT): ICD-10-CM

## 2024-05-20 DIAGNOSIS — F02.80 ALZHEIMER'S DISEASE: ICD-10-CM

## 2024-05-20 DIAGNOSIS — G30.9 ALZHEIMER'S DISEASE: ICD-10-CM

## 2024-05-20 PROCEDURE — 70551 MRI BRAIN STEM W/O DYE: CPT

## 2024-05-22 ENCOUNTER — OFFICE VISIT (OUTPATIENT)
Dept: NEUROLOGY | Facility: CLINIC | Age: 69
End: 2024-05-22
Payer: MEDICARE

## 2024-05-22 VITALS
DIASTOLIC BLOOD PRESSURE: 80 MMHG | HEART RATE: 86 BPM | SYSTOLIC BLOOD PRESSURE: 124 MMHG | OXYGEN SATURATION: 95 % | BODY MASS INDEX: 25.31 KG/M2 | WEIGHT: 138.4 LBS

## 2024-05-22 DIAGNOSIS — F02.80 ALZHEIMER'S DISEASE: ICD-10-CM

## 2024-05-22 DIAGNOSIS — G31.84 MCI (MILD COGNITIVE IMPAIRMENT): Primary | ICD-10-CM

## 2024-05-22 DIAGNOSIS — G30.9 ALZHEIMER'S DISEASE: ICD-10-CM

## 2024-05-22 PROCEDURE — 99214 OFFICE O/P EST MOD 30 MIN: CPT | Performed by: PSYCHIATRY & NEUROLOGY

## 2024-05-22 PROCEDURE — 1159F MED LIST DOCD IN RCRD: CPT | Performed by: PSYCHIATRY & NEUROLOGY

## 2024-05-22 PROCEDURE — 1160F RVW MEDS BY RX/DR IN RCRD: CPT | Performed by: PSYCHIATRY & NEUROLOGY

## 2024-05-22 NOTE — PROGRESS NOTES
Notes by MA:  Pt is here with her sister today to discuss recent MRI results and Leqembi treatment.      Subjective:     Patient ID: Yesi Benson is a 69 y.o. female.    History of Present Illness  The following portions of the patient's history were reviewed and updated as appropriate: allergies, current medications, past family history, past medical history, past social history, past surgical history, and problem list.    Review of Systems   Musculoskeletal:  Negative for gait problem.   Neurological:  Negative for dizziness, tremors, seizures, syncope, facial asymmetry, speech difficulty, weakness, light-headedness, numbness and headaches.   Psychiatric/Behavioral:  Negative for agitation, behavioral problems, confusion, decreased concentration, dysphoric mood, hallucinations, self-injury, sleep disturbance and suicidal ideas. The patient is not nervous/anxious and is not hyperactive.         Objective:    Neurologic Exam  She is awake alert pleasant cooperative with reasonably fluent speech and reasonable speech comprehension.       Cranial nerves II through XII normal and symmetric.  I do not see any visual field limitation by confrontation today.     Motor exam reveals age-appropriate tone bulk and power without lateralization and without drift.     Sensory exam reveals reasonably preserved and symmetric sensation to light touch, temperature, and vibration.     Coordination testing reveals smooth and accurate finger-nose-finger normal rapid alternating movements.     Tendon reflexes are 1+ and symmetric.  Toes are downgoing.  Physical Exam    Assessment/Plan:     Diagnoses and all orders for this visit:    1. MCI (mild cognitive impairment) (Primary)    2. Alzheimer's disease     Returns for follow-up after her 6 Leqembi infusion.  Her last surveillance MRI was reviewed in detail with the patient today.  It is stable and there is no recurrence of Aria.  She is doing well and has no problematic side effects  from the medication at this point currently.  Continuing on with her infusion schedule and we will see her after her next surveillance MRI following the 14th infusion.  I discussed this with her and her daughter and answered their questions today.

## 2024-05-23 DIAGNOSIS — G30.9 ALZHEIMER'S DISEASE: Primary | ICD-10-CM

## 2024-05-23 DIAGNOSIS — G31.84 MCI (MILD COGNITIVE IMPAIRMENT): ICD-10-CM

## 2024-05-23 DIAGNOSIS — F02.80 ALZHEIMER'S DISEASE: Primary | ICD-10-CM

## 2024-05-28 ENCOUNTER — HOSPITAL ENCOUNTER (OUTPATIENT)
Dept: INFUSION THERAPY | Facility: HOSPITAL | Age: 69
Discharge: HOME | End: 2024-05-28
Admitting: PSYCHIATRY & NEUROLOGY
Payer: MEDICARE

## 2024-05-28 VITALS
SYSTOLIC BLOOD PRESSURE: 134 MMHG | RESPIRATION RATE: 15 BRPM | HEART RATE: 75 BPM | OXYGEN SATURATION: 99 % | TEMPERATURE: 98.4 F | DIASTOLIC BLOOD PRESSURE: 69 MMHG

## 2024-05-28 DIAGNOSIS — G31.84 MCI (MILD COGNITIVE IMPAIRMENT): Primary | ICD-10-CM

## 2024-05-28 PROCEDURE — 25010000002 DIPHENHYDRAMINE PER 50 MG: Performed by: PSYCHIATRY & NEUROLOGY

## 2024-05-28 PROCEDURE — 96365 THER/PROPH/DIAG IV INF INIT: CPT

## 2024-05-28 PROCEDURE — 96374 THER/PROPH/DIAG INJ IV PUSH: CPT

## 2024-05-28 PROCEDURE — 25010000002 LECANEMAB-IRMB 500 MG/5ML SOLUTION 5 ML VIAL: Performed by: PSYCHIATRY & NEUROLOGY

## 2024-05-28 PROCEDURE — 25010000002 LECANEMAB-IRMB 200 MG/2ML SOLUTION 2 ML VIAL: Performed by: PSYCHIATRY & NEUROLOGY

## 2024-05-28 PROCEDURE — 25010000002 METHYLPREDNISOLONE PER 125 MG: Performed by: PSYCHIATRY & NEUROLOGY

## 2024-05-28 PROCEDURE — 25810000003 SODIUM CHLORIDE 0.9 % SOLUTION 250 ML FLEX CONT: Performed by: PSYCHIATRY & NEUROLOGY

## 2024-05-28 PROCEDURE — 96375 TX/PRO/DX INJ NEW DRUG ADDON: CPT

## 2024-05-28 RX ORDER — FAMOTIDINE 10 MG/ML
20 INJECTION, SOLUTION INTRAVENOUS AS NEEDED
OUTPATIENT
Start: 2024-06-04

## 2024-05-28 RX ORDER — METHYLPREDNISOLONE SODIUM SUCCINATE 125 MG/2ML
125 INJECTION, POWDER, LYOPHILIZED, FOR SOLUTION INTRAMUSCULAR; INTRAVENOUS ONCE
Status: COMPLETED | OUTPATIENT
Start: 2024-05-28 | End: 2024-05-28

## 2024-05-28 RX ORDER — ACETAMINOPHEN 325 MG/1
650 TABLET ORAL ONCE
Start: 2024-06-04 | End: 2024-06-04

## 2024-05-28 RX ORDER — DIPHENHYDRAMINE HYDROCHLORIDE 50 MG/ML
50 INJECTION INTRAMUSCULAR; INTRAVENOUS AS NEEDED
OUTPATIENT
Start: 2024-06-04

## 2024-05-28 RX ORDER — DIPHENHYDRAMINE HYDROCHLORIDE 50 MG/ML
25 INJECTION INTRAMUSCULAR; INTRAVENOUS ONCE
Start: 2024-06-04 | End: 2024-06-04

## 2024-05-28 RX ORDER — SODIUM CHLORIDE 9 MG/ML
20 INJECTION, SOLUTION INTRAVENOUS ONCE
OUTPATIENT
Start: 2024-06-04

## 2024-05-28 RX ORDER — ACETAMINOPHEN 325 MG/1
650 TABLET ORAL ONCE
Status: COMPLETED | OUTPATIENT
Start: 2024-05-28 | End: 2024-05-28

## 2024-05-28 RX ORDER — METHYLPREDNISOLONE SODIUM SUCCINATE 125 MG/2ML
125 INJECTION, POWDER, LYOPHILIZED, FOR SOLUTION INTRAMUSCULAR; INTRAVENOUS ONCE
Start: 2024-06-04 | End: 2024-06-04

## 2024-05-28 RX ORDER — DIPHENHYDRAMINE HYDROCHLORIDE 50 MG/ML
25 INJECTION INTRAMUSCULAR; INTRAVENOUS ONCE
Status: COMPLETED | OUTPATIENT
Start: 2024-05-28 | End: 2024-05-28

## 2024-05-28 RX ADMIN — ACETAMINOPHEN 650 MG: 325 TABLET ORAL at 09:44

## 2024-05-28 RX ADMIN — DIPHENHYDRAMINE HYDROCHLORIDE 25 MG: 50 INJECTION, SOLUTION INTRAMUSCULAR; INTRAVENOUS at 09:46

## 2024-05-28 RX ADMIN — LECANEMAB 630 MG: 100 INJECTION, SOLUTION INTRAVENOUS at 10:07

## 2024-05-28 RX ADMIN — METHYLPREDNISOLONE SODIUM SUCCINATE 125 MG: 125 INJECTION, POWDER, FOR SOLUTION INTRAMUSCULAR; INTRAVENOUS at 09:45

## 2024-05-28 NOTE — NURSING NOTE
Patient arrived to [facility] at 0927. History and medications reviewed. Medications given as ordered. Patient tolerated well. First IV infiltrated, IV removed and warm compress applied to site. Another IV started, and infusion resumed. AVS reviewed and sent home with patient. Patient discharged at 1200 in stable condition without complaints.

## 2024-05-28 NOTE — PATIENT INSTRUCTIONS
"  Call  DR SANDRA ASHBY @ [phone]  if you have any problems or concerns.    We know you have a Choice in healthcare and appreciate you using [facility].  Our purpose is to provide you \"Excellent Care\".  We hope that you will always choose us in the future and continue to recommend us to your family and friends.                "

## 2024-06-11 ENCOUNTER — HOSPITAL ENCOUNTER (OUTPATIENT)
Dept: INFUSION THERAPY | Facility: HOSPITAL | Age: 69
Discharge: HOME | End: 2024-06-11
Admitting: PSYCHIATRY & NEUROLOGY
Payer: MEDICARE

## 2024-06-11 VITALS
RESPIRATION RATE: 16 BRPM | TEMPERATURE: 98.3 F | SYSTOLIC BLOOD PRESSURE: 131 MMHG | OXYGEN SATURATION: 98 % | DIASTOLIC BLOOD PRESSURE: 71 MMHG | HEART RATE: 71 BPM

## 2024-06-11 DIAGNOSIS — G31.84 MCI (MILD COGNITIVE IMPAIRMENT): Primary | ICD-10-CM

## 2024-06-11 PROCEDURE — 25810000003 SODIUM CHLORIDE 0.9 % SOLUTION 250 ML FLEX CONT: Performed by: PSYCHIATRY & NEUROLOGY

## 2024-06-11 PROCEDURE — 25010000002 LECANEMAB-IRMB 500 MG/5ML SOLUTION 5 ML VIAL: Performed by: PSYCHIATRY & NEUROLOGY

## 2024-06-11 PROCEDURE — 96375 TX/PRO/DX INJ NEW DRUG ADDON: CPT

## 2024-06-11 PROCEDURE — 96365 THER/PROPH/DIAG IV INF INIT: CPT

## 2024-06-11 PROCEDURE — 25010000002 METHYLPREDNISOLONE PER 125 MG: Performed by: PSYCHIATRY & NEUROLOGY

## 2024-06-11 PROCEDURE — 25010000002 LECANEMAB-IRMB 200 MG/2ML SOLUTION 2 ML VIAL: Performed by: PSYCHIATRY & NEUROLOGY

## 2024-06-11 PROCEDURE — 96374 THER/PROPH/DIAG INJ IV PUSH: CPT

## 2024-06-11 PROCEDURE — 25010000002 DIPHENHYDRAMINE PER 50 MG: Performed by: PSYCHIATRY & NEUROLOGY

## 2024-06-11 RX ORDER — DIPHENHYDRAMINE HYDROCHLORIDE 50 MG/ML
50 INJECTION INTRAMUSCULAR; INTRAVENOUS AS NEEDED
OUTPATIENT
Start: 2024-06-18

## 2024-06-11 RX ORDER — METHYLPREDNISOLONE SODIUM SUCCINATE 125 MG/2ML
125 INJECTION, POWDER, LYOPHILIZED, FOR SOLUTION INTRAMUSCULAR; INTRAVENOUS ONCE
Start: 2024-06-18 | End: 2024-06-18

## 2024-06-11 RX ORDER — DIPHENHYDRAMINE HYDROCHLORIDE 50 MG/ML
25 INJECTION INTRAMUSCULAR; INTRAVENOUS ONCE
Status: COMPLETED | OUTPATIENT
Start: 2024-06-11 | End: 2024-06-11

## 2024-06-11 RX ORDER — FAMOTIDINE 10 MG/ML
20 INJECTION, SOLUTION INTRAVENOUS AS NEEDED
OUTPATIENT
Start: 2024-06-18

## 2024-06-11 RX ORDER — DIPHENHYDRAMINE HYDROCHLORIDE 50 MG/ML
25 INJECTION INTRAMUSCULAR; INTRAVENOUS ONCE
Start: 2024-06-18 | End: 2024-06-18

## 2024-06-11 RX ORDER — METHYLPREDNISOLONE SODIUM SUCCINATE 125 MG/2ML
125 INJECTION, POWDER, LYOPHILIZED, FOR SOLUTION INTRAMUSCULAR; INTRAVENOUS ONCE
Status: COMPLETED | OUTPATIENT
Start: 2024-06-11 | End: 2024-06-11

## 2024-06-11 RX ORDER — ACETAMINOPHEN 325 MG/1
650 TABLET ORAL ONCE
Status: COMPLETED | OUTPATIENT
Start: 2024-06-11 | End: 2024-06-11

## 2024-06-11 RX ORDER — ACETAMINOPHEN 325 MG/1
650 TABLET ORAL ONCE
Start: 2024-06-18 | End: 2024-06-18

## 2024-06-11 RX ORDER — SODIUM CHLORIDE 9 MG/ML
20 INJECTION, SOLUTION INTRAVENOUS ONCE
OUTPATIENT
Start: 2024-06-18

## 2024-06-11 RX ADMIN — METHYLPREDNISOLONE SODIUM SUCCINATE 125 MG: 125 INJECTION, POWDER, FOR SOLUTION INTRAMUSCULAR; INTRAVENOUS at 10:11

## 2024-06-11 RX ADMIN — ACETAMINOPHEN 650 MG: 325 TABLET ORAL at 10:09

## 2024-06-11 RX ADMIN — LECANEMAB 630 MG: 100 INJECTION, SOLUTION INTRAVENOUS at 10:21

## 2024-06-11 RX ADMIN — DIPHENHYDRAMINE HYDROCHLORIDE 25 MG: 50 INJECTION, SOLUTION INTRAMUSCULAR; INTRAVENOUS at 10:09

## 2024-06-11 NOTE — PATIENT INSTRUCTIONS
"Call  Dr. Ham [phone]  if you have any problems or concerns.    We know you have a Choice in healthcare and appreciate you using [facility].  Our purpose is to provide you \"Excellent Care\".  We hope that you will always choose us in the future and continue to recommend us to your family and friends.     "

## 2024-06-11 NOTE — NURSING NOTE
Patient arrived to [facility] at 0946. History and medications reviewed. Medications given as ordered. Patient tolerated well without complications. AVS reviewed and sent home. Patient discharged at 1200 in stable condition without complaints.

## 2024-06-25 ENCOUNTER — HOSPITAL ENCOUNTER (OUTPATIENT)
Dept: INFUSION THERAPY | Facility: HOSPITAL | Age: 69
Discharge: HOME | End: 2024-06-25
Payer: MEDICARE

## 2024-06-25 VITALS
WEIGHT: 134 LBS | DIASTOLIC BLOOD PRESSURE: 77 MMHG | TEMPERATURE: 97.7 F | BODY MASS INDEX: 24.51 KG/M2 | OXYGEN SATURATION: 99 % | SYSTOLIC BLOOD PRESSURE: 136 MMHG | HEART RATE: 75 BPM | RESPIRATION RATE: 16 BRPM

## 2024-06-25 DIAGNOSIS — G31.84 MCI (MILD COGNITIVE IMPAIRMENT): Primary | ICD-10-CM

## 2024-06-25 PROCEDURE — 25810000003 SODIUM CHLORIDE 0.9 % SOLUTION 250 ML FLEX CONT: Performed by: PSYCHIATRY & NEUROLOGY

## 2024-06-25 PROCEDURE — 25010000002 DIPHENHYDRAMINE PER 50 MG: Performed by: PSYCHIATRY & NEUROLOGY

## 2024-06-25 PROCEDURE — 96365 THER/PROPH/DIAG IV INF INIT: CPT

## 2024-06-25 PROCEDURE — 25010000002 METHYLPREDNISOLONE PER 125 MG: Performed by: PSYCHIATRY & NEUROLOGY

## 2024-06-25 PROCEDURE — 96375 TX/PRO/DX INJ NEW DRUG ADDON: CPT

## 2024-06-25 PROCEDURE — 25010000002 LECANEMAB-IRMB 200 MG/2ML SOLUTION 2 ML VIAL: Performed by: PSYCHIATRY & NEUROLOGY

## 2024-06-25 PROCEDURE — 96374 THER/PROPH/DIAG INJ IV PUSH: CPT

## 2024-06-25 PROCEDURE — 25010000002 LECANEMAB-IRMB 500 MG/5ML SOLUTION 5 ML VIAL: Performed by: PSYCHIATRY & NEUROLOGY

## 2024-06-25 RX ORDER — DIPHENHYDRAMINE HYDROCHLORIDE 50 MG/ML
50 INJECTION INTRAMUSCULAR; INTRAVENOUS AS NEEDED
OUTPATIENT
Start: 2024-07-02

## 2024-06-25 RX ORDER — DIPHENHYDRAMINE HYDROCHLORIDE 50 MG/ML
25 INJECTION INTRAMUSCULAR; INTRAVENOUS ONCE
Start: 2024-07-02 | End: 2024-07-02

## 2024-06-25 RX ORDER — ACETAMINOPHEN 325 MG/1
650 TABLET ORAL ONCE
Start: 2024-07-02 | End: 2024-07-02

## 2024-06-25 RX ORDER — METHYLPREDNISOLONE SODIUM SUCCINATE 125 MG/2ML
125 INJECTION, POWDER, LYOPHILIZED, FOR SOLUTION INTRAMUSCULAR; INTRAVENOUS ONCE
Status: COMPLETED | OUTPATIENT
Start: 2024-06-25 | End: 2024-06-25

## 2024-06-25 RX ORDER — SODIUM CHLORIDE 9 MG/ML
20 INJECTION, SOLUTION INTRAVENOUS ONCE
OUTPATIENT
Start: 2024-07-02

## 2024-06-25 RX ORDER — ACETAMINOPHEN 325 MG/1
650 TABLET ORAL ONCE
Status: COMPLETED | OUTPATIENT
Start: 2024-06-25 | End: 2024-06-25

## 2024-06-25 RX ORDER — DIPHENHYDRAMINE HYDROCHLORIDE 50 MG/ML
25 INJECTION INTRAMUSCULAR; INTRAVENOUS ONCE
Status: COMPLETED | OUTPATIENT
Start: 2024-06-25 | End: 2024-06-25

## 2024-06-25 RX ORDER — FAMOTIDINE 10 MG/ML
20 INJECTION, SOLUTION INTRAVENOUS AS NEEDED
OUTPATIENT
Start: 2024-07-02

## 2024-06-25 RX ORDER — METHYLPREDNISOLONE SODIUM SUCCINATE 125 MG/2ML
125 INJECTION, POWDER, LYOPHILIZED, FOR SOLUTION INTRAMUSCULAR; INTRAVENOUS ONCE
Start: 2024-07-02 | End: 2024-07-02

## 2024-06-25 RX ADMIN — LECANEMAB 610 MG: 100 INJECTION, SOLUTION INTRAVENOUS at 10:05

## 2024-06-25 RX ADMIN — DIPHENHYDRAMINE HYDROCHLORIDE 25 MG: 50 INJECTION, SOLUTION INTRAMUSCULAR; INTRAVENOUS at 09:41

## 2024-06-25 RX ADMIN — ACETAMINOPHEN 650 MG: 325 TABLET ORAL at 09:41

## 2024-06-25 RX ADMIN — METHYLPREDNISOLONE SODIUM SUCCINATE 125 MG: 125 INJECTION, POWDER, FOR SOLUTION INTRAMUSCULAR; INTRAVENOUS at 09:41

## 2024-06-25 NOTE — NURSING NOTE
Patient arrived to [facility] at 0928. History and medications reviewed. Medications given as ordered. Patient tolerated well without complications. AVS reviewed and sent home. Patient discharged at 1158 in stable condition without complaints.

## 2024-07-01 NOTE — TELEPHONE ENCOUNTER
Pulse is still a bit high.  I think he needs to follow up with Cardiology to discuss going on another medicine to help with his AFib.  We can not go any higher with the metoprolol and I think his heart rate is still too fast which is going to get him into trouble possibly down the road.  Daughter should make an appointment to discuss using other medications for his AFib with the cardiologist   Rx Refill Note  Requested Prescriptions     Pending Prescriptions Disp Refills    cyclobenzaprine (FLEXERIL) 10 MG tablet [Pharmacy Med Name: CYCLOBENZAPRINE 10 MG TABLET] 60 tablet 0     Sig: TAKE 1 TABLET BY MOUTH 3 TIMES A DAY AS NEEDED FOR MUSCLE SPASMS.      Last office visit with prescribing clinician: 10/24/2022      Next office visit with prescribing clinician: 10/26/2023   Last Filled: 08.22.2023    DX:   Status post total right knee replacement   Date of Surgery:10.14.2020    Daphney Paris MA  09/14/23, 10:30 EDT    {TIP  Encounters:    {TIP  Please add Last Relevant Lab Date if appropriate:  {TIP  Is Refill Pharmacy correct?:

## 2024-07-09 ENCOUNTER — HOSPITAL ENCOUNTER (OUTPATIENT)
Dept: INFUSION THERAPY | Facility: HOSPITAL | Age: 69
Discharge: HOME | End: 2024-07-09
Admitting: PSYCHIATRY & NEUROLOGY
Payer: MEDICARE

## 2024-07-09 VITALS
HEART RATE: 75 BPM | OXYGEN SATURATION: 98 % | RESPIRATION RATE: 16 BRPM | DIASTOLIC BLOOD PRESSURE: 70 MMHG | SYSTOLIC BLOOD PRESSURE: 138 MMHG | TEMPERATURE: 97.7 F

## 2024-07-09 DIAGNOSIS — G31.84 MCI (MILD COGNITIVE IMPAIRMENT): Primary | ICD-10-CM

## 2024-07-09 PROCEDURE — 25810000003 SODIUM CHLORIDE 0.9 % SOLUTION 250 ML FLEX CONT: Performed by: PSYCHIATRY & NEUROLOGY

## 2024-07-09 PROCEDURE — 25010000002 DIPHENHYDRAMINE PER 50 MG: Performed by: PSYCHIATRY & NEUROLOGY

## 2024-07-09 PROCEDURE — 25010000002 LECANEMAB-IRMB 200 MG/2ML SOLUTION 2 ML VIAL: Performed by: PSYCHIATRY & NEUROLOGY

## 2024-07-09 PROCEDURE — 96365 THER/PROPH/DIAG IV INF INIT: CPT

## 2024-07-09 PROCEDURE — 25010000002 LECANEMAB-IRMB 500 MG/5ML SOLUTION 5 ML VIAL: Performed by: PSYCHIATRY & NEUROLOGY

## 2024-07-09 PROCEDURE — 96374 THER/PROPH/DIAG INJ IV PUSH: CPT

## 2024-07-09 PROCEDURE — 25010000002 METHYLPREDNISOLONE PER 125 MG: Performed by: PSYCHIATRY & NEUROLOGY

## 2024-07-09 PROCEDURE — 96367 TX/PROPH/DG ADDL SEQ IV INF: CPT

## 2024-07-09 PROCEDURE — 96375 TX/PRO/DX INJ NEW DRUG ADDON: CPT

## 2024-07-09 RX ORDER — DIPHENHYDRAMINE HYDROCHLORIDE 50 MG/ML
25 INJECTION INTRAMUSCULAR; INTRAVENOUS ONCE
Status: COMPLETED | OUTPATIENT
Start: 2024-07-09 | End: 2024-07-09

## 2024-07-09 RX ORDER — ACETAMINOPHEN 325 MG/1
650 TABLET ORAL ONCE
Status: COMPLETED | OUTPATIENT
Start: 2024-07-09 | End: 2024-07-09

## 2024-07-09 RX ORDER — DIPHENHYDRAMINE HYDROCHLORIDE 50 MG/ML
25 INJECTION INTRAMUSCULAR; INTRAVENOUS ONCE
Start: 2024-07-16 | End: 2024-07-16

## 2024-07-09 RX ORDER — ACETAMINOPHEN 325 MG/1
650 TABLET ORAL ONCE
Start: 2024-07-16 | End: 2024-07-16

## 2024-07-09 RX ORDER — SODIUM CHLORIDE 9 MG/ML
20 INJECTION, SOLUTION INTRAVENOUS ONCE
OUTPATIENT
Start: 2024-07-16

## 2024-07-09 RX ORDER — FAMOTIDINE 10 MG/ML
20 INJECTION, SOLUTION INTRAVENOUS AS NEEDED
OUTPATIENT
Start: 2024-07-16

## 2024-07-09 RX ORDER — METHYLPREDNISOLONE SODIUM SUCCINATE 125 MG/2ML
125 INJECTION, POWDER, LYOPHILIZED, FOR SOLUTION INTRAMUSCULAR; INTRAVENOUS ONCE
Status: COMPLETED | OUTPATIENT
Start: 2024-07-09 | End: 2024-07-09

## 2024-07-09 RX ORDER — DIPHENHYDRAMINE HYDROCHLORIDE 50 MG/ML
50 INJECTION INTRAMUSCULAR; INTRAVENOUS AS NEEDED
OUTPATIENT
Start: 2024-07-16

## 2024-07-09 RX ORDER — METHYLPREDNISOLONE SODIUM SUCCINATE 125 MG/2ML
125 INJECTION, POWDER, LYOPHILIZED, FOR SOLUTION INTRAMUSCULAR; INTRAVENOUS ONCE
Start: 2024-07-16 | End: 2024-07-16

## 2024-07-09 RX ADMIN — LECANEMAB 610 MG: 100 INJECTION, SOLUTION INTRAVENOUS at 10:52

## 2024-07-09 RX ADMIN — ACETAMINOPHEN 650 MG: 325 TABLET ORAL at 10:30

## 2024-07-09 RX ADMIN — METHYLPREDNISOLONE SODIUM SUCCINATE 125 MG: 125 INJECTION, POWDER, FOR SOLUTION INTRAMUSCULAR; INTRAVENOUS at 10:32

## 2024-07-09 RX ADMIN — DIPHENHYDRAMINE HYDROCHLORIDE 25 MG: 50 INJECTION, SOLUTION INTRAMUSCULAR; INTRAVENOUS at 10:32

## 2024-07-09 NOTE — NURSING NOTE
Patient arrived to [facility] at 1025.  History and medications reviewed with patient.  Medications administered as ordered without complications.  AVS discussed and copy given to patient.  Patient discharged at 1229 in stable condition without complaint.

## 2024-07-09 NOTE — PATIENT INSTRUCTIONS
"  Call  Dr. Ham [phone]  if you have any problems or concerns.    We know you have a Choice in healthcare and appreciate you using [facility].  Our purpose is to provide you \"Excellent Care\".  We hope that you will always choose us in the future and continue to recommend us to your family and friends.             "

## 2024-07-16 ENCOUNTER — ANESTHESIA EVENT (OUTPATIENT)
Dept: PERIOP | Facility: HOSPITAL | Age: 69
End: 2024-07-16
Payer: MEDICARE

## 2024-07-17 ENCOUNTER — HOSPITAL ENCOUNTER (OUTPATIENT)
Facility: HOSPITAL | Age: 69
Setting detail: HOSPITAL OUTPATIENT SURGERY
Discharge: HOME | End: 2024-07-17
Attending: INTERNAL MEDICINE | Admitting: INTERNAL MEDICINE
Payer: MEDICARE

## 2024-07-17 ENCOUNTER — ANESTHESIA (OUTPATIENT)
Dept: PERIOP | Facility: HOSPITAL | Age: 69
End: 2024-07-17
Payer: MEDICARE

## 2024-07-17 VITALS
HEART RATE: 71 BPM | TEMPERATURE: 97.5 F | BODY MASS INDEX: 25.14 KG/M2 | DIASTOLIC BLOOD PRESSURE: 88 MMHG | HEIGHT: 62 IN | SYSTOLIC BLOOD PRESSURE: 165 MMHG | WEIGHT: 136.6 LBS | OXYGEN SATURATION: 100 % | RESPIRATION RATE: 15 BRPM

## 2024-07-17 DIAGNOSIS — Z86.010 PERSONAL HISTORY OF COLONIC POLYPS: ICD-10-CM

## 2024-07-17 PROCEDURE — 25010000002 PROPOFOL 200 MG/20ML EMULSION: Performed by: NURSE ANESTHETIST, CERTIFIED REGISTERED

## 2024-07-17 PROCEDURE — 45380 COLONOSCOPY AND BIOPSY: CPT | Performed by: INTERNAL MEDICINE

## 2024-07-17 PROCEDURE — 25810000003 LACTATED RINGERS PER 1000 ML

## 2024-07-17 PROCEDURE — 88305 TISSUE EXAM BY PATHOLOGIST: CPT | Performed by: INTERNAL MEDICINE

## 2024-07-17 RX ORDER — SODIUM CHLORIDE 9 MG/ML
40 INJECTION, SOLUTION INTRAVENOUS AS NEEDED
Status: DISCONTINUED | OUTPATIENT
Start: 2024-07-17 | End: 2024-07-17 | Stop reason: HOSPADM

## 2024-07-17 RX ORDER — SODIUM CHLORIDE 0.9 % (FLUSH) 0.9 %
10 SYRINGE (ML) INJECTION EVERY 12 HOURS SCHEDULED
Status: DISCONTINUED | OUTPATIENT
Start: 2024-07-17 | End: 2024-07-17 | Stop reason: HOSPADM

## 2024-07-17 RX ORDER — SODIUM CHLORIDE, SODIUM LACTATE, POTASSIUM CHLORIDE, CALCIUM CHLORIDE 600; 310; 30; 20 MG/100ML; MG/100ML; MG/100ML; MG/100ML
9 INJECTION, SOLUTION INTRAVENOUS CONTINUOUS
Status: DISCONTINUED | OUTPATIENT
Start: 2024-07-17 | End: 2024-07-17 | Stop reason: HOSPADM

## 2024-07-17 RX ORDER — LIDOCAINE HYDROCHLORIDE 10 MG/ML
0.5 INJECTION, SOLUTION INFILTRATION; PERINEURAL ONCE AS NEEDED
Status: DISCONTINUED | OUTPATIENT
Start: 2024-07-17 | End: 2024-07-17 | Stop reason: HOSPADM

## 2024-07-17 RX ORDER — SODIUM CHLORIDE 0.9 % (FLUSH) 0.9 %
10 SYRINGE (ML) INJECTION AS NEEDED
Status: DISCONTINUED | OUTPATIENT
Start: 2024-07-17 | End: 2024-07-17 | Stop reason: HOSPADM

## 2024-07-17 RX ORDER — LIDOCAINE HYDROCHLORIDE 20 MG/ML
INJECTION, SOLUTION INFILTRATION; PERINEURAL AS NEEDED
Status: DISCONTINUED | OUTPATIENT
Start: 2024-07-17 | End: 2024-07-17 | Stop reason: SURG

## 2024-07-17 RX ORDER — PROPOFOL 10 MG/ML
INJECTION, EMULSION INTRAVENOUS AS NEEDED
Status: DISCONTINUED | OUTPATIENT
Start: 2024-07-17 | End: 2024-07-17 | Stop reason: SURG

## 2024-07-17 RX ADMIN — PROPOFOL 520 MG: 10 INJECTION, EMULSION INTRAVENOUS at 14:59

## 2024-07-17 RX ADMIN — LIDOCAINE HYDROCHLORIDE 60 MG: 20 INJECTION, SOLUTION INFILTRATION; PERINEURAL at 14:59

## 2024-07-17 RX ADMIN — SODIUM CHLORIDE, POTASSIUM CHLORIDE, SODIUM LACTATE AND CALCIUM CHLORIDE 9 ML/HR: 600; 310; 30; 20 INJECTION, SOLUTION INTRAVENOUS at 13:36

## 2024-07-17 NOTE — BRIEF OP NOTE
COLONOSCOPY WITH POLYPECTOMY  Progress Note    Yesi Benson  7/17/2024    Pre-op Diagnosis:   Personal history of colonic polyps [Z86.010]       Post-Op Diagnosis Codes:     * Personal history of colonic polyps [Z86.010]     * Colon polyp [K63.5]    Procedure/CPT® Codes:        Procedure(s):  COLONOSCOPY WITH POLYPECTOMY              Surgeon(s):  Jaime Alaniz MD    Anesthesia: Monitored Anesthesia Care    Staff:   Circulator: Randi Bowden RN  Scrub Person: Rufina Hall         Estimated Blood Loss: none    Urine Voided: * No values recorded between 7/17/2024  2:54 PM and 7/17/2024  3:20 PM *    Specimens:                Specimens       ID Source Type Tests Collected By Collected At Frozen?    A Large Intestine, Cecum Polyp TISSUE PATHOLOGY EXAM   Jaime Alaniz MD 7/17/24 1511 No    Description: cecal polyp    This specimen was not marked as sent.                  Drains: * No LDAs found *    Findings: Colon to TI good prep  Polyp-Biopsy        Complications: none          Jaime Alaniz MD     Date: 7/17/2024  Time: 15:23 EDT

## 2024-07-17 NOTE — H&P
Patient Care Team:  Mani Gorman MD as PCP - General (Family Medicine)  Matt Vega MD (Inactive) as Consulting Physician (Obstetrics and Gynecology)    CHIEF COMPLAINT: Personal hx colon polyps    HISTORY OF PRESENT ILLNESS:  Last exam was 2019    Past Medical History:   Diagnosis Date    Colon polyp     Dementia had problems with memory                   during last year    have not been diagnosed    Disease of thyroid gland     Elevated cholesterol     Hyperlipidemia approx. 20 years ago    Knee swelling     Memory loss within the last year    Tear of meniscus of knee     TIA (transient ischemic attack) 01/19/2024    TIA (transient ischemic attack)      Past Surgical History:   Procedure Laterality Date    COLONOSCOPY N/A 03/27/2019    Procedure: COLONOSCOPY POLYPECTOMY;  Surgeon: Jaime Alaniz MD;  Location: [facility] OR;  Service: Gastroenterology    DENTAL PROCEDURE Left 2011,2014    HERNIA REPAIR      JOINT MANIPULATION Right 12/21/2020    Procedure: KNEE MANIPULATION;  Surgeon: Elmer Fuentes MD;  Location: [facility] OR;  Service: Orthopedics;  Laterality: Right;    JOINT REPLACEMENT      SALPINGO OOPHORECTOMY Left     as an infant     TOTAL KNEE ARTHROPLASTY Right 10/14/2020    Procedure: TOTAL KNEE ARTHROPLASTY AND ALL ASSOCIATED PROCEDURES;  Surgeon: Elmer Fuentes MD;  Location: [facility] OR;  Service: Orthopedics;  Laterality: Right;     Family History   Problem Relation Age of Onset    Dementia Mother         ill at least 10 years    Migraines Mother     Seizures Mother     Stroke Mother     Cancer Father         Penile Cancer    Cancer Sister         Breast Cancer    Cancer Maternal Grandmother         Breast Cancer    Malig Hyperthermia Neg Hx      Social History     Tobacco Use    Smoking status: Never     Passive exposure: Never    Smokeless tobacco: Never   Vaping Use    Vaping status: Never Used   Substance Use Topics    Alcohol use: Never    Drug use: Never  "    Medications Prior to Admission   Medication Sig Dispense Refill Last Dose    atorvastatin (LIPITOR) 40 MG tablet Take 1 tablet by mouth Every Night. 90 tablet 0 7/16/2024    chlorthalidone (HYGROTON) 25 MG tablet Take 1 tablet by mouth Daily.   7/16/2024    escitalopram (LEXAPRO) 10 MG tablet Take 1 tablet by mouth Daily.   7/16/2024    levothyroxine (SYNTHROID, LEVOTHROID) 88 MCG tablet levothyroxine 88 mcg tablet   7/16/2024    aspirin 81 MG EC tablet Take 1 tablet by mouth Daily.   7/12/2024    calcium carbonate (OS-ARMANDO) 600 MG tablet Take 1 tablet by mouth Daily.   Unknown    cyclobenzaprine (FLEXERIL) 10 MG tablet Take 1 tablet by mouth 3 (Three) Times a Day As Needed for Muscle Spasms. 60 tablet 2     multivitamin (MULTI VITAMIN PO) Take  by mouth Daily.   7/12/2024    Omega-3 Fatty Acids (FISH OIL) 1000 MG capsule capsule Take 1 capsule by mouth Daily With Breakfast.   7/12/2024     Allergies:  Patient has no known allergies.    REVIEW OF SYSTEMS:  Please see the above history of present illness for pertinent positives and negatives.  The remainder of the patient's systems have been reviewed and are negative.     Vital Signs  Temp:  [98 °F (36.7 °C)] 98 °F (36.7 °C)  Heart Rate:  [76] 76  Resp:  [24] 24  BP: (131)/(56) 131/56    Flowsheet Rows      Flowsheet Row First Filed Value   Admission Height 157.5 cm (62\") Documented at 07/17/2024 1314   Admission Weight 62 kg (136 lb 9.6 oz) Documented at 07/17/2024 1314             Physical Exam:  Physical Exam   Constitutional: Patient appears well-developed and well-nourished and in no acute distress   HEENT:   Head: Normocephalic and atraumatic.   Eyes:  Pupils are equal, round, and reactive to light. EOM are intact. Sclerae are anicteric and non-injected.  Mouth and Throat: Patient has moist mucous membranes. Oropharynx is clear of any erythema or exudate.     Neck: Neck supple. No JVD present. No thyromegaly present. No lymphadenopathy " present.  Cardiovascular: Regular rate, regular rhythm, S1 normal and S2 normal.  Exam reveals no gallop and no friction rub.  No murmur heard.  Pulmonary/Chest: Lungs are clear to auscultation bilaterally. No respiratory distress. No wheezes. No rhonchi. No rales.   Abdominal: Soft. Bowel sounds are normal. No distension and no mass. There is no hepatosplenomegaly. There is no tenderness.   Musculoskeletal: Normal Muscle tone  Extremities: No edema. Pulses are palpable in all 4 extremities.  Neurological: Patient is alert and oriented to person, place, and time. Cranial nerves II-XII are grossly intact with no focal deficits.  Skin: Skin is warm. No rash noted. Nails show no clubbing.  No cyanosis or erythema.    Debilities/Disabilities Identified: None  Emotional Behavior: Appropriate     Results Review:   I reviewed the patient's new clinical results.    Lab Results (most recent)       None            Imaging Results (Most Recent)       None          reviewed    ECG/EMG Results (most recent)       None          reviewed    Assessment & Plan   Personal hx colon polyps/  colonoscopy      I discussed the patient's findings and my recommendations with patient.     Jaime Alaniz MD  07/17/24  14:55 EDT    Time: 10 min prior to procedure.

## 2024-07-17 NOTE — ANESTHESIA PREPROCEDURE EVALUATION
Anesthesia Evaluation     Patient summary reviewed and Nursing notes reviewed   no history of anesthetic complications:   NPO Solid Status: > 8 hours  NPO Liquid Status: > 8 hours           Airway   Mallampati: II  TM distance: >3 FB  Neck ROM: full  No difficulty expected  Dental - normal exam     Pulmonary - negative pulmonary ROS and normal exam    breath sounds clear to auscultation  Cardiovascular - normal exam    ECG reviewed  Rhythm: regular  Rate: normal    (+) hypertension, hyperlipidemia      Neuro/Psych  (+) TIA (1/24), psychiatric history    ROS Comment: Memory loss (patient denies)  GI/Hepatic/Renal/Endo    (+) thyroid problem hypothyroidism    Musculoskeletal         ROS comment: R knee  Abdominal  - normal exam   Substance History - negative use     OB/GYN          Other   arthritis,                       Anesthesia Plan    ASA 2     MAC     intravenous induction     Anesthetic plan, risks, benefits, and alternatives have been provided, discussed and informed consent has been obtained with: patient.  Pre-procedure education provided  Use of blood products discussed with patient  Consented to blood products.    Plan discussed with CRNA.

## 2024-07-17 NOTE — ANESTHESIA POSTPROCEDURE EVALUATION
Patient: Yesi Benson    Procedure Summary       Date: 07/17/24 Room / Location: [facility] ENDOSCOPY 1 /  [facility] OR    Anesthesia Start: 1454 Anesthesia Stop: 1525    Procedure: COLONOSCOPY WITH POLYPECTOMY Diagnosis:       Personal history of colonic polyps      Colon polyp      (Personal history of colonic polyps [Z86.010])    Surgeons: Jaime Alaniz MD Provider: Heidy Roberts CRNA    Anesthesia Type: MAC ASA Status: 2            Anesthesia Type: MAC    Vitals  Vitals Value Taken Time   /65 07/17/24 1540   Temp 97.5 °F (36.4 °C) 07/17/24 1526   Pulse 71 07/17/24 1550   Resp 15 07/17/24 1530   SpO2 100 % 07/17/24 1550   Vitals shown include unfiled device data.        Post Anesthesia Care and Evaluation    Patient location during evaluation: PHASE II  Patient participation: complete - patient participated  Level of consciousness: awake  Pain score: 0  Pain management: adequate    Airway patency: patent  Anesthetic complications: No anesthetic complications  PONV Status: none  Cardiovascular status: acceptable  Respiratory status: acceptable  Hydration status: acceptable

## 2024-07-19 LAB
LAB AP CASE REPORT: NORMAL
PATH REPORT.FINAL DX SPEC: NORMAL
PATH REPORT.GROSS SPEC: NORMAL

## 2024-07-23 ENCOUNTER — HOSPITAL ENCOUNTER (OUTPATIENT)
Dept: INFUSION THERAPY | Facility: HOSPITAL | Age: 69
Discharge: HOME | End: 2024-07-23
Admitting: PSYCHIATRY & NEUROLOGY
Payer: MEDICARE

## 2024-07-23 VITALS
HEART RATE: 79 BPM | TEMPERATURE: 97.3 F | DIASTOLIC BLOOD PRESSURE: 70 MMHG | SYSTOLIC BLOOD PRESSURE: 147 MMHG | RESPIRATION RATE: 20 BRPM | OXYGEN SATURATION: 99 %

## 2024-07-23 DIAGNOSIS — G31.84 MCI (MILD COGNITIVE IMPAIRMENT): Primary | ICD-10-CM

## 2024-07-23 PROCEDURE — 25810000003 SODIUM CHLORIDE 0.9 % SOLUTION 250 ML FLEX CONT: Performed by: PSYCHIATRY & NEUROLOGY

## 2024-07-23 PROCEDURE — 96375 TX/PRO/DX INJ NEW DRUG ADDON: CPT

## 2024-07-23 PROCEDURE — 96365 THER/PROPH/DIAG IV INF INIT: CPT

## 2024-07-23 PROCEDURE — 25010000002 LECANEMAB-IRMB 500 MG/5ML SOLUTION 5 ML VIAL: Performed by: PSYCHIATRY & NEUROLOGY

## 2024-07-23 PROCEDURE — 25010000002 METHYLPREDNISOLONE PER 125 MG: Performed by: PSYCHIATRY & NEUROLOGY

## 2024-07-23 PROCEDURE — 25010000002 DIPHENHYDRAMINE PER 50 MG: Performed by: PSYCHIATRY & NEUROLOGY

## 2024-07-23 PROCEDURE — 25010000002 LECANEMAB-IRMB 200 MG/2ML SOLUTION 2 ML VIAL: Performed by: PSYCHIATRY & NEUROLOGY

## 2024-07-23 RX ORDER — METHYLPREDNISOLONE SODIUM SUCCINATE 125 MG/2ML
125 INJECTION, POWDER, LYOPHILIZED, FOR SOLUTION INTRAMUSCULAR; INTRAVENOUS ONCE
Status: COMPLETED | OUTPATIENT
Start: 2024-07-23 | End: 2024-07-23

## 2024-07-23 RX ORDER — DIPHENHYDRAMINE HYDROCHLORIDE 50 MG/ML
50 INJECTION INTRAMUSCULAR; INTRAVENOUS AS NEEDED
OUTPATIENT
Start: 2024-07-30

## 2024-07-23 RX ORDER — ACETAMINOPHEN 325 MG/1
650 TABLET ORAL ONCE
Status: COMPLETED | OUTPATIENT
Start: 2024-07-23 | End: 2024-07-23

## 2024-07-23 RX ORDER — FAMOTIDINE 10 MG/ML
20 INJECTION, SOLUTION INTRAVENOUS AS NEEDED
OUTPATIENT
Start: 2024-07-30

## 2024-07-23 RX ORDER — ACETAMINOPHEN 325 MG/1
650 TABLET ORAL ONCE
Start: 2024-07-30 | End: 2024-07-30

## 2024-07-23 RX ORDER — DIPHENHYDRAMINE HYDROCHLORIDE 50 MG/ML
25 INJECTION INTRAMUSCULAR; INTRAVENOUS ONCE
Status: COMPLETED | OUTPATIENT
Start: 2024-07-23 | End: 2024-07-23

## 2024-07-23 RX ORDER — SODIUM CHLORIDE 9 MG/ML
20 INJECTION, SOLUTION INTRAVENOUS ONCE
OUTPATIENT
Start: 2024-07-30

## 2024-07-23 RX ORDER — METHYLPREDNISOLONE SODIUM SUCCINATE 125 MG/2ML
125 INJECTION, POWDER, LYOPHILIZED, FOR SOLUTION INTRAMUSCULAR; INTRAVENOUS ONCE
Start: 2024-07-30 | End: 2024-07-30

## 2024-07-23 RX ORDER — DIPHENHYDRAMINE HYDROCHLORIDE 50 MG/ML
25 INJECTION INTRAMUSCULAR; INTRAVENOUS ONCE
Start: 2024-07-30 | End: 2024-07-30

## 2024-07-23 RX ADMIN — METHYLPREDNISOLONE SODIUM SUCCINATE 125 MG: 125 INJECTION, POWDER, FOR SOLUTION INTRAMUSCULAR; INTRAVENOUS at 10:27

## 2024-07-23 RX ADMIN — DIPHENHYDRAMINE HYDROCHLORIDE 25 MG: 50 INJECTION, SOLUTION INTRAMUSCULAR; INTRAVENOUS at 10:28

## 2024-07-23 RX ADMIN — ACETAMINOPHEN 650 MG: 325 TABLET ORAL at 10:28

## 2024-07-23 RX ADMIN — LECANEMAB 620 MG: 100 INJECTION, SOLUTION INTRAVENOUS at 10:37

## 2024-07-23 NOTE — PATIENT INSTRUCTIONS
"  Call  Dr. Ham [phone]  if you have any problems or concerns.    We know you have a Choice in healthcare and appreciate you using [facility].  Our purpose is to provide you \"Excellent Care\".  We hope that you will always choose us in the future and continue to recommend us to your family and friends.             "

## 2024-07-23 NOTE — NURSING NOTE
1008 Patient arrives to [facility] in stable condition.  History and medications reviewed with patient. VSS.  Medications given as ordered. Patient tolerated her infusion well. AVS reviewed with patient and copy sent home with her. Patient discharged from [facility] in stable condition without any complaints at 1220.

## 2024-08-06 ENCOUNTER — HOSPITAL ENCOUNTER (OUTPATIENT)
Dept: INFUSION THERAPY | Facility: HOSPITAL | Age: 69
Discharge: HOME | End: 2024-08-06
Admitting: PSYCHIATRY & NEUROLOGY
Payer: MEDICARE

## 2024-08-06 VITALS
TEMPERATURE: 98.6 F | OXYGEN SATURATION: 99 % | DIASTOLIC BLOOD PRESSURE: 75 MMHG | SYSTOLIC BLOOD PRESSURE: 137 MMHG | RESPIRATION RATE: 16 BRPM | HEART RATE: 82 BPM

## 2024-08-06 DIAGNOSIS — G31.84 MCI (MILD COGNITIVE IMPAIRMENT): Primary | ICD-10-CM

## 2024-08-06 PROCEDURE — 25010000002 DIPHENHYDRAMINE PER 50 MG: Performed by: PSYCHIATRY & NEUROLOGY

## 2024-08-06 PROCEDURE — 25810000003 SODIUM CHLORIDE 0.9 % SOLUTION 250 ML FLEX CONT: Performed by: PSYCHIATRY & NEUROLOGY

## 2024-08-06 PROCEDURE — 25010000002 METHYLPREDNISOLONE PER 125 MG: Performed by: PSYCHIATRY & NEUROLOGY

## 2024-08-06 PROCEDURE — 96375 TX/PRO/DX INJ NEW DRUG ADDON: CPT

## 2024-08-06 PROCEDURE — 96365 THER/PROPH/DIAG IV INF INIT: CPT

## 2024-08-06 PROCEDURE — 96374 THER/PROPH/DIAG INJ IV PUSH: CPT

## 2024-08-06 PROCEDURE — 25010000002 LECANEMAB-IRMB 200 MG/2ML SOLUTION 2 ML VIAL: Performed by: PSYCHIATRY & NEUROLOGY

## 2024-08-06 PROCEDURE — 25010000002 LECANEMAB-IRMB 500 MG/5ML SOLUTION 5 ML VIAL: Performed by: PSYCHIATRY & NEUROLOGY

## 2024-08-06 RX ORDER — ACETAMINOPHEN 325 MG/1
650 TABLET ORAL ONCE
Start: 2024-08-13 | End: 2024-08-13

## 2024-08-06 RX ORDER — DIPHENHYDRAMINE HYDROCHLORIDE 50 MG/ML
25 INJECTION INTRAMUSCULAR; INTRAVENOUS ONCE
Start: 2024-08-13 | End: 2024-08-13

## 2024-08-06 RX ORDER — FAMOTIDINE 10 MG/ML
20 INJECTION, SOLUTION INTRAVENOUS AS NEEDED
OUTPATIENT
Start: 2024-08-13

## 2024-08-06 RX ORDER — SODIUM CHLORIDE 9 MG/ML
20 INJECTION, SOLUTION INTRAVENOUS ONCE
OUTPATIENT
Start: 2024-08-13

## 2024-08-06 RX ORDER — METHYLPREDNISOLONE SODIUM SUCCINATE 125 MG/2ML
125 INJECTION, POWDER, LYOPHILIZED, FOR SOLUTION INTRAMUSCULAR; INTRAVENOUS ONCE
Status: COMPLETED | OUTPATIENT
Start: 2024-08-06 | End: 2024-08-06

## 2024-08-06 RX ORDER — DIPHENHYDRAMINE HYDROCHLORIDE 50 MG/ML
25 INJECTION INTRAMUSCULAR; INTRAVENOUS ONCE
Status: COMPLETED | OUTPATIENT
Start: 2024-08-06 | End: 2024-08-06

## 2024-08-06 RX ORDER — METHYLPREDNISOLONE SODIUM SUCCINATE 125 MG/2ML
125 INJECTION, POWDER, LYOPHILIZED, FOR SOLUTION INTRAMUSCULAR; INTRAVENOUS ONCE
Start: 2024-08-13 | End: 2024-08-13

## 2024-08-06 RX ORDER — ACETAMINOPHEN 325 MG/1
650 TABLET ORAL ONCE
Status: COMPLETED | OUTPATIENT
Start: 2024-08-06 | End: 2024-08-06

## 2024-08-06 RX ORDER — DIPHENHYDRAMINE HYDROCHLORIDE 50 MG/ML
50 INJECTION INTRAMUSCULAR; INTRAVENOUS AS NEEDED
OUTPATIENT
Start: 2024-08-13

## 2024-08-06 RX ADMIN — ACETAMINOPHEN 650 MG: 325 TABLET ORAL at 11:15

## 2024-08-06 RX ADMIN — DIPHENHYDRAMINE HYDROCHLORIDE 25 MG: 50 INJECTION, SOLUTION INTRAMUSCULAR; INTRAVENOUS at 11:21

## 2024-08-06 RX ADMIN — LECANEMAB 620 MG: 100 INJECTION, SOLUTION INTRAVENOUS at 11:27

## 2024-08-06 RX ADMIN — METHYLPREDNISOLONE SODIUM SUCCINATE 125 MG: 125 INJECTION, POWDER, FOR SOLUTION INTRAMUSCULAR; INTRAVENOUS at 11:21

## 2024-08-06 NOTE — NURSING NOTE
1105 Patient arrives to [facility] in stable condition.  History and medications reviewed with patient. VSS. Infusion given as ordered, patient tolerated well. AVS reviewed with patient and copy sent home. Patient discharged from [facility] at 1300.

## 2024-08-06 NOTE — PATIENT INSTRUCTIONS
"  Call  DR SANDRA ASHBY @ [phone]  if you have any problems or concerns.    We know you have a Choice in healthcare and appreciate you using [facility].  Our purpose is to provide you \"Excellent Care\".  We hope that you will always choose us in the future and continue to recommend us to your family and friends.                "

## 2024-08-20 ENCOUNTER — HOSPITAL ENCOUNTER (OUTPATIENT)
Dept: INFUSION THERAPY | Facility: HOSPITAL | Age: 69
Discharge: HOME | End: 2024-08-20
Admitting: PSYCHIATRY & NEUROLOGY
Payer: MEDICARE

## 2024-08-20 VITALS
OXYGEN SATURATION: 100 % | DIASTOLIC BLOOD PRESSURE: 82 MMHG | HEART RATE: 78 BPM | SYSTOLIC BLOOD PRESSURE: 135 MMHG | BODY MASS INDEX: 25.3 KG/M2 | TEMPERATURE: 98.1 F | RESPIRATION RATE: 16 BRPM | WEIGHT: 138.3 LBS

## 2024-08-20 DIAGNOSIS — G31.84 MCI (MILD COGNITIVE IMPAIRMENT): Primary | ICD-10-CM

## 2024-08-20 PROCEDURE — 96365 THER/PROPH/DIAG IV INF INIT: CPT

## 2024-08-20 PROCEDURE — 25810000003 SODIUM CHLORIDE 0.9 % SOLUTION 250 ML FLEX CONT: Performed by: PSYCHIATRY & NEUROLOGY

## 2024-08-20 PROCEDURE — 96375 TX/PRO/DX INJ NEW DRUG ADDON: CPT

## 2024-08-20 PROCEDURE — 25010000002 METHYLPREDNISOLONE PER 125 MG: Performed by: PSYCHIATRY & NEUROLOGY

## 2024-08-20 PROCEDURE — 25010000002 LECANEMAB-IRMB 500 MG/5ML SOLUTION 5 ML VIAL: Performed by: PSYCHIATRY & NEUROLOGY

## 2024-08-20 PROCEDURE — 25010000002 DIPHENHYDRAMINE PER 50 MG: Performed by: PSYCHIATRY & NEUROLOGY

## 2024-08-20 PROCEDURE — 96374 THER/PROPH/DIAG INJ IV PUSH: CPT

## 2024-08-20 PROCEDURE — 25010000002 LECANEMAB-IRMB 200 MG/2ML SOLUTION 2 ML VIAL: Performed by: PSYCHIATRY & NEUROLOGY

## 2024-08-20 RX ORDER — ACETAMINOPHEN 325 MG/1
650 TABLET ORAL ONCE
Start: 2024-08-27 | End: 2024-08-27

## 2024-08-20 RX ORDER — DIPHENHYDRAMINE HYDROCHLORIDE 50 MG/ML
25 INJECTION INTRAMUSCULAR; INTRAVENOUS ONCE
Start: 2024-08-27 | End: 2024-08-27

## 2024-08-20 RX ORDER — DIPHENHYDRAMINE HYDROCHLORIDE 50 MG/ML
50 INJECTION INTRAMUSCULAR; INTRAVENOUS AS NEEDED
OUTPATIENT
Start: 2024-08-27

## 2024-08-20 RX ORDER — METHYLPREDNISOLONE SODIUM SUCCINATE 125 MG/2ML
125 INJECTION, POWDER, LYOPHILIZED, FOR SOLUTION INTRAMUSCULAR; INTRAVENOUS ONCE
Status: COMPLETED | OUTPATIENT
Start: 2024-08-20 | End: 2024-08-20

## 2024-08-20 RX ORDER — DIPHENHYDRAMINE HYDROCHLORIDE 50 MG/ML
25 INJECTION INTRAMUSCULAR; INTRAVENOUS ONCE
Status: COMPLETED | OUTPATIENT
Start: 2024-08-20 | End: 2024-08-20

## 2024-08-20 RX ORDER — METHYLPREDNISOLONE SODIUM SUCCINATE 125 MG/2ML
125 INJECTION, POWDER, LYOPHILIZED, FOR SOLUTION INTRAMUSCULAR; INTRAVENOUS ONCE
Start: 2024-08-27 | End: 2024-08-27

## 2024-08-20 RX ORDER — SODIUM CHLORIDE 9 MG/ML
20 INJECTION, SOLUTION INTRAVENOUS ONCE
OUTPATIENT
Start: 2024-08-27

## 2024-08-20 RX ORDER — FAMOTIDINE 10 MG/ML
20 INJECTION, SOLUTION INTRAVENOUS AS NEEDED
OUTPATIENT
Start: 2024-08-27

## 2024-08-20 RX ORDER — ACETAMINOPHEN 325 MG/1
650 TABLET ORAL ONCE
Status: COMPLETED | OUTPATIENT
Start: 2024-08-20 | End: 2024-08-20

## 2024-08-20 RX ADMIN — ACETAMINOPHEN 650 MG: 325 TABLET ORAL at 10:24

## 2024-08-20 RX ADMIN — DIPHENHYDRAMINE HYDROCHLORIDE 25 MG: 50 INJECTION, SOLUTION INTRAMUSCULAR; INTRAVENOUS at 10:25

## 2024-08-20 RX ADMIN — METHYLPREDNISOLONE SODIUM SUCCINATE 125 MG: 125 INJECTION, POWDER, FOR SOLUTION INTRAMUSCULAR; INTRAVENOUS at 10:24

## 2024-08-20 RX ADMIN — LECANEMAB 630 MG: 100 INJECTION, SOLUTION INTRAVENOUS at 10:32

## 2024-08-20 NOTE — NURSING NOTE
PT ARRIVED TO [facility] FOR APPT. VSS, NO COMPLAINTS AT THIS TIME. MEDICATION ADMINISTERED PER MD ORDER. PT TOLERATED WELL. VSS POST INFUSION. VERIFIED PT HAD NEXT SCHEDULED APPT. AVS OFFERED, PT REFUSED. PT DISCHARGED FROM [facility] AT 12:05 PM IN STABLE CONDITION, WITHOUT COMPLAINTS.

## 2024-08-20 NOTE — PATIENT INSTRUCTIONS
"  Call  Dr. Ham [phone]  if you have any problems or concerns.    We know you have a Choice in healthcare and appreciate you using [facility].  Our purpose is to provide you \"Excellent Care\".  We hope that you will always choose us in the future and continue to recommend us to your family and friends.             "

## 2024-08-26 ENCOUNTER — HOSPITAL ENCOUNTER (OUTPATIENT)
Dept: MRI IMAGING | Facility: HOSPITAL | Age: 69
Discharge: HOME | End: 2024-08-26
Admitting: PSYCHIATRY & NEUROLOGY
Payer: MEDICARE

## 2024-08-26 DIAGNOSIS — G31.84 MCI (MILD COGNITIVE IMPAIRMENT): ICD-10-CM

## 2024-08-26 DIAGNOSIS — G30.9 ALZHEIMER'S DISEASE: ICD-10-CM

## 2024-08-26 DIAGNOSIS — F02.80 ALZHEIMER'S DISEASE: ICD-10-CM

## 2024-08-26 PROCEDURE — 70551 MRI BRAIN STEM W/O DYE: CPT

## 2024-08-30 ENCOUNTER — OFFICE VISIT (OUTPATIENT)
Dept: NEUROLOGY | Facility: CLINIC | Age: 69
End: 2024-08-30
Payer: MEDICARE

## 2024-08-30 VITALS
WEIGHT: 137.6 LBS | OXYGEN SATURATION: 96 % | SYSTOLIC BLOOD PRESSURE: 120 MMHG | BODY MASS INDEX: 25.17 KG/M2 | HEART RATE: 95 BPM | DIASTOLIC BLOOD PRESSURE: 80 MMHG

## 2024-08-30 DIAGNOSIS — F02.80 ALZHEIMER'S DISEASE: Primary | ICD-10-CM

## 2024-08-30 DIAGNOSIS — G30.9 ALZHEIMER'S DISEASE: Primary | ICD-10-CM

## 2024-08-30 NOTE — PROGRESS NOTES
Notes by MA:  Pt is here today for a follow up appointment. She recently had an MRI. Her sister accompanies her today.      Subjective:     Patient ID: Yesi Benson is a 69 y.o. female.    Memory Loss    The following portions of the patient's history were reviewed and updated as appropriate: allergies, current medications, past family history, past medical history, past social history, past surgical history, and problem list.    Review of Systems   Psychiatric/Behavioral:  Positive for confusion and decreased concentration.         Objective:    Neurologic Exam  She is awake alert pleasant cooperative with reasonably fluent speech and reasonable speech comprehension.  Repeat MoCA score today is 17 out of 30.    Cranial nerves II through XII normal and symmetric.  I do not see any visual field limitation by confrontation today.     Motor exam reveals age-appropriate tone bulk and power without lateralization and without drift.     Sensory exam reveals reasonably preserved and symmetric sensation to light touch, temperature, and vibration.     Coordination testing reveals smooth and accurate finger-nose-finger normal rapid alternating movements.     Tendon reflexes are 1+ and symmetric.  Toes are downgoing.  Physical Exam    Assessment/Plan:     Diagnoses and all orders for this visit:    1. Alzheimer's disease (Primary)     MCI versus mild dementia in the setting of laboratory confirmed Alzheimer disease.  She is here after her final scheduled surveillance brain MRI.  There is no recurrence of Aria.  She is doing very well clinically.  No new issues or complaints and tolerating her Leqembi infusions very well.  We had a discussion about her future infusions as well as new medications that are coming on to market.  I will see her back in 3 months time to check on her progress and as always, she is encouraged to call in the interim with any problems or questions.

## 2024-09-03 ENCOUNTER — HOSPITAL ENCOUNTER (OUTPATIENT)
Dept: INFUSION THERAPY | Facility: HOSPITAL | Age: 69
Discharge: HOME | End: 2024-09-03
Payer: MEDICARE

## 2024-09-03 VITALS
HEART RATE: 72 BPM | DIASTOLIC BLOOD PRESSURE: 69 MMHG | SYSTOLIC BLOOD PRESSURE: 127 MMHG | TEMPERATURE: 98 F | OXYGEN SATURATION: 100 % | WEIGHT: 138.23 LBS | BODY MASS INDEX: 25.28 KG/M2 | RESPIRATION RATE: 16 BRPM

## 2024-09-03 DIAGNOSIS — G31.84 MCI (MILD COGNITIVE IMPAIRMENT): Primary | ICD-10-CM

## 2024-09-03 PROCEDURE — 96374 THER/PROPH/DIAG INJ IV PUSH: CPT

## 2024-09-03 PROCEDURE — 25010000002 METHYLPREDNISOLONE PER 125 MG: Performed by: PSYCHIATRY & NEUROLOGY

## 2024-09-03 PROCEDURE — 25810000003 SODIUM CHLORIDE 0.9 % SOLUTION 250 ML FLEX CONT: Performed by: PSYCHIATRY & NEUROLOGY

## 2024-09-03 PROCEDURE — 96365 THER/PROPH/DIAG IV INF INIT: CPT

## 2024-09-03 PROCEDURE — 25010000002 LECANEMAB-IRMB 200 MG/2ML SOLUTION 2 ML VIAL: Performed by: PSYCHIATRY & NEUROLOGY

## 2024-09-03 PROCEDURE — 96375 TX/PRO/DX INJ NEW DRUG ADDON: CPT

## 2024-09-03 PROCEDURE — 25010000002 DIPHENHYDRAMINE PER 50 MG: Performed by: PSYCHIATRY & NEUROLOGY

## 2024-09-03 RX ORDER — SODIUM CHLORIDE 9 MG/ML
20 INJECTION, SOLUTION INTRAVENOUS ONCE
OUTPATIENT
Start: 2024-09-10

## 2024-09-03 RX ORDER — FAMOTIDINE 10 MG/ML
20 INJECTION, SOLUTION INTRAVENOUS AS NEEDED
OUTPATIENT
Start: 2024-09-10

## 2024-09-03 RX ORDER — DIPHENHYDRAMINE HYDROCHLORIDE 50 MG/ML
50 INJECTION INTRAMUSCULAR; INTRAVENOUS AS NEEDED
OUTPATIENT
Start: 2024-09-10

## 2024-09-03 RX ORDER — ACETAMINOPHEN 325 MG/1
650 TABLET ORAL ONCE
Start: 2024-09-10 | End: 2024-09-10

## 2024-09-03 RX ORDER — DIPHENHYDRAMINE HYDROCHLORIDE 50 MG/ML
25 INJECTION INTRAMUSCULAR; INTRAVENOUS ONCE
Status: COMPLETED | OUTPATIENT
Start: 2024-09-03 | End: 2024-09-03

## 2024-09-03 RX ORDER — ACETAMINOPHEN 325 MG/1
650 TABLET ORAL ONCE
Status: COMPLETED | OUTPATIENT
Start: 2024-09-03 | End: 2024-09-03

## 2024-09-03 RX ORDER — METHYLPREDNISOLONE SODIUM SUCCINATE 125 MG/2ML
125 INJECTION, POWDER, LYOPHILIZED, FOR SOLUTION INTRAMUSCULAR; INTRAVENOUS ONCE
Start: 2024-09-10 | End: 2024-09-10

## 2024-09-03 RX ORDER — DIPHENHYDRAMINE HYDROCHLORIDE 50 MG/ML
25 INJECTION INTRAMUSCULAR; INTRAVENOUS ONCE
Start: 2024-09-10 | End: 2024-09-10

## 2024-09-03 RX ORDER — METHYLPREDNISOLONE SODIUM SUCCINATE 125 MG/2ML
125 INJECTION, POWDER, LYOPHILIZED, FOR SOLUTION INTRAMUSCULAR; INTRAVENOUS ONCE
Status: COMPLETED | OUTPATIENT
Start: 2024-09-03 | End: 2024-09-03

## 2024-09-03 RX ADMIN — DIPHENHYDRAMINE HYDROCHLORIDE 25 MG: 50 INJECTION, SOLUTION INTRAMUSCULAR; INTRAVENOUS at 13:33

## 2024-09-03 RX ADMIN — ACETAMINOPHEN 650 MG: 325 TABLET ORAL at 13:25

## 2024-09-03 RX ADMIN — LECANEMAB 600 MG: 100 INJECTION, SOLUTION INTRAVENOUS at 13:45

## 2024-09-03 RX ADMIN — METHYLPREDNISOLONE SODIUM SUCCINATE 125 MG: 125 INJECTION, POWDER, FOR SOLUTION INTRAMUSCULAR; INTRAVENOUS at 13:33

## 2024-09-03 NOTE — NURSING NOTE
1316 Patient arrives to [facility] in stable condition. Medications and history reviewed with patient. VSS. Medications given as ordered, patient tolerated them well. AVS printed and reviewed with patient, copy sent home with patient. Patient discharged from [facility] in stable condition without any complaints at 1528.

## 2024-09-03 NOTE — PATIENT INSTRUCTIONS
"  Call  Dr. Ham [phone]  if you have any problems or concerns.    We know you have a Choice in healthcare and appreciate you using [facility].  Our purpose is to provide you \"Excellent Care\".  We hope that you will always choose us in the future and continue to recommend us to your family and friends.             "

## 2024-09-17 ENCOUNTER — HOSPITAL ENCOUNTER (OUTPATIENT)
Dept: INFUSION THERAPY | Facility: HOSPITAL | Age: 69
Discharge: HOME | End: 2024-09-17
Admitting: PSYCHIATRY & NEUROLOGY
Payer: MEDICARE

## 2024-09-17 VITALS
TEMPERATURE: 97.4 F | SYSTOLIC BLOOD PRESSURE: 149 MMHG | HEART RATE: 80 BPM | OXYGEN SATURATION: 98 % | RESPIRATION RATE: 16 BRPM | DIASTOLIC BLOOD PRESSURE: 77 MMHG

## 2024-09-17 DIAGNOSIS — G31.84 MCI (MILD COGNITIVE IMPAIRMENT): Primary | ICD-10-CM

## 2024-09-17 PROCEDURE — 96374 THER/PROPH/DIAG INJ IV PUSH: CPT

## 2024-09-17 PROCEDURE — 25810000003 SODIUM CHLORIDE 0.9 % SOLUTION 250 ML FLEX CONT: Performed by: PSYCHIATRY & NEUROLOGY

## 2024-09-17 PROCEDURE — 96375 TX/PRO/DX INJ NEW DRUG ADDON: CPT

## 2024-09-17 PROCEDURE — 25010000002 DIPHENHYDRAMINE PER 50 MG: Performed by: PSYCHIATRY & NEUROLOGY

## 2024-09-17 PROCEDURE — 25010000002 LECANEMAB-IRMB 200 MG/2ML SOLUTION 2 ML VIAL: Performed by: PSYCHIATRY & NEUROLOGY

## 2024-09-17 PROCEDURE — 96365 THER/PROPH/DIAG IV INF INIT: CPT

## 2024-09-17 PROCEDURE — 25010000002 METHYLPREDNISOLONE PER 125 MG: Performed by: PSYCHIATRY & NEUROLOGY

## 2024-09-17 RX ORDER — METHYLPREDNISOLONE SODIUM SUCCINATE 125 MG/2ML
125 INJECTION, POWDER, LYOPHILIZED, FOR SOLUTION INTRAMUSCULAR; INTRAVENOUS ONCE
Status: COMPLETED | OUTPATIENT
Start: 2024-09-17 | End: 2024-09-17

## 2024-09-17 RX ORDER — DIPHENHYDRAMINE HYDROCHLORIDE 50 MG/ML
50 INJECTION INTRAMUSCULAR; INTRAVENOUS AS NEEDED
OUTPATIENT
Start: 2024-09-24

## 2024-09-17 RX ORDER — METHYLPREDNISOLONE SODIUM SUCCINATE 125 MG/2ML
125 INJECTION, POWDER, LYOPHILIZED, FOR SOLUTION INTRAMUSCULAR; INTRAVENOUS ONCE
Start: 2024-09-24 | End: 2024-09-24

## 2024-09-17 RX ORDER — DIPHENHYDRAMINE HYDROCHLORIDE 50 MG/ML
25 INJECTION INTRAMUSCULAR; INTRAVENOUS ONCE
Status: COMPLETED | OUTPATIENT
Start: 2024-09-17 | End: 2024-09-17

## 2024-09-17 RX ORDER — DIPHENHYDRAMINE HYDROCHLORIDE 50 MG/ML
25 INJECTION INTRAMUSCULAR; INTRAVENOUS ONCE
Start: 2024-09-24 | End: 2024-09-24

## 2024-09-17 RX ORDER — FAMOTIDINE 10 MG/ML
20 INJECTION, SOLUTION INTRAVENOUS AS NEEDED
OUTPATIENT
Start: 2024-09-24

## 2024-09-17 RX ORDER — ACETAMINOPHEN 325 MG/1
650 TABLET ORAL ONCE
Status: COMPLETED | OUTPATIENT
Start: 2024-09-17 | End: 2024-09-17

## 2024-09-17 RX ORDER — ACETAMINOPHEN 325 MG/1
650 TABLET ORAL ONCE
Start: 2024-09-24 | End: 2024-09-24

## 2024-09-17 RX ORDER — SODIUM CHLORIDE 9 MG/ML
20 INJECTION, SOLUTION INTRAVENOUS ONCE
OUTPATIENT
Start: 2024-09-24

## 2024-09-17 RX ADMIN — DIPHENHYDRAMINE HYDROCHLORIDE 25 MG: 50 INJECTION, SOLUTION INTRAMUSCULAR; INTRAVENOUS at 10:15

## 2024-09-17 RX ADMIN — LECANEMAB 600 MG: 100 INJECTION, SOLUTION INTRAVENOUS at 10:30

## 2024-09-17 RX ADMIN — METHYLPREDNISOLONE SODIUM SUCCINATE 125 MG: 125 INJECTION, POWDER, FOR SOLUTION INTRAMUSCULAR; INTRAVENOUS at 10:15

## 2024-09-17 RX ADMIN — ACETAMINOPHEN 650 MG: 325 TABLET ORAL at 10:14

## 2024-10-01 ENCOUNTER — HOSPITAL ENCOUNTER (OUTPATIENT)
Dept: INFUSION THERAPY | Facility: HOSPITAL | Age: 69
Discharge: HOME | End: 2024-10-01
Admitting: PSYCHIATRY & NEUROLOGY
Payer: MEDICARE

## 2024-10-01 VITALS
SYSTOLIC BLOOD PRESSURE: 144 MMHG | TEMPERATURE: 98 F | DIASTOLIC BLOOD PRESSURE: 86 MMHG | OXYGEN SATURATION: 100 % | RESPIRATION RATE: 16 BRPM | HEART RATE: 91 BPM

## 2024-10-01 DIAGNOSIS — G31.84 MCI (MILD COGNITIVE IMPAIRMENT): Primary | ICD-10-CM

## 2024-10-01 PROCEDURE — 96374 THER/PROPH/DIAG INJ IV PUSH: CPT

## 2024-10-01 PROCEDURE — 96375 TX/PRO/DX INJ NEW DRUG ADDON: CPT

## 2024-10-01 PROCEDURE — 25010000002 DIPHENHYDRAMINE PER 50 MG: Performed by: PSYCHIATRY & NEUROLOGY

## 2024-10-01 PROCEDURE — 96365 THER/PROPH/DIAG IV INF INIT: CPT

## 2024-10-01 PROCEDURE — 25010000002 METHYLPREDNISOLONE PER 125 MG: Performed by: PSYCHIATRY & NEUROLOGY

## 2024-10-01 PROCEDURE — 25010000002 LECANEMAB-IRMB 200 MG/2ML SOLUTION 2 ML VIAL: Performed by: PSYCHIATRY & NEUROLOGY

## 2024-10-01 PROCEDURE — 25810000003 SODIUM CHLORIDE 0.9 % SOLUTION 250 ML FLEX CONT: Performed by: PSYCHIATRY & NEUROLOGY

## 2024-10-01 RX ORDER — ACETAMINOPHEN 325 MG/1
650 TABLET ORAL ONCE
Status: COMPLETED | OUTPATIENT
Start: 2024-10-01 | End: 2024-10-01

## 2024-10-01 RX ORDER — ACETAMINOPHEN 325 MG/1
650 TABLET ORAL ONCE
Start: 2024-10-08 | End: 2024-10-08

## 2024-10-01 RX ORDER — METHYLPREDNISOLONE SODIUM SUCCINATE 125 MG/2ML
125 INJECTION, POWDER, LYOPHILIZED, FOR SOLUTION INTRAMUSCULAR; INTRAVENOUS ONCE
Status: COMPLETED | OUTPATIENT
Start: 2024-10-01 | End: 2024-10-01

## 2024-10-01 RX ORDER — SODIUM CHLORIDE 9 MG/ML
20 INJECTION, SOLUTION INTRAVENOUS ONCE
OUTPATIENT
Start: 2024-10-08

## 2024-10-01 RX ORDER — FAMOTIDINE 10 MG/ML
20 INJECTION, SOLUTION INTRAVENOUS AS NEEDED
OUTPATIENT
Start: 2024-10-08

## 2024-10-01 RX ORDER — METHYLPREDNISOLONE SODIUM SUCCINATE 125 MG/2ML
125 INJECTION, POWDER, LYOPHILIZED, FOR SOLUTION INTRAMUSCULAR; INTRAVENOUS ONCE
Start: 2024-10-08 | End: 2024-10-08

## 2024-10-01 RX ORDER — DIPHENHYDRAMINE HYDROCHLORIDE 50 MG/ML
50 INJECTION INTRAMUSCULAR; INTRAVENOUS AS NEEDED
OUTPATIENT
Start: 2024-10-08

## 2024-10-01 RX ORDER — DIPHENHYDRAMINE HYDROCHLORIDE 50 MG/ML
25 INJECTION INTRAMUSCULAR; INTRAVENOUS ONCE
Start: 2024-10-08 | End: 2024-10-08

## 2024-10-01 RX ORDER — DIPHENHYDRAMINE HYDROCHLORIDE 50 MG/ML
25 INJECTION INTRAMUSCULAR; INTRAVENOUS ONCE
Status: COMPLETED | OUTPATIENT
Start: 2024-10-01 | End: 2024-10-01

## 2024-10-01 RX ADMIN — LECANEMAB 600 MG: 100 INJECTION, SOLUTION INTRAVENOUS at 10:31

## 2024-10-01 RX ADMIN — METHYLPREDNISOLONE SODIUM SUCCINATE 125 MG: 125 INJECTION, POWDER, FOR SOLUTION INTRAMUSCULAR; INTRAVENOUS at 10:23

## 2024-10-01 RX ADMIN — DIPHENHYDRAMINE HYDROCHLORIDE 25 MG: 50 INJECTION, SOLUTION INTRAMUSCULAR; INTRAVENOUS at 10:23

## 2024-10-01 RX ADMIN — ACETAMINOPHEN 650 MG: 325 TABLET ORAL at 10:23

## 2024-10-01 NOTE — NURSING NOTE
PT ARRIVED TO [facility] FOR APPT. VSS, NO COMPLAINTS AT THIS TIME. MEDICATION ADMINISTERED PER MD ORDER. PT TOLERATED WELL. VSS POST INFUSION. VERIFIED PT HAD NEXT SCHEDULED APPT. AVS OFFERED, PT REFUSED. PT DISCHARGED FROM [facility] AT 12:05 PM IN STABLE CONDITION, WITHOUT COMPLAINTS.

## 2024-10-15 ENCOUNTER — HOSPITAL ENCOUNTER (OUTPATIENT)
Dept: INFUSION THERAPY | Facility: HOSPITAL | Age: 69
Discharge: HOME | End: 2024-10-15
Admitting: PSYCHIATRY & NEUROLOGY
Payer: MEDICARE

## 2024-10-15 VITALS
WEIGHT: 140 LBS | OXYGEN SATURATION: 99 % | RESPIRATION RATE: 16 BRPM | DIASTOLIC BLOOD PRESSURE: 63 MMHG | BODY MASS INDEX: 25.61 KG/M2 | TEMPERATURE: 97.7 F | HEART RATE: 79 BPM | SYSTOLIC BLOOD PRESSURE: 126 MMHG

## 2024-10-15 DIAGNOSIS — G31.84 MCI (MILD COGNITIVE IMPAIRMENT): Primary | ICD-10-CM

## 2024-10-15 PROCEDURE — 25810000003 SODIUM CHLORIDE 0.9 % SOLUTION 250 ML FLEX CONT: Performed by: PSYCHIATRY & NEUROLOGY

## 2024-10-15 PROCEDURE — 96374 THER/PROPH/DIAG INJ IV PUSH: CPT

## 2024-10-15 PROCEDURE — 96365 THER/PROPH/DIAG IV INF INIT: CPT

## 2024-10-15 PROCEDURE — 25010000002 METHYLPREDNISOLONE PER 125 MG: Performed by: PSYCHIATRY & NEUROLOGY

## 2024-10-15 PROCEDURE — 25010000002 LECANEMAB-IRMB 200 MG/2ML SOLUTION 2 ML VIAL: Performed by: PSYCHIATRY & NEUROLOGY

## 2024-10-15 PROCEDURE — 96375 TX/PRO/DX INJ NEW DRUG ADDON: CPT

## 2024-10-15 PROCEDURE — 25010000002 DIPHENHYDRAMINE PER 50 MG: Performed by: PSYCHIATRY & NEUROLOGY

## 2024-10-15 RX ORDER — SODIUM CHLORIDE 9 MG/ML
20 INJECTION, SOLUTION INTRAVENOUS ONCE
OUTPATIENT
Start: 2024-10-22

## 2024-10-15 RX ORDER — ACETAMINOPHEN 325 MG/1
650 TABLET ORAL ONCE
Status: COMPLETED | OUTPATIENT
Start: 2024-10-15 | End: 2024-10-15

## 2024-10-15 RX ORDER — FAMOTIDINE 10 MG/ML
20 INJECTION, SOLUTION INTRAVENOUS AS NEEDED
OUTPATIENT
Start: 2024-10-22

## 2024-10-15 RX ORDER — DIPHENHYDRAMINE HYDROCHLORIDE 50 MG/ML
50 INJECTION INTRAMUSCULAR; INTRAVENOUS AS NEEDED
OUTPATIENT
Start: 2024-10-22

## 2024-10-15 RX ORDER — DIPHENHYDRAMINE HYDROCHLORIDE 50 MG/ML
25 INJECTION INTRAMUSCULAR; INTRAVENOUS ONCE
Start: 2024-10-22 | End: 2024-10-22

## 2024-10-15 RX ORDER — DIPHENHYDRAMINE HYDROCHLORIDE 50 MG/ML
25 INJECTION INTRAMUSCULAR; INTRAVENOUS ONCE
Status: COMPLETED | OUTPATIENT
Start: 2024-10-15 | End: 2024-10-15

## 2024-10-15 RX ORDER — METHYLPREDNISOLONE SODIUM SUCCINATE 125 MG/2ML
125 INJECTION, POWDER, LYOPHILIZED, FOR SOLUTION INTRAMUSCULAR; INTRAVENOUS ONCE
Start: 2024-10-22 | End: 2024-10-22

## 2024-10-15 RX ORDER — ACETAMINOPHEN 325 MG/1
650 TABLET ORAL ONCE
Start: 2024-10-22 | End: 2024-10-22

## 2024-10-15 RX ORDER — METHYLPREDNISOLONE SODIUM SUCCINATE 125 MG/2ML
125 INJECTION, POWDER, LYOPHILIZED, FOR SOLUTION INTRAMUSCULAR; INTRAVENOUS ONCE
Status: COMPLETED | OUTPATIENT
Start: 2024-10-15 | End: 2024-10-15

## 2024-10-15 RX ADMIN — DIPHENHYDRAMINE HYDROCHLORIDE 25 MG: 50 INJECTION, SOLUTION INTRAMUSCULAR; INTRAVENOUS at 09:50

## 2024-10-15 RX ADMIN — ACETAMINOPHEN 650 MG: 325 TABLET ORAL at 09:49

## 2024-10-15 RX ADMIN — METHYLPREDNISOLONE SODIUM SUCCINATE 125 MG: 125 INJECTION, POWDER, FOR SOLUTION INTRAMUSCULAR; INTRAVENOUS at 09:50

## 2024-10-15 RX ADMIN — LECANEMAB 600 MG: 100 INJECTION, SOLUTION INTRAVENOUS at 10:06

## 2024-10-15 NOTE — NURSING NOTE
0910 Patient arrives to [facility] in stable condition. History and medications reviewed with patient. VSS. Medications given to patient as ordered, patient tolerated well. Patient refuses AVS. Patient discharged from [facility] in stable condition without any complaints at 1135.

## 2024-10-21 RX ORDER — CYCLOBENZAPRINE HCL 10 MG
10 TABLET ORAL 3 TIMES DAILY PRN
Qty: 60 TABLET | Refills: 0 | Status: SHIPPED | OUTPATIENT
Start: 2024-10-21

## 2024-10-21 NOTE — TELEPHONE ENCOUNTER
Rx Refill Note  Requested Prescriptions     Pending Prescriptions Disp Refills    cyclobenzaprine (FLEXERIL) 10 MG tablet [Pharmacy Med Name: CYCLOBENZAPRINE 10 MG TABLET] 60 tablet 0     Sig: TAKE 1 TABLET BY MOUTH 3 TIMES A DAY AS NEEDED FOR MUSCLE SPASMS.      Last office visit with prescribing clinician: 4/23/2024      Next office visit with prescribing clinician: Visit date not found   Last Filled: 4/23/2024    Status post total right knee replacement     Date of Surgery: 12/21/2020      Daria Méndez MA  10/21/24, 11:04 EDT    Previous RX pended for your approval, change or denial.     {TIP  Encounters:    {TIP  Please add Last Relevant Lab Date if appropriate:  {TIP  Is Refill Pharmacy correct?:

## 2024-10-29 ENCOUNTER — HOSPITAL ENCOUNTER (OUTPATIENT)
Dept: INFUSION THERAPY | Facility: HOSPITAL | Age: 69
Discharge: HOME | End: 2024-10-29
Admitting: PSYCHIATRY & NEUROLOGY
Payer: MEDICARE

## 2024-10-29 VITALS
TEMPERATURE: 98.6 F | OXYGEN SATURATION: 98 % | RESPIRATION RATE: 16 BRPM | HEART RATE: 76 BPM | DIASTOLIC BLOOD PRESSURE: 75 MMHG | SYSTOLIC BLOOD PRESSURE: 130 MMHG

## 2024-10-29 DIAGNOSIS — G31.84 MCI (MILD COGNITIVE IMPAIRMENT): Primary | ICD-10-CM

## 2024-10-29 PROCEDURE — 96374 THER/PROPH/DIAG INJ IV PUSH: CPT

## 2024-10-29 PROCEDURE — 25810000003 SODIUM CHLORIDE 0.9 % SOLUTION 250 ML FLEX CONT: Performed by: PSYCHIATRY & NEUROLOGY

## 2024-10-29 PROCEDURE — 25010000002 LECANEMAB-IRMB 200 MG/2ML SOLUTION 2 ML VIAL: Performed by: PSYCHIATRY & NEUROLOGY

## 2024-10-29 PROCEDURE — 25010000002 METHYLPREDNISOLONE PER 125 MG: Performed by: PSYCHIATRY & NEUROLOGY

## 2024-10-29 PROCEDURE — 25010000002 DIPHENHYDRAMINE PER 50 MG: Performed by: PSYCHIATRY & NEUROLOGY

## 2024-10-29 PROCEDURE — 96365 THER/PROPH/DIAG IV INF INIT: CPT

## 2024-10-29 PROCEDURE — 96375 TX/PRO/DX INJ NEW DRUG ADDON: CPT

## 2024-10-29 RX ORDER — FAMOTIDINE 10 MG/ML
20 INJECTION, SOLUTION INTRAVENOUS AS NEEDED
OUTPATIENT
Start: 2024-11-05

## 2024-10-29 RX ORDER — ACETAMINOPHEN 325 MG/1
650 TABLET ORAL ONCE
Start: 2024-11-05 | End: 2024-11-05

## 2024-10-29 RX ORDER — DIPHENHYDRAMINE HYDROCHLORIDE 50 MG/ML
25 INJECTION INTRAMUSCULAR; INTRAVENOUS ONCE
Status: COMPLETED | OUTPATIENT
Start: 2024-10-29 | End: 2024-10-29

## 2024-10-29 RX ORDER — METHYLPREDNISOLONE SODIUM SUCCINATE 125 MG/2ML
125 INJECTION, POWDER, LYOPHILIZED, FOR SOLUTION INTRAMUSCULAR; INTRAVENOUS ONCE
Start: 2024-11-05 | End: 2024-11-05

## 2024-10-29 RX ORDER — ACETAMINOPHEN 325 MG/1
650 TABLET ORAL ONCE
Status: COMPLETED | OUTPATIENT
Start: 2024-10-29 | End: 2024-10-29

## 2024-10-29 RX ORDER — METHYLPREDNISOLONE SODIUM SUCCINATE 125 MG/2ML
125 INJECTION, POWDER, LYOPHILIZED, FOR SOLUTION INTRAMUSCULAR; INTRAVENOUS ONCE
Status: COMPLETED | OUTPATIENT
Start: 2024-10-29 | End: 2024-10-29

## 2024-10-29 RX ORDER — DIPHENHYDRAMINE HYDROCHLORIDE 50 MG/ML
25 INJECTION INTRAMUSCULAR; INTRAVENOUS ONCE
Start: 2024-11-05 | End: 2024-11-05

## 2024-10-29 RX ORDER — DIPHENHYDRAMINE HYDROCHLORIDE 50 MG/ML
50 INJECTION INTRAMUSCULAR; INTRAVENOUS AS NEEDED
OUTPATIENT
Start: 2024-11-05

## 2024-10-29 RX ORDER — SODIUM CHLORIDE 9 MG/ML
20 INJECTION, SOLUTION INTRAVENOUS ONCE
OUTPATIENT
Start: 2024-11-05

## 2024-10-29 RX ADMIN — LECANEMAB 600 MG: 100 INJECTION, SOLUTION INTRAVENOUS at 10:28

## 2024-10-29 RX ADMIN — METHYLPREDNISOLONE SODIUM SUCCINATE 125 MG: 125 INJECTION, POWDER, FOR SOLUTION INTRAMUSCULAR; INTRAVENOUS at 10:21

## 2024-10-29 RX ADMIN — ACETAMINOPHEN 650 MG: 325 TABLET ORAL at 10:12

## 2024-10-29 RX ADMIN — DIPHENHYDRAMINE HYDROCHLORIDE 25 MG: 50 INJECTION, SOLUTION INTRAMUSCULAR; INTRAVENOUS at 10:22

## 2024-10-29 NOTE — NURSING NOTE
Patient arrived to [facility] at 0958. History and medications reviewed. VSS. Medications given as ordered. Patient tolerated well without complications. AVS refused. Patient discharged at 1148 in stable condition without complaints.

## 2024-10-29 NOTE — NURSING NOTE
Patient arrived to [facility] at 0958.  History and medications reviewed with patient.  Medication administered as ordered without complications.  AVS refused by patient.  Patient discharged at 1023 in stable condition [facility] complaint.

## 2024-11-12 ENCOUNTER — HOSPITAL ENCOUNTER (OUTPATIENT)
Dept: INFUSION THERAPY | Facility: HOSPITAL | Age: 69
Discharge: HOME | End: 2024-11-12
Admitting: PSYCHIATRY & NEUROLOGY
Payer: MEDICARE

## 2024-11-12 VITALS
SYSTOLIC BLOOD PRESSURE: 135 MMHG | TEMPERATURE: 98.8 F | OXYGEN SATURATION: 98 % | RESPIRATION RATE: 16 BRPM | DIASTOLIC BLOOD PRESSURE: 60 MMHG | HEART RATE: 86 BPM

## 2024-11-12 DIAGNOSIS — G31.84 MCI (MILD COGNITIVE IMPAIRMENT): Primary | ICD-10-CM

## 2024-11-12 PROCEDURE — 25010000002 DIPHENHYDRAMINE PER 50 MG: Performed by: PSYCHIATRY & NEUROLOGY

## 2024-11-12 PROCEDURE — 96365 THER/PROPH/DIAG IV INF INIT: CPT

## 2024-11-12 PROCEDURE — 25010000002 LECANEMAB-IRMB 200 MG/2ML SOLUTION 2 ML VIAL: Performed by: PSYCHIATRY & NEUROLOGY

## 2024-11-12 PROCEDURE — 25010000002 METHYLPREDNISOLONE PER 125 MG: Performed by: PSYCHIATRY & NEUROLOGY

## 2024-11-12 PROCEDURE — 25810000003 SODIUM CHLORIDE 0.9 % SOLUTION 250 ML FLEX CONT: Performed by: PSYCHIATRY & NEUROLOGY

## 2024-11-12 PROCEDURE — 96375 TX/PRO/DX INJ NEW DRUG ADDON: CPT

## 2024-11-12 RX ORDER — DIPHENHYDRAMINE HYDROCHLORIDE 50 MG/ML
25 INJECTION INTRAMUSCULAR; INTRAVENOUS ONCE
Start: 2024-11-19 | End: 2024-11-19

## 2024-11-12 RX ORDER — DIPHENHYDRAMINE HYDROCHLORIDE 50 MG/ML
25 INJECTION INTRAMUSCULAR; INTRAVENOUS ONCE
Status: COMPLETED | OUTPATIENT
Start: 2024-11-12 | End: 2024-11-12

## 2024-11-12 RX ORDER — METHYLPREDNISOLONE SODIUM SUCCINATE 125 MG/2ML
125 INJECTION, POWDER, LYOPHILIZED, FOR SOLUTION INTRAMUSCULAR; INTRAVENOUS ONCE
Status: COMPLETED | OUTPATIENT
Start: 2024-11-12 | End: 2024-11-12

## 2024-11-12 RX ORDER — DIPHENHYDRAMINE HYDROCHLORIDE 50 MG/ML
50 INJECTION INTRAMUSCULAR; INTRAVENOUS AS NEEDED
OUTPATIENT
Start: 2024-11-19

## 2024-11-12 RX ORDER — FAMOTIDINE 10 MG/ML
20 INJECTION, SOLUTION INTRAVENOUS AS NEEDED
OUTPATIENT
Start: 2024-11-19

## 2024-11-12 RX ORDER — ACETAMINOPHEN 325 MG/1
650 TABLET ORAL ONCE
Start: 2024-11-19 | End: 2024-11-19

## 2024-11-12 RX ORDER — METHYLPREDNISOLONE SODIUM SUCCINATE 125 MG/2ML
125 INJECTION, POWDER, LYOPHILIZED, FOR SOLUTION INTRAMUSCULAR; INTRAVENOUS ONCE
Start: 2024-11-19 | End: 2024-11-19

## 2024-11-12 RX ORDER — SODIUM CHLORIDE 9 MG/ML
20 INJECTION, SOLUTION INTRAVENOUS ONCE
OUTPATIENT
Start: 2024-11-19

## 2024-11-12 RX ORDER — ACETAMINOPHEN 325 MG/1
650 TABLET ORAL ONCE
Status: COMPLETED | OUTPATIENT
Start: 2024-11-12 | End: 2024-11-12

## 2024-11-12 RX ADMIN — METHYLPREDNISOLONE SODIUM SUCCINATE 125 MG: 125 INJECTION, POWDER, FOR SOLUTION INTRAMUSCULAR; INTRAVENOUS at 09:58

## 2024-11-12 RX ADMIN — LECANEMAB 600 MG: 100 INJECTION, SOLUTION INTRAVENOUS at 09:58

## 2024-11-12 RX ADMIN — ACETAMINOPHEN 650 MG: 325 TABLET ORAL at 09:58

## 2024-11-12 RX ADMIN — DIPHENHYDRAMINE HYDROCHLORIDE 25 MG: 50 INJECTION, SOLUTION INTRAMUSCULAR; INTRAVENOUS at 09:58

## 2024-11-12 NOTE — PROGRESS NOTES
Pharmacy Dosing Service  Automatic Dose Rounding  Leqembi      Subjective:  Yesi Benson is a 69 y.o. female who meets the following criteria for automatic dose rounding within 10% per P&T guidance to avoid unnecessary waste       Assessment/Action/Plan:  Patient meets criteria and Leqembi 640 mg IV x1 has been changed to Leqembi 600 mg IV x1 within +/-10% dose rounding policy (~6.7% reduction).         Rosi HURTADO PharmD, 11/12/202409:45 EST

## 2024-11-12 NOTE — NURSING NOTE
PT ARRIVED TO [facility] FOR APPT. VSS, NO COMPLAINTS AT THIS TIME. MEDICATION ADMINISTERED PER MD ORDER. PT TOLERATED WELL. VSS POST INFUSION. VERIFIED PT HAD NEXT SCHEDULED APPT. AVS OFFERED, PT REFUSED. PT DISCHARGED FROM [facility] AT 11:16 AM IN STABLE CONDITION, WITHOUT COMPLAINTS.

## 2024-11-26 ENCOUNTER — HOSPITAL ENCOUNTER (OUTPATIENT)
Dept: INFUSION THERAPY | Facility: HOSPITAL | Age: 69
Discharge: HOME | End: 2024-11-26
Admitting: PSYCHIATRY & NEUROLOGY
Payer: MEDICARE

## 2024-11-26 VITALS
TEMPERATURE: 97.8 F | DIASTOLIC BLOOD PRESSURE: 74 MMHG | BODY MASS INDEX: 25.61 KG/M2 | HEART RATE: 80 BPM | RESPIRATION RATE: 16 BRPM | OXYGEN SATURATION: 99 % | WEIGHT: 140 LBS | SYSTOLIC BLOOD PRESSURE: 153 MMHG

## 2024-11-26 DIAGNOSIS — G31.84 MCI (MILD COGNITIVE IMPAIRMENT): Primary | ICD-10-CM

## 2024-11-26 PROCEDURE — 25810000003 SODIUM CHLORIDE 0.9 % SOLUTION 250 ML FLEX CONT: Performed by: PSYCHIATRY & NEUROLOGY

## 2024-11-26 PROCEDURE — 96365 THER/PROPH/DIAG IV INF INIT: CPT

## 2024-11-26 PROCEDURE — 25010000002 METHYLPREDNISOLONE PER 125 MG: Performed by: PSYCHIATRY & NEUROLOGY

## 2024-11-26 PROCEDURE — 96375 TX/PRO/DX INJ NEW DRUG ADDON: CPT

## 2024-11-26 PROCEDURE — 25010000002 DIPHENHYDRAMINE PER 50 MG: Performed by: PSYCHIATRY & NEUROLOGY

## 2024-11-26 PROCEDURE — 96374 THER/PROPH/DIAG INJ IV PUSH: CPT

## 2024-11-26 PROCEDURE — 25010000002 LECANEMAB-IRMB 200 MG/2ML SOLUTION 2 ML VIAL: Performed by: PSYCHIATRY & NEUROLOGY

## 2024-11-26 RX ORDER — SODIUM CHLORIDE 9 MG/ML
20 INJECTION, SOLUTION INTRAVENOUS ONCE
OUTPATIENT
Start: 2024-12-03

## 2024-11-26 RX ORDER — DIPHENHYDRAMINE HYDROCHLORIDE 50 MG/ML
25 INJECTION INTRAMUSCULAR; INTRAVENOUS ONCE
Status: COMPLETED | OUTPATIENT
Start: 2024-11-26 | End: 2024-11-26

## 2024-11-26 RX ORDER — METHYLPREDNISOLONE SODIUM SUCCINATE 125 MG/2ML
125 INJECTION INTRAMUSCULAR; INTRAVENOUS ONCE
Start: 2024-12-03 | End: 2024-12-03

## 2024-11-26 RX ORDER — FAMOTIDINE 10 MG/ML
20 INJECTION, SOLUTION INTRAVENOUS AS NEEDED
OUTPATIENT
Start: 2024-12-03

## 2024-11-26 RX ORDER — ACETAMINOPHEN 325 MG/1
650 TABLET ORAL ONCE
Status: COMPLETED | OUTPATIENT
Start: 2024-11-26 | End: 2024-11-26

## 2024-11-26 RX ORDER — DIPHENHYDRAMINE HYDROCHLORIDE 50 MG/ML
50 INJECTION INTRAMUSCULAR; INTRAVENOUS AS NEEDED
OUTPATIENT
Start: 2024-12-03

## 2024-11-26 RX ORDER — HYDROCORTISONE SODIUM SUCCINATE 100 MG/2ML
100 INJECTION INTRAMUSCULAR; INTRAVENOUS AS NEEDED
OUTPATIENT
Start: 2024-12-03

## 2024-11-26 RX ORDER — DIPHENHYDRAMINE HYDROCHLORIDE 50 MG/ML
25 INJECTION INTRAMUSCULAR; INTRAVENOUS ONCE
Start: 2024-12-03 | End: 2024-12-03

## 2024-11-26 RX ORDER — ACETAMINOPHEN 325 MG/1
650 TABLET ORAL ONCE
Start: 2024-12-03 | End: 2024-12-03

## 2024-11-26 RX ORDER — METHYLPREDNISOLONE SODIUM SUCCINATE 125 MG/2ML
125 INJECTION INTRAMUSCULAR; INTRAVENOUS ONCE
Status: COMPLETED | OUTPATIENT
Start: 2024-11-26 | End: 2024-11-26

## 2024-11-26 RX ADMIN — ACETAMINOPHEN 650 MG: 325 TABLET ORAL at 10:54

## 2024-11-26 RX ADMIN — METHYLPREDNISOLONE SODIUM SUCCINATE 125 MG: 125 INJECTION, POWDER, FOR SOLUTION INTRAMUSCULAR; INTRAVENOUS at 10:58

## 2024-11-26 RX ADMIN — LECANEMAB 600 MG: 100 INJECTION, SOLUTION INTRAVENOUS at 11:05

## 2024-11-26 RX ADMIN — DIPHENHYDRAMINE HYDROCHLORIDE 25 MG: 50 INJECTION, SOLUTION INTRAMUSCULAR; INTRAVENOUS at 10:58

## 2024-11-26 NOTE — NURSING NOTE
Patient arrived to [facility] at 1036. History and medications reviewed. VSS. Medications given as ordered. Patient tolerated well without complications. AVS refused. Patient discharged at 1218 in stable condition without complaints.

## 2024-12-03 ENCOUNTER — TELEPHONE (OUTPATIENT)
Dept: NEUROLOGY | Facility: CLINIC | Age: 69
End: 2024-12-03
Payer: MEDICARE

## 2024-12-03 NOTE — TELEPHONE ENCOUNTER
[facility] and chen PA for reapproval for Jocelin Conklinatin to be received within 72 hrs. Reference # [id number]. Phone number [phone].

## 2024-12-06 ENCOUNTER — OFFICE VISIT (OUTPATIENT)
Dept: NEUROLOGY | Facility: CLINIC | Age: 69
End: 2024-12-06
Payer: MEDICARE

## 2024-12-06 VITALS
SYSTOLIC BLOOD PRESSURE: 118 MMHG | HEART RATE: 85 BPM | WEIGHT: 144.6 LBS | OXYGEN SATURATION: 95 % | DIASTOLIC BLOOD PRESSURE: 82 MMHG | BODY MASS INDEX: 26.45 KG/M2

## 2024-12-06 DIAGNOSIS — F02.80 ALZHEIMER'S DISEASE: Primary | ICD-10-CM

## 2024-12-06 DIAGNOSIS — G30.9 ALZHEIMER'S DISEASE: Primary | ICD-10-CM

## 2024-12-06 NOTE — PROGRESS NOTES
Notes by MA:  Ms Benson is here today for a follow up appointment regarding treatment for memory loss. Her sister accompanies her today.      Subjective:     Patient ID: Yesi Benson is a 69 y.o. female.    History of Present Illness  The following portions of the patient's history were reviewed and updated as appropriate: allergies, current medications, past family history, past medical history, past social history, past surgical history, and problem list.    Review of Systems   Psychiatric/Behavioral:  Positive for confusion and decreased concentration.       Objective:    Neurological Exam   She is awake alert pleasant cooperative with reasonably fluent speech and reasonable speech comprehension.  Repeat MoCA score today is 18 out of 30.     Cranial nerves II through XII normal and symmetric.  I do not see any visual field limitation by confrontation today.     Motor exam reveals age-appropriate tone bulk and power without lateralization and without drift.     Sensory exam reveals reasonably preserved and symmetric sensation to light touch, temperature, and vibration.     Coordination testing reveals smooth and accurate finger-nose-finger normal rapid alternating movements.     Tendon reflexes are 1+ and symmetric.  Toes are downgoing.  Physical Exam    Assessment/Plan:     Diagnoses and all orders for this visit:    1. Alzheimer's disease (Primary)     MCI versus mild dementia in the setting of Alzheimer disease.  Cognitive screens are now stable to slightly improved at the 11-month point and her antiamyloid treatment.  She is tolerating the treatments well.  No problematic side effects.  She has a little bit of flushing after each treatment but does not find this uncomfortable.  We discussed the importance of maintaining hydration but no other changes are necessary from a neurological standpoint at this time we will see her back in about 2 months time to discuss her treatment and any changes that may be  available at that time.

## 2024-12-10 ENCOUNTER — HOSPITAL ENCOUNTER (OUTPATIENT)
Dept: INFUSION THERAPY | Facility: HOSPITAL | Age: 69
Discharge: HOME | End: 2024-12-10
Admitting: PSYCHIATRY & NEUROLOGY
Payer: MEDICARE

## 2024-12-10 VITALS
RESPIRATION RATE: 16 BRPM | WEIGHT: 143 LBS | TEMPERATURE: 98.1 F | BODY MASS INDEX: 26.16 KG/M2 | DIASTOLIC BLOOD PRESSURE: 78 MMHG | SYSTOLIC BLOOD PRESSURE: 147 MMHG | OXYGEN SATURATION: 98 % | HEART RATE: 78 BPM

## 2024-12-10 DIAGNOSIS — G31.84 MCI (MILD COGNITIVE IMPAIRMENT): Primary | ICD-10-CM

## 2024-12-10 PROCEDURE — 96365 THER/PROPH/DIAG IV INF INIT: CPT

## 2024-12-10 PROCEDURE — 25010000002 LECANEMAB-IRMB 200 MG/2ML SOLUTION 2 ML VIAL: Performed by: PSYCHIATRY & NEUROLOGY

## 2024-12-10 PROCEDURE — 25010000002 METHYLPREDNISOLONE PER 125 MG: Performed by: PSYCHIATRY & NEUROLOGY

## 2024-12-10 PROCEDURE — 25810000003 SODIUM CHLORIDE 0.9 % SOLUTION 250 ML FLEX CONT: Performed by: PSYCHIATRY & NEUROLOGY

## 2024-12-10 PROCEDURE — 25010000002 DIPHENHYDRAMINE PER 50 MG: Performed by: PSYCHIATRY & NEUROLOGY

## 2024-12-10 PROCEDURE — 96372 THER/PROPH/DIAG INJ SC/IM: CPT

## 2024-12-10 PROCEDURE — 96375 TX/PRO/DX INJ NEW DRUG ADDON: CPT

## 2024-12-10 RX ORDER — DIPHENHYDRAMINE HYDROCHLORIDE 50 MG/ML
25 INJECTION INTRAMUSCULAR; INTRAVENOUS ONCE
Status: COMPLETED | OUTPATIENT
Start: 2024-12-10 | End: 2024-12-10

## 2024-12-10 RX ORDER — DIPHENHYDRAMINE HYDROCHLORIDE 50 MG/ML
50 INJECTION INTRAMUSCULAR; INTRAVENOUS AS NEEDED
OUTPATIENT
Start: 2024-12-17

## 2024-12-10 RX ORDER — METHYLPREDNISOLONE SODIUM SUCCINATE 125 MG/2ML
125 INJECTION INTRAMUSCULAR; INTRAVENOUS ONCE
Status: COMPLETED | OUTPATIENT
Start: 2024-12-10 | End: 2024-12-10

## 2024-12-10 RX ORDER — SODIUM CHLORIDE 9 MG/ML
20 INJECTION, SOLUTION INTRAVENOUS ONCE
OUTPATIENT
Start: 2024-12-17

## 2024-12-10 RX ORDER — DIPHENHYDRAMINE HYDROCHLORIDE 50 MG/ML
25 INJECTION INTRAMUSCULAR; INTRAVENOUS ONCE
Start: 2024-12-17 | End: 2024-12-17

## 2024-12-10 RX ORDER — HYDROCORTISONE SODIUM SUCCINATE 100 MG/2ML
100 INJECTION INTRAMUSCULAR; INTRAVENOUS AS NEEDED
OUTPATIENT
Start: 2024-12-17

## 2024-12-10 RX ORDER — METHYLPREDNISOLONE SODIUM SUCCINATE 125 MG/2ML
125 INJECTION INTRAMUSCULAR; INTRAVENOUS ONCE
Start: 2024-12-17 | End: 2024-12-17

## 2024-12-10 RX ORDER — SODIUM CHLORIDE 9 MG/ML
20 INJECTION, SOLUTION INTRAVENOUS ONCE
Status: DISCONTINUED | OUTPATIENT
Start: 2024-12-10 | End: 2024-12-12 | Stop reason: HOSPADM

## 2024-12-10 RX ORDER — FAMOTIDINE 10 MG/ML
20 INJECTION, SOLUTION INTRAVENOUS AS NEEDED
OUTPATIENT
Start: 2024-12-17

## 2024-12-10 RX ORDER — ACETAMINOPHEN 325 MG/1
650 TABLET ORAL ONCE
Start: 2024-12-17 | End: 2024-12-17

## 2024-12-10 RX ORDER — ACETAMINOPHEN 325 MG/1
650 TABLET ORAL ONCE
Status: COMPLETED | OUTPATIENT
Start: 2024-12-10 | End: 2024-12-10

## 2024-12-10 RX ADMIN — DIPHENHYDRAMINE HYDROCHLORIDE 25 MG: 50 INJECTION, SOLUTION INTRAMUSCULAR; INTRAVENOUS at 10:52

## 2024-12-10 RX ADMIN — LECANEMAB 600 MG: 100 INJECTION, SOLUTION INTRAVENOUS at 11:12

## 2024-12-10 RX ADMIN — METHYLPREDNISOLONE SODIUM SUCCINATE 125 MG: 125 INJECTION, POWDER, FOR SOLUTION INTRAMUSCULAR; INTRAVENOUS at 10:53

## 2024-12-10 RX ADMIN — ACETAMINOPHEN 650 MG: 325 TABLET ORAL at 10:52

## 2024-12-10 NOTE — NURSING NOTE
PT ARRIVED TO [facility] FOR APPT. VSS, NO OCMPLAINTS AT THIS TIME. MEDICATION ADMINISTERED PER MD ORDER. PT TOLERATED WELL. VSS POST INFUSION. VERIFIED PT HAD NEXT SCHEDULED APPT. AVS OFFERED, PT REFUSED. PT DISCHARGED FROM [facility] AT 12:25 PM IN STABLE CONDITION, WITHOUT COMPLAINTS.

## 2024-12-11 ENCOUNTER — TELEPHONE (OUTPATIENT)
Dept: NEUROLOGY | Facility: CLINIC | Age: 69
End: 2024-12-11
Payer: MEDICARE

## 2024-12-11 NOTE — TELEPHONE ENCOUNTER
Received by fax, approval for Leqembi continuation, however, the start date was 01/01/25 when I had requested the start date as 12/21/24.     Per Ede, she will send PA back to [facility] for review, as she can confirm my request for 12/21/24 start date. The patient is scheduled for an infusion on 12/24/24 and it will need to be covered under the new PA. Ede reports there is a 24-72 hour turnaround and they will send me the determination.     Call reference #: [id number]

## 2024-12-19 ENCOUNTER — TELEPHONE (OUTPATIENT)
Dept: NEUROLOGY | Facility: CLINIC | Age: 69
End: 2024-12-19
Payer: MEDICARE

## 2024-12-19 RX ORDER — CYCLOBENZAPRINE HCL 10 MG
10 TABLET ORAL 3 TIMES DAILY PRN
Qty: 60 TABLET | Refills: 0 | Status: SHIPPED | OUTPATIENT
Start: 2024-12-19

## 2024-12-19 NOTE — TELEPHONE ENCOUNTER
Received call, Jessica would like for you to call her back. Regarding the infusion for next week not being covered. I advised that I knew that you have been working on this.  [phone]

## 2024-12-19 NOTE — TELEPHONE ENCOUNTER
Rx Refill Note  Requested Prescriptions     Pending Prescriptions Disp Refills    cyclobenzaprine (FLEXERIL) 10 MG tablet [Pharmacy Med Name: CYCLOBENZAPRINE 10 MG TABLET] 60 tablet 0     Sig: TAKE 1 TABLET BY MOUTH 3 TIMES A DAY AS NEEDED FOR MUSCLE SPASMS.      Last office visit with prescribing clinician: 4/23/2024      Next office visit with prescribing clinician: Visit date not found   Last Filled:    Status post total right knee replacement   Date of Surgery: 12/21/2020    Daria Méndez MA  12/19/24, 10:48 EST    Previous RX pended for your approval, change or denial.     {TIP  Encounters:    {TIP  Please add Last Relevant Lab Date if appropriate:  {TIP  Is Refill Pharmacy correct?:

## 2024-12-19 NOTE — TELEPHONE ENCOUNTER
Called [facility] and spoke with Selena. Selena sts she updated PA to cover Leqembi from 12/21/24 - 06/20/25. Auth # [id number].  Call Reference # [id number]

## 2024-12-20 RX ORDER — METHYLPREDNISOLONE SODIUM SUCCINATE 125 MG/2ML
125 INJECTION INTRAMUSCULAR; INTRAVENOUS ONCE
OUTPATIENT
Start: 2024-12-20

## 2024-12-20 RX ORDER — SODIUM CHLORIDE 9 MG/ML
20 INJECTION, SOLUTION INTRAVENOUS ONCE
OUTPATIENT
Start: 2024-12-20

## 2024-12-20 RX ORDER — ACETAMINOPHEN 325 MG/1
650 TABLET ORAL ONCE
OUTPATIENT
Start: 2024-12-20

## 2024-12-20 RX ORDER — HYDROCORTISONE SODIUM SUCCINATE 100 MG/2ML
100 INJECTION INTRAMUSCULAR; INTRAVENOUS AS NEEDED
OUTPATIENT
Start: 2024-12-20

## 2024-12-20 RX ORDER — FAMOTIDINE 10 MG/ML
20 INJECTION, SOLUTION INTRAVENOUS AS NEEDED
OUTPATIENT
Start: 2024-12-20

## 2024-12-20 RX ORDER — DIPHENHYDRAMINE HYDROCHLORIDE 50 MG/ML
50 INJECTION INTRAMUSCULAR; INTRAVENOUS AS NEEDED
OUTPATIENT
Start: 2024-12-20

## 2024-12-24 ENCOUNTER — HOSPITAL ENCOUNTER (OUTPATIENT)
Dept: INFUSION THERAPY | Facility: HOSPITAL | Age: 69
Discharge: HOME | End: 2024-12-24
Admitting: PSYCHIATRY & NEUROLOGY
Payer: MEDICARE

## 2024-12-24 VITALS
HEART RATE: 86 BPM | WEIGHT: 141 LBS | DIASTOLIC BLOOD PRESSURE: 80 MMHG | OXYGEN SATURATION: 95 % | BODY MASS INDEX: 25.79 KG/M2 | SYSTOLIC BLOOD PRESSURE: 131 MMHG | RESPIRATION RATE: 16 BRPM | TEMPERATURE: 97.6 F

## 2024-12-24 DIAGNOSIS — G31.84 MCI (MILD COGNITIVE IMPAIRMENT): Primary | ICD-10-CM

## 2024-12-24 PROCEDURE — 96365 THER/PROPH/DIAG IV INF INIT: CPT

## 2024-12-24 PROCEDURE — 25010000002 DIPHENHYDRAMINE PER 50 MG: Performed by: PSYCHIATRY & NEUROLOGY

## 2024-12-24 PROCEDURE — 25010000002 LECANEMAB-IRMB 200 MG/2ML SOLUTION 2 ML VIAL: Performed by: PSYCHIATRY & NEUROLOGY

## 2024-12-24 PROCEDURE — 25010000002 METHYLPREDNISOLONE PER 125 MG: Performed by: PSYCHIATRY & NEUROLOGY

## 2024-12-24 PROCEDURE — 25810000003 SODIUM CHLORIDE 0.9 % SOLUTION 250 ML FLEX CONT: Performed by: PSYCHIATRY & NEUROLOGY

## 2024-12-24 PROCEDURE — 96375 TX/PRO/DX INJ NEW DRUG ADDON: CPT

## 2024-12-24 PROCEDURE — 96374 THER/PROPH/DIAG INJ IV PUSH: CPT

## 2024-12-24 RX ORDER — DIPHENHYDRAMINE HYDROCHLORIDE 50 MG/ML
25 INJECTION INTRAMUSCULAR; INTRAVENOUS ONCE
Status: COMPLETED | OUTPATIENT
Start: 2024-12-24 | End: 2024-12-24

## 2024-12-24 RX ORDER — DIPHENHYDRAMINE HYDROCHLORIDE 50 MG/ML
25 INJECTION INTRAMUSCULAR; INTRAVENOUS ONCE
Start: 2025-01-07 | End: 2025-01-07

## 2024-12-24 RX ORDER — HYDROCORTISONE SODIUM SUCCINATE 100 MG/2ML
100 INJECTION INTRAMUSCULAR; INTRAVENOUS AS NEEDED
OUTPATIENT
Start: 2025-01-07

## 2024-12-24 RX ORDER — DIPHENHYDRAMINE HYDROCHLORIDE 50 MG/ML
50 INJECTION INTRAMUSCULAR; INTRAVENOUS AS NEEDED
OUTPATIENT
Start: 2025-01-07

## 2024-12-24 RX ORDER — METHYLPREDNISOLONE SODIUM SUCCINATE 125 MG/2ML
125 INJECTION INTRAMUSCULAR; INTRAVENOUS ONCE
Status: COMPLETED | OUTPATIENT
Start: 2024-12-24 | End: 2024-12-24

## 2024-12-24 RX ORDER — METHYLPREDNISOLONE SODIUM SUCCINATE 125 MG/2ML
125 INJECTION INTRAMUSCULAR; INTRAVENOUS ONCE
OUTPATIENT
Start: 2025-01-07

## 2024-12-24 RX ORDER — ACETAMINOPHEN 325 MG/1
650 TABLET ORAL ONCE
Status: COMPLETED | OUTPATIENT
Start: 2024-12-24 | End: 2024-12-24

## 2024-12-24 RX ORDER — SODIUM CHLORIDE 9 MG/ML
20 INJECTION, SOLUTION INTRAVENOUS ONCE
OUTPATIENT
Start: 2025-01-07

## 2024-12-24 RX ORDER — ACETAMINOPHEN 325 MG/1
650 TABLET ORAL ONCE
Status: CANCELLED | OUTPATIENT
Start: 2025-01-07

## 2024-12-24 RX ORDER — ACETAMINOPHEN 325 MG/1
650 TABLET ORAL ONCE
Start: 2025-01-07 | End: 2025-01-07

## 2024-12-24 RX ORDER — ACETAMINOPHEN 325 MG/1
650 TABLET ORAL ONCE
Status: DISCONTINUED | OUTPATIENT
Start: 2024-12-24 | End: 2024-12-24 | Stop reason: SDUPTHER

## 2024-12-24 RX ORDER — METHYLPREDNISOLONE SODIUM SUCCINATE 125 MG/2ML
125 INJECTION INTRAMUSCULAR; INTRAVENOUS ONCE
Start: 2025-01-07 | End: 2025-01-07

## 2024-12-24 RX ORDER — FAMOTIDINE 10 MG/ML
20 INJECTION, SOLUTION INTRAVENOUS AS NEEDED
OUTPATIENT
Start: 2025-01-07

## 2024-12-24 RX ADMIN — METHYLPREDNISOLONE SODIUM SUCCINATE 125 MG: 125 INJECTION, POWDER, FOR SOLUTION INTRAMUSCULAR; INTRAVENOUS at 10:47

## 2024-12-24 RX ADMIN — LECANEMAB 600 MG: 100 INJECTION, SOLUTION INTRAVENOUS at 10:51

## 2024-12-24 RX ADMIN — ACETAMINOPHEN 650 MG: 325 TABLET ORAL at 10:36

## 2024-12-24 RX ADMIN — DIPHENHYDRAMINE HYDROCHLORIDE 25 MG: 50 INJECTION, SOLUTION INTRAMUSCULAR; INTRAVENOUS at 10:47

## 2024-12-24 NOTE — NURSING NOTE
Patient arrived to [facility] at 1020. History and medications reviewed. VSS. Medications given as ordered. Patient tolerated well without complications. AVS refused. Patient discharged at 1200 in stable condition without complaints.

## 2025-01-07 ENCOUNTER — HOSPITAL ENCOUNTER (OUTPATIENT)
Dept: INFUSION THERAPY | Facility: HOSPITAL | Age: 70
Discharge: HOME | End: 2025-01-07
Admitting: PSYCHIATRY & NEUROLOGY
Payer: MEDICARE

## 2025-01-07 VITALS
SYSTOLIC BLOOD PRESSURE: 144 MMHG | DIASTOLIC BLOOD PRESSURE: 78 MMHG | TEMPERATURE: 98.4 F | HEART RATE: 82 BPM | OXYGEN SATURATION: 97 % | RESPIRATION RATE: 16 BRPM

## 2025-01-07 DIAGNOSIS — G31.84 MCI (MILD COGNITIVE IMPAIRMENT): Primary | ICD-10-CM

## 2025-01-07 PROCEDURE — 96365 THER/PROPH/DIAG IV INF INIT: CPT

## 2025-01-07 PROCEDURE — 25010000002 METHYLPREDNISOLONE PER 125 MG: Performed by: PSYCHIATRY & NEUROLOGY

## 2025-01-07 PROCEDURE — 25010000002 LECANEMAB-IRMB 200 MG/2ML SOLUTION 2 ML VIAL: Performed by: PSYCHIATRY & NEUROLOGY

## 2025-01-07 PROCEDURE — 25010000002 DIPHENHYDRAMINE PER 50 MG: Performed by: PSYCHIATRY & NEUROLOGY

## 2025-01-07 PROCEDURE — 96375 TX/PRO/DX INJ NEW DRUG ADDON: CPT

## 2025-01-07 PROCEDURE — 96374 THER/PROPH/DIAG INJ IV PUSH: CPT

## 2025-01-07 PROCEDURE — 25810000003 SODIUM CHLORIDE 0.9 % SOLUTION 250 ML FLEX CONT: Performed by: PSYCHIATRY & NEUROLOGY

## 2025-01-07 RX ORDER — ACETAMINOPHEN 325 MG/1
650 TABLET ORAL ONCE
Start: 2025-01-21 | End: 2025-01-21

## 2025-01-07 RX ORDER — HYDROCORTISONE SODIUM SUCCINATE 100 MG/2ML
100 INJECTION INTRAMUSCULAR; INTRAVENOUS AS NEEDED
OUTPATIENT
Start: 2025-01-21

## 2025-01-07 RX ORDER — SODIUM CHLORIDE 9 MG/ML
20 INJECTION, SOLUTION INTRAVENOUS ONCE
OUTPATIENT
Start: 2025-01-21

## 2025-01-07 RX ORDER — METHYLPREDNISOLONE SODIUM SUCCINATE 125 MG/2ML
125 INJECTION INTRAMUSCULAR; INTRAVENOUS ONCE
OUTPATIENT
Start: 2025-01-21

## 2025-01-07 RX ORDER — DIPHENHYDRAMINE HYDROCHLORIDE 50 MG/ML
25 INJECTION INTRAMUSCULAR; INTRAVENOUS ONCE
Status: COMPLETED | OUTPATIENT
Start: 2025-01-07 | End: 2025-01-07

## 2025-01-07 RX ORDER — METHYLPREDNISOLONE SODIUM SUCCINATE 125 MG/2ML
125 INJECTION INTRAMUSCULAR; INTRAVENOUS ONCE
Start: 2025-01-21 | End: 2025-01-21

## 2025-01-07 RX ORDER — DIPHENHYDRAMINE HYDROCHLORIDE 50 MG/ML
25 INJECTION INTRAMUSCULAR; INTRAVENOUS ONCE
Start: 2025-01-21 | End: 2025-01-21

## 2025-01-07 RX ORDER — ACETAMINOPHEN 325 MG/1
650 TABLET ORAL ONCE
Status: COMPLETED | OUTPATIENT
Start: 2025-01-07 | End: 2025-01-07

## 2025-01-07 RX ORDER — DIPHENHYDRAMINE HYDROCHLORIDE 50 MG/ML
50 INJECTION INTRAMUSCULAR; INTRAVENOUS AS NEEDED
OUTPATIENT
Start: 2025-01-21

## 2025-01-07 RX ORDER — FAMOTIDINE 10 MG/ML
20 INJECTION, SOLUTION INTRAVENOUS AS NEEDED
OUTPATIENT
Start: 2025-01-21

## 2025-01-07 RX ORDER — METHYLPREDNISOLONE SODIUM SUCCINATE 125 MG/2ML
125 INJECTION INTRAMUSCULAR; INTRAVENOUS ONCE
Status: COMPLETED | OUTPATIENT
Start: 2025-01-07 | End: 2025-01-07

## 2025-01-07 RX ADMIN — METHYLPREDNISOLONE SODIUM SUCCINATE 125 MG: 125 INJECTION, POWDER, FOR SOLUTION INTRAMUSCULAR; INTRAVENOUS at 10:45

## 2025-01-07 RX ADMIN — DIPHENHYDRAMINE HYDROCHLORIDE 25 MG: 50 INJECTION, SOLUTION INTRAMUSCULAR; INTRAVENOUS at 10:45

## 2025-01-07 RX ADMIN — ACETAMINOPHEN 650 MG: 325 TABLET ORAL at 10:45

## 2025-01-07 RX ADMIN — LECANEMAB 600 MG: 100 INJECTION, SOLUTION INTRAVENOUS at 10:54

## 2025-01-07 NOTE — PATIENT INSTRUCTIONS
"Call Dr. Salomon Ham, Neurology at [phone] if you have any problems or concerns.    We know you have a Choice in healthcare and appreciate you using [facility].  Our purpose is to provide you \"Excellent Care\".  We hope that you will always choose us in the future and continue to recommend us to your family and friends.     "

## 2025-01-07 NOTE — NURSING NOTE
0931 Patient arrives to [facility] in stable condition. History and medications reviewed with patient. VSS. Medications given as ordered, patient tolerated well. AVS printed and copy to patient. Patient discharged from [facility] in stable condition without any complaints at 1208.

## 2025-01-21 ENCOUNTER — HOSPITAL ENCOUNTER (OUTPATIENT)
Dept: INFUSION THERAPY | Facility: HOSPITAL | Age: 70
Discharge: HOME | End: 2025-01-21
Admitting: PSYCHIATRY & NEUROLOGY
Payer: MEDICARE

## 2025-01-21 VITALS
RESPIRATION RATE: 16 BRPM | SYSTOLIC BLOOD PRESSURE: 146 MMHG | OXYGEN SATURATION: 100 % | HEART RATE: 75 BPM | DIASTOLIC BLOOD PRESSURE: 72 MMHG

## 2025-01-21 DIAGNOSIS — G31.84 MCI (MILD COGNITIVE IMPAIRMENT): Primary | ICD-10-CM

## 2025-01-21 PROCEDURE — 96365 THER/PROPH/DIAG IV INF INIT: CPT

## 2025-01-21 PROCEDURE — 96375 TX/PRO/DX INJ NEW DRUG ADDON: CPT

## 2025-01-21 PROCEDURE — 25010000002 LECANEMAB-IRMB 200 MG/2ML SOLUTION 2 ML VIAL: Performed by: PSYCHIATRY & NEUROLOGY

## 2025-01-21 PROCEDURE — 25010000002 METHYLPREDNISOLONE PER 125 MG: Performed by: PSYCHIATRY & NEUROLOGY

## 2025-01-21 PROCEDURE — 96374 THER/PROPH/DIAG INJ IV PUSH: CPT

## 2025-01-21 PROCEDURE — 25010000002 DIPHENHYDRAMINE PER 50 MG: Performed by: PSYCHIATRY & NEUROLOGY

## 2025-01-21 PROCEDURE — 25810000003 SODIUM CHLORIDE 0.9 % SOLUTION 250 ML FLEX CONT: Performed by: PSYCHIATRY & NEUROLOGY

## 2025-01-21 RX ORDER — DIPHENHYDRAMINE HYDROCHLORIDE 50 MG/ML
50 INJECTION INTRAMUSCULAR; INTRAVENOUS AS NEEDED
OUTPATIENT
Start: 2025-02-04

## 2025-01-21 RX ORDER — HYDROCORTISONE SODIUM SUCCINATE 100 MG/2ML
100 INJECTION INTRAMUSCULAR; INTRAVENOUS AS NEEDED
OUTPATIENT
Start: 2025-02-04

## 2025-01-21 RX ORDER — DIPHENHYDRAMINE HYDROCHLORIDE 50 MG/ML
25 INJECTION INTRAMUSCULAR; INTRAVENOUS ONCE
Status: COMPLETED | OUTPATIENT
Start: 2025-01-21 | End: 2025-01-21

## 2025-01-21 RX ORDER — METHYLPREDNISOLONE SODIUM SUCCINATE 125 MG/2ML
125 INJECTION INTRAMUSCULAR; INTRAVENOUS ONCE
OUTPATIENT
Start: 2025-02-04

## 2025-01-21 RX ORDER — ACETAMINOPHEN 325 MG/1
650 TABLET ORAL ONCE
Start: 2025-02-04 | End: 2025-02-04

## 2025-01-21 RX ORDER — DIPHENHYDRAMINE HYDROCHLORIDE 50 MG/ML
25 INJECTION INTRAMUSCULAR; INTRAVENOUS ONCE
Start: 2025-02-04 | End: 2025-02-04

## 2025-01-21 RX ORDER — SODIUM CHLORIDE 9 MG/ML
20 INJECTION, SOLUTION INTRAVENOUS ONCE
OUTPATIENT
Start: 2025-02-04

## 2025-01-21 RX ORDER — FAMOTIDINE 10 MG/ML
20 INJECTION, SOLUTION INTRAVENOUS AS NEEDED
OUTPATIENT
Start: 2025-02-04

## 2025-01-21 RX ORDER — ACETAMINOPHEN 325 MG/1
650 TABLET ORAL ONCE
Status: COMPLETED | OUTPATIENT
Start: 2025-01-21 | End: 2025-01-21

## 2025-01-21 RX ORDER — METHYLPREDNISOLONE SODIUM SUCCINATE 125 MG/2ML
125 INJECTION INTRAMUSCULAR; INTRAVENOUS ONCE
Status: COMPLETED | OUTPATIENT
Start: 2025-01-21 | End: 2025-01-21

## 2025-01-21 RX ORDER — METHYLPREDNISOLONE SODIUM SUCCINATE 125 MG/2ML
125 INJECTION INTRAMUSCULAR; INTRAVENOUS ONCE
Start: 2025-02-04 | End: 2025-02-04

## 2025-01-21 RX ADMIN — LECANEMAB 600 MG: 100 INJECTION, SOLUTION INTRAVENOUS at 10:47

## 2025-01-21 RX ADMIN — METHYLPREDNISOLONE SODIUM SUCCINATE 125 MG: 125 INJECTION, POWDER, FOR SOLUTION INTRAMUSCULAR; INTRAVENOUS at 10:44

## 2025-01-21 RX ADMIN — ACETAMINOPHEN 650 MG: 325 TABLET ORAL at 10:19

## 2025-01-21 RX ADMIN — DIPHENHYDRAMINE HYDROCHLORIDE 25 MG: 50 INJECTION, SOLUTION INTRAMUSCULAR; INTRAVENOUS at 10:44

## 2025-02-04 ENCOUNTER — HOSPITAL ENCOUNTER (OUTPATIENT)
Dept: INFUSION THERAPY | Facility: HOSPITAL | Age: 70
Discharge: HOME | End: 2025-02-04
Admitting: PSYCHIATRY & NEUROLOGY
Payer: MEDICARE

## 2025-02-04 VITALS
HEART RATE: 78 BPM | SYSTOLIC BLOOD PRESSURE: 140 MMHG | OXYGEN SATURATION: 100 % | BODY MASS INDEX: 26.16 KG/M2 | TEMPERATURE: 97.6 F | RESPIRATION RATE: 16 BRPM | DIASTOLIC BLOOD PRESSURE: 78 MMHG | WEIGHT: 143 LBS

## 2025-02-04 DIAGNOSIS — G31.84 MCI (MILD COGNITIVE IMPAIRMENT): Primary | ICD-10-CM

## 2025-02-04 PROCEDURE — 96374 THER/PROPH/DIAG INJ IV PUSH: CPT

## 2025-02-04 PROCEDURE — 25010000002 LECANEMAB-IRMB 200 MG/2ML SOLUTION 2 ML VIAL: Performed by: PSYCHIATRY & NEUROLOGY

## 2025-02-04 PROCEDURE — 25810000003 SODIUM CHLORIDE 0.9 % SOLUTION 250 ML FLEX CONT: Performed by: PSYCHIATRY & NEUROLOGY

## 2025-02-04 PROCEDURE — 25010000002 DIPHENHYDRAMINE PER 50 MG: Performed by: PSYCHIATRY & NEUROLOGY

## 2025-02-04 PROCEDURE — 25010000002 METHYLPREDNISOLONE PER 125 MG: Performed by: PSYCHIATRY & NEUROLOGY

## 2025-02-04 PROCEDURE — 96365 THER/PROPH/DIAG IV INF INIT: CPT

## 2025-02-04 PROCEDURE — 96375 TX/PRO/DX INJ NEW DRUG ADDON: CPT

## 2025-02-04 RX ORDER — HYDROCORTISONE SODIUM SUCCINATE 100 MG/2ML
100 INJECTION INTRAMUSCULAR; INTRAVENOUS AS NEEDED
OUTPATIENT
Start: 2025-02-18

## 2025-02-04 RX ORDER — METHYLPREDNISOLONE SODIUM SUCCINATE 125 MG/2ML
125 INJECTION INTRAMUSCULAR; INTRAVENOUS ONCE
Status: CANCELLED | OUTPATIENT
Start: 2025-02-18

## 2025-02-04 RX ORDER — FAMOTIDINE 10 MG/ML
20 INJECTION, SOLUTION INTRAVENOUS AS NEEDED
OUTPATIENT
Start: 2025-02-18

## 2025-02-04 RX ORDER — DIPHENHYDRAMINE HYDROCHLORIDE 50 MG/ML
50 INJECTION INTRAMUSCULAR; INTRAVENOUS AS NEEDED
OUTPATIENT
Start: 2025-02-18

## 2025-02-04 RX ORDER — METHYLPREDNISOLONE SODIUM SUCCINATE 125 MG/2ML
125 INJECTION INTRAMUSCULAR; INTRAVENOUS ONCE
Status: DISCONTINUED | OUTPATIENT
Start: 2025-02-04 | End: 2025-02-04 | Stop reason: SDUPTHER

## 2025-02-04 RX ORDER — DIPHENHYDRAMINE HYDROCHLORIDE 50 MG/ML
25 INJECTION INTRAMUSCULAR; INTRAVENOUS ONCE
Start: 2025-02-18 | End: 2025-02-18

## 2025-02-04 RX ORDER — ACETAMINOPHEN 325 MG/1
650 TABLET ORAL ONCE
Start: 2025-02-18 | End: 2025-02-18

## 2025-02-04 RX ORDER — DIPHENHYDRAMINE HYDROCHLORIDE 50 MG/ML
25 INJECTION INTRAMUSCULAR; INTRAVENOUS ONCE
Status: COMPLETED | OUTPATIENT
Start: 2025-02-04 | End: 2025-02-04

## 2025-02-04 RX ORDER — SODIUM CHLORIDE 9 MG/ML
20 INJECTION, SOLUTION INTRAVENOUS ONCE
OUTPATIENT
Start: 2025-02-18

## 2025-02-04 RX ORDER — METHYLPREDNISOLONE SODIUM SUCCINATE 125 MG/2ML
125 INJECTION INTRAMUSCULAR; INTRAVENOUS ONCE
Start: 2025-02-18 | End: 2025-02-18

## 2025-02-04 RX ORDER — ACETAMINOPHEN 325 MG/1
650 TABLET ORAL ONCE
Status: COMPLETED | OUTPATIENT
Start: 2025-02-04 | End: 2025-02-04

## 2025-02-04 RX ORDER — METHYLPREDNISOLONE SODIUM SUCCINATE 125 MG/2ML
125 INJECTION INTRAMUSCULAR; INTRAVENOUS ONCE
Status: COMPLETED | OUTPATIENT
Start: 2025-02-04 | End: 2025-02-04

## 2025-02-04 RX ADMIN — METHYLPREDNISOLONE SODIUM SUCCINATE 125 MG: 125 INJECTION, POWDER, FOR SOLUTION INTRAMUSCULAR; INTRAVENOUS at 10:32

## 2025-02-04 RX ADMIN — LECANEMAB 600 MG: 100 INJECTION, SOLUTION INTRAVENOUS at 10:38

## 2025-02-04 RX ADMIN — DIPHENHYDRAMINE HYDROCHLORIDE 25 MG: 50 INJECTION, SOLUTION INTRAMUSCULAR; INTRAVENOUS at 10:33

## 2025-02-04 RX ADMIN — ACETAMINOPHEN 650 MG: 325 TABLET ORAL at 10:20

## 2025-02-04 NOTE — NURSING NOTE
1003 Patient arrives to [facility] in stable condition. History and medications reviewed with patient, VSS. Medications given as ordered, patient tolerated well. AVS printed and copy to patient. Patient discharged from [facility] in stable condition without any complaints at 1158.

## 2025-02-04 NOTE — PATIENT INSTRUCTIONS
"Call Dr. Salomon Ham, Neurology at [phone] if you have any problems or concerns.    We know you have a Choice in healthcare and appreciate you using [facility].  Our purpose is to provide you \"Excellent Care\".  We hope that you will always choose us in the future and continue to recommend us to your family and friends.     "

## 2025-02-12 DIAGNOSIS — Z96.651 STATUS POST TOTAL RIGHT KNEE REPLACEMENT: Primary | ICD-10-CM

## 2025-02-12 NOTE — TELEPHONE ENCOUNTER
Rx Refill Note  Requested Prescriptions      No prescriptions requested or ordered in this encounter      Last office visit with prescribing clinician: 10/26/2023      Next office visit with prescribing clinician: Visit date not found   Last Filled:1/13/25    No diagnosis found.   Date of Surgery:S/p right total knee arthroplasty DOS 10/14/2020  Status post right total knee manipulation-12/21/2020      Nicole Chase MA  02/12/25, 13:43 EST    Previous RX pended for your approval, change or denial.     {TIP  Encounters:    {TIP  Please add Last Relevant Lab Date if appropriate:  {TIP  Is Refill Pharmacy correct?:

## 2025-02-17 RX ORDER — CYCLOBENZAPRINE HCL 10 MG
10 TABLET ORAL 3 TIMES DAILY PRN
Qty: 60 TABLET | Refills: 0 | Status: SHIPPED | OUTPATIENT
Start: 2025-02-17

## 2025-02-17 NOTE — TELEPHONE ENCOUNTER
Rx Refill Note  Requested Prescriptions     Pending Prescriptions Disp Refills    cyclobenzaprine (FLEXERIL) 10 MG tablet [Pharmacy Med Name: CYCLOBENZAPRINE 10 MG TABLET] 60 tablet 0     Sig: TAKE 1 TABLET BY MOUTH 3 TIMES A DAY AS NEEDED FOR MUSCLE SPASMS.      Last office visit with prescribing clinician: 4/23/2024      Next office visit with prescribing clinician: Visit date not found   Last Filled: 12/19/2024    Status post total right knee replacement   Date of Surgery: 12/21/2020      Daria Méndez MA  02/17/25, 09:42 EST    Previous RX pended for your approval, change or denial.     {TIP  Encounters:    {TIP  Please add Last Relevant Lab Date if appropriate:  {TIP  Is Refill Pharmacy correct?:

## 2025-02-18 ENCOUNTER — HOSPITAL ENCOUNTER (OUTPATIENT)
Dept: INFUSION THERAPY | Facility: HOSPITAL | Age: 70
Discharge: HOME | End: 2025-02-18
Admitting: PSYCHIATRY & NEUROLOGY
Payer: MEDICARE

## 2025-02-18 VITALS
DIASTOLIC BLOOD PRESSURE: 77 MMHG | HEART RATE: 77 BPM | WEIGHT: 142 LBS | OXYGEN SATURATION: 99 % | TEMPERATURE: 97.9 F | SYSTOLIC BLOOD PRESSURE: 155 MMHG | BODY MASS INDEX: 25.97 KG/M2 | RESPIRATION RATE: 16 BRPM

## 2025-02-18 DIAGNOSIS — G31.84 MCI (MILD COGNITIVE IMPAIRMENT): Primary | ICD-10-CM

## 2025-02-18 PROCEDURE — 25010000002 METHYLPREDNISOLONE PER 125 MG: Performed by: PSYCHIATRY & NEUROLOGY

## 2025-02-18 PROCEDURE — 96375 TX/PRO/DX INJ NEW DRUG ADDON: CPT

## 2025-02-18 PROCEDURE — 25810000003 SODIUM CHLORIDE 0.9 % SOLUTION 250 ML FLEX CONT: Performed by: PSYCHIATRY & NEUROLOGY

## 2025-02-18 PROCEDURE — 96365 THER/PROPH/DIAG IV INF INIT: CPT

## 2025-02-18 PROCEDURE — 25010000002 DIPHENHYDRAMINE PER 50 MG: Performed by: PSYCHIATRY & NEUROLOGY

## 2025-02-18 PROCEDURE — 96374 THER/PROPH/DIAG INJ IV PUSH: CPT

## 2025-02-18 PROCEDURE — 25010000002 LECANEMAB-IRMB 200 MG/2ML SOLUTION 2 ML VIAL: Performed by: PSYCHIATRY & NEUROLOGY

## 2025-02-18 RX ORDER — ACETAMINOPHEN 325 MG/1
650 TABLET ORAL ONCE
Status: COMPLETED | OUTPATIENT
Start: 2025-02-18 | End: 2025-02-18

## 2025-02-18 RX ORDER — DIPHENHYDRAMINE HYDROCHLORIDE 50 MG/ML
50 INJECTION INTRAMUSCULAR; INTRAVENOUS AS NEEDED
OUTPATIENT
Start: 2025-03-04

## 2025-02-18 RX ORDER — SODIUM CHLORIDE 9 MG/ML
20 INJECTION, SOLUTION INTRAVENOUS ONCE
OUTPATIENT
Start: 2025-03-04

## 2025-02-18 RX ORDER — FAMOTIDINE 10 MG/ML
20 INJECTION, SOLUTION INTRAVENOUS AS NEEDED
OUTPATIENT
Start: 2025-03-04

## 2025-02-18 RX ORDER — ACETAMINOPHEN 325 MG/1
650 TABLET ORAL ONCE
Start: 2025-03-04 | End: 2025-03-04

## 2025-02-18 RX ORDER — HYDROCORTISONE SODIUM SUCCINATE 100 MG/2ML
100 INJECTION INTRAMUSCULAR; INTRAVENOUS AS NEEDED
OUTPATIENT
Start: 2025-03-04

## 2025-02-18 RX ORDER — DIPHENHYDRAMINE HYDROCHLORIDE 50 MG/ML
25 INJECTION INTRAMUSCULAR; INTRAVENOUS ONCE
Status: COMPLETED | OUTPATIENT
Start: 2025-02-18 | End: 2025-02-18

## 2025-02-18 RX ORDER — METHYLPREDNISOLONE SODIUM SUCCINATE 125 MG/2ML
125 INJECTION INTRAMUSCULAR; INTRAVENOUS ONCE
Start: 2025-03-04 | End: 2025-03-04

## 2025-02-18 RX ORDER — DIPHENHYDRAMINE HYDROCHLORIDE 50 MG/ML
25 INJECTION INTRAMUSCULAR; INTRAVENOUS ONCE
Start: 2025-03-04 | End: 2025-03-04

## 2025-02-18 RX ORDER — METHYLPREDNISOLONE SODIUM SUCCINATE 125 MG/2ML
125 INJECTION INTRAMUSCULAR; INTRAVENOUS ONCE
Status: COMPLETED | OUTPATIENT
Start: 2025-02-18 | End: 2025-02-18

## 2025-02-18 RX ADMIN — METHYLPREDNISOLONE SODIUM SUCCINATE 125 MG: 125 INJECTION, POWDER, FOR SOLUTION INTRAMUSCULAR; INTRAVENOUS at 10:12

## 2025-02-18 RX ADMIN — ACETAMINOPHEN 650 MG: 325 TABLET ORAL at 10:12

## 2025-02-18 RX ADMIN — LECANEMAB 600 MG: 100 INJECTION, SOLUTION INTRAVENOUS at 10:38

## 2025-02-18 RX ADMIN — DIPHENHYDRAMINE HYDROCHLORIDE 25 MG: 50 INJECTION, SOLUTION INTRAMUSCULAR; INTRAVENOUS at 10:12

## 2025-02-18 NOTE — PATIENT INSTRUCTIONS
"  Call Dr. Salomon Ham, Neurology @ [phone] if you have any problems or concerns.    We know you have a Choice in healthcare and appreciate you using [facility].  Our purpose is to provide you \"Excellent Care\".  We hope that you will always choose us in the future and continue to recommend us to your family and friends.              "

## 2025-02-18 NOTE — NURSING NOTE
Patient arrived to [facility] 0959.  History and medications reviewed with patient.  Medications administered as ordered without complications.  AVS refused by patient.  Patient discharged at 1200 in stable condition without complaint.

## 2025-02-26 ENCOUNTER — OFFICE VISIT (OUTPATIENT)
Dept: NEUROLOGY | Facility: CLINIC | Age: 70
End: 2025-02-26
Payer: MEDICARE

## 2025-02-26 VITALS
WEIGHT: 142 LBS | BODY MASS INDEX: 25.97 KG/M2 | OXYGEN SATURATION: 98 % | HEART RATE: 97 BPM | DIASTOLIC BLOOD PRESSURE: 78 MMHG | SYSTOLIC BLOOD PRESSURE: 128 MMHG

## 2025-02-26 DIAGNOSIS — G30.9 ALZHEIMER'S DISEASE: Primary | ICD-10-CM

## 2025-02-26 DIAGNOSIS — F02.80 ALZHEIMER'S DISEASE: Primary | ICD-10-CM

## 2025-02-26 NOTE — PROGRESS NOTES
Notes by MA:  Ms Benson is here with her sister today for a follow up appointment.      Subjective:     Patient ID: Yesi Benson is a 69 y.o. female.    Memory Loss    The following portions of the patient's history were reviewed and updated as appropriate: allergies, current medications, past family history, past medical history, past social history, past surgical history, and problem list.    Review of Systems   Psychiatric/Behavioral:  Positive for confusion and decreased concentration.         Objective:    Neurological Exam     Physical Exam    Assessment/Plan:     Diagnoses and all orders for this visit:    1. Alzheimer's disease (Primary)     Still doing very well on Leqembi infusions.  She is tolerating them well without any problematic side effects.  Appears cognitively stable.  We discussed the recent FDA approval for transition to maintenance Leqembi therapy and we will schedule a follow-up visit for her in 5 months to discuss making that transition.  No other changes currently.

## 2025-03-04 ENCOUNTER — HOSPITAL ENCOUNTER (OUTPATIENT)
Dept: INFUSION THERAPY | Facility: HOSPITAL | Age: 70
Discharge: HOME | End: 2025-03-04
Admitting: PSYCHIATRY & NEUROLOGY
Payer: MEDICARE

## 2025-03-04 VITALS
DIASTOLIC BLOOD PRESSURE: 73 MMHG | RESPIRATION RATE: 16 BRPM | HEART RATE: 78 BPM | OXYGEN SATURATION: 100 % | BODY MASS INDEX: 25.79 KG/M2 | WEIGHT: 141 LBS | SYSTOLIC BLOOD PRESSURE: 132 MMHG | TEMPERATURE: 97.9 F

## 2025-03-04 DIAGNOSIS — G31.84 MCI (MILD COGNITIVE IMPAIRMENT): Primary | ICD-10-CM

## 2025-03-04 PROCEDURE — 25010000002 DIPHENHYDRAMINE PER 50 MG: Performed by: PSYCHIATRY & NEUROLOGY

## 2025-03-04 PROCEDURE — 25010000002 LECANEMAB-IRMB 200 MG/2ML SOLUTION 2 ML VIAL: Performed by: PSYCHIATRY & NEUROLOGY

## 2025-03-04 PROCEDURE — 96365 THER/PROPH/DIAG IV INF INIT: CPT

## 2025-03-04 PROCEDURE — 96374 THER/PROPH/DIAG INJ IV PUSH: CPT

## 2025-03-04 PROCEDURE — 25010000002 METHYLPREDNISOLONE PER 125 MG: Performed by: PSYCHIATRY & NEUROLOGY

## 2025-03-04 PROCEDURE — 25810000003 SODIUM CHLORIDE 0.9 % SOLUTION 250 ML FLEX CONT: Performed by: PSYCHIATRY & NEUROLOGY

## 2025-03-04 PROCEDURE — 96375 TX/PRO/DX INJ NEW DRUG ADDON: CPT

## 2025-03-04 RX ORDER — FAMOTIDINE 10 MG/ML
20 INJECTION, SOLUTION INTRAVENOUS AS NEEDED
OUTPATIENT
Start: 2025-03-18

## 2025-03-04 RX ORDER — METHYLPREDNISOLONE SODIUM SUCCINATE 125 MG/2ML
125 INJECTION INTRAMUSCULAR; INTRAVENOUS ONCE
Status: COMPLETED | OUTPATIENT
Start: 2025-03-04 | End: 2025-03-04

## 2025-03-04 RX ORDER — METHYLPREDNISOLONE SODIUM SUCCINATE 125 MG/2ML
125 INJECTION INTRAMUSCULAR; INTRAVENOUS ONCE
Start: 2025-03-18 | End: 2025-03-18

## 2025-03-04 RX ORDER — DIPHENHYDRAMINE HYDROCHLORIDE 50 MG/ML
25 INJECTION INTRAMUSCULAR; INTRAVENOUS ONCE
Status: COMPLETED | OUTPATIENT
Start: 2025-03-04 | End: 2025-03-04

## 2025-03-04 RX ORDER — ACETAMINOPHEN 325 MG/1
650 TABLET ORAL ONCE
Start: 2025-03-18 | End: 2025-03-18

## 2025-03-04 RX ORDER — DIPHENHYDRAMINE HYDROCHLORIDE 50 MG/ML
25 INJECTION INTRAMUSCULAR; INTRAVENOUS ONCE
Start: 2025-03-18 | End: 2025-03-18

## 2025-03-04 RX ORDER — SODIUM CHLORIDE 9 MG/ML
20 INJECTION, SOLUTION INTRAVENOUS ONCE
OUTPATIENT
Start: 2025-03-18

## 2025-03-04 RX ORDER — DIPHENHYDRAMINE HYDROCHLORIDE 50 MG/ML
50 INJECTION INTRAMUSCULAR; INTRAVENOUS AS NEEDED
OUTPATIENT
Start: 2025-03-18

## 2025-03-04 RX ORDER — ACETAMINOPHEN 325 MG/1
650 TABLET ORAL ONCE
Status: COMPLETED | OUTPATIENT
Start: 2025-03-04 | End: 2025-03-04

## 2025-03-04 RX ORDER — HYDROCORTISONE SODIUM SUCCINATE 100 MG/2ML
100 INJECTION INTRAMUSCULAR; INTRAVENOUS AS NEEDED
OUTPATIENT
Start: 2025-03-18

## 2025-03-04 RX ADMIN — ACETAMINOPHEN 650 MG: 325 TABLET ORAL at 10:26

## 2025-03-04 RX ADMIN — LECANEMAB 600 MG: 100 INJECTION, SOLUTION INTRAVENOUS at 10:43

## 2025-03-04 RX ADMIN — METHYLPREDNISOLONE SODIUM SUCCINATE 125 MG: 125 INJECTION, POWDER, FOR SOLUTION INTRAMUSCULAR; INTRAVENOUS at 10:26

## 2025-03-04 RX ADMIN — DIPHENHYDRAMINE HYDROCHLORIDE 25 MG: 50 INJECTION, SOLUTION INTRAMUSCULAR; INTRAVENOUS at 10:27

## 2025-03-04 NOTE — NURSING NOTE
Patient arrived to [facility] at 1013. History and medications reviewed. VSS. Medications given as ordered. Patient tolerated well without complications. AVS refused. Patient discharged at 1156 in stable condition without complaints.

## 2025-03-18 ENCOUNTER — HOSPITAL ENCOUNTER (OUTPATIENT)
Dept: INFUSION THERAPY | Facility: HOSPITAL | Age: 70
Discharge: HOME | End: 2025-03-18
Payer: MEDICARE

## 2025-03-18 VITALS
HEART RATE: 79 BPM | SYSTOLIC BLOOD PRESSURE: 147 MMHG | TEMPERATURE: 97.7 F | WEIGHT: 144 LBS | BODY MASS INDEX: 26.34 KG/M2 | OXYGEN SATURATION: 99 % | DIASTOLIC BLOOD PRESSURE: 89 MMHG | RESPIRATION RATE: 16 BRPM

## 2025-03-18 DIAGNOSIS — G31.84 MCI (MILD COGNITIVE IMPAIRMENT): Primary | ICD-10-CM

## 2025-03-18 PROCEDURE — 96365 THER/PROPH/DIAG IV INF INIT: CPT

## 2025-03-18 PROCEDURE — 25010000002 LECANEMAB-IRMB 200 MG/2ML SOLUTION 2 ML VIAL: Performed by: PSYCHIATRY & NEUROLOGY

## 2025-03-18 PROCEDURE — 25010000002 DIPHENHYDRAMINE PER 50 MG: Performed by: PSYCHIATRY & NEUROLOGY

## 2025-03-18 PROCEDURE — 96374 THER/PROPH/DIAG INJ IV PUSH: CPT

## 2025-03-18 PROCEDURE — 96375 TX/PRO/DX INJ NEW DRUG ADDON: CPT

## 2025-03-18 PROCEDURE — 25810000003 SODIUM CHLORIDE 0.9 % SOLUTION 250 ML FLEX CONT: Performed by: PSYCHIATRY & NEUROLOGY

## 2025-03-18 PROCEDURE — 25010000002 METHYLPREDNISOLONE PER 125 MG: Performed by: PSYCHIATRY & NEUROLOGY

## 2025-03-18 RX ORDER — METHYLPREDNISOLONE SODIUM SUCCINATE 125 MG/2ML
125 INJECTION INTRAMUSCULAR; INTRAVENOUS ONCE
Start: 2025-04-01 | End: 2025-04-01

## 2025-03-18 RX ORDER — DIPHENHYDRAMINE HYDROCHLORIDE 50 MG/ML
25 INJECTION INTRAMUSCULAR; INTRAVENOUS ONCE
Status: COMPLETED | OUTPATIENT
Start: 2025-03-18 | End: 2025-03-18

## 2025-03-18 RX ORDER — ACETAMINOPHEN 325 MG/1
650 TABLET ORAL ONCE
Start: 2025-04-01 | End: 2025-04-01

## 2025-03-18 RX ORDER — SODIUM CHLORIDE 9 MG/ML
20 INJECTION, SOLUTION INTRAVENOUS ONCE
OUTPATIENT
Start: 2025-04-01

## 2025-03-18 RX ORDER — FAMOTIDINE 10 MG/ML
20 INJECTION, SOLUTION INTRAVENOUS AS NEEDED
OUTPATIENT
Start: 2025-04-01

## 2025-03-18 RX ORDER — DIPHENHYDRAMINE HYDROCHLORIDE 50 MG/ML
25 INJECTION INTRAMUSCULAR; INTRAVENOUS ONCE
Start: 2025-04-01 | End: 2025-04-01

## 2025-03-18 RX ORDER — ACETAMINOPHEN 325 MG/1
650 TABLET ORAL ONCE
Status: COMPLETED | OUTPATIENT
Start: 2025-03-18 | End: 2025-03-18

## 2025-03-18 RX ORDER — METHYLPREDNISOLONE SODIUM SUCCINATE 125 MG/2ML
125 INJECTION INTRAMUSCULAR; INTRAVENOUS ONCE
Status: COMPLETED | OUTPATIENT
Start: 2025-03-18 | End: 2025-03-18

## 2025-03-18 RX ORDER — DIPHENHYDRAMINE HYDROCHLORIDE 50 MG/ML
50 INJECTION INTRAMUSCULAR; INTRAVENOUS AS NEEDED
OUTPATIENT
Start: 2025-04-01

## 2025-03-18 RX ORDER — HYDROCORTISONE SODIUM SUCCINATE 100 MG/2ML
100 INJECTION INTRAMUSCULAR; INTRAVENOUS AS NEEDED
OUTPATIENT
Start: 2025-04-01

## 2025-03-18 RX ADMIN — LECANEMAB 600 MG: 100 INJECTION, SOLUTION INTRAVENOUS at 11:05

## 2025-03-18 RX ADMIN — ACETAMINOPHEN 650 MG: 325 TABLET ORAL at 11:12

## 2025-03-18 RX ADMIN — METHYLPREDNISOLONE SODIUM SUCCINATE 125 MG: 125 INJECTION, POWDER, LYOPHILIZED, FOR SOLUTION INTRAMUSCULAR; INTRAVENOUS at 11:14

## 2025-03-18 RX ADMIN — DIPHENHYDRAMINE HYDROCHLORIDE 25 MG: 50 INJECTION, SOLUTION INTRAMUSCULAR; INTRAVENOUS at 11:13

## 2025-03-18 NOTE — PATIENT INSTRUCTIONS
"  Call Dr. Salomon Ham, Neurology @ [phone] if you have any problems or concerns.    We know you have a Choice in healthcare and appreciate you using [facility].  Our purpose is to provide you \"Excellent Care\".  We hope that you will always choose us in the future and continue to recommend us to your family and friends.              "

## 2025-03-18 NOTE — NURSING NOTE
1030 Patient arrives to [facility] in stable condition.  History and medications reviewed with patient, VSS. Medications given as ordered, patient tolerated well. AVS printed and copy to patient. Patient discharged from [facility] in stable condition without any complaints at 1230.

## 2025-04-01 ENCOUNTER — HOSPITAL ENCOUNTER (OUTPATIENT)
Dept: INFUSION THERAPY | Facility: HOSPITAL | Age: 70
Discharge: HOME | End: 2025-04-01
Payer: MEDICARE

## 2025-04-01 VITALS
BODY MASS INDEX: 27.98 KG/M2 | TEMPERATURE: 98.3 F | SYSTOLIC BLOOD PRESSURE: 133 MMHG | WEIGHT: 153 LBS | OXYGEN SATURATION: 100 % | HEART RATE: 79 BPM | RESPIRATION RATE: 16 BRPM | DIASTOLIC BLOOD PRESSURE: 74 MMHG

## 2025-04-01 DIAGNOSIS — G31.84 MCI (MILD COGNITIVE IMPAIRMENT): Primary | ICD-10-CM

## 2025-04-01 PROCEDURE — 25010000002 METHYLPREDNISOLONE PER 125 MG: Performed by: PSYCHIATRY & NEUROLOGY

## 2025-04-01 PROCEDURE — 25010000002 LECANEMAB-IRMB 200 MG/2ML SOLUTION 2 ML VIAL: Performed by: PSYCHIATRY & NEUROLOGY

## 2025-04-01 PROCEDURE — 96375 TX/PRO/DX INJ NEW DRUG ADDON: CPT

## 2025-04-01 PROCEDURE — 25810000003 SODIUM CHLORIDE 0.9 % SOLUTION 250 ML FLEX CONT: Performed by: PSYCHIATRY & NEUROLOGY

## 2025-04-01 PROCEDURE — 96374 THER/PROPH/DIAG INJ IV PUSH: CPT

## 2025-04-01 PROCEDURE — 25010000002 DIPHENHYDRAMINE PER 50 MG: Performed by: PSYCHIATRY & NEUROLOGY

## 2025-04-01 PROCEDURE — 25010000002 LECANEMAB-IRMB 500 MG/5ML SOLUTION 5 ML VIAL: Performed by: PSYCHIATRY & NEUROLOGY

## 2025-04-01 PROCEDURE — 96365 THER/PROPH/DIAG IV INF INIT: CPT

## 2025-04-01 RX ORDER — FAMOTIDINE 10 MG/ML
20 INJECTION, SOLUTION INTRAVENOUS AS NEEDED
OUTPATIENT
Start: 2025-04-15

## 2025-04-01 RX ORDER — DIPHENHYDRAMINE HYDROCHLORIDE 50 MG/ML
50 INJECTION, SOLUTION INTRAMUSCULAR; INTRAVENOUS AS NEEDED
OUTPATIENT
Start: 2025-04-15

## 2025-04-01 RX ORDER — METHYLPREDNISOLONE SODIUM SUCCINATE 125 MG/2ML
125 INJECTION INTRAMUSCULAR; INTRAVENOUS ONCE
Start: 2025-04-15 | End: 2025-04-15

## 2025-04-01 RX ORDER — DIPHENHYDRAMINE HYDROCHLORIDE 50 MG/ML
25 INJECTION, SOLUTION INTRAMUSCULAR; INTRAVENOUS ONCE
Status: COMPLETED | OUTPATIENT
Start: 2025-04-01 | End: 2025-04-01

## 2025-04-01 RX ORDER — HYDROCORTISONE SODIUM SUCCINATE 100 MG/2ML
100 INJECTION INTRAMUSCULAR; INTRAVENOUS AS NEEDED
OUTPATIENT
Start: 2025-04-15

## 2025-04-01 RX ORDER — SODIUM CHLORIDE 9 MG/ML
20 INJECTION, SOLUTION INTRAVENOUS ONCE
OUTPATIENT
Start: 2025-04-15

## 2025-04-01 RX ORDER — METHYLPREDNISOLONE SODIUM SUCCINATE 125 MG/2ML
125 INJECTION INTRAMUSCULAR; INTRAVENOUS ONCE
Status: COMPLETED | OUTPATIENT
Start: 2025-04-01 | End: 2025-04-01

## 2025-04-01 RX ORDER — ACETAMINOPHEN 325 MG/1
650 TABLET ORAL ONCE
Status: COMPLETED | OUTPATIENT
Start: 2025-04-01 | End: 2025-04-01

## 2025-04-01 RX ORDER — ACETAMINOPHEN 325 MG/1
650 TABLET ORAL ONCE
Start: 2025-04-15 | End: 2025-04-15

## 2025-04-01 RX ORDER — DIPHENHYDRAMINE HYDROCHLORIDE 50 MG/ML
25 INJECTION, SOLUTION INTRAMUSCULAR; INTRAVENOUS ONCE
Start: 2025-04-15 | End: 2025-04-15

## 2025-04-01 RX ADMIN — LECANEMAB 700 MG: 100 INJECTION, SOLUTION INTRAVENOUS at 10:26

## 2025-04-01 RX ADMIN — DIPHENHYDRAMINE HYDROCHLORIDE 25 MG: 50 INJECTION, SOLUTION INTRAMUSCULAR; INTRAVENOUS at 09:57

## 2025-04-01 RX ADMIN — METHYLPREDNISOLONE SODIUM SUCCINATE 125 MG: 125 INJECTION, POWDER, LYOPHILIZED, FOR SOLUTION INTRAMUSCULAR; INTRAVENOUS at 09:56

## 2025-04-01 RX ADMIN — ACETAMINOPHEN 650 MG: 325 TABLET, FILM COATED ORAL at 09:57

## 2025-04-01 NOTE — NURSING NOTE
Patient arrived to [facility] at 0938.  History and medications reviewed with patient.  Medications administered as ordered without complications.  AVS refused by patient. Patient discharged at 1143 in stable condition without complaint.

## 2025-04-15 ENCOUNTER — HOSPITAL ENCOUNTER (OUTPATIENT)
Dept: INFUSION THERAPY | Facility: HOSPITAL | Age: 70
Discharge: HOME | End: 2025-04-15
Payer: MEDICARE

## 2025-04-15 VITALS
RESPIRATION RATE: 16 BRPM | TEMPERATURE: 98 F | DIASTOLIC BLOOD PRESSURE: 72 MMHG | SYSTOLIC BLOOD PRESSURE: 142 MMHG | BODY MASS INDEX: 26.16 KG/M2 | OXYGEN SATURATION: 99 % | WEIGHT: 143 LBS | HEART RATE: 100 BPM

## 2025-04-15 DIAGNOSIS — G31.84 MCI (MILD COGNITIVE IMPAIRMENT): Primary | ICD-10-CM

## 2025-04-15 PROCEDURE — 96374 THER/PROPH/DIAG INJ IV PUSH: CPT

## 2025-04-15 PROCEDURE — 25010000002 DIPHENHYDRAMINE PER 50 MG: Performed by: PSYCHIATRY & NEUROLOGY

## 2025-04-15 PROCEDURE — 25810000003 SODIUM CHLORIDE 0.9 % SOLUTION 250 ML FLEX CONT: Performed by: PSYCHIATRY & NEUROLOGY

## 2025-04-15 PROCEDURE — 25010000002 METHYLPREDNISOLONE PER 125 MG: Performed by: PSYCHIATRY & NEUROLOGY

## 2025-04-15 PROCEDURE — 96365 THER/PROPH/DIAG IV INF INIT: CPT

## 2025-04-15 PROCEDURE — 25010000002 LECANEMAB-IRMB 200 MG/2ML SOLUTION 2 ML VIAL: Performed by: PSYCHIATRY & NEUROLOGY

## 2025-04-15 PROCEDURE — 25010000002 LECANEMAB-IRMB 500 MG/5ML SOLUTION 5 ML VIAL: Performed by: PSYCHIATRY & NEUROLOGY

## 2025-04-15 PROCEDURE — 96375 TX/PRO/DX INJ NEW DRUG ADDON: CPT

## 2025-04-15 RX ORDER — ACETAMINOPHEN 325 MG/1
650 TABLET ORAL ONCE
Status: COMPLETED | OUTPATIENT
Start: 2025-04-15 | End: 2025-04-15

## 2025-04-15 RX ORDER — FAMOTIDINE 10 MG/ML
20 INJECTION, SOLUTION INTRAVENOUS AS NEEDED
OUTPATIENT
Start: 2025-04-29

## 2025-04-15 RX ORDER — HYDROCORTISONE SODIUM SUCCINATE 100 MG/2ML
100 INJECTION INTRAMUSCULAR; INTRAVENOUS AS NEEDED
OUTPATIENT
Start: 2025-04-29

## 2025-04-15 RX ORDER — DIPHENHYDRAMINE HYDROCHLORIDE 50 MG/ML
25 INJECTION, SOLUTION INTRAMUSCULAR; INTRAVENOUS ONCE
Start: 2025-04-29 | End: 2025-04-29

## 2025-04-15 RX ORDER — DIPHENHYDRAMINE HYDROCHLORIDE 50 MG/ML
50 INJECTION, SOLUTION INTRAMUSCULAR; INTRAVENOUS AS NEEDED
OUTPATIENT
Start: 2025-04-29

## 2025-04-15 RX ORDER — SODIUM CHLORIDE 9 MG/ML
20 INJECTION, SOLUTION INTRAVENOUS ONCE
OUTPATIENT
Start: 2025-04-29

## 2025-04-15 RX ORDER — ACETAMINOPHEN 325 MG/1
650 TABLET ORAL ONCE
Start: 2025-04-29 | End: 2025-04-29

## 2025-04-15 RX ORDER — METHYLPREDNISOLONE SODIUM SUCCINATE 125 MG/2ML
125 INJECTION INTRAMUSCULAR; INTRAVENOUS ONCE
Start: 2025-04-29 | End: 2025-04-29

## 2025-04-15 RX ORDER — METHYLPREDNISOLONE SODIUM SUCCINATE 125 MG/2ML
125 INJECTION INTRAMUSCULAR; INTRAVENOUS ONCE
Status: COMPLETED | OUTPATIENT
Start: 2025-04-15 | End: 2025-04-15

## 2025-04-15 RX ORDER — DIPHENHYDRAMINE HYDROCHLORIDE 50 MG/ML
25 INJECTION, SOLUTION INTRAMUSCULAR; INTRAVENOUS ONCE
Status: COMPLETED | OUTPATIENT
Start: 2025-04-15 | End: 2025-04-15

## 2025-04-15 RX ADMIN — METHYLPREDNISOLONE SODIUM SUCCINATE 125 MG: 125 INJECTION, POWDER, LYOPHILIZED, FOR SOLUTION INTRAMUSCULAR; INTRAVENOUS at 09:50

## 2025-04-15 RX ADMIN — DIPHENHYDRAMINE HYDROCHLORIDE 25 MG: 50 INJECTION, SOLUTION INTRAMUSCULAR; INTRAVENOUS at 09:50

## 2025-04-15 RX ADMIN — ACETAMINOPHEN 650 MG: 325 TABLET, FILM COATED ORAL at 09:50

## 2025-04-15 RX ADMIN — LECANEMAB 650 MG: 100 INJECTION, SOLUTION INTRAVENOUS at 10:00

## 2025-04-15 NOTE — NURSING NOTE
Patient arrived to [facility] at 0930.  History and medications reviewed with patient.  Medication administered as ordered without complications.  AVS refused by patient.  Patient discharged at 1113 in stable condition without complaint.

## 2025-04-15 NOTE — PATIENT INSTRUCTIONS
"  Call Dr. Salomon Ham, Neurology @ [phone] if you have any problems or concerns.    We know you have a Choice in healthcare and appreciate you using [facility].  Our purpose is to provide you \"Excellent Care\".  We hope that you will always choose us in the future and continue to recommend us to your family and friends.              "

## 2025-04-21 RX ORDER — CYCLOBENZAPRINE HCL 10 MG
10 TABLET ORAL 3 TIMES DAILY PRN
Qty: 60 TABLET | Refills: 0 | Status: SHIPPED | OUTPATIENT
Start: 2025-04-21

## 2025-04-21 NOTE — TELEPHONE ENCOUNTER
Rx Refill Note  Requested Prescriptions     Pending Prescriptions Disp Refills    cyclobenzaprine (FLEXERIL) 10 MG tablet [Pharmacy Med Name: CYCLOBENZAPRINE 10 MG TABLET] 60 tablet 0     Sig: TAKE 1 TABLET BY MOUTH 3 TIMES A DAY AS NEEDED FOR MUSCLE SPASMS.      Last office visit with prescribing clinician: 4/23/2024      Next office visit with prescribing clinician: Visit date not found   Last Filled:02-17-25      No diagnosis found. Status post total right knee replacement    Date of Surgery:      Beulah Valiente MA  04/21/25, 09:49 EDT    {TIP  Encounters:    {TIP  Please add Last Relevant Lab Date if appropriate:  {TIP  Is Refill Pharmacy correct?:

## 2025-04-24 ENCOUNTER — APPOINTMENT (OUTPATIENT)
Dept: WOMENS IMAGING | Facility: HOSPITAL | Age: 70
End: 2025-04-24
Payer: MEDICARE

## 2025-04-24 PROCEDURE — 77063 BREAST TOMOSYNTHESIS BI: CPT | Performed by: RADIOLOGY

## 2025-04-24 PROCEDURE — 77067 SCR MAMMO BI INCL CAD: CPT | Performed by: RADIOLOGY

## 2025-04-29 ENCOUNTER — HOSPITAL ENCOUNTER (OUTPATIENT)
Dept: INFUSION THERAPY | Facility: HOSPITAL | Age: 70
Discharge: HOME | End: 2025-04-29
Admitting: PSYCHIATRY & NEUROLOGY
Payer: MEDICARE

## 2025-04-29 VITALS
BODY MASS INDEX: 26.34 KG/M2 | TEMPERATURE: 97.7 F | WEIGHT: 144 LBS | SYSTOLIC BLOOD PRESSURE: 136 MMHG | RESPIRATION RATE: 14 BRPM | OXYGEN SATURATION: 100 % | DIASTOLIC BLOOD PRESSURE: 80 MMHG | HEART RATE: 76 BPM

## 2025-04-29 DIAGNOSIS — G31.84 MILD COGNITIVE IMPAIRMENT: ICD-10-CM

## 2025-04-29 DIAGNOSIS — G31.84 MCI (MILD COGNITIVE IMPAIRMENT): Primary | ICD-10-CM

## 2025-04-29 PROCEDURE — 96365 THER/PROPH/DIAG IV INF INIT: CPT

## 2025-04-29 PROCEDURE — 25010000002 LECANEMAB-IRMB 200 MG/2ML SOLUTION 2 ML VIAL: Performed by: PSYCHIATRY & NEUROLOGY

## 2025-04-29 PROCEDURE — 25810000003 SODIUM CHLORIDE 0.9 % SOLUTION 250 ML FLEX CONT: Performed by: PSYCHIATRY & NEUROLOGY

## 2025-04-29 PROCEDURE — 96375 TX/PRO/DX INJ NEW DRUG ADDON: CPT

## 2025-04-29 PROCEDURE — 96374 THER/PROPH/DIAG INJ IV PUSH: CPT

## 2025-04-29 PROCEDURE — 25010000002 DIPHENHYDRAMINE PER 50 MG: Performed by: PSYCHIATRY & NEUROLOGY

## 2025-04-29 PROCEDURE — 25010000002 METHYLPREDNISOLONE PER 125 MG: Performed by: PSYCHIATRY & NEUROLOGY

## 2025-04-29 RX ORDER — METHYLPREDNISOLONE SODIUM SUCCINATE 125 MG/2ML
125 INJECTION INTRAMUSCULAR; INTRAVENOUS ONCE
Status: COMPLETED | OUTPATIENT
Start: 2025-04-29 | End: 2025-04-29

## 2025-04-29 RX ORDER — SODIUM CHLORIDE 9 MG/ML
20 INJECTION, SOLUTION INTRAVENOUS ONCE
OUTPATIENT
Start: 2025-05-13

## 2025-04-29 RX ORDER — DIPHENHYDRAMINE HYDROCHLORIDE 50 MG/ML
25 INJECTION, SOLUTION INTRAMUSCULAR; INTRAVENOUS ONCE
Status: COMPLETED | OUTPATIENT
Start: 2025-04-29 | End: 2025-04-29

## 2025-04-29 RX ORDER — METHYLPREDNISOLONE SODIUM SUCCINATE 125 MG/2ML
125 INJECTION INTRAMUSCULAR; INTRAVENOUS ONCE
Start: 2025-05-13 | End: 2025-05-13

## 2025-04-29 RX ORDER — DIPHENHYDRAMINE HYDROCHLORIDE 50 MG/ML
50 INJECTION, SOLUTION INTRAMUSCULAR; INTRAVENOUS AS NEEDED
OUTPATIENT
Start: 2025-05-13

## 2025-04-29 RX ORDER — DIPHENHYDRAMINE HYDROCHLORIDE 50 MG/ML
25 INJECTION, SOLUTION INTRAMUSCULAR; INTRAVENOUS ONCE
Start: 2025-05-13 | End: 2025-05-13

## 2025-04-29 RX ORDER — FAMOTIDINE 10 MG/ML
20 INJECTION, SOLUTION INTRAVENOUS AS NEEDED
OUTPATIENT
Start: 2025-05-13

## 2025-04-29 RX ORDER — ACETAMINOPHEN 325 MG/1
650 TABLET ORAL ONCE
Status: COMPLETED | OUTPATIENT
Start: 2025-04-29 | End: 2025-04-29

## 2025-04-29 RX ORDER — ACETAMINOPHEN 325 MG/1
650 TABLET ORAL ONCE
Start: 2025-05-13 | End: 2025-05-13

## 2025-04-29 RX ORDER — HYDROCORTISONE SODIUM SUCCINATE 100 MG/2ML
100 INJECTION INTRAMUSCULAR; INTRAVENOUS AS NEEDED
OUTPATIENT
Start: 2025-05-13

## 2025-04-29 RX ADMIN — LECANEMAB 600 MG: 100 INJECTION, SOLUTION INTRAVENOUS at 10:11

## 2025-04-29 RX ADMIN — METHYLPREDNISOLONE SODIUM SUCCINATE 125 MG: 125 INJECTION, POWDER, LYOPHILIZED, FOR SOLUTION INTRAMUSCULAR; INTRAVENOUS at 09:51

## 2025-04-29 RX ADMIN — DIPHENHYDRAMINE HYDROCHLORIDE 25 MG: 50 INJECTION, SOLUTION INTRAMUSCULAR; INTRAVENOUS at 09:51

## 2025-04-29 RX ADMIN — ACETAMINOPHEN 650 MG: 325 TABLET, FILM COATED ORAL at 09:49

## 2025-04-29 NOTE — NURSING NOTE
5151 Patient arrived to [facility] for appointment, VSS denies any complaints  1119 Leqembi infusion completed patient tolerated well denies any complaints. VSS  1125 Patient discharged to home in stable condition with copy of AVS given to patient   Ventricular Rate : 140  Atrial Rate : 140  P-R Interval : 128  QRS Duration : 84  Q-T Interval : 282  QTC Calculation(Bazett) : 430  P Axis : 43  R Axis : -38  T Axis : 44  Diagnosis : Sinus tachycardia  Left axis deviation  ****Abnormal ECG****  No previous ECGs available  Confirmed by YOLANDA STEVENSON MASOOD ([id number]) on 11/21/2019 11:53:22 PM

## 2025-05-08 DIAGNOSIS — Z96.651 STATUS POST TOTAL RIGHT KNEE REPLACEMENT: ICD-10-CM

## 2025-05-08 NOTE — TELEPHONE ENCOUNTER
Rx Refill Note  Requested Prescriptions     Pending Prescriptions Disp Refills    Ibuprofen 3 %, Gabapentin 10 %, Baclofen 2 %, lidocaine 4 %, Ketamine HCl 4 % 90 g 2     Sig: Apply 1-2 g topically to the appropriate area as directed 3 (Three) to 4 (Four) times daily.      Last office visit with prescribing clinician: 10/26/2023      Next office visit with prescribing clinician: Visit date not found   Last Filled:2/12/2025    Encounter Diagnosis   Name Primary?    Status post total right knee replacement       Date of Surgery:S/p right total knee arthroplasty DOS 10/14/2020       Nicole Chase MA  05/08/25, 11:52 EDT    Previous RX pended for your approval, change or denial.     {TIP  Encounters:    {TIP  Please add Last Relevant Lab Date if appropriate:  {TIP  Is Refill Pharmacy correct?:

## 2025-05-13 ENCOUNTER — HOSPITAL ENCOUNTER (OUTPATIENT)
Dept: INFUSION THERAPY | Facility: HOSPITAL | Age: 70
Discharge: HOME | End: 2025-05-13
Admitting: PSYCHIATRY & NEUROLOGY
Payer: MEDICARE

## 2025-05-13 VITALS
BODY MASS INDEX: 26.34 KG/M2 | TEMPERATURE: 97.3 F | OXYGEN SATURATION: 100 % | WEIGHT: 144 LBS | HEART RATE: 79 BPM | SYSTOLIC BLOOD PRESSURE: 160 MMHG | RESPIRATION RATE: 16 BRPM | DIASTOLIC BLOOD PRESSURE: 89 MMHG

## 2025-05-13 DIAGNOSIS — G31.84 MCI (MILD COGNITIVE IMPAIRMENT): Primary | ICD-10-CM

## 2025-05-13 PROCEDURE — 96365 THER/PROPH/DIAG IV INF INIT: CPT

## 2025-05-13 PROCEDURE — 25010000002 DIPHENHYDRAMINE PER 50 MG: Performed by: PSYCHIATRY & NEUROLOGY

## 2025-05-13 PROCEDURE — 96374 THER/PROPH/DIAG INJ IV PUSH: CPT

## 2025-05-13 PROCEDURE — 25010000002 LECANEMAB-IRMB 200 MG/2ML SOLUTION 2 ML VIAL: Performed by: PSYCHIATRY & NEUROLOGY

## 2025-05-13 PROCEDURE — 25810000003 SODIUM CHLORIDE 0.9 % SOLUTION 250 ML FLEX CONT: Performed by: PSYCHIATRY & NEUROLOGY

## 2025-05-13 PROCEDURE — 25010000002 METHYLPREDNISOLONE PER 125 MG: Performed by: PSYCHIATRY & NEUROLOGY

## 2025-05-13 PROCEDURE — 96375 TX/PRO/DX INJ NEW DRUG ADDON: CPT

## 2025-05-13 RX ORDER — DIPHENHYDRAMINE HYDROCHLORIDE 50 MG/ML
50 INJECTION, SOLUTION INTRAMUSCULAR; INTRAVENOUS AS NEEDED
OUTPATIENT
Start: 2025-05-27

## 2025-05-13 RX ORDER — DIPHENHYDRAMINE HYDROCHLORIDE 50 MG/ML
25 INJECTION, SOLUTION INTRAMUSCULAR; INTRAVENOUS ONCE
Start: 2025-05-27 | End: 2025-05-27

## 2025-05-13 RX ORDER — HYDROCORTISONE SODIUM SUCCINATE 100 MG/2ML
100 INJECTION INTRAMUSCULAR; INTRAVENOUS AS NEEDED
OUTPATIENT
Start: 2025-05-27

## 2025-05-13 RX ORDER — ACETAMINOPHEN 325 MG/1
650 TABLET ORAL ONCE
Status: COMPLETED | OUTPATIENT
Start: 2025-05-13 | End: 2025-05-13

## 2025-05-13 RX ORDER — METHYLPREDNISOLONE SODIUM SUCCINATE 125 MG/2ML
125 INJECTION INTRAMUSCULAR; INTRAVENOUS ONCE
Status: COMPLETED | OUTPATIENT
Start: 2025-05-13 | End: 2025-05-13

## 2025-05-13 RX ORDER — METHYLPREDNISOLONE SODIUM SUCCINATE 125 MG/2ML
125 INJECTION INTRAMUSCULAR; INTRAVENOUS ONCE
Start: 2025-05-27 | End: 2025-05-27

## 2025-05-13 RX ORDER — ACETAMINOPHEN 325 MG/1
650 TABLET ORAL ONCE
Start: 2025-05-27 | End: 2025-05-27

## 2025-05-13 RX ORDER — SODIUM CHLORIDE 9 MG/ML
20 INJECTION, SOLUTION INTRAVENOUS ONCE
Status: DISCONTINUED | OUTPATIENT
Start: 2025-05-13 | End: 2025-05-15 | Stop reason: HOSPADM

## 2025-05-13 RX ORDER — SODIUM CHLORIDE 9 MG/ML
20 INJECTION, SOLUTION INTRAVENOUS ONCE
OUTPATIENT
Start: 2025-05-27

## 2025-05-13 RX ORDER — DIPHENHYDRAMINE HYDROCHLORIDE 50 MG/ML
25 INJECTION, SOLUTION INTRAMUSCULAR; INTRAVENOUS ONCE
Status: COMPLETED | OUTPATIENT
Start: 2025-05-13 | End: 2025-05-13

## 2025-05-13 RX ORDER — FAMOTIDINE 10 MG/ML
20 INJECTION, SOLUTION INTRAVENOUS AS NEEDED
OUTPATIENT
Start: 2025-05-27

## 2025-05-13 RX ADMIN — DIPHENHYDRAMINE HYDROCHLORIDE 25 MG: 50 INJECTION, SOLUTION INTRAMUSCULAR; INTRAVENOUS at 08:47

## 2025-05-13 RX ADMIN — METHYLPREDNISOLONE SODIUM SUCCINATE 125 MG: 125 INJECTION, POWDER, LYOPHILIZED, FOR SOLUTION INTRAMUSCULAR; INTRAVENOUS at 08:47

## 2025-05-13 RX ADMIN — LECANEMAB 600 MG: 100 INJECTION, SOLUTION INTRAVENOUS at 08:55

## 2025-05-13 RX ADMIN — ACETAMINOPHEN 650 MG: 325 TABLET ORAL at 08:48

## 2025-05-13 NOTE — NURSING NOTE
Patient arrived to [facility] at 0825.  History and medications reviewed with patient.  Medications administered as ordered without complications.  AVS refused by patient.  Patient discharged at 1018 in stable condition without complaint.

## 2025-05-27 ENCOUNTER — HOSPITAL ENCOUNTER (OUTPATIENT)
Dept: INFUSION THERAPY | Facility: HOSPITAL | Age: 70
Discharge: HOME | End: 2025-05-27
Admitting: PSYCHIATRY & NEUROLOGY
Payer: MEDICARE

## 2025-05-27 VITALS
BODY MASS INDEX: 26.34 KG/M2 | SYSTOLIC BLOOD PRESSURE: 153 MMHG | RESPIRATION RATE: 16 BRPM | WEIGHT: 144 LBS | OXYGEN SATURATION: 100 % | DIASTOLIC BLOOD PRESSURE: 74 MMHG | TEMPERATURE: 96.8 F | HEART RATE: 101 BPM

## 2025-05-27 DIAGNOSIS — G31.84 MCI (MILD COGNITIVE IMPAIRMENT): Primary | ICD-10-CM

## 2025-05-27 PROCEDURE — 25010000002 DIPHENHYDRAMINE PER 50 MG: Performed by: PSYCHIATRY & NEUROLOGY

## 2025-05-27 PROCEDURE — 25010000002 METHYLPREDNISOLONE PER 125 MG: Performed by: PSYCHIATRY & NEUROLOGY

## 2025-05-27 PROCEDURE — 96374 THER/PROPH/DIAG INJ IV PUSH: CPT

## 2025-05-27 PROCEDURE — 96365 THER/PROPH/DIAG IV INF INIT: CPT

## 2025-05-27 PROCEDURE — 25010000002 LECANEMAB-IRMB 200 MG/2ML SOLUTION 2 ML VIAL: Performed by: PSYCHIATRY & NEUROLOGY

## 2025-05-27 PROCEDURE — 25810000003 SODIUM CHLORIDE 0.9 % SOLUTION 250 ML FLEX CONT: Performed by: PSYCHIATRY & NEUROLOGY

## 2025-05-27 PROCEDURE — 96375 TX/PRO/DX INJ NEW DRUG ADDON: CPT

## 2025-05-27 RX ORDER — ACETAMINOPHEN 325 MG/1
650 TABLET ORAL ONCE
Start: 2025-06-10 | End: 2025-06-10

## 2025-05-27 RX ORDER — DIPHENHYDRAMINE HYDROCHLORIDE 50 MG/ML
25 INJECTION, SOLUTION INTRAMUSCULAR; INTRAVENOUS ONCE
Status: COMPLETED | OUTPATIENT
Start: 2025-05-27 | End: 2025-05-27

## 2025-05-27 RX ORDER — DIPHENHYDRAMINE HYDROCHLORIDE 50 MG/ML
50 INJECTION, SOLUTION INTRAMUSCULAR; INTRAVENOUS AS NEEDED
OUTPATIENT
Start: 2025-06-10

## 2025-05-27 RX ORDER — HYDROCORTISONE SODIUM SUCCINATE 100 MG/2ML
100 INJECTION INTRAMUSCULAR; INTRAVENOUS AS NEEDED
OUTPATIENT
Start: 2025-06-10

## 2025-05-27 RX ORDER — METHYLPREDNISOLONE SODIUM SUCCINATE 125 MG/2ML
125 INJECTION INTRAMUSCULAR; INTRAVENOUS ONCE
Status: COMPLETED | OUTPATIENT
Start: 2025-05-27 | End: 2025-05-27

## 2025-05-27 RX ORDER — SODIUM CHLORIDE 9 MG/ML
20 INJECTION, SOLUTION INTRAVENOUS ONCE
OUTPATIENT
Start: 2025-06-10

## 2025-05-27 RX ORDER — FAMOTIDINE 10 MG/ML
20 INJECTION, SOLUTION INTRAVENOUS AS NEEDED
OUTPATIENT
Start: 2025-06-10

## 2025-05-27 RX ORDER — METHYLPREDNISOLONE SODIUM SUCCINATE 125 MG/2ML
125 INJECTION INTRAMUSCULAR; INTRAVENOUS ONCE
Start: 2025-06-10 | End: 2025-06-10

## 2025-05-27 RX ORDER — DIPHENHYDRAMINE HYDROCHLORIDE 50 MG/ML
25 INJECTION, SOLUTION INTRAMUSCULAR; INTRAVENOUS ONCE
Start: 2025-06-10 | End: 2025-06-10

## 2025-05-27 RX ORDER — ACETAMINOPHEN 325 MG/1
650 TABLET ORAL ONCE
Status: COMPLETED | OUTPATIENT
Start: 2025-05-27 | End: 2025-05-27

## 2025-05-27 RX ADMIN — DIPHENHYDRAMINE HYDROCHLORIDE 25 MG: 50 INJECTION, SOLUTION INTRAMUSCULAR; INTRAVENOUS at 09:32

## 2025-05-27 RX ADMIN — METHYLPREDNISOLONE SODIUM SUCCINATE 125 MG: 125 INJECTION, POWDER, LYOPHILIZED, FOR SOLUTION INTRAMUSCULAR; INTRAVENOUS at 09:32

## 2025-05-27 RX ADMIN — ACETAMINOPHEN 650 MG: 325 TABLET ORAL at 09:32

## 2025-05-27 RX ADMIN — LECANEMAB 600 MG: 100 INJECTION, SOLUTION INTRAVENOUS at 09:49

## 2025-05-27 NOTE — PATIENT INSTRUCTIONS
"  Call Dr. Salomon Ham, Neurology @ [phone] if you have any problems or concerns.    We know you have a Choice in healthcare and appreciate you using [facility].  Our purpose is to provide you \"Excellent Care\".  We hope that you will always choose us in the future and continue to recommend us to your family and friends.              "

## 2025-05-27 NOTE — NURSING NOTE
Pt arrived to [facility] for appt. VSS, no complaints at this time. Medications and history went over. Medication administered per MD order. Pt tolerated well. VSS post infusion. Verified pt had next scheduled appt. AVS printed out, copy given to pt. Pt discharged from [facility] at 11:06 AM in stable condition, without complaints.

## 2025-06-10 ENCOUNTER — HOSPITAL ENCOUNTER (OUTPATIENT)
Dept: INFUSION THERAPY | Facility: HOSPITAL | Age: 70
Discharge: HOME | End: 2025-06-10
Admitting: PSYCHIATRY & NEUROLOGY
Payer: MEDICARE

## 2025-06-10 VITALS
WEIGHT: 144 LBS | SYSTOLIC BLOOD PRESSURE: 131 MMHG | HEART RATE: 82 BPM | BODY MASS INDEX: 26.34 KG/M2 | TEMPERATURE: 97.7 F | OXYGEN SATURATION: 98 % | RESPIRATION RATE: 16 BRPM | DIASTOLIC BLOOD PRESSURE: 76 MMHG

## 2025-06-10 DIAGNOSIS — G31.84 MCI (MILD COGNITIVE IMPAIRMENT): Primary | ICD-10-CM

## 2025-06-10 DIAGNOSIS — G31.84 MILD COGNITIVE IMPAIRMENT: ICD-10-CM

## 2025-06-10 PROCEDURE — 25810000003 SODIUM CHLORIDE 0.9 % SOLUTION 250 ML FLEX CONT: Performed by: PSYCHIATRY & NEUROLOGY

## 2025-06-10 PROCEDURE — 96375 TX/PRO/DX INJ NEW DRUG ADDON: CPT

## 2025-06-10 PROCEDURE — 25010000002 METHYLPREDNISOLONE PER 125 MG: Performed by: PSYCHIATRY & NEUROLOGY

## 2025-06-10 PROCEDURE — 25010000002 LECANEMAB-IRMB 200 MG/2ML SOLUTION 2 ML VIAL: Performed by: PSYCHIATRY & NEUROLOGY

## 2025-06-10 PROCEDURE — 96365 THER/PROPH/DIAG IV INF INIT: CPT

## 2025-06-10 PROCEDURE — 96374 THER/PROPH/DIAG INJ IV PUSH: CPT

## 2025-06-10 PROCEDURE — 25010000002 DIPHENHYDRAMINE PER 50 MG: Performed by: PSYCHIATRY & NEUROLOGY

## 2025-06-10 RX ORDER — DIPHENHYDRAMINE HYDROCHLORIDE 50 MG/ML
50 INJECTION, SOLUTION INTRAMUSCULAR; INTRAVENOUS AS NEEDED
OUTPATIENT
Start: 2025-06-24

## 2025-06-10 RX ORDER — FAMOTIDINE 10 MG/ML
20 INJECTION, SOLUTION INTRAVENOUS AS NEEDED
OUTPATIENT
Start: 2025-06-24

## 2025-06-10 RX ORDER — METHYLPREDNISOLONE SODIUM SUCCINATE 125 MG/2ML
125 INJECTION INTRAMUSCULAR; INTRAVENOUS ONCE
Status: COMPLETED | OUTPATIENT
Start: 2025-06-10 | End: 2025-06-10

## 2025-06-10 RX ORDER — HYDROCORTISONE SODIUM SUCCINATE 100 MG/2ML
100 INJECTION INTRAMUSCULAR; INTRAVENOUS AS NEEDED
OUTPATIENT
Start: 2025-06-24

## 2025-06-10 RX ORDER — METHYLPREDNISOLONE SODIUM SUCCINATE 125 MG/2ML
125 INJECTION INTRAMUSCULAR; INTRAVENOUS ONCE
Start: 2025-06-24 | End: 2025-06-24

## 2025-06-10 RX ORDER — ACETAMINOPHEN 325 MG/1
650 TABLET ORAL ONCE
Start: 2025-06-24 | End: 2025-06-24

## 2025-06-10 RX ORDER — DIPHENHYDRAMINE HYDROCHLORIDE 50 MG/ML
25 INJECTION, SOLUTION INTRAMUSCULAR; INTRAVENOUS ONCE
Status: COMPLETED | OUTPATIENT
Start: 2025-06-10 | End: 2025-06-10

## 2025-06-10 RX ORDER — SODIUM CHLORIDE 9 MG/ML
20 INJECTION, SOLUTION INTRAVENOUS ONCE
OUTPATIENT
Start: 2025-06-24

## 2025-06-10 RX ORDER — DIPHENHYDRAMINE HYDROCHLORIDE 50 MG/ML
25 INJECTION, SOLUTION INTRAMUSCULAR; INTRAVENOUS ONCE
Start: 2025-06-24 | End: 2025-06-24

## 2025-06-10 RX ORDER — SODIUM CHLORIDE 9 MG/ML
20 INJECTION, SOLUTION INTRAVENOUS ONCE
Status: DISCONTINUED | OUTPATIENT
Start: 2025-06-10 | End: 2025-06-12 | Stop reason: HOSPADM

## 2025-06-10 RX ORDER — ACETAMINOPHEN 325 MG/1
650 TABLET ORAL ONCE
Status: COMPLETED | OUTPATIENT
Start: 2025-06-10 | End: 2025-06-10

## 2025-06-10 RX ADMIN — LECANEMAB 600 MG: 100 INJECTION, SOLUTION INTRAVENOUS at 10:30

## 2025-06-10 RX ADMIN — DIPHENHYDRAMINE HYDROCHLORIDE 25 MG: 50 INJECTION, SOLUTION INTRAMUSCULAR; INTRAVENOUS at 10:16

## 2025-06-10 RX ADMIN — ACETAMINOPHEN 650 MG: 325 TABLET ORAL at 10:10

## 2025-06-10 RX ADMIN — METHYLPREDNISOLONE SODIUM SUCCINATE 125 MG: 125 INJECTION, POWDER, FOR SOLUTION INTRAMUSCULAR; INTRAVENOUS at 10:13

## 2025-06-10 NOTE — NURSING NOTE
0930  Pt here ambulatory to [facility] for Leqembi infusion.  1140  Pt discharged ambulatory with discharge AVS paperwork and next appt.  Pt denies any adverse reactions to infusion today; VSS.  Pt denies any headache or double vision.

## 2025-06-10 NOTE — PATIENT INSTRUCTIONS
"Call Dr. Salomon Ham, Neurology at [phone] if you have any problems or concerns.    We know you have a Choice in healthcare and appreciate you using [facility].  Our purpose is to provide you \"Excellent Care\".  We hope that you will always choose us in the future and continue to recommend us to your family and friends.     "

## 2025-06-16 RX ORDER — CYCLOBENZAPRINE HCL 10 MG
10 TABLET ORAL 3 TIMES DAILY PRN
Qty: 60 TABLET | Refills: 0 | Status: SHIPPED | OUTPATIENT
Start: 2025-06-16

## 2025-06-16 NOTE — TELEPHONE ENCOUNTER
Rx Refill Note  Requested Prescriptions     Pending Prescriptions Disp Refills    cyclobenzaprine (FLEXERIL) 10 MG tablet [Pharmacy Med Name: CYCLOBENZAPRINE 10 MG TABLET] 60 tablet 0     Sig: TAKE 1 TABLET BY MOUTH 3 TIMES A DAY AS NEEDED FOR MUSCLE SPASMS.      Last office visit with prescribing clinician: 4/23/2024      Next office visit with prescribing clinician: Visit date not found   Last Filled:04-21-25      No diagnosis found. Status post total right knee replacement +1   Date of Surgery:      Beulah Valiente MA  06/16/25, 11:18 EDT    {TIP  Encounters:    {TIP  Please add Last Relevant Lab Date if appropriate:  {TIP  Is Refill Pharmacy correct?:

## 2025-06-24 ENCOUNTER — HOSPITAL ENCOUNTER (OUTPATIENT)
Dept: INFUSION THERAPY | Facility: HOSPITAL | Age: 70
Discharge: HOME | End: 2025-06-24
Admitting: PSYCHIATRY & NEUROLOGY
Payer: MEDICARE

## 2025-06-24 VITALS
SYSTOLIC BLOOD PRESSURE: 152 MMHG | HEART RATE: 82 BPM | RESPIRATION RATE: 16 BRPM | DIASTOLIC BLOOD PRESSURE: 69 MMHG | OXYGEN SATURATION: 100 % | TEMPERATURE: 98.4 F

## 2025-06-24 DIAGNOSIS — G31.84 MCI (MILD COGNITIVE IMPAIRMENT): Primary | ICD-10-CM

## 2025-06-24 PROCEDURE — 96374 THER/PROPH/DIAG INJ IV PUSH: CPT

## 2025-06-24 PROCEDURE — 96375 TX/PRO/DX INJ NEW DRUG ADDON: CPT

## 2025-06-24 PROCEDURE — 25010000002 DIPHENHYDRAMINE PER 50 MG: Performed by: PSYCHIATRY & NEUROLOGY

## 2025-06-24 PROCEDURE — 96365 THER/PROPH/DIAG IV INF INIT: CPT

## 2025-06-24 PROCEDURE — 25010000002 LECANEMAB-IRMB 200 MG/2ML SOLUTION 2 ML VIAL: Performed by: PSYCHIATRY & NEUROLOGY

## 2025-06-24 PROCEDURE — 25010000002 METHYLPREDNISOLONE PER 125 MG: Performed by: PSYCHIATRY & NEUROLOGY

## 2025-06-24 PROCEDURE — 25810000003 SODIUM CHLORIDE 0.9 % SOLUTION 250 ML FLEX CONT: Performed by: PSYCHIATRY & NEUROLOGY

## 2025-06-24 RX ORDER — ACETAMINOPHEN 325 MG/1
650 TABLET ORAL ONCE
Status: COMPLETED | OUTPATIENT
Start: 2025-06-24 | End: 2025-06-24

## 2025-06-24 RX ORDER — DIPHENHYDRAMINE HYDROCHLORIDE 50 MG/ML
25 INJECTION, SOLUTION INTRAMUSCULAR; INTRAVENOUS ONCE
Start: 2025-07-08 | End: 2025-07-08

## 2025-06-24 RX ORDER — FAMOTIDINE 10 MG/ML
20 INJECTION, SOLUTION INTRAVENOUS AS NEEDED
OUTPATIENT
Start: 2025-07-08

## 2025-06-24 RX ORDER — SODIUM CHLORIDE 9 MG/ML
20 INJECTION, SOLUTION INTRAVENOUS ONCE
OUTPATIENT
Start: 2025-07-08

## 2025-06-24 RX ORDER — DIPHENHYDRAMINE HYDROCHLORIDE 50 MG/ML
25 INJECTION, SOLUTION INTRAMUSCULAR; INTRAVENOUS ONCE
Status: COMPLETED | OUTPATIENT
Start: 2025-06-24 | End: 2025-06-24

## 2025-06-24 RX ORDER — HYDROCORTISONE SODIUM SUCCINATE 100 MG/2ML
100 INJECTION INTRAMUSCULAR; INTRAVENOUS AS NEEDED
OUTPATIENT
Start: 2025-07-08

## 2025-06-24 RX ORDER — ACETAMINOPHEN 325 MG/1
650 TABLET ORAL ONCE
Start: 2025-07-08 | End: 2025-07-08

## 2025-06-24 RX ORDER — METHYLPREDNISOLONE SODIUM SUCCINATE 125 MG/2ML
125 INJECTION INTRAMUSCULAR; INTRAVENOUS ONCE
Status: COMPLETED | OUTPATIENT
Start: 2025-06-24 | End: 2025-06-24

## 2025-06-24 RX ORDER — DIPHENHYDRAMINE HYDROCHLORIDE 50 MG/ML
50 INJECTION, SOLUTION INTRAMUSCULAR; INTRAVENOUS AS NEEDED
OUTPATIENT
Start: 2025-07-08

## 2025-06-24 RX ORDER — METHYLPREDNISOLONE SODIUM SUCCINATE 125 MG/2ML
125 INJECTION INTRAMUSCULAR; INTRAVENOUS ONCE
Start: 2025-07-08 | End: 2025-07-08

## 2025-06-24 RX ADMIN — METHYLPREDNISOLONE SODIUM SUCCINATE 125 MG: 125 INJECTION, POWDER, FOR SOLUTION INTRAMUSCULAR; INTRAVENOUS at 09:04

## 2025-06-24 RX ADMIN — LECANEMAB 600 MG: 100 INJECTION, SOLUTION INTRAVENOUS at 09:28

## 2025-06-24 RX ADMIN — DIPHENHYDRAMINE HYDROCHLORIDE 25 MG: 50 INJECTION, SOLUTION INTRAMUSCULAR; INTRAVENOUS at 09:04

## 2025-06-24 RX ADMIN — ACETAMINOPHEN 650 MG: 325 TABLET ORAL at 09:04

## 2025-06-24 NOTE — PATIENT INSTRUCTIONS
"  Call Dr. Salomon Ham, Neurology @ [phone] if you have any problems or concerns.    We know you have a Choice in healthcare and appreciate you using [facility].  Our purpose is to provide you \"Excellent Care\".  We hope that you will always choose us in the future and continue to recommend us to your family and friends.              "

## 2025-06-24 NOTE — NURSING NOTE
Pt arrived to [facility] for appt. VSS, no complaints at this time. Medication and history reviewed with pt. Medication administered per MD order. Pt tolerated well. VSS post infusion. Scheduled next appt. AVS printed out, copy given to pt. Pt discharged from [facility] at 10:50 AM in stable condition, without complaints.

## 2025-07-08 ENCOUNTER — HOSPITAL ENCOUNTER (OUTPATIENT)
Dept: INFUSION THERAPY | Facility: HOSPITAL | Age: 70
Discharge: HOME | End: 2025-07-08
Admitting: PSYCHIATRY & NEUROLOGY
Payer: MEDICARE

## 2025-07-08 VITALS
SYSTOLIC BLOOD PRESSURE: 141 MMHG | BODY MASS INDEX: 26.34 KG/M2 | WEIGHT: 144 LBS | RESPIRATION RATE: 16 BRPM | HEART RATE: 84 BPM | DIASTOLIC BLOOD PRESSURE: 64 MMHG | OXYGEN SATURATION: 99 %

## 2025-07-08 DIAGNOSIS — G31.84 MCI (MILD COGNITIVE IMPAIRMENT): Primary | ICD-10-CM

## 2025-07-08 PROCEDURE — 96374 THER/PROPH/DIAG INJ IV PUSH: CPT

## 2025-07-08 PROCEDURE — 25810000003 SODIUM CHLORIDE 0.9 % SOLUTION 250 ML FLEX CONT: Performed by: PSYCHIATRY & NEUROLOGY

## 2025-07-08 PROCEDURE — 25010000002 LECANEMAB-IRMB 200 MG/2ML SOLUTION 2 ML VIAL: Performed by: PSYCHIATRY & NEUROLOGY

## 2025-07-08 PROCEDURE — 25010000002 METHYLPREDNISOLONE PER 125 MG: Performed by: PSYCHIATRY & NEUROLOGY

## 2025-07-08 PROCEDURE — 96375 TX/PRO/DX INJ NEW DRUG ADDON: CPT

## 2025-07-08 PROCEDURE — 96365 THER/PROPH/DIAG IV INF INIT: CPT

## 2025-07-08 PROCEDURE — 25010000002 DIPHENHYDRAMINE PER 50 MG: Performed by: PSYCHIATRY & NEUROLOGY

## 2025-07-08 RX ORDER — METHYLPREDNISOLONE SODIUM SUCCINATE 125 MG/2ML
125 INJECTION INTRAMUSCULAR; INTRAVENOUS ONCE
Start: 2025-07-22 | End: 2025-07-22

## 2025-07-08 RX ORDER — ACETAMINOPHEN 325 MG/1
650 TABLET ORAL ONCE
Status: COMPLETED | OUTPATIENT
Start: 2025-07-08 | End: 2025-07-08

## 2025-07-08 RX ORDER — DIPHENHYDRAMINE HYDROCHLORIDE 50 MG/ML
25 INJECTION, SOLUTION INTRAMUSCULAR; INTRAVENOUS ONCE
Status: COMPLETED | OUTPATIENT
Start: 2025-07-08 | End: 2025-07-08

## 2025-07-08 RX ORDER — FAMOTIDINE 10 MG/ML
20 INJECTION, SOLUTION INTRAVENOUS AS NEEDED
OUTPATIENT
Start: 2025-07-22

## 2025-07-08 RX ORDER — HYDROCORTISONE SODIUM SUCCINATE 100 MG/2ML
100 INJECTION INTRAMUSCULAR; INTRAVENOUS AS NEEDED
OUTPATIENT
Start: 2025-07-22

## 2025-07-08 RX ORDER — METHYLPREDNISOLONE SODIUM SUCCINATE 125 MG/2ML
125 INJECTION INTRAMUSCULAR; INTRAVENOUS ONCE
Status: COMPLETED | OUTPATIENT
Start: 2025-07-08 | End: 2025-07-08

## 2025-07-08 RX ORDER — DIPHENHYDRAMINE HYDROCHLORIDE 50 MG/ML
50 INJECTION, SOLUTION INTRAMUSCULAR; INTRAVENOUS AS NEEDED
OUTPATIENT
Start: 2025-07-22

## 2025-07-08 RX ORDER — ACETAMINOPHEN 325 MG/1
650 TABLET ORAL ONCE
Start: 2025-07-22 | End: 2025-07-22

## 2025-07-08 RX ORDER — SODIUM CHLORIDE 9 MG/ML
20 INJECTION, SOLUTION INTRAVENOUS ONCE
OUTPATIENT
Start: 2025-07-22

## 2025-07-08 RX ORDER — DIPHENHYDRAMINE HYDROCHLORIDE 50 MG/ML
25 INJECTION, SOLUTION INTRAMUSCULAR; INTRAVENOUS ONCE
Start: 2025-07-22 | End: 2025-07-22

## 2025-07-08 RX ADMIN — LECANEMAB 600 MG: 100 INJECTION, SOLUTION INTRAVENOUS at 10:07

## 2025-07-08 RX ADMIN — METHYLPREDNISOLONE SODIUM SUCCINATE 125 MG: 125 INJECTION, POWDER, FOR SOLUTION INTRAMUSCULAR; INTRAVENOUS at 09:58

## 2025-07-08 RX ADMIN — ACETAMINOPHEN 650 MG: 325 TABLET ORAL at 09:57

## 2025-07-08 RX ADMIN — DIPHENHYDRAMINE HYDROCHLORIDE 25 MG: 50 INJECTION, SOLUTION INTRAMUSCULAR; INTRAVENOUS at 09:57

## 2025-07-08 NOTE — NURSING NOTE
0919 Patient arrived for appointment for [facility]. History and medications reviewed, VSS   1114 Leqembi completed patient tolerated well. Patient refused AVS when offered. VSS  1120 Patient discharged to home in stable condition.

## 2025-07-08 NOTE — PATIENT INSTRUCTIONS
"  Call Dr. Salomon Ham, Neurology @ [phone] if you have any problems or concerns.    We know you have a Choice in healthcare and appreciate you using [facility].  Our purpose is to provide you \"Excellent Care\".  We hope that you will always choose us in the future and continue to recommend us to your family and friends.              "

## 2025-07-22 ENCOUNTER — HOSPITAL ENCOUNTER (OUTPATIENT)
Dept: INFUSION THERAPY | Facility: HOSPITAL | Age: 70
Discharge: HOME | End: 2025-07-22
Admitting: PSYCHIATRY & NEUROLOGY
Payer: MEDICARE

## 2025-07-22 VITALS
TEMPERATURE: 97.5 F | SYSTOLIC BLOOD PRESSURE: 139 MMHG | RESPIRATION RATE: 15 BRPM | OXYGEN SATURATION: 96 % | HEART RATE: 94 BPM | DIASTOLIC BLOOD PRESSURE: 78 MMHG

## 2025-07-22 DIAGNOSIS — G31.84 MCI (MILD COGNITIVE IMPAIRMENT): Primary | ICD-10-CM

## 2025-07-22 PROCEDURE — 25010000002 LECANEMAB-IRMB 200 MG/2ML SOLUTION 2 ML VIAL: Performed by: PSYCHIATRY & NEUROLOGY

## 2025-07-22 PROCEDURE — 96375 TX/PRO/DX INJ NEW DRUG ADDON: CPT

## 2025-07-22 PROCEDURE — 96365 THER/PROPH/DIAG IV INF INIT: CPT

## 2025-07-22 PROCEDURE — 25010000002 METHYLPREDNISOLONE PER 125 MG: Performed by: PSYCHIATRY & NEUROLOGY

## 2025-07-22 PROCEDURE — 96374 THER/PROPH/DIAG INJ IV PUSH: CPT

## 2025-07-22 PROCEDURE — 25010000002 DIPHENHYDRAMINE PER 50 MG: Performed by: PSYCHIATRY & NEUROLOGY

## 2025-07-22 PROCEDURE — 25810000003 SODIUM CHLORIDE 0.9 % SOLUTION 250 ML FLEX CONT: Performed by: PSYCHIATRY & NEUROLOGY

## 2025-07-22 RX ORDER — FAMOTIDINE 10 MG/ML
20 INJECTION, SOLUTION INTRAVENOUS AS NEEDED
OUTPATIENT
Start: 2025-08-05

## 2025-07-22 RX ORDER — SODIUM CHLORIDE 9 MG/ML
20 INJECTION, SOLUTION INTRAVENOUS ONCE
OUTPATIENT
Start: 2025-08-05

## 2025-07-22 RX ORDER — DIPHENHYDRAMINE HYDROCHLORIDE 50 MG/ML
25 INJECTION, SOLUTION INTRAMUSCULAR; INTRAVENOUS ONCE
Start: 2025-08-05 | End: 2025-08-05

## 2025-07-22 RX ORDER — DIPHENHYDRAMINE HYDROCHLORIDE 50 MG/ML
25 INJECTION, SOLUTION INTRAMUSCULAR; INTRAVENOUS ONCE
Status: COMPLETED | OUTPATIENT
Start: 2025-07-22 | End: 2025-07-22

## 2025-07-22 RX ORDER — ACETAMINOPHEN 325 MG/1
650 TABLET ORAL ONCE
Status: COMPLETED | OUTPATIENT
Start: 2025-07-22 | End: 2025-07-22

## 2025-07-22 RX ORDER — DIPHENHYDRAMINE HYDROCHLORIDE 50 MG/ML
50 INJECTION, SOLUTION INTRAMUSCULAR; INTRAVENOUS AS NEEDED
OUTPATIENT
Start: 2025-08-05

## 2025-07-22 RX ORDER — METHYLPREDNISOLONE SODIUM SUCCINATE 125 MG/2ML
125 INJECTION INTRAMUSCULAR; INTRAVENOUS ONCE
Status: COMPLETED | OUTPATIENT
Start: 2025-07-22 | End: 2025-07-22

## 2025-07-22 RX ORDER — METHYLPREDNISOLONE SODIUM SUCCINATE 125 MG/2ML
125 INJECTION INTRAMUSCULAR; INTRAVENOUS ONCE
Start: 2025-08-05 | End: 2025-08-05

## 2025-07-22 RX ORDER — ACETAMINOPHEN 325 MG/1
650 TABLET ORAL ONCE
Start: 2025-08-05 | End: 2025-08-05

## 2025-07-22 RX ORDER — HYDROCORTISONE SODIUM SUCCINATE 100 MG/2ML
100 INJECTION INTRAMUSCULAR; INTRAVENOUS AS NEEDED
OUTPATIENT
Start: 2025-08-05

## 2025-07-22 RX ADMIN — ACETAMINOPHEN 650 MG: 325 TABLET, FILM COATED ORAL at 09:57

## 2025-07-22 RX ADMIN — DIPHENHYDRAMINE HYDROCHLORIDE 25 MG: 50 INJECTION INTRAMUSCULAR; INTRAVENOUS at 09:58

## 2025-07-22 RX ADMIN — LECANEMAB 600 MG: 100 INJECTION, SOLUTION INTRAVENOUS at 10:14

## 2025-07-22 RX ADMIN — METHYLPREDNISOLONE SODIUM SUCCINATE 125 MG: 125 INJECTION, POWDER, FOR SOLUTION INTRAMUSCULAR; INTRAVENOUS at 09:58

## 2025-07-22 NOTE — NURSING NOTE
Patient arrived to [facility] at 0911. History and medications reviewed. VSS. Medications given as ordered. Patient tolerated well without complications. AVS reviewed and sent home. Patient discharged at 1125 in stable condition without complaints.

## 2025-07-22 NOTE — PATIENT INSTRUCTIONS
"Call Dr. Salomon Ham, Neurology at [phone] if you have any problems or concerns.    We know you have a Choice in healthcare and appreciate you using [facility].  Our purpose is to provide you \"Excellent Care\".  We hope that you will always choose us in the future and continue to recommend us to your family and friends.     "

## 2025-07-24 ENCOUNTER — OFFICE VISIT (OUTPATIENT)
Dept: NEUROLOGY | Facility: CLINIC | Age: 70
End: 2025-07-24
Payer: MEDICARE

## 2025-07-24 VITALS
SYSTOLIC BLOOD PRESSURE: 116 MMHG | WEIGHT: 147.4 LBS | DIASTOLIC BLOOD PRESSURE: 70 MMHG | HEART RATE: 105 BPM | OXYGEN SATURATION: 95 % | BODY MASS INDEX: 26.96 KG/M2

## 2025-07-24 DIAGNOSIS — F02.80 ALZHEIMER'S DISEASE: Primary | ICD-10-CM

## 2025-07-24 DIAGNOSIS — G30.9 ALZHEIMER'S DISEASE: Primary | ICD-10-CM

## 2025-07-24 NOTE — PROGRESS NOTES
Notes by [facility]:  Ms Benson is here today with her sister for a follow up appointment concerning treatment for memory loss.      Subjective:     Patient ID: Yesi Benson is a 70 y.o. female.    Memory Loss    The following portions of the patient's history were reviewed and updated as appropriate: allergies, past family history, past medical history, past social history, past surgical history, and problem list.    History of Present Illness      Review of Systems   Psychiatric/Behavioral:  Positive for confusion and decreased concentration.         Objective:    Neurological Exam   She is awake alert pleasant cooperative with reasonably fluent speech and reasonable speech comprehension.  Repeat MoCA score today is 16 out of 30.     Cranial nerves II through XII normal and symmetric.  I do not see any visual field limitation by confrontation today.     Motor exam reveals age-appropriate tone bulk and power without lateralization and without drift.     Sensory exam reveals reasonably preserved and symmetric sensation to light touch, temperature, and vibration.     Coordination testing reveals smooth and accurate finger-nose-finger normal rapid alternating movements.     Tendon reflexes are 1+ and symmetric.  Toes are downgoing.  Physical Exam    Assessment/Plan:     Diagnoses and all orders for this visit:    1. Alzheimer's disease (Primary)         Assessment & Plan  Doing very well with Leqembi every 2 weeks.  Tolerating the infusions well.  Very minor slip in her cognitive screens.  Overall she is very happy with her progress.  No new neurological findings today.  No lateralizing features.  She is approaching 18 months at which point she could switch over to maintenance therapy but she prefers to stay on every 2-week infusions for the time being.  Will see her back in 3 months to see how she is doing.  Consider adding cholinesterase therapy at that time.    Patient or patient representative verbalized consent for the  use of Ambient Listening during the visit with  Salomon Ham MD for chart documentation. 7/24/2025  14:20 EDT

## 2025-08-05 ENCOUNTER — HOSPITAL ENCOUNTER (OUTPATIENT)
Dept: INFUSION THERAPY | Facility: HOSPITAL | Age: 70
Discharge: HOME | End: 2025-08-05
Admitting: PSYCHIATRY & NEUROLOGY
Payer: MEDICARE

## 2025-08-05 VITALS
TEMPERATURE: 98.3 F | HEART RATE: 83 BPM | DIASTOLIC BLOOD PRESSURE: 67 MMHG | SYSTOLIC BLOOD PRESSURE: 132 MMHG | OXYGEN SATURATION: 99 % | RESPIRATION RATE: 15 BRPM

## 2025-08-05 DIAGNOSIS — G31.84 MILD COGNITIVE IMPAIRMENT: ICD-10-CM

## 2025-08-05 DIAGNOSIS — G31.84 MCI (MILD COGNITIVE IMPAIRMENT): Primary | ICD-10-CM

## 2025-08-05 PROCEDURE — 96374 THER/PROPH/DIAG INJ IV PUSH: CPT

## 2025-08-05 PROCEDURE — 96375 TX/PRO/DX INJ NEW DRUG ADDON: CPT

## 2025-08-05 PROCEDURE — 25010000002 LECANEMAB-IRMB 200 MG/2ML SOLUTION 2 ML VIAL: Performed by: PSYCHIATRY & NEUROLOGY

## 2025-08-05 PROCEDURE — 25810000003 SODIUM CHLORIDE 0.9 % SOLUTION 250 ML FLEX CONT: Performed by: PSYCHIATRY & NEUROLOGY

## 2025-08-05 PROCEDURE — 25010000002 LECANEMAB-IRMB 500 MG/5ML SOLUTION 5 ML VIAL: Performed by: PSYCHIATRY & NEUROLOGY

## 2025-08-05 PROCEDURE — 25010000002 DIPHENHYDRAMINE PER 50 MG: Performed by: PSYCHIATRY & NEUROLOGY

## 2025-08-05 PROCEDURE — 25010000002 METHYLPREDNISOLONE PER 125 MG: Performed by: PSYCHIATRY & NEUROLOGY

## 2025-08-05 PROCEDURE — 96365 THER/PROPH/DIAG IV INF INIT: CPT

## 2025-08-05 RX ORDER — ACETAMINOPHEN 325 MG/1
650 TABLET ORAL ONCE
Status: COMPLETED | OUTPATIENT
Start: 2025-08-05 | End: 2025-08-05

## 2025-08-05 RX ORDER — DIPHENHYDRAMINE HYDROCHLORIDE 50 MG/ML
50 INJECTION, SOLUTION INTRAMUSCULAR; INTRAVENOUS AS NEEDED
OUTPATIENT
Start: 2025-08-19

## 2025-08-05 RX ORDER — METHYLPREDNISOLONE SODIUM SUCCINATE 125 MG/2ML
125 INJECTION INTRAMUSCULAR; INTRAVENOUS ONCE
Status: COMPLETED | OUTPATIENT
Start: 2025-08-05 | End: 2025-08-05

## 2025-08-05 RX ORDER — DIPHENHYDRAMINE HYDROCHLORIDE 50 MG/ML
25 INJECTION, SOLUTION INTRAMUSCULAR; INTRAVENOUS ONCE
Status: COMPLETED | OUTPATIENT
Start: 2025-08-05 | End: 2025-08-05

## 2025-08-05 RX ORDER — SODIUM CHLORIDE 9 MG/ML
20 INJECTION, SOLUTION INTRAVENOUS ONCE
OUTPATIENT
Start: 2025-08-19

## 2025-08-05 RX ORDER — HYDROCORTISONE SODIUM SUCCINATE 100 MG/2ML
100 INJECTION INTRAMUSCULAR; INTRAVENOUS AS NEEDED
OUTPATIENT
Start: 2025-08-19

## 2025-08-05 RX ORDER — FAMOTIDINE 10 MG/ML
20 INJECTION, SOLUTION INTRAVENOUS AS NEEDED
OUTPATIENT
Start: 2025-08-19

## 2025-08-05 RX ORDER — DIPHENHYDRAMINE HYDROCHLORIDE 50 MG/ML
25 INJECTION, SOLUTION INTRAMUSCULAR; INTRAVENOUS ONCE
Start: 2025-08-19 | End: 2025-08-19

## 2025-08-05 RX ORDER — ACETAMINOPHEN 325 MG/1
650 TABLET ORAL ONCE
Start: 2025-08-19 | End: 2025-08-19

## 2025-08-05 RX ORDER — METHYLPREDNISOLONE SODIUM SUCCINATE 125 MG/2ML
125 INJECTION INTRAMUSCULAR; INTRAVENOUS ONCE
Start: 2025-08-19 | End: 2025-08-19

## 2025-08-05 RX ADMIN — DIPHENHYDRAMINE HYDROCHLORIDE 25 MG: 50 INJECTION, SOLUTION INTRAMUSCULAR; INTRAVENOUS at 10:02

## 2025-08-05 RX ADMIN — METHYLPREDNISOLONE SODIUM SUCCINATE 125 MG: 125 INJECTION, POWDER, FOR SOLUTION INTRAMUSCULAR; INTRAVENOUS at 10:02

## 2025-08-05 RX ADMIN — ACETAMINOPHEN 650 MG: 325 TABLET, FILM COATED ORAL at 10:02

## 2025-08-05 RX ADMIN — LECANEMAB 700 MG: 100 INJECTION, SOLUTION INTRAVENOUS at 10:13

## 2025-08-12 DIAGNOSIS — Z96.651 STATUS POST TOTAL RIGHT KNEE REPLACEMENT: ICD-10-CM

## 2025-08-19 ENCOUNTER — HOSPITAL ENCOUNTER (OUTPATIENT)
Dept: INFUSION THERAPY | Facility: HOSPITAL | Age: 70
Discharge: HOME | End: 2025-08-19
Admitting: PSYCHIATRY & NEUROLOGY
Payer: MEDICARE

## 2025-08-19 VITALS
OXYGEN SATURATION: 100 % | HEART RATE: 89 BPM | TEMPERATURE: 98.2 F | RESPIRATION RATE: 16 BRPM | DIASTOLIC BLOOD PRESSURE: 72 MMHG | SYSTOLIC BLOOD PRESSURE: 129 MMHG

## 2025-08-19 DIAGNOSIS — G31.84 MCI (MILD COGNITIVE IMPAIRMENT): Primary | ICD-10-CM

## 2025-08-19 PROCEDURE — 25010000002 LECANEMAB-IRMB 500 MG/5ML SOLUTION 5 ML VIAL: Performed by: PSYCHIATRY & NEUROLOGY

## 2025-08-19 PROCEDURE — 25010000002 DIPHENHYDRAMINE PER 50 MG: Performed by: PSYCHIATRY & NEUROLOGY

## 2025-08-19 PROCEDURE — 96375 TX/PRO/DX INJ NEW DRUG ADDON: CPT

## 2025-08-19 PROCEDURE — 25010000002 LECANEMAB-IRMB 200 MG/2ML SOLUTION 2 ML VIAL: Performed by: PSYCHIATRY & NEUROLOGY

## 2025-08-19 PROCEDURE — 96365 THER/PROPH/DIAG IV INF INIT: CPT

## 2025-08-19 PROCEDURE — 96374 THER/PROPH/DIAG INJ IV PUSH: CPT

## 2025-08-19 PROCEDURE — 25810000003 SODIUM CHLORIDE 0.9 % SOLUTION 250 ML FLEX CONT: Performed by: PSYCHIATRY & NEUROLOGY

## 2025-08-19 PROCEDURE — 25010000002 METHYLPREDNISOLONE PER 125 MG: Performed by: PSYCHIATRY & NEUROLOGY

## 2025-08-19 RX ORDER — METHYLPREDNISOLONE SODIUM SUCCINATE 125 MG/2ML
125 INJECTION INTRAMUSCULAR; INTRAVENOUS ONCE
Start: 2025-09-02 | End: 2025-09-02

## 2025-08-19 RX ORDER — METHYLPREDNISOLONE SODIUM SUCCINATE 125 MG/2ML
125 INJECTION INTRAMUSCULAR; INTRAVENOUS ONCE
Status: COMPLETED | OUTPATIENT
Start: 2025-08-19 | End: 2025-08-19

## 2025-08-19 RX ORDER — ACETAMINOPHEN 325 MG/1
650 TABLET ORAL ONCE
Status: COMPLETED | OUTPATIENT
Start: 2025-08-19 | End: 2025-08-19

## 2025-08-19 RX ORDER — FAMOTIDINE 10 MG/ML
20 INJECTION, SOLUTION INTRAVENOUS AS NEEDED
OUTPATIENT
Start: 2025-09-02

## 2025-08-19 RX ORDER — SODIUM CHLORIDE 9 MG/ML
20 INJECTION, SOLUTION INTRAVENOUS ONCE
OUTPATIENT
Start: 2025-09-02

## 2025-08-19 RX ORDER — ACETAMINOPHEN 325 MG/1
650 TABLET ORAL ONCE
Start: 2025-09-02 | End: 2025-09-02

## 2025-08-19 RX ORDER — DIPHENHYDRAMINE HYDROCHLORIDE 50 MG/ML
50 INJECTION, SOLUTION INTRAMUSCULAR; INTRAVENOUS AS NEEDED
OUTPATIENT
Start: 2025-09-02

## 2025-08-19 RX ORDER — DIPHENHYDRAMINE HYDROCHLORIDE 50 MG/ML
25 INJECTION, SOLUTION INTRAMUSCULAR; INTRAVENOUS ONCE
Start: 2025-09-02 | End: 2025-09-02

## 2025-08-19 RX ORDER — HYDROCORTISONE SODIUM SUCCINATE 100 MG/2ML
100 INJECTION INTRAMUSCULAR; INTRAVENOUS AS NEEDED
OUTPATIENT
Start: 2025-09-02

## 2025-08-19 RX ORDER — DIPHENHYDRAMINE HYDROCHLORIDE 50 MG/ML
25 INJECTION, SOLUTION INTRAMUSCULAR; INTRAVENOUS ONCE
Status: COMPLETED | OUTPATIENT
Start: 2025-08-19 | End: 2025-08-19

## 2025-08-19 RX ADMIN — DIPHENHYDRAMINE HYDROCHLORIDE 25 MG: 50 INJECTION INTRAMUSCULAR; INTRAVENOUS at 09:37

## 2025-08-19 RX ADMIN — ACETAMINOPHEN 650 MG: 325 TABLET, FILM COATED ORAL at 09:37

## 2025-08-19 RX ADMIN — METHYLPREDNISOLONE SODIUM SUCCINATE 125 MG: 125 INJECTION, POWDER, FOR SOLUTION INTRAMUSCULAR; INTRAVENOUS at 09:37

## 2025-08-19 RX ADMIN — LECANEMAB 670 MG: 100 INJECTION, SOLUTION INTRAVENOUS at 09:56

## 2025-08-21 RX ORDER — CYCLOBENZAPRINE HCL 10 MG
10 TABLET ORAL 3 TIMES DAILY PRN
Qty: 60 TABLET | Refills: 0 | Status: SHIPPED | OUTPATIENT
Start: 2025-08-21

## (undated) DEVICE — SNAR POLYP CAPTIFLX WD OVL 27MM 240CM

## (undated) DEVICE — Device

## (undated) DEVICE — 3M™ STERI-DRAPE™ U-DRAPE 1015: Brand: STERI-DRAPE™

## (undated) DEVICE — CAP GUIDE PSI/SIGNATURE/I-ASSIST

## (undated) DEVICE — JACKT LAB F/R KNIT CUFF/COLR XLG BLU

## (undated) DEVICE — LINER SURG CANSTR SXN S/RIGD 1500CC

## (undated) DEVICE — BW-412T DISP COMBO CLEANING BRUSH: Brand: SINGLE USE COMBINATION CLEANING BRUSH

## (undated) DEVICE — BNDG ADHS CURAD FLX/FABRC 2X4IN STRL LF

## (undated) DEVICE — TRAP POLYP 4CHAMBER

## (undated) DEVICE — FRCP BX RADJAW4 NDL 2.8 240CM LG OG BX40

## (undated) DEVICE — VIAL FORMALIN CAP 10P 40ML

## (undated) DEVICE — PIN DRL NOHEAD TROC 3.2X75MM

## (undated) DEVICE — ENDO. PORT CONNECTOR W/VALVE FOR OLYMPUS® SCOPES: Brand: ERBE

## (undated) DEVICE — GLV SURG SENSICARE PI MIC PF SZ8 LF STRL

## (undated) DEVICE — WEBRIL* CAST PADDING: Brand: DEROYAL

## (undated) DEVICE — SOL IRR H2O BTL 1000ML STRL

## (undated) DEVICE — PREP SOL POVIDONE/IODINE BT 4OZ

## (undated) DEVICE — SYS SKIN CLS DERMABOND PRINEO W/22CM MESH TP

## (undated) DEVICE — MEDI-VAC YANK SUCT HNDL W/TPRD BULBOUS TIP: Brand: CARDINAL HEALTH

## (undated) DEVICE — SOL ISO/ALC RUB 70PCT 4OZ

## (undated) DEVICE — DRAPE,REIN 53X77,STERILE: Brand: MEDLINE

## (undated) DEVICE — SYR LL W/SCALE/MARK 3ML STRL

## (undated) DEVICE — SCRW HEX PERSONA FML 2.5X25MM PK/2
Type: IMPLANTABLE DEVICE | Site: KNEE | Status: NON-FUNCTIONAL
Removed: 2020-10-14

## (undated) DEVICE — HOOD, PEEL-AWAY: Brand: FLYTE

## (undated) DEVICE — CEMENT MIXING SYSTEM WITH FEMORAL BREAKWAY NOZZLE: Brand: REVOLUTION

## (undated) DEVICE — Device: Brand: DEFENDO AIR/WATER/SUCTION AND BIOPSY VALVE

## (undated) DEVICE — SPNG GZ WOVN 4X4IN 12PLY 10/BX STRL

## (undated) DEVICE — DUAL CUT SAGITTAL BLADE

## (undated) DEVICE — KT ORCA ORCAPOD DISP STRL

## (undated) DEVICE — GLV SURG SENSICARE ORTHO PF LF 7.5 STRL

## (undated) DEVICE — SCRW HEX CORT HD 3.5X38MM
Type: IMPLANTABLE DEVICE | Site: KNEE | Status: NON-FUNCTIONAL
Removed: 2020-10-14

## (undated) DEVICE — INTENDED FOR TISSUE SEPARATION, AND OTHER PROCEDURES THAT REQUIRE A SHARP SURGICAL BLADE TO PUNCTURE OR CUT.: Brand: BARD-PARKER ® STAINLESS STEEL BLADES

## (undated) DEVICE — SUT VIC 0 CT1 36IN J946H

## (undated) DEVICE — SUT VIC 2/0 CT1 CR8 18IN J839D

## (undated) DEVICE — GLV SURG NEOLON 2G PF LF 7.5 STRL

## (undated) DEVICE — NDL HYPO PRECISIONGLIDE/REG 18G 1IN PNK

## (undated) DEVICE — PUMP PAIN AUTOFUSER AUTO SELCT NOBOLUS 1TO14ML/HR 550ML DISP

## (undated) DEVICE — DECANT BG O JET

## (undated) DEVICE — SYR CONTRL LUERLOK 10CC

## (undated) DEVICE — ELECTRD BLD EZ CLN STD 4IN

## (undated) DEVICE — IMMOB KN 3PNL DLX CANVS 19IN BLU

## (undated) DEVICE — MAT FLR ABSORBENT LG 4FT 10 2.5FT

## (undated) DEVICE — ADAPT CLN BIOGUARD AIR/H2O DISP

## (undated) DEVICE — GLV SURG SENSICARE MICRO PF LF 7.5 STRL

## (undated) DEVICE — DRSNG TELFA PAD NONADH STR 1S 3X8IN

## (undated) DEVICE — SUCTION CANISTER, 3000CC,SAFELINER: Brand: DEROYAL

## (undated) DEVICE — KT CATH NERV BLCK ONQ QUIKBLCK 20GA 100MM

## (undated) DEVICE — PENCL E/S ULTRAVAC TELESCP NOSE HOLSTR 10FT

## (undated) DEVICE — FRAZIER SURGICAL SUCTION INSTRUMENT: Brand: ARGYLE

## (undated) DEVICE — PK KN TOTL 90

## (undated) DEVICE — GOWN ISOL W/THUMB UNIV BLU BX/15

## (undated) DEVICE — MASK,FACE,FLUID RESIST,SHLD,EARLOOP: Brand: MEDLINE

## (undated) DEVICE — SYR LL 3CC

## (undated) DEVICE — APPL CHLORAPREP W/TINT 26ML ORNG

## (undated) DEVICE — NDL SPINE 18G 31/2IN PNK

## (undated) DEVICE — TRAP FLD MINIVAC MEGADYNE 100ML

## (undated) DEVICE — INTENDED USE FOR SURGICAL MARKING ON INTACT SKIN, ALSO PROVIDES A PERMANENT METHOD OF IDENTIFYING OBJECTS IN THE OPERATING ROOM: Brand: WRITESITE® REGULAR TIP SKIN MARKER

## (undated) DEVICE — COLD THERAPY BLANKET: Brand: DEROYAL